# Patient Record
Sex: FEMALE | Race: WHITE | NOT HISPANIC OR LATINO | Employment: OTHER | URBAN - METROPOLITAN AREA
[De-identification: names, ages, dates, MRNs, and addresses within clinical notes are randomized per-mention and may not be internally consistent; named-entity substitution may affect disease eponyms.]

---

## 2017-01-13 DIAGNOSIS — R94.39 ABNORMAL STRESS ECHOCARDIOGRAM: ICD-10-CM

## 2017-01-13 NOTE — PROGRESS NOTES
Patient has abnormal wall motion on DSE and has a family history of heart disease (premature). We will proceed with PET stress for further evaluation and then see her back in clinic.

## 2017-01-16 ENCOUNTER — TELEPHONE (OUTPATIENT)
Dept: CARDIOLOGY | Facility: CLINIC | Age: 62
End: 2017-01-16

## 2017-01-16 NOTE — TELEPHONE ENCOUNTER
----- Message from Citlaly Robison MD sent at 1/13/2017  4:50 PM CST -----  I have reviewed the results.I put in a noted for her stress test. Her baseline heart fucntion is 45-50% and she has family history of CAD. I would like to proceed with PET stress (Dobutamine) and then see her back in clinic for discussion of med adjustments. Please call her and let her know.     Please open telephone encounter, call patient, document conversation, and close encounter. Close encounter unless further questions for me.

## 2017-02-02 ENCOUNTER — TELEPHONE (OUTPATIENT)
Dept: CARDIOLOGY | Facility: CLINIC | Age: 62
End: 2017-02-02

## 2017-02-02 NOTE — TELEPHONE ENCOUNTER
Called to confirm appointment for Nuclear Cardiology PET stress test.   Message left with pre-testing instructions & call back information.

## 2017-02-03 ENCOUNTER — CLINICAL SUPPORT (OUTPATIENT)
Dept: CARDIOLOGY | Facility: CLINIC | Age: 62
End: 2017-02-03
Payer: COMMERCIAL

## 2017-02-03 DIAGNOSIS — R94.39 ABNORMAL STRESS ECHOCARDIOGRAM: ICD-10-CM

## 2017-02-03 PROCEDURE — 93015 CV STRESS TEST SUPVJ I&R: CPT | Mod: S$GLB,,, | Performed by: INTERNAL MEDICINE

## 2017-02-03 PROCEDURE — 78492 MYOCRD IMG PET MLT RST&STRS: CPT | Mod: S$GLB,,, | Performed by: INTERNAL MEDICINE

## 2017-02-03 PROCEDURE — A9555 RB82 RUBIDIUM: HCPCS | Mod: S$GLB,,, | Performed by: INTERNAL MEDICINE

## 2017-02-06 ENCOUNTER — TELEPHONE (OUTPATIENT)
Dept: CARDIOLOGY | Facility: CLINIC | Age: 62
End: 2017-02-06

## 2017-02-06 NOTE — TELEPHONE ENCOUNTER
----- Message from Citlaly Robison MD sent at 2/5/2017  9:18 AM CST -----  I have reviewed the results. Please call her and let her know her PET stress is normal. I will be seeing her next week also.    Please open telephone encounter, call patient, document conversation, and close encounter. Close encounter unless further questions for me.

## 2017-03-30 ENCOUNTER — HOSPITAL ENCOUNTER (OUTPATIENT)
Dept: PULMONOLOGY | Facility: CLINIC | Age: 62
Discharge: HOME | End: 2017-03-30
Payer: COMMERCIAL

## 2017-03-30 ENCOUNTER — TELEPHONE (OUTPATIENT)
Dept: TRANSPLANT | Facility: CLINIC | Age: 62
End: 2017-03-30

## 2017-03-30 ENCOUNTER — OFFICE VISIT (OUTPATIENT)
Dept: CARDIOLOGY | Facility: CLINIC | Age: 62
End: 2017-03-30
Payer: COMMERCIAL

## 2017-03-30 ENCOUNTER — OFFICE VISIT (OUTPATIENT)
Dept: PULMONOLOGY | Facility: CLINIC | Age: 62
End: 2017-03-30
Payer: COMMERCIAL

## 2017-03-30 VITALS
HEIGHT: 71 IN | HEART RATE: 88 BPM | DIASTOLIC BLOOD PRESSURE: 68 MMHG | WEIGHT: 194.25 LBS | BODY MASS INDEX: 27.19 KG/M2 | SYSTOLIC BLOOD PRESSURE: 118 MMHG

## 2017-03-30 VITALS
HEART RATE: 88 BPM | WEIGHT: 193.56 LBS | DIASTOLIC BLOOD PRESSURE: 72 MMHG | BODY MASS INDEX: 27.1 KG/M2 | OXYGEN SATURATION: 97 % | SYSTOLIC BLOOD PRESSURE: 140 MMHG | HEIGHT: 71 IN

## 2017-03-30 VITALS — BODY MASS INDEX: 28.58 KG/M2 | WEIGHT: 193 LBS | HEIGHT: 69 IN

## 2017-03-30 DIAGNOSIS — I27.20 PULMONARY HYPERTENSION: Primary | ICD-10-CM

## 2017-03-30 DIAGNOSIS — I27.20 PULMONARY HYPERTENSION: ICD-10-CM

## 2017-03-30 DIAGNOSIS — E89.0 POSTOPERATIVE HYPOTHYROIDISM: ICD-10-CM

## 2017-03-30 DIAGNOSIS — M35.00 SJOGREN'S SYNDROME: ICD-10-CM

## 2017-03-30 DIAGNOSIS — I73.00 RAYNAUD'S DISEASE WITHOUT GANGRENE: ICD-10-CM

## 2017-03-30 DIAGNOSIS — I50.32 CHRONIC DIASTOLIC HEART FAILURE: ICD-10-CM

## 2017-03-30 DIAGNOSIS — M34.1 CREST (CALCINOSIS, RAYNAUD'S PHENOMENON, ESOPHAGEAL DYSFUNCTION, SCLERODACTYLY, TELANGIECTASIA): ICD-10-CM

## 2017-03-30 DIAGNOSIS — E78.2 MIXED HYPERLIPIDEMIA: ICD-10-CM

## 2017-03-30 DIAGNOSIS — R94.39 ABNORMAL STRESS ECHOCARDIOGRAM: Primary | ICD-10-CM

## 2017-03-30 DIAGNOSIS — J84.10 PULMONARY FIBROSIS: ICD-10-CM

## 2017-03-30 LAB
PRE FEV1 FVC: 85
PRE FEV1: 2.31
PRE FVC: 2.71
PREDICTED FEV1 FVC: 77
PREDICTED FEV1: 2.77
PREDICTED FVC: 3.53

## 2017-03-30 PROCEDURE — 99999 PR PBB SHADOW E&M-EST. PATIENT-LVL III: CPT | Mod: PBBFAC,,, | Performed by: INTERNAL MEDICINE

## 2017-03-30 PROCEDURE — 94010 BREATHING CAPACITY TEST: CPT | Mod: 59,S$GLB,, | Performed by: INTERNAL MEDICINE

## 2017-03-30 PROCEDURE — 99204 OFFICE O/P NEW MOD 45 MIN: CPT | Mod: 25,S$GLB,, | Performed by: INTERNAL MEDICINE

## 2017-03-30 PROCEDURE — 1160F RVW MEDS BY RX/DR IN RCRD: CPT | Mod: S$GLB,,, | Performed by: INTERNAL MEDICINE

## 2017-03-30 PROCEDURE — 99214 OFFICE O/P EST MOD 30 MIN: CPT | Mod: S$GLB,,, | Performed by: INTERNAL MEDICINE

## 2017-03-30 PROCEDURE — 94727 GAS DIL/WSHOT DETER LNG VOL: CPT | Mod: S$GLB,,, | Performed by: INTERNAL MEDICINE

## 2017-03-30 PROCEDURE — 94620 PR PULMONARY STRESS TESTING,SIMPLE: CPT | Mod: S$GLB,,, | Performed by: INTERNAL MEDICINE

## 2017-03-30 PROCEDURE — 94729 DIFFUSING CAPACITY: CPT | Mod: S$GLB,,, | Performed by: INTERNAL MEDICINE

## 2017-03-30 RX ORDER — RISEDRONATE SODIUM 150 MG/1
150 TABLET, FILM COATED ORAL WEEKLY
Refills: 6 | COMMUNITY
Start: 2017-03-23 | End: 2021-03-19

## 2017-03-30 RX ORDER — CARVEDILOL 12.5 MG/1
12.5 TABLET ORAL 2 TIMES DAILY WITH MEALS
Qty: 60 TABLET | Refills: 6 | Status: SHIPPED | OUTPATIENT
Start: 2017-03-30 | End: 2017-05-30 | Stop reason: SDUPTHER

## 2017-03-30 NOTE — MR AVS SNAPSHOT
[facility] - Pulmonary Services  [address]  [facility] [address]  Phone: [phone]                  Fatimah Jonesboscq   3/30/2017 9:30 AM   Office Visit    Description:  Female : 1955   Provider:  Wilma Briones MD   Department:  [facility]           Diagnoses this Visit        Comments    Pulmonary hypertension    -  Primary     Pulmonary fibrosis         CREST (calcinosis, Raynaud's phenomenon, esophageal dysfunction, sclerodactyly, telangiectasia)         Sjogren's syndrome         Raynaud's disease without gangrene                To Do List           Goals (5 Years of Data)     None      [facility]     [facility] -  Assistance  Unless otherwise directed by your provider, please contact [facility], our nurse care line that is available for  assistance.     Registered nurses in the [facility] provide: appointment scheduling, clinical advisement, health education, and other advisory services.  Call: [phone] (toll free)               Medications           Message regarding Medications     Verify the changes and/or additions to your medication regime listed below are the same as discussed with your clinician today.  If any of these changes or additions are incorrect, please notify your healthcare provider.             Verify that the below list of medications is an accurate representation of the medications you are currently taking.  If none reported, the list may be blank. If incorrect, please contact your healthcare provider. Carry this list with you in case of emergency.           Current Medications     albuterol-ipratropium 2.5mg-0.5mg/3mL (DUO-NEB) 0.5 mg-3 mg(2.5 mg base)/3 mL nebulizer solution ONE VIAL in Nebulizer EVERY SIX HOURS AS NEEDED FOR SHORTNESS OF BREATH    ambrisentan (LETAIRIS) 10 MG Tab Take 10 mg by mouth once daily.    atorvastatin (LIPITOR) 10 MG tablet Take 10 mg by mouth once daily.      "azathioprine (IMURAN) 50 mg Tab Take 125 mg by mouth once daily.     BIOTIN ORAL Take 500 mcg by mouth once daily.    budesonide (PULMICORT) 0.5 mg/2 mL nebulizer solution ONE VIAL IN Nebulizer EVERY DAY    CALCIUM CARBONATE/VITAMIN D3 (CALCIUM 500 + D, D3, ORAL) Take by mouth once daily.    DYMISTA 137-50 mcg/spray Spry nassal spray 1 spray by Each Nare route 2 (two) times daily.    folic acid (FOLVITE) 1 MG tablet TWO TABLETS BY MOUTH EVERY DAY    furosemide (LASIX) 40 MG tablet every Mon, Wed, Fri.     gabapentin (NEURONTIN) 600 MG tablet Take by mouth. 2 tablets am and 1/2 tablet pm    KLOR-CON 8 mEq TbSR Take 8 mEq by mouth every Mon, Wed, Fri.     levocetirizine (XYZAL) 5 MG tablet Take 5 mg by mouth every evening.     lorazepam (ATIVAN) 1 MG tablet 1 mg 2 (two) times daily.     multivitamin capsule Take 1 capsule by mouth once daily.    nifedipine (PROCARDIA) 10 MG Cap 10 mg once daily.     NON FORMULARY MEDICATION once daily. AnG    pantoprazole (PROTONIX) 40 MG tablet Take 40 mg by mouth once daily.     predniSONE (DELTASONE) 1 MG tablet Take 2 mg by mouth once daily.    predniSONE (DELTASONE) 5 MG tablet Take 5 mg by mouth once daily.     TIROSINT 137 mcg Cap once daily.     tizanidine (ZANAFLEX) 4 MG tablet every 8 (eight) hours as needed.     VITAMIN D2 50,000 unit capsule 50,000 Units every 30 days.     XOPENEX HFA 45 mcg/actuation inhaler Inhale 1 puff into the lungs every 4 (four) hours as needed.     carvedilol (COREG) 12.5 MG tablet Take 1 tablet (12.5 mg total) by mouth 2 (two) times daily with meals.    risedronate (ACTONEL) 150 MG Tab Take 150 mg by mouth every 30 days.           Clinical Reference Information           Your Vitals Were     BP Pulse Height Weight SpO2 BMI    140/72 88 5' 11" (1.803 m) 87.8 kg (193 lb 9 oz) 97% 27 kg/m2      Blood Pressure          Most Recent Value    BP  (!)  140/72      Allergies as of 3/30/2017     No Known Allergies      Immunizations Administered on Date of " Encounter - 3/30/2017     None      Orders Placed During Today's Visit     Future Labs/Procedures Expected by Expires    CT Chest Without Contrast  3/30/2017 3/30/2018    DLCO-Carbon Monoxide Diffusing Capacity  3/30/2017 3/30/2018    LUNG VOLUMES  3/30/2017 3/30/2018    Spirometry without Bronchodilator  3/30/2017 3/30/2018    Stress test, pulmonary  As directed 3/30/2018      [facility] Sign-Up     Activating your [facility] account is as easy as 1-2-3!     1) Visit [email or web address], select Sign Up Now, enter this activation code and your date of birth, then select Next.  [email or web address]  Expires: 5/14/2017  8:01 AM      2) Create a username and password to use when you visit [facility] in the future and select a security question in case you lose your password and select Next.    3) Enter your e-mail address and click Sign Up!    Additional Information  If you have questions, please e-mail [email or web address] or call [phone] to talk to our [facility] staff. Remember, [facility] is NOT to be used for urgent needs. For medical emergencies, dial 911.         Instructions    Open lung biopsy report/ and slides    Disk with the last 2-3 CT scans chest    Last few pulmonary function test/6mwd results               Language Assistance Services     ATTENTION: Language assistance services are available, free of charge. Please call [phone].      ATENCIÓN: Si habla español, tiene a ascencio disposición servicios gratuitos de asistencia lingüística. Llame al [phone].     CHÚ Ý: N?u b?n nói Ti?ng Vi?t, có các d?ch v? h? tr? ngôn ng? mi?n phí dành cho b?n. G?i s? [phone].         [facility] complies with applicable Federal civil rights laws and does not discriminate on the basis of race, color, national origin, age, disability, or sex.

## 2017-03-30 NOTE — PROGRESS NOTES
Subjective:       Patient ID: Fatimah Cha is a 61 y.o. female.    Chief Complaint: No chief complaint on file.    HPI   Fatimah Cha 61 y.o. female    has a past medical history of Diabetes mellitus; Hypercholesteremia; Hypertension; Hypothyroidism; Interstitial lung disease; Osteopenia; Raynaud disease; and Sjoegren syndrome.    has a past surgical history that includes foot surgery x2; Tubal ligation; Breast biopsy; lumpectomy (benign); and Lung biopsy.   reports that she quit smoking about 38 years ago. Her smoking use included Cigarettes. She has a 5.00 pack-year smoking history. She has never used smokeless tobacco. She reports that she does not use illicit drugs.  Referred by: Dr. Citlaly Robison  Who had no chief complaint listed for this encounter.  The patient's last visit with me was on Visit date not found.    Patient referred by Dr Robison for second opinion for ILD and PH  Patient states she is doing well, has been stable for many years, continues to work.     Diagnosed with thyroid dz Hashimotos at age 29- strong family hx  2001- had raynauds, eventually dx with CREST  2004- dx with Sjogren's   Approximated 4 years ago, was evaluated for ild after collapsing at work. Workup showed ILD and subsequently PH by [facility]  Started on Letairis 4 years ago  2013 had OLB  Has noted no change in sx  Has also been on immuran 150 and 7mg prednisone  Gets pfts 2x per year, with 6mwd  Last ct chest last year  Last [facility] 2014 or 2015  Exercises on her gazelle for 30 min per day  Recently had FLU 2/2017    Review of Systems   Respiratory: Positive for dyspnea on extertion.    All other systems reviewed and are negative.      Objective:      Physical Exam   Constitutional: She is oriented to person, place, and time. She appears well-developed and well-nourished. She appears not cachectic. No distress. She is not obese.   HENT:   Head: Normocephalic.   Nose: Nose normal. No mucosal edema.   Mouth/Throat: Normal  dentition. No oropharyngeal exudate.   Neck: Normal range of motion. Neck supple. No tracheal deviation present.   Cardiovascular: Normal rate, regular rhythm, normal heart sounds and intact distal pulses.  Exam reveals no gallop and no friction rub.    No murmur heard.  Accentuated p2, pasp ~40-50   Pulmonary/Chest: Normal expansion, symmetric chest wall expansion, effort normal and breath sounds normal. No stridor.   Abdominal: Soft. Bowel sounds are normal.   Musculoskeletal: Normal range of motion. She exhibits no edema, tenderness or deformity.   Lymphadenopathy: No supraclavicular adenopathy is present.     She has no cervical adenopathy.   Neurological: She is alert and oriented to person, place, and time. No cranial nerve deficit. Gait normal.   Skin: Skin is warm and dry. No rash noted. She is not diaphoretic. No cyanosis or erythema. No pallor. Nails show no clubbing.   telagiectasias   Psychiatric: She has a normal mood and affect. Her behavior is normal. Judgment and thought content normal.     Personal Diagnostic Review    Pulmonary Function Tests 3/30/2017   Ordering Provider MD Anselmo.   Performing nurse/tech/RT RADHA Diana.   Diagnosis Pulmonary Hypertension   Height 69   Weight 3088   BMI (Calculated) 28.6   Patient Race    6MWT Status not completed   Patient Reported Dyspnea   Was O2 used? No   6MW Distance walked (feet) 400   Distance walked (meters) 121.92   Did patient stop? Yes   How many times? 1   Stop Time 1 139   Did patient restart? No   Type of assistive device(s) used? no assistive devices   Oxygen Saturation 93   Supplemental Oxygen Room Air   Heart Rate 93   Blood Pressure 133/78   Kushal Dyspnea Rating  moderate   Oxygen Saturation 78   Supplemental Oxygen Room Air   Heart Rate 111   Blood Pressure 139/65   Kushal Dyspnea Rating  somewhat heavy   Recovery Time (seconds) 151   Oxygen Saturation 92   Supplemental Oxygen Room Air   Heart Rate 95   Is procedure ready for interpretation?  Yes         Assessment:       1. Pulmonary hypertension    2. Pulmonary fibrosis    3. CREST (calcinosis, Raynaud's phenomenon, esophageal dysfunction, sclerodactyly, telangiectasia)    4. Sjogren's syndrome    5. Raynaud's disease without gangrene        Outpatient Encounter Prescriptions as of 3/30/2017   Medication Sig Dispense Refill    albuterol-ipratropium 2.5mg-0.5mg/3mL (DUO-NEB) 0.5 mg-3 mg(2.5 mg base)/3 mL nebulizer solution ONE VIAL in Nebulizer EVERY SIX HOURS AS NEEDED FOR SHORTNESS OF BREATH  5    ambrisentan (LETAIRIS) 10 MG Tab Take 10 mg by mouth once daily.      atorvastatin (LIPITOR) 10 MG tablet Take 10 mg by mouth once daily.       azathioprine (IMURAN) 50 mg Tab Take 125 mg by mouth once daily.       BIOTIN ORAL Take 500 mcg by mouth once daily.      budesonide (PULMICORT) 0.5 mg/2 mL nebulizer solution ONE VIAL IN Nebulizer EVERY DAY  5    CALCIUM CARBONATE/VITAMIN D3 (CALCIUM 500 + D, D3, ORAL) Take by mouth once daily.      carvedilol (COREG) 12.5 MG tablet Take 1 tablet (12.5 mg total) by mouth 2 (two) times daily with meals. 60 tablet 6    DYMISTA 137-50 mcg/spray Spry nassal spray 1 spray by Each Nare route 2 (two) times daily.  6    folic acid (FOLVITE) 1 MG tablet TWO TABLETS BY MOUTH EVERY DAY  1    furosemide (LASIX) 40 MG tablet every Mon, Wed, Fri.       KLOR-CON 8 mEq TbSR Take 8 mEq by mouth every Mon, Wed, Fri.       levocetirizine (XYZAL) 5 MG tablet Take 5 mg by mouth every evening.       lorazepam (ATIVAN) 1 MG tablet 1 mg 2 (two) times daily.       multivitamin capsule Take 1 capsule by mouth once daily.      nifedipine (PROCARDIA) 10 MG Cap 10 mg once daily.       NON FORMULARY MEDICATION once daily. AnG      pantoprazole (PROTONIX) 40 MG tablet Take 40 mg by mouth once daily.       predniSONE (DELTASONE) 1 MG tablet Take 2 mg by mouth once daily.  1    predniSONE (DELTASONE) 5 MG tablet Take 5 mg by mouth once daily.       TIROSINT 137 mcg Cap once  daily.       tizanidine (ZANAFLEX) 4 MG tablet every 8 (eight) hours as needed.       VITAMIN D2 50,000 unit capsule 50,000 Units every 30 days.       XOPENEX HFA 45 mcg/actuation inhaler Inhale 1 puff into the lungs every 4 (four) hours as needed.       gabapentin (NEURONTIN) 600 MG tablet Take by mouth. 2 tablets am and 1/2 tablet pm  5    risedronate (ACTONEL) 150 MG Tab Take 150 mg by mouth every 30 days.  6     No facility-administered encounter medications on file as of 3/30/2017.      Orders Placed This Encounter   Procedures    CT Chest Without Contrast     Standing Status:   Future     Standing Expiration Date:   3/30/2018    LUNG VOLUMES     Standing Status:   Future     Number of Occurrences:   1     Standing Expiration Date:   3/30/2018    Spirometry without Bronchodilator     Standing Status:   Future     Number of Occurrences:   1     Standing Expiration Date:   3/30/2018    DLCO-Carbon Monoxide Diffusing Capacity     Standing Status:   Future     Number of Occurrences:   1     Standing Expiration Date:   3/30/2018    Stress test, pulmonary     Standing Status:   Future     Number of Occurrences:   1     Standing Expiration Date:   3/30/2018     Plan:           Diagnoses and all orders for this visit:    Pulmonary hypertension  -     CT Chest Without Contrast; Future  -     LUNG VOLUMES; Future  -     Spirometry without Bronchodilator; Future  -     DLCO-Carbon Monoxide Diffusing Capacity; Future  -     Stress test, pulmonary; Future    Pulmonary fibrosis    CREST (calcinosis, Raynaud's phenomenon, esophageal dysfunction, sclerodactyly, telangiectasia)    Sjogren's syndrome    Raynaud's disease without gangrene       I personally reviewed the      1. Echo report stress echo with pasp 20's  2. PFT pending  3. 6MWD pending  4. CXR pending  5. CXR report pending  6. CT chest pending   7. CT chest report pending    Assessment:  ILD  PH  Sjogrens  CREST    Plan:  Reevaluate workup with ct, pfts,  6mwd  Patient to bring us OLB slides and ct scans, pfts  Continue current therapy for now  May need to consider additional therapy for PAH  Patient also seeing Rheumatology here now    Return in about 4 weeks (around 4/27/2017).    Patient Instructions   Open lung biopsy report/ and slides    Disk with the last 2-3 CT scans chest    Last few pulmonary function test/6mwd results                There is no immunization history on file for this patient.

## 2017-03-30 NOTE — PROGRESS NOTES
Subjective:    Patient ID:  Fatimah Cha is a 61 y.o. female who presents for follow-up of Palpitations      HPI     Patient was hospitalized feb 6 for the flu for 2 days.  She has dyspnea on exertion with moderate exertion.  LE edema more pronounce at the end of the day.    When she moves to fast she has a fast higher HR. She does not have palpitations with rest.    Review of Systems   Constitution: Negative.   HENT: Negative.    Eyes: Negative.    Cardiovascular: Positive for dyspnea on exertion, irregular heartbeat, leg swelling and palpitations.   Respiratory: Positive for cough.    Endocrine: Negative.    Skin: Negative.    Musculoskeletal: Negative.         Objective:    Physical Exam   Constitutional: She appears well-developed and well-nourished. She is cooperative.        HENT:   Mouth/Throat: Oropharynx is clear and moist and mucous membranes are normal. Mucous membranes are not cyanotic.   Eyes: Conjunctivae and lids are normal.   Neck: No hepatojugular reflux and no JVD present.   Cardiovascular: Normal rate and regular rhythm.  PMI is not displaced.    Pulses:       Radial pulses are 2+ on the right side, and 2+ on the left side.        Posterior tibial pulses are 2+ on the right side, and 2+ on the left side.   Pulmonary/Chest: Effort normal and breath sounds normal.   No kyphosis or scoliosis.   Abdominal: Normal appearance and bowel sounds are normal. There is no tenderness.   Musculoskeletal:        Right upper leg: She exhibits no edema.        Left upper leg: She exhibits no edema.        Right lower leg: She exhibits no edema.        Left lower leg: She exhibits no edema.   Yes limitations in exercise ability.  Varicose veins.   Neurological: She is alert. She has normal strength.   Skin: Skin is warm and dry. No cyanosis. Nails show no clubbing.   Psychiatric: She has a normal mood and affect.   Test(s) Reviewed  I have reviewed the following in detail:  [x] Stress test   [] Angiography    [x] Echocardiogram   [x] Labs   [] Other:               Assessment:       Abnormal stress echocardiogram  No obstructive coronary disease as per PET stress. It did show endothelial dysfunction.    We will start coreg 12.5 mg twice a day. She will start half a pill twice a day first and then increase gradually.    Pt information handout given and reviewed    Mixed hyperlipidemia  Last lipid profile from 7/19/2016  Total chol 140  HDL 53    LDl 63    She is on Lipitor 10 mg once a day    Chronic diastolic heart failure  She is on Lasix 40 mg MWF. Some day when she takes her medications her BP is low. We will have to monitor with the starting of coreg.    EF of 45-50%    holter showed pvc no arrhythmia    Pulmonary fibrosis  She is on Letaris, prednisone (7 mg maintained) and imuran 75 mg twice a day.    She need to see our pulmonary hypertension clinic  On our echo here our pulmonary pressures were estimated at normal , with normal diastolic function and normal Rv systolic function.    Postoperative hypothyroidism  She is followed by Dr. Madrid on the [facility] . Her TSH is 0.65    Sjogren's syndrome  Consult Rheumatology    CREST (calcinosis, Raynaud's phenomenon, esophageal dysfunction, sclerodactyly, telangiectasia)  Consult rheumatology.         Plan:         Medications Ordered This Encounter      carvedilol (COREG) 12.5 MG tablet   Orders Placed This Encounter   Procedures    Ambulatory consult to Rheumatology    Ambulatory consult to Pulmonology    2D Echo w/ Color Flow Doppler           Follow up in 6 months

## 2017-03-30 NOTE — PATIENT INSTRUCTIONS
Open lung biopsy report/ and slides    Disk with the last 2-3 CT scans chest    Last few pulmonary function test/6mwd results

## 2017-03-30 NOTE — MR AVS SNAPSHOT
[facility]  [address]  [facility] [address]  Phone: [phone]                  Fatimah Jonesboscq   3/30/2017 8:00 AM   Office Visit    Description:  Female : 1955   Provider:  Citlaly Robison MD   Department:  [facility]           Reason for Visit     Palpitations           Diagnoses this Visit        Comments    Abnormal stress echocardiogram    -  Primary     Mixed hyperlipidemia         Chronic diastolic heart failure         Pulmonary fibrosis         Postoperative hypothyroidism         Sjogren's syndrome         CREST (calcinosis, Raynaud's phenomenon, esophageal dysfunction, sclerodactyly, telangiectasia)                To Do List           Goals (5 Years of Data)     None      Follow-Up and Disposition     Return in about 6 months (around 2017).    Follow-up and Disposition History       These Medications        Disp Refills Start End    carvedilol (COREG) 12.5 MG tablet 60 tablet 6 3/30/2017     Take 1 tablet (12.5 mg total) by mouth 2 (two) times daily with meals. - Oral    Pharmacy: [facility] - [place] [address] Ph #: [phone]         [facility]     [facility] Nurse Care Line -  Assistance  Unless otherwise directed by your provider, please contact [facility], our nurse care line that is available for  assistance.     Registered nurses in the [facility] provide: appointment scheduling, clinical advisement, health education, and other advisory services.  Call: [phone] (toll free)               Medications           Message regarding Medications     Verify the changes and/or additions to your medication regime listed below are the same as discussed with your clinician today.  If any of these changes or additions are incorrect, please notify your healthcare provider.        START taking these NEW medications        Refills    carvedilol (COREG) 12.5 MG tablet 6     Sig: Take 1 tablet (12.5 mg total) by mouth 2 (two) times daily with meals.    Class: Normal    Route: Oral           Verify that the below list of medications is an accurate representation of the medications you are currently taking.  If none reported, the list may be blank. If incorrect, please contact your healthcare provider. Carry this list with you in case of emergency.           Current Medications     albuterol-ipratropium 2.5mg-0.5mg/3mL (DUO-NEB) 0.5 mg-3 mg(2.5 mg base)/3 mL nebulizer solution ONE VIAL in Nebulizer EVERY SIX HOURS AS NEEDED FOR SHORTNESS OF BREATH    ambrisentan (LETAIRIS) 10 MG Tab Take 10 mg by mouth once daily.    atorvastatin (LIPITOR) 10 MG tablet Take 10 mg by mouth once daily.     azathioprine (IMURAN) 50 mg Tab Take 125 mg by mouth once daily.     BIOTIN ORAL Take 500 mcg by mouth once daily.    budesonide (PULMICORT) 0.5 mg/2 mL nebulizer solution ONE VIAL IN Nebulizer EVERY DAY    CALCIUM CARBONATE/VITAMIN D3 (CALCIUM 500 + D, D3, ORAL) Take by mouth once daily.    DYMISTA 137-50 mcg/spray Spry nassal spray 1 spray by Each Nare route 2 (two) times daily.    folic acid (FOLVITE) 1 MG tablet TWO TABLETS BY MOUTH EVERY DAY    furosemide (LASIX) 40 MG tablet every Mon, Wed, Fri.     gabapentin (NEURONTIN) 600 MG tablet Take by mouth. 2 tablets am and 1/2 tablet pm    KLOR-CON 8 mEq TbSR Take 8 mEq by mouth every Mon, Wed, Fri.     levocetirizine (XYZAL) 5 MG tablet Take 5 mg by mouth every evening.     lorazepam (ATIVAN) 1 MG tablet 1 mg 2 (two) times daily.     multivitamin capsule Take 1 capsule by mouth once daily.    nifedipine (PROCARDIA) 10 MG Cap 10 mg once daily.     NON FORMULARY MEDICATION once daily. AnG    pantoprazole (PROTONIX) 40 MG tablet Take 40 mg by mouth once daily.     predniSONE (DELTASONE) 1 MG tablet Take 2 mg by mouth once daily.    predniSONE (DELTASONE) 5 MG tablet Take 5 mg by mouth once daily.     TIROSINT 137 mcg Cap once daily.     tizanidine  "(ZANAFLEX) 4 MG tablet every 8 (eight) hours as needed.     VITAMIN D2 50,000 unit capsule 50,000 Units every 30 days.     XOPENEX HFA 45 mcg/actuation inhaler Inhale 1 puff into the lungs every 4 (four) hours as needed.     carvedilol (COREG) 12.5 MG tablet Take 1 tablet (12.5 mg total) by mouth 2 (two) times daily with meals.           Clinical Reference Information           Your Vitals Were     BP Pulse Height Weight BMI    118/68 88 5' 11" (1.803 m) 88.1 kg (194 lb 3.6 oz) 27.09 kg/m2      Blood Pressure          Most Recent Value    Right Arm BP - Sitting  120/80    Left Arm BP - Sitting  118/68    BP  118/68      Allergies as of 3/30/2017     No Known Allergies      Immunizations Administered on Date of Encounter - 3/30/2017     None      Orders Placed During Today's Visit      Normal Orders This Visit    Ambulatory consult to Pulmonology     Ambulatory consult to Rheumatology     Future Labs/Procedures Expected by Expires    2D Echo w/ Color Flow Doppler  9/30/2017 3/30/2018      [facility] Sign-Up     Activating your [facility] account is as easy as 1-2-3!     1) Visit [email or web address], select Sign Up Now, enter this activation code and your date of birth, then select Next.  [email or web address]  Expires: 5/14/2017  8:01 AM      2) Create a username and password to use when you visit [facility] in the future and select a security question in case you lose your password and select Next.    3) Enter your e-mail address and click Sign Up!    Additional Information  If you have questions, please e-mail [email or web address] or call [phone] to talk to our [facility] staff. Remember, [facility] is NOT to be used for urgent needs. For medical emergencies, dial 911.         Language Assistance Services     ATTENTION: Language assistance services are available, free of charge. Please call [phone].      ATENCIÓN: Si habla español, tiene a ascencio disposición servicios gratuitos de asistencia lingüística. Llame al " [phone].     VILLA Ý: N?u b?n nói Ti?ng Vi?t, có các d?ch v? h? tr? ngôn ng? mi?n phí dành cho b?n. G?i s? [phone].         [facility] complies with applicable Federal civil rights laws and does not discriminate on the basis of race, color, national origin, age, disability, or sex.

## 2017-03-30 NOTE — ASSESSMENT & PLAN NOTE
Last lipid profile from 7/19/2016  Total chol 140  HDL 53    LDl 63    She is on Lipitor 10 mg once a day

## 2017-03-30 NOTE — ASSESSMENT & PLAN NOTE
She is on Lasix 40 mg MWF. Some day when she takes her medications her BP is low. We will have to monitor with the starting of coreg.    EF of 45-50%    holter showed pvc no arrhythmia

## 2017-03-30 NOTE — ASSESSMENT & PLAN NOTE
No obstructive coronary disease as per PET stress. It did show endothelial dysfunction.    We will start coreg 12.5 mg twice a day. She will start half a pill twice a day first and then increase gradually.    Pt information handout given and reviewed

## 2017-03-30 NOTE — ASSESSMENT & PLAN NOTE
She is on Letaris, prednisone (7 mg maintained) and imuran 75 mg twice a day.    She need to see our pulmonary hypertension clinic  On our echo here our pulmonary pressures were estimated at normal , with normal diastolic function and normal Rv systolic function.

## 2017-03-30 NOTE — LETTER
March 30, 2017      Citlaly Robison MD  [address] [facility] [address]           [facility]  [address] [facility]  [facility] [address]  Phone: [phone]          Patient: Fatimah Cha   MR Number: [id number]   YOB: 1955   Date of Visit: 3/30/2017       Dear Dr. Citlaly Robison:    Thank you for referring Fatimah Cha to me for evaluation. Attached you will find relevant portions of my assessment and plan of care.    If you have questions, please do not hesitate to call me. I look forward to following Fatimah Cha along with you.    Sincerely,    Wilma Briones MD    Enclosure  CC:  No Recipients    If you would like to receive this communication electronically, please contact [email or web address] or [phone] to request more information on [facility] access.    For providers and/or their staff who would like to refer a patient to [facility], please contact us through our one-stop-shop provider referral line, [facility], at [phone].    If you feel you have received this communication in error or would no longer like to receive these types of communications, please e-mail [email or web address]

## 2017-03-30 NOTE — TELEPHONE ENCOUNTER
----- Message from Elizabeth Bland sent at 3/30/2017 11:31 AM CDT -----      ----- Message -----     From: Aye Perdomo MA     Sent: 3/30/2017   8:26 AM       To: [facility] Pre-Heart Transplant Clinical    Dr Robison would like this patient to be seen in Pulmonary Hypertension.  She says if anyone has a problem to call her Dr Robison.  Thanks

## 2017-03-31 ENCOUNTER — HOSPITAL ENCOUNTER (OUTPATIENT)
Dept: RADIOLOGY | Facility: HOSPITAL | Age: 62
Discharge: HOME | End: 2017-03-31
Attending: INTERNAL MEDICINE
Payer: COMMERCIAL

## 2017-03-31 DIAGNOSIS — I27.20 PULMONARY HYPERTENSION: ICD-10-CM

## 2017-03-31 PROCEDURE — 71250 CT THORAX DX C-: CPT | Mod: TC

## 2017-03-31 PROCEDURE — 71250 CT THORAX DX C-: CPT | Mod: 26,,, | Performed by: RADIOLOGY

## 2017-03-31 NOTE — PROCEDURES
Fatimah Cha is a 61 y.o.  female patient, who presents for a 6 minute walk test ordered by Marlena Briones MD.  The diagnosis is Pulmonary Hypertension.  The patient's BMI is 28.6 kg/m2.  Predicted distance (lower limit of normal) is 338.08 meters.      Test Results:    The test was not completed due to shortness of breath.  The patient stopped 1 time for a total of 221 seconds.  The total time walked was 139 seconds.  During walking, the patient reported:  Dyspnea. The patient used no assistive devices  during testing.     03/30/2017---------Distance: 121.92 meters (400 feet)     O2 Sat % Supplemental Oxygen Heart Rate Blood Pressure Kushal Scale   Pre-exercise  (Resting) 93 % Room Air 93 bpm 133/78 mmHg 3   During Exercise 78 % Room Air 111 bpm 139/65 mmHg 4   Post-exercise  (Recovery) 92 % Room Air  95 bpm       Recovery Time: 151 seconds    Performing nurse/tech: RADHA Diana.      PREVIOUS STUDY:   The patient has not had a previous study.      CLINICAL INTERPRETATION:  Six minute walk distance is 121.92 meters (400 feet) with somewhat heavy dyspnea.  During exercise, there was significant desaturation while breathing room air.  Both blood pressure and heart rate remained stable with walking.  The patient did not report non-pulmonary symptoms during exercise.  Severe exercise impairment is likely due to desaturation and subjective symptoms.  The patient did not complete the study, walking 139 seconds of the 360 second test.  The patient may benefit from using supplemental oxygen during exertion and lung transplant evaluation.  No previous study performed.  Based upon age and body mass index, exercise capacity is less than predicted.

## 2017-04-26 ENCOUNTER — TELEPHONE (OUTPATIENT)
Dept: PULMONOLOGY | Facility: CLINIC | Age: 62
End: 2017-04-26

## 2017-04-26 ENCOUNTER — TELEPHONE (OUTPATIENT)
Dept: TRANSPLANT | Facility: CLINIC | Age: 62
End: 2017-04-26

## 2017-04-26 DIAGNOSIS — I27.9 CHRONIC PULMONARY HEART DISEASE: ICD-10-CM

## 2017-04-26 DIAGNOSIS — Z79.01 LONG TERM (CURRENT) USE OF ANTICOAGULANTS: ICD-10-CM

## 2017-04-26 DIAGNOSIS — Z79.899 POLYPHARMACY: Primary | ICD-10-CM

## 2017-04-26 DIAGNOSIS — R06.82 TACHYPNEA: ICD-10-CM

## 2017-04-26 NOTE — TELEPHONE ENCOUNTER
----- Message from Pernell Aleman sent at 4/26/2017 10:15 AM CDT -----  Contact: Patient  Patient is returning a call from Dr. Briones    Please call [phone].

## 2017-04-26 NOTE — TELEPHONE ENCOUNTER
----- Message from Wilma Briones MD sent at 4/26/2017 11:22 AM CDT -----  Please set patient up for RHC. I have spoken to her. Thanks, SJ

## 2017-05-11 ENCOUNTER — TELEPHONE (OUTPATIENT)
Dept: TRANSPLANT | Facility: CLINIC | Age: 62
End: 2017-05-11

## 2017-05-11 NOTE — TELEPHONE ENCOUNTER
"Returned call. Patient had questions about RHC and appointment. Confirmed the date of the cath for May 17th. Patient has had several caths and wants to make sure the staff knows that she will be wearing socks and gloves, because she suffers from Raynaud's. Also, she is very claustrophobic which has gotten worse since she started wearing oxygen. Additionally, she reports that she "turns blue" when she lies flat. Reassured patient and let her know that she would have time to talk with the doctor and the nurses pre-procedure so that they can address any concerns she might have.  Will discuss follow-up appointment after RHC.  "

## 2017-05-12 ENCOUNTER — TELEPHONE (OUTPATIENT)
Dept: TRANSPLANT | Facility: CLINIC | Age: 62
End: 2017-05-12

## 2017-05-12 DIAGNOSIS — Z76.82 LUNG TRANSPLANT CANDIDATE: ICD-10-CM

## 2017-05-12 DIAGNOSIS — J84.10 PULMONARY FIBROSIS: ICD-10-CM

## 2017-05-12 DIAGNOSIS — I27.20 PULMONARY HYPERTENSION: ICD-10-CM

## 2017-05-12 DIAGNOSIS — M35.02 SJOGREN'S SYNDROME WITH LUNG INVOLVEMENT: Primary | ICD-10-CM

## 2017-05-17 ENCOUNTER — HOSPITAL ENCOUNTER (OUTPATIENT)
Facility: HOSPITAL | Age: 62
Discharge: HOME | End: 2017-05-17
Attending: INTERNAL MEDICINE | Admitting: INTERNAL MEDICINE
Payer: COMMERCIAL

## 2017-05-17 DIAGNOSIS — I27.29 OTHER SECONDARY PULMONARY HYPERTENSION: ICD-10-CM

## 2017-05-17 DIAGNOSIS — M34.1 CREST (CALCINOSIS, RAYNAUD'S PHENOMENON, ESOPHAGEAL DYSFUNCTION, SCLERODACTYLY, TELANGIECTASIA): Primary | ICD-10-CM

## 2017-05-17 DIAGNOSIS — J84.10 PULMONARY FIBROSIS: ICD-10-CM

## 2017-05-17 PROCEDURE — 25000003 PHARM REV CODE 250: Mod: TXP

## 2017-05-17 PROCEDURE — C1894 INTRO/SHEATH, NON-LASER: HCPCS | Mod: TXP

## 2017-05-17 PROCEDURE — 93451 RIGHT HEART CATH: CPT | Mod: 26,NTX,, | Performed by: INTERNAL MEDICINE

## 2017-05-17 RX ORDER — ASPIRIN 81 MG/1
81 TABLET ORAL NIGHTLY
COMMUNITY

## 2017-05-17 RX ORDER — PANTOPRAZOLE SODIUM 40 MG/1
40 TABLET, DELAYED RELEASE ORAL DAILY
COMMUNITY
End: 2017-09-19

## 2017-05-17 NOTE — DISCHARGE SUMMARY
Admit 05/17/2017  Discharge  05/17/2017  Discharge / Principle diagnosis  pulmonary hypertension.    Discharge attending Cherise Bower MD  Hospital course.  Came in for RHC . Tolerated procedure well (see full results in cardiac procedure section).  Results discussed with patient / caregiver.   Discharge condition / disposition Good / home   discharge activity activity as tolerated    Discharge diet diet as tolerated / unchanged from admission  Discharge medication   No current facility-administered medications on file prior to encounter.      Current Outpatient Prescriptions on File Prior to Encounter   Medication Sig    ambrisentan (LETAIRIS) 10 MG Tab Take 10 mg by mouth once daily.    atorvastatin (LIPITOR) 10 MG tablet Take 10 mg by mouth once daily.     carvedilol (COREG) 12.5 MG tablet Take 1 tablet (12.5 mg total) by mouth 2 (two) times daily with meals.    DYMISTA 137-50 mcg/spray Spry nassal spray 1 spray by Each Nare route 2 (two) times daily.    folic acid (FOLVITE) 1 MG tablet TWO TABLETS BY MOUTH EVERY DAY    furosemide (LASIX) 40 MG tablet every Mon, Wed, Fri.     KLOR-CON 8 mEq TbSR Take 8 mEq by mouth every Mon, Wed, Fri.     lorazepam (ATIVAN) 1 MG tablet 1 mg 2 (two) times daily.     multivitamin capsule Take 1 capsule by mouth once daily.    predniSONE (DELTASONE) 1 MG tablet Take 2 mg by mouth once daily.    predniSONE (DELTASONE) 5 MG tablet Take 5 mg by mouth once daily.     risedronate (ACTONEL) 150 MG Tab Take 150 mg by mouth every 30 days.    TIROSINT 137 mcg Cap once daily.     VITAMIN D2 50,000 unit capsule 50,000 Units every 30 days.     albuterol-ipratropium 2.5mg-0.5mg/3mL (DUO-NEB) 0.5 mg-3 mg(2.5 mg base)/3 mL nebulizer solution ONE VIAL in Nebulizer EVERY SIX HOURS AS NEEDED FOR SHORTNESS OF BREATH    azathioprine (IMURAN) 50 mg Tab Take 50 mg by mouth 3 (three) times daily.     BIOTIN ORAL Take 500 mcg by mouth once daily.    budesonide (PULMICORT) 0.5 mg/2 mL  nebulizer solution ONE VIAL IN Nebulizer EVERY DAY    CALCIUM CARBONATE/VITAMIN D3 (CALCIUM 500 + D, D3, ORAL) Take by mouth once daily.    levocetirizine (XYZAL) 5 MG tablet Take 5 mg by mouth every evening.     nifedipine (PROCARDIA) 10 MG Cap 10 mg 3 (three) times daily.     NON FORMULARY MEDICATION once daily. AnG    pantoprazole (PROTONIX) 40 MG tablet Take 40 mg by mouth once daily.     tizanidine (ZANAFLEX) 4 MG tablet every 8 (eight) hours as needed.     XOPENEX HFA 45 mcg/actuation inhaler Inhale 1 puff into the lungs every 4 (four) hours as needed.     [DISCONTINUED] gabapentin (NEURONTIN) 600 MG tablet Take by mouth. 2 tablets am and 1/2 tablet pm     Followup in clinic as scheduled

## 2017-05-17 NOTE — H&P
Subjective:      Fatimah Cha is a 61 y.o. female with a who needs RHC  for evaluation of pulmonary hypertension.  Currently able to lay flat.      Past Medical History:   Diagnosis Date    Diabetes mellitus     Hypercholesteremia     Hypertension     Hypothyroidism     Interstitial lung disease     Osteopenia     Raynaud disease     Sjoegren syndrome      Past Surgical History:   Procedure Laterality Date    BREAST BIOPSY      foot surgery x2      lumpectomy (benign)      LUNG BIOPSY      TUBAL LIGATION       Social History     Social History    Marital status:      Spouse name: N/A    Number of children: N/A    Years of education: N/A     Occupational History    Not on file.     Social History Main Topics    Smoking status: Former Smoker     Packs/day: 1.00     Years: 5.00     Types: Cigarettes     Quit date: 3/30/1979    Smokeless tobacco: Never Used    Alcohol use Not on file    Drug use: No    Sexual activity: Not on file     Other Topics Concern    Not on file     Social History Narrative     Family History   Problem Relation Age of Onset    Breast cancer Neg Hx     Colon cancer Neg Hx     Ovarian cancer Neg Hx            Other significant clinical information:  Review of patient's allergies indicates:  No Known Allergies  No current facility-administered medications on file prior to encounter.      Current Outpatient Prescriptions on File Prior to Encounter   Medication Sig    TIROSINT 137 mcg Cap once daily.     albuterol-ipratropium 2.5mg-0.5mg/3mL (DUO-NEB) 0.5 mg-3 mg(2.5 mg base)/3 mL nebulizer solution ONE VIAL in Nebulizer EVERY SIX HOURS AS NEEDED FOR SHORTNESS OF BREATH    ambrisentan (LETAIRIS) 10 MG Tab Take 10 mg by mouth once daily.    atorvastatin (LIPITOR) 10 MG tablet Take 10 mg by mouth once daily.     azathioprine (IMURAN) 50 mg Tab Take 125 mg by mouth once daily.     BIOTIN ORAL Take 500 mcg by mouth once daily.    budesonide (PULMICORT) 0.5  mg/2 mL nebulizer solution ONE VIAL IN Nebulizer EVERY DAY    CALCIUM CARBONATE/VITAMIN D3 (CALCIUM 500 + D, D3, ORAL) Take by mouth once daily.    carvedilol (COREG) 12.5 MG tablet Take 1 tablet (12.5 mg total) by mouth 2 (two) times daily with meals.    DYMISTA 137-50 mcg/spray Spry nassal spray 1 spray by Each Nare route 2 (two) times daily.    folic acid (FOLVITE) 1 MG tablet TWO TABLETS BY MOUTH EVERY DAY    furosemide (LASIX) 40 MG tablet every Mon, Wed, Fri.     gabapentin (NEURONTIN) 600 MG tablet Take by mouth. 2 tablets am and 1/2 tablet pm    KLOR-CON 8 mEq TbSR Take 8 mEq by mouth every Mon, Wed, Fri.     levocetirizine (XYZAL) 5 MG tablet Take 5 mg by mouth every evening.     lorazepam (ATIVAN) 1 MG tablet 1 mg 2 (two) times daily.     multivitamin capsule Take 1 capsule by mouth once daily.    nifedipine (PROCARDIA) 10 MG Cap 10 mg once daily.     NON FORMULARY MEDICATION once daily. AnG    pantoprazole (PROTONIX) 40 MG tablet Take 40 mg by mouth once daily.     predniSONE (DELTASONE) 1 MG tablet Take 2 mg by mouth once daily.    predniSONE (DELTASONE) 5 MG tablet Take 5 mg by mouth once daily.     risedronate (ACTONEL) 150 MG Tab Take 150 mg by mouth every 30 days.    tizanidine (ZANAFLEX) 4 MG tablet every 8 (eight) hours as needed.     VITAMIN D2 50,000 unit capsule 50,000 Units every 30 days.     XOPENEX HFA 45 mcg/actuation inhaler Inhale 1 puff into the lungs every 4 (four) hours as needed.             Objective:      Physical Exam     / 66   HR 63   PULSE ox 96% on three liters    General Appearance:    Alert, cooperative, no distress, appears stated age   Head:    Normocephalic, without obvious abnormality, atraumatic   Eyes:    PERRL, conjunctiva/corneas clear, EOM's intact, fundi     benign, both eyes   Ears:    Normal TM's and external ear canals, both ears   Nose:   Nares normal, septum midline, mucosa normal, no drainage    or sinus tenderness   Throat:   Lips,  mucosa, and tongue normal; teeth and gums normal   Neck:   Supple, symmetrical, trachea midline, no adenopathy;     thyroid:  no enlargement/tenderness/nodules; no carotid    bruit or JVD   Back:     Symmetric, no curvature, ROM normal, no CVA tenderness   Lungs:     Clear to auscultation bilaterally, respirations unlabored   Chest Wall:    No tenderness or deformity    Heart:    Regular rate and rhythm, S1 and S2 normal, no murmur, rub   or gallop   Breast Exam:    No tenderness, masses, or nipple abnormality   Abdomen:     Soft, non-tender, bowel sounds active all four quadrants,     no masses, no organomegaly   Genitalia:    Normal female without lesion, discharge or tenderness   Rectal:    Normal tone, no masses or tenderness;    guaiac negative stool   Extremities:   Extremities normal, atraumatic, no cyanosis or edema   Pulses:   2+ and symmetric all extremities   Skin:   Skin color, texture, turgor normal, no rashes or lesions   Lymph nodes:   Cervical, supraclavicular, and axillary nodes normal   Neurologic:   CNII-XII intact, normal strength, sensation and reflexes     throughout         Lab Review   Lab Results   Component Value Date    WBC 5.15 05/17/2017    HGB 12.7 05/17/2017    HCT 37.9 05/17/2017    MCV 92 05/17/2017     05/17/2017     Lab Results   Component Value Date    INR 0.9 05/17/2017           Assessment:       Plan:     I have explained the risks, benefits, and alternatives of the procedure in detail.  The patient expresses understanding and all questions have been answered.  The patient agrees to the proceed as planned.  RHC via RIJ   Micropuncture access needle will be used to minimize bleeding risk.

## 2017-05-17 NOTE — DISCHARGE INSTRUCTIONS

## 2017-05-17 NOTE — IP AVS SNAPSHOT
[facility]  [address]  [facility] [address]  Phone: [phone]           Patient Discharge Instructions   Our goal is to set you up for success. This packet includes information on your condition, medications, and your home care.  It will help you care for yourself to prevent having to return to the hospital.     Please ask your nurse if you have any questions.      There are many details to remember when preparing to leave the hospital. Here is what you will need to do:    1. Take your medicine. If you are prescribed medications, review your Medication List on the following pages. You may have new medications to  at the pharmacy and others that you'll need to stop taking. Review the instructions for how and when to take your medications. Talk with your doctor or nurses if you are unsure of what to do.     2. Go to your follow-up appointments. Specific follow-up information is listed in the following pages. Your may be contacted by a nurse or clinical provider about future appointments. Be sure we have all of the phone numbers to reach you. Please contact your provider's office if you are unable to make an appointment.     3. Watch for warning signs. Your doctor or nurse will give you detailed warning signs to watch for and when to call for assistance. These instructions may also include educational information about your condition. If you experience any of warning signs to your health, call your doctor.           [facility]  Unless otherwise directed by your provider, please   contact [facility], our nurse care line   that is available for 24/7 assistance.     [phone] (toll-free)     Registered nurses in the [facility]   provide: appointment scheduling, clinical advisement, health education, and other advisory services.                  ** Verify the list of medication(s) below is accurate and up to date. Carry this with you in case of  emergency. If your medications have changed, please notify your healthcare provider.             Medication List      ASK your doctor about these medications        Additional Info                      albuterol-ipratropium 2.5mg-0.5mg/3mL 0.5 mg-3 mg(2.5 mg base)/3 mL nebulizer solution   Commonly known as:  DUO-NEB   Refills:  5    Instructions:  ONE VIAL in Nebulizer EVERY SIX HOURS AS NEEDED FOR SHORTNESS OF BREATH     Begin Date    AM    Noon    PM    Bedtime       aspirin 81 MG EC tablet   Commonly known as:  ECOTRIN   Refills:  0   Dose:  81 mg    Instructions:  Take 81 mg by mouth every evening.     Begin Date    AM    Noon    PM    Bedtime       atorvastatin 10 MG tablet   Commonly known as:  LIPITOR   Refills:  0   Dose:  10 mg    Instructions:  Take 10 mg by mouth once daily.     Begin Date    AM    Noon    PM    Bedtime       BIOTIN ORAL   Refills:  0   Dose:  500 mcg    Instructions:  Take 500 mcg by mouth once daily.     Begin Date    AM    Noon    PM    Bedtime       budesonide 0.5 mg/2 mL nebulizer solution   Commonly known as:  PULMICORT   Refills:  5    Instructions:  ONE VIAL IN Nebulizer EVERY DAY     Begin Date    AM    Noon    PM    Bedtime       CALCIUM 500 + D (D3) ORAL   Refills:  0    Instructions:  Take by mouth once daily.     Begin Date    AM    Noon    PM    Bedtime       carvedilol 12.5 MG tablet   Commonly known as:  COREG   Quantity:  60 tablet   Refills:  6   Dose:  12.5 mg    Instructions:  Take 1 tablet (12.5 mg total) by mouth 2 (two) times daily with meals.     Begin Date    AM    Noon    PM    Bedtime       DYMISTA 137-50 mcg/spray [place] nassal spray   Refills:  6   Dose:  1 spray   Generic drug:  azelastine-fluticasone    Instructions:  1 spray by Each Nare route 2 (two) times daily.     Begin Date    AM    Noon    PM    Bedtime       folic acid 1 MG tablet   Commonly known as:  FOLVITE   Refills:  1    Instructions:  TWO TABLETS BY MOUTH EVERY DAY     Begin Date    AM    Noon     PM    Bedtime       furosemide 40 MG tablet   Commonly known as:  LASIX   Refills:  0    Instructions:  every Mon, Wed, Fri.     Begin Date    AM    Noon    PM    Bedtime       IMURAN 50 mg Tab   Refills:  0   Dose:  50 mg   Generic drug:  azathioprine    Instructions:  Take 50 mg by mouth 3 (three) times daily.     Begin Date    AM    Noon    PM    Bedtime       KLOR-CON 8 8 MEQ Tbsr   Refills:  0   Dose:  8 mEq   Generic drug:  potassium chloride    Instructions:  Take 8 mEq by mouth every Mon, Wed, Fri.     Begin Date    AM    Noon    PM    Bedtime       LETAIRIS 10 MG Tab   Refills:  0   Dose:  10 mg   Generic drug:  ambrisentan    Instructions:  Take 10 mg by mouth once daily.     Begin Date    AM    Noon    PM    Bedtime       levocetirizine 5 MG tablet   Commonly known as:  XYZAL   Refills:  0   Dose:  5 mg    Instructions:  Take 5 mg by mouth every evening.     Begin Date    AM    Noon    PM    Bedtime       lorazepam 1 MG tablet   Commonly known as:  ATIVAN   Refills:  0   Dose:  1 mg    Instructions:  1 mg 2 (two) times daily.     Begin Date    AM    Noon    PM    Bedtime       multivitamin capsule   Refills:  0   Dose:  1 capsule    Instructions:  Take 1 capsule by mouth once daily.     Begin Date    AM    Noon    PM    Bedtime       nifedipine 10 MG Cap   Commonly known as:  PROCARDIA   Refills:  0   Dose:  10 mg    Instructions:  10 mg 3 (three) times daily.     Begin Date    AM    Noon    PM    Bedtime       NON FORMULARY MEDICATION   Refills:  0    Instructions:  once daily. AnG     Begin Date    AM    Noon    PM    Bedtime       * pantoprazole 40 MG tablet   Commonly known as:  PROTONIX   Refills:  0   Dose:  40 mg    Instructions:  Take 40 mg by mouth once daily.     Begin Date    AM    Noon    PM    Bedtime       * pantoprazole 40 MG tablet   Commonly known as:  PROTONIX   Refills:  0   Dose:  40 mg    Instructions:  Take 40 mg by mouth once daily.     Begin Date    AM    Noon    PM    Bedtime        * predniSONE 5 MG tablet   Commonly known as:  DELTASONE   Refills:  0   Dose:  5 mg    Instructions:  Take 5 mg by mouth once daily.     Begin Date    AM    Noon    PM    Bedtime       * predniSONE 1 MG tablet   Commonly known as:  DELTASONE   Refills:  1   Dose:  2 mg    Instructions:  Take 2 mg by mouth once daily.     Begin Date    AM    Noon    PM    Bedtime       risedronate 150 MG Tab   Commonly known as:  ACTONEL   Refills:  6   Dose:  150 mg    Instructions:  Take 150 mg by mouth every 30 days.     Begin Date    AM    Noon    PM    Bedtime       TIROSINT 137 mcg Cap   Refills:  0   Generic drug:  levothyroxine    Instructions:  once daily.     Begin Date    AM    Noon    PM    Bedtime       tizanidine 4 MG tablet   Commonly known as:  ZANAFLEX   Refills:  0    Instructions:  every 8 (eight) hours as needed.     Begin Date    AM    Noon    PM    Bedtime       VITAMIN D2 50,000 unit Cap   Refills:  0   Dose:  74746 Units   Generic drug:  ergocalciferol    Instructions:  50,000 Units every 30 days.     Begin Date    AM    Noon    PM    Bedtime       XOPENEX HFA 45 mcg/actuation inhaler   Refills:  0   Dose:  1 puff   Generic drug:  levalbuterol    Instructions:  Inhale 1 puff into the lungs every 4 (four) hours as needed.     Begin Date    AM    Noon    PM    Bedtime       * Notice:  This list has 4 medication(s) that are the same as other medications prescribed for you. Read the directions carefully, and ask your doctor or other care provider to review them with you.               Please bring to all follow up appointments:    1. A copy of your discharge instructions.  2. All medicines you are currently taking in their original bottles.  3. Identification and insurance card.    Please arrive 15 minutes ahead of scheduled appointment time.    Please call 24 hours in advance if you must reschedule your appointment and/or time.        Your Scheduled Appointments     Jun 23, 2017  8:00 AM CDT   Consult with  MD Demetrio Coreas - Rheumatology ([facility] )    [address]  [place] [address] [phone]            Aug 30, 2017  9:00 AM CDT   Established Patient Visit with MD Demetrio Marquis - Pulmonary Services ([facility] )    [address]  [place] [address] [phone]                  Discharge Instructions       AFTER THE PROCEDURE:  -You may remove the bandage in 24 hours and wash with soap and water.  -You may shower, but do not soak in a tub for three days.   PRECAUTIONS FOR THE NEXT 24 HOURS:  -If you need to cough, sneeze, have a bowel movement, or bear down, hold pressure over your bandage.  -Do not  anything heavier than a gallon of milk(about 5 pounds)  -Avoid excessive bending over.  SYMPTOMS TO WATCH FOR AND REPORT TO YOUR DOCTOR:  -BLEEDING: hold pressure over the site until bleeding stops. Proceed to Emergency Room by ambulance (do not drive yourself) if unable to stop bleeding. Notify your doctor.  -HEMATOMA(hard bruise under the skin): Blake around the bruise if one develops. Call your doctor if it increases in size or if you have difficulty talking, swallowing, breathing or anything unusual.  SIGNS OF INFECTION:Fever (temperature over 100.5 F), pus or redness  -RASH  -CHEST PAIN OR SHORTNESS OF BREATH    You may call you coordinator in the Heart Failure/Heart Transplant/Pulmonary Hypertension Clinic at [phone] during normal business hours(Monday through Friday from 8 A.M. to 5 P.M.) After hours, call the Heart Transplant Service doctor on call at [phone].        Admission Information     Date & Time Provider Department CSN    5/17/2017  7:37 AM Renetta Russell MD [facility] [id number]      Care Providers     Provider Role Specialty Primary office phone    Renetta Russell MD Attending Provider Cardiology [phone]      Recent Lab Values     No lab values to display.       Allergies as of 5/17/2017     No Known Allergies      Advance Directives     An advance directive is a document which, in the event you are no longer able to make decisions for yourself, tells your healthcare team what kind of treatment you do or do not want to receive, or who you would like to make those decisions for you.  If you do not currently have an advance directive, [facility] encourages you to create one.  For more information call:  [phone]), [phone] ([phone]),  or log on to [email or web address].        Smoking Cessation     If you would like to quit smoking:   You may be eligible for free services if you are a [place] resident and started smoking cigarettes before September 1, 1988.  Call the Smoking Cessation Trust (SCT) toll free at ([phone] [phone] or ([phone] [phone].   Call 9-442-QUIT-NOW if you do not meet the above criteria.   Contact us via email: [email or web address]   View our website for more information: [email or web address]        Language Assistance Services     ATTENTION: Language assistance services are available, free of charge. Please call [phone].      ATENCIÓN: Si habla español, tiene a ascencio disposición servicios gratuitos de asistencia lingüística. Llame al [phone].     CHÚ Ý: N?u b?n nói Ti?ng Vi?t, có các d?ch v? h? tr? ngôn ng? mi?n phí dành cho b?n. G?i s? [phone].        Heart Failure Education       Heart Failure: Being Active  You have a condition called heart failure. Being active doesnt mean that you have to wear yourself out. Even a little movement each day helps to strengthen your heart. If you cant get out to exercise, you can do simple stretching and strengthening exercises at home. These are good ways to keep you well-conditioned and prevent you and your heart from becoming excessively weak.    Ideas to get you started  · Add a little movement to things you do now. Walk to mail letters. Park your car at  the far end of the parking lot and walk to the store. Walk up a flight of stairs instead of taking the elevator.  · Choose activities you enjoy. You might walk, swim, or ride an exercise bike. Things like gardening and washing the car count, too. Other possibilities include: washing dishes, walking the dog, walking around the mall, and doing aerobic activities with friends.  · Join a group exercise program at a [facility], a senior center, or a community center. Or look into a hospital cardiac rehabilitation program. Ask your doctor if you qualify.  Tips to keep you going  · Get up and get dressed each day. Go to a coffee shop and read a newspaper or go somewhere that you'll be in the presence of other active people. Youll feel more like being active.  · Make a plan. Choose one or more activities that you enjoy and that you can easily do. Then plan to do at least one each day. You might write your plan on a calendar.  · Go with a friend or a group if you like company. This can help you feel supported and stay motivated, too.  · Plan social events that you enjoy. This will keep you mentally engaged as well as physically motivated to do things you find pleasure in.  For your safety  · Talk with your healthcare provider before starting an exercise program.  · Exercise indoors when its too hot or too cold outside, or when the air quality is poor. Try walking at a shopping mall.  · Wear socks and sturdy shoes to maintain your balance and prevent falls.  · Start slowly. Do a few minutes several times a day at first. Increase your time and speed little by little.  · Stop and rest whenever you feel tired or get short of breath.  · Dont push yourself on days when you dont feel well.  Date Last Reviewed: 3/20/2016  © 3733-3567 [employer]. [address] [place] [address], [place] [address]. All rights reserved. This information is not intended as a substitute for professional medical care. Always follow your  healthcare professional's instructions.              Heart Failure: Evaluating Your Heart  You have a condition called heart failure. To evaluate your condition, your doctor will examine you, ask questions, and do some tests. Along with looking for signs of heart failure, the doctor looks for any other health problems that may have led to heart failure. The results of your evaluation will help your doctor form a treatment plan.  Health history and physical exam  Your visit will start with a health history. Tell the doctor about any symptoms youve noticed and about all medicines you take. Then youll have a physical exam. This includes listening to your heartbeat and breathing. Youll also be checked for swelling (edema) in your legs and neck. When you have fluid buildup or fluid in the lungs, it may be called congestive heart failure.  Diagnosing heart failure     During an echocardiogram, sound waves bounce off the heart. These are converted into a picture on the screen.   The following may be done to help your doctor form a diagnosis:  · X-rays show the size and shape of your heart. These pictures can also show fluid in your lungs.  · An electrocardiogram (ECG or EKG) shows the pattern of your heartbeat. Small pads (electrodes) are placed on your chest, arms, and legs. Wires connect the pads to the ECG machine, which records your hearts electrical signals. This can give the doctor information about heart function.  · An echocardiogram uses ultrasound waves to show the structure and movement of your heart muscle. This shows how well the heart pumps. It also shows the thickness of the heart walls, and if the heart is enlarged. It is one of the most useful, non-invasive tests as it provides information about the heart's general function. This helps your doctor make treatment decisions.  · Lab tests evaluate small amounts of blood or urine for signs of problems. A BNP lab test can help diagnose and evaluate heart  failure. BNP stands for B-type natriuretic peptide. The ventricles secrete more BNP when heart failure worsens. Lab tests can also provide information about metabolic dysfunction or heart dysfunction.  Your treatment plan  Based on the results of your evaluation and tests, your doctor will develop a treatment plan. This plan is designed to relieve some of your heart failure symptoms and help make you more comfortable. Your treatment plan may include:  · Medicine to help your heart work better and improve your quality of life  · Changes in what you eat and drink to help prevent fluid from backing up in your body  · Daily monitoring of your weight and heart failure symptoms to see how well your treatment plan is working  · Exercise to help you stay healthy  · Help with quitting smoking  · Emotional and psychological support to help adjust to the changes  · Referrals to other specialists to make sure you are being treated comprehensively  Date Last Reviewed: 3/21/2016  © 2146-2177 [employer]. [address] [place] [address]. All rights reserved. This information is not intended as a substitute for professional medical care. Always follow your healthcare professional's instructions.              Heart Failure: Making Changes to Your Diet  You have a condition called heart failure. When you have heart failure, excess fluid is more likely to build up in your body because your heart isn't working well. This makes the heart work harder to pump blood. Fluid buildup causes symptoms such as shortness of breath and swelling (edema). This is often referred to as congestive heart failure or CHF. Controlling the amount of salt (sodium) you eat may help stop fluid from building up. Your doctor may also tell you to reduce the amount of fluid you drink.  Reading food labels    Your healthcare provider will tell you how much sodium you can eat each day. Read food labels to keep track. Keep in mind that certain  foods are high in salt. These include canned, frozen, and processed foods. Check the amount of sodium in each serving. Watch out for high-sodium ingredients. These include MSG (monosodium glutamate), baking soda, and sodium phosphate.   Eating less salt  Give yourself time to get used to eating less salt. It may take a little while. Here are some tips to help:  · Take the saltshaker off the table. Replace it with salt-free herb mixes and spices.  · Eat fresh or plain frozen vegetables. These have much less salt than canned vegetables.  · Choose low-sodium snacks like sodium-free pretzels, crackers, or air-popped popcorn.  · Dont add salt to your food when youre cooking. Instead, season your foods with pepper, lemon, garlic, or onion.  · When you eat out, ask that your food be cooked without added salt.  · Avoid eating fried foods as these often have a great deal of salt.  If youre told to limit fluids  You may need to limit how much fluid you have to help prevent swelling. This includes anything that is liquid at room temperature, such as ice cream and soup. If your doctor tells you to limit fluid, try these tips:  · Measure drinks in a measuring cup before you drink them. This will help you meet daily goals.  · Chill drinks to make them more refreshing.  · Suck on frozen lemon wedges to quench thirst.  · Only drink when youre thirsty.  · Chew sugarless gum or suck on hard candy to keep your mouth moist.  · Weigh yourself daily to know if your body's fluid content is rising.  My sodium goal  Your healthcare provider may give you a sodium goal to meet each day. This includes sodium found in food as well as salt that you add. My goal is to eat no more than ___________ mg of sodium per day.     When to call your doctor  Call your doctor right away if you have any symptoms of worsening heart failure. These can include:  · Sudden weight gain  · Increased swelling of your legs or ankles  · Trouble breathing when  youre resting or at night  · Increase in the number of pillows you have to sleep on  · Chest pain, pressure, discomfort, or pain in the jaw, neck, or back   Date Last Reviewed: 3/21/2016  © 5496-2330 [employer]. [address] [place] [address]. All rights reserved. This information is not intended as a substitute for professional medical care. Always follow your healthcare professional's instructions.              Heart Failure: Medicines to Help Your Heart    You have a condition called heart failure (also known as congestive heart failure, or CHF). Your doctor will likely prescribe medicines for heart failure and any underlying health problems you have. Most heart failure patients take one or more types of medicinen. Your healthcare provider will work to find the combination of medicines that works best for you.  Heart failure medicines  Here are the most common heart failure medicines:  · ACE inhibitors lower blood pressure and decrease strain on the heart. This makes it easier for the heart to pump. Angiotensin receptor blockers have similar effects. These are prescribed for some patients instead of ACE inhibitors.  · Beta-blockers relieve stress on the heart. They also improve symptoms. They may also improve the heart's pumping action over time.  · Diuretics (also called water pills) help rid your body of excess water. This can help rid your body of swelling (edema). Having less fluid to pump means your heart doesnt have to work as hard. Some diuretics make your body lose a mineral called potassium. Your doctor will tell you if you need to take supplements or eat more foods high in potassium.  · Digoxin helps your heart pump with more strength. This helps your heart pump more blood with each beat. So, more oxygen-rich blood travels to the rest of the body.  · Aldosterone antagonists help alter hormones and decrease strain on the heart.  · Hydralazine and nitrates are two separate  medicines used together to treat heart failure. They may come in one combination pill. They lower blood pressure and decrease how hard the heart has to pump.  Medicines for related conditions  Controlling other heart problems helps keep heart failure under control, too. Depending on other heart problems you have, medicines may be prescribed to:  · Lower blood pressure (antihypertensives).  · Lower cholesterol levels (statins).  · Prevent blood clots (anticoagulants or aspirin).  · Keep the heartbeat steady (antiarrhythmics).  Date Last Reviewed: 3/5/2016  © 2959-5842 [employer]. [address] [place] [address]. All rights reserved. This information is not intended as a substitute for professional medical care. Always follow your healthcare professional's instructions.              Heart Failure: Procedures That May Help    The heart is a muscle that pumps oxygen-rich blood to all parts of the body. When you have heart failure, the heart is not able to pump as well as it should. Blood and fluid may back up into the lungs (congestive heart failure), and some parts of the body dont get enough oxygen-rich blood to work normally. These problems lead to the symptoms of heart failure.     Certain procedures may help the heart pump better in some cases of heart failure. Some procedures are done to treat health problems that may have caused the heart failure such as coronary artery disease or heart rhythm problems. For more serious heart failure, other options are available.  Treating artery and valve problems  If you have coronary artery disease or valve disease, procedures may be done to improve blood flow. This helps the heart pump better, which can improve heart failure symptoms. First, your doctor may do a cardiac catheterization to help detect clogged blood vessels or valve damage. During this procedure, a  thin tube (catheter) in inserted into a blood vessel and guided to the heart. There a  dye is injected and a special type of X-ray (angiogram) is taken of the blood vessels. Procedures to open a blocked artery or fix damaged valves can also be done using catheterization.  · Angioplasty uses a balloon-tipped instrument at the end of the catheter. The balloon is inflated to widen the narrowed artery. In many cases, a stent is expanded to further support the narrowed artery. A stent is a metal mesh tube.  · Valve surgery repairs or replacement of faulty valves can also be done during catheterization so blood can flow properly through the chambers of the heart.  Bypass surgery is another option to help treat blocked arteries. It uses a healthy blood vessel from elsewhere in the body. The healthy blood vessel is attached above and below the blocked area so that blood can flow around the blocked artery.  Treating heart rhythm problems  A device may be placed in the chest to help a weak heart maintain a healthy, heartbeat so the heart can pump more effectively:  · Pacemaker. A pacemaker is an implanted device that regulates your heartbeat electronically. It monitors your heart's rhythm and generates a painless electric impulse that helps the heart beat in a regular rhythm. A pacemaker is programmed to meet your specific heart rhythm needs.  · Biventricular pacing/cardiac resynchronization therapy. A type of pacemaker that paces both pumping chambers of the heart at the same time to coordinate contractions and to improve the heart's function. Some people with heart failure are candidates for this therapy.  · Implantable cardioverter defibrillator. A device similar to a pacemaker that senses when the heart is beating too fast and delivers an electrical shock to convert the fast rhythm to a normal rhythm. This can be a life saving device.  In severe cases  In more serious cases of heart failure when other treatments no longer work, other options may include:  · Ventricular assist devices (VADs). These are  mechanical devices used to take over the pumping function for one or both of the heart's ventricles, or pumping chambers. A VAD may be necessary when heart failure progresses to the point that medicines and other treatments no longer help. In some cases, a VAD may be used as a bridge to transplant.  · Heart transplant. This is replacing the diseased heart with a healthy one from a donor. This is an option for a few people who are very sick. A heart transplant is very serious and not an option for all patients. Your doctor can tell you more.  Date Last Reviewed: 3/20/2016  © 7873-6483 [employer]. [address] [place] [address], [place] [address]. All rights reserved. This information is not intended as a substitute for professional medical care. Always follow your healthcare professional's instructions.              Heart Failure: Tracking Your Weight  You have a condition called heart failure. When you have heart failure, a sudden weight gain or a steady rise in weight is a warning sign that your body is retaining too much water and salt. This could mean your heart failure is getting worse. If left untreated, it can cause problems for your lungs and result in shortness of breath. Weighing yourself each day is the best way to know if youre retaining water. If your weight goes up quickly, call your doctor. You will be given instructions on how to get rid of the excess water. You will likely need medicines and to avoid salt. This will help your heart work better.  Call your doctor if you gain more than 2 pounds in 1 day, more than 5 pounds in 1 week, or whatever weight gain you were told to report by your doctor. This is often a sign of worsening heart failure and needs to be evaluated and treated. Your doctor will tell you what to do next.   Tips for weighing yourself    · Weigh yourself at the same time each morning, wearing the same clothes. Weigh yourself after urinating and before eating.  · Use the same  scale each day. Make sure the numbers are easy to read. Put the scale on a flat, hard surface -- not on a rug or carpet.  · Do not stop weighing yourself. If you forget one day, weigh again the next morning.  How to use your weight chart  · Keep your weight chart near the scale. Write your weight on the chart as soon as you get off the scale.  · Fill in the month and the start date on the chart. Then write down your weight each day. Your chart will look like this:    · If you miss a day, leave the space blank. Weigh yourself the next day and write your weight in the next space.  · Take your weight chart with you when you go to see your doctor.  Date Last Reviewed: 3/20/2016  © 7399-6976 [employer]. [address] [place] [address], [place] [address]. All rights reserved. This information is not intended as a substitute for professional medical care. Always follow your healthcare professional's instructions.              Heart Failure: Warning Signs of a Flare-Up  You have a condition called heart failure. Once you have heart failure, flare-ups can happen. Below are signs that can mean your heart failure is getting worse. If you notice any of these warning signs, call your healthcare provider.  Swelling    · Your feet, ankles, or lower legs get puffier.  · You notice skin changes on your lower legs.  · Your shoes feel too tight.  · Your clothes are tighter in the waist.  · You have trouble getting rings on or off your fingers.  Shortness of breath  · You have to breathe harder even when youre doing your normal activities or when youre resting.  · You are short of breath walking up stairs or even short distances.  · You wake up at night short of breath or coughing.  · You need to use more pillows or sit up to sleep.  · You wake up tired or restless.  Other warning signs  · You feel weaker, dizzy, or more tired.  · You have chest pain or changes in your heartbeat.  · You have a cough that wont go away.  · You  cant remember things or dont feel like eating.  Tracking your weight  Gaining weight is often the first warning sign that heart failure is getting worse. Gaining even a few pounds can be a sign that your body is retaining excess water and salt. Weighing yourself each day in the morning after you urinate and before you eat, is the best way to know if you're retaining water. Get a scale that is easy to read and make sure you wear the same clothes and use the same scale every time you weigh. Your healthcare provider will show you how to track your weight. Call your doctor if you gain more than 2 pounds in 1 day, 5 pounds in 1 week, or whatever weight gain you were told to report by your doctor. This is often a sign of worsening heart failure and needs to be evaluated and treated before it compromises your breathing. Your doctor will tell you what to do next.    Date Last Reviewed: 3/15/2016  © 4835-3495 [employer]. [address] [place] [address], [place] [address]. All rights reserved. This information is not intended as a substitute for professional medical care. Always follow your healthcare professional's instructions.              [facility] Sign-Up     Activating your [facility] account is as easy as 1-2-3!     1) Visit [email or web address], select Sign Up Now, enter this activation code and your date of birth, then select Next.  [email or web address]  Expires: 7/1/2017  9:57 AM      2) Create a username and password to use when you visit [facility] in the future and select a security question in case you lose your password and select Next.    3) Enter your e-mail address and click Sign Up!    Additional Information  If you have questions, please e-mail [email or web address] or call [phone] to talk to our [facility] staff. Remember, [facility] is NOT to be used for urgent needs. For medical emergencies, dial 911.          [facility] complies with applicable Federal civil rights laws and does not  discriminate on the basis of race, color, national origin, age, disability, or sex.

## 2017-05-30 NOTE — TELEPHONE ENCOUNTER
----- Message from Xochitl Little sent at 5/30/2017 12:57 PM CDT -----  Contact: Pt called  Pt called, requesting a 90 day supply for coreg. [facility].Ph for pt is [phone]. Pt of Dr. Robison and LOV 3/30/17. Thank you

## 2017-05-31 RX ORDER — CARVEDILOL 12.5 MG/1
12.5 TABLET ORAL 2 TIMES DAILY WITH MEALS
Qty: 180 TABLET | Refills: 3 | Status: SHIPPED | OUTPATIENT
Start: 2017-05-31 | End: 2018-11-20 | Stop reason: SDUPTHER

## 2017-06-08 ENCOUNTER — TELEPHONE (OUTPATIENT)
Dept: TRANSPLANT | Facility: CLINIC | Age: 62
End: 2017-06-08

## 2017-06-08 NOTE — TELEPHONE ENCOUNTER
----- Message from Amaris Farrar sent at 6/7/2017  2:54 PM CDT -----  Contact: pt   Patient calling to speak with someone about an appt that was suppose to be scheduled on June 26th. Please call  [phone]

## 2017-07-03 ENCOUNTER — LAB VISIT (OUTPATIENT)
Dept: LAB | Facility: HOSPITAL | Age: 62
End: 2017-07-03
Attending: INTERNAL MEDICINE
Payer: COMMERCIAL

## 2017-07-03 ENCOUNTER — HOSPITAL ENCOUNTER (OUTPATIENT)
Dept: PULMONOLOGY | Facility: CLINIC | Age: 62
Discharge: HOME | End: 2017-07-03
Payer: COMMERCIAL

## 2017-07-03 ENCOUNTER — INITIAL CONSULT (OUTPATIENT)
Dept: TRANSPLANT | Facility: CLINIC | Age: 62
End: 2017-07-03
Payer: COMMERCIAL

## 2017-07-03 ENCOUNTER — HOSPITAL ENCOUNTER (OUTPATIENT)
Dept: CARDIOLOGY | Facility: CLINIC | Age: 62
Discharge: HOME | End: 2017-07-03
Payer: COMMERCIAL

## 2017-07-03 VITALS
HEART RATE: 94 BPM | TEMPERATURE: 97 F | RESPIRATION RATE: 20 BRPM | HEIGHT: 69 IN | BODY MASS INDEX: 29.47 KG/M2 | WEIGHT: 199 LBS | OXYGEN SATURATION: 92 % | SYSTOLIC BLOOD PRESSURE: 118 MMHG | DIASTOLIC BLOOD PRESSURE: 56 MMHG

## 2017-07-03 VITALS — WEIGHT: 199.31 LBS | HEIGHT: 69 IN | BODY MASS INDEX: 29.52 KG/M2

## 2017-07-03 DIAGNOSIS — Z76.82 LUNG TRANSPLANT CANDIDATE: ICD-10-CM

## 2017-07-03 DIAGNOSIS — I27.20 PULMONARY HYPERTENSION: ICD-10-CM

## 2017-07-03 DIAGNOSIS — M35.02 SJOGREN'S SYNDROME WITH LUNG INVOLVEMENT: ICD-10-CM

## 2017-07-03 DIAGNOSIS — J84.10 PULMONARY FIBROSIS: ICD-10-CM

## 2017-07-03 DIAGNOSIS — Z76.82 LUNG TRANSPLANT CANDIDATE: Primary | ICD-10-CM

## 2017-07-03 DIAGNOSIS — I34.0 NONRHEUMATIC MITRAL VALVE INSUFFICIENCY: ICD-10-CM

## 2017-07-03 DIAGNOSIS — J84.89 INTERSTITIAL LUNG DISEASE DUE TO CONNECTIVE TISSUE DISEASE: ICD-10-CM

## 2017-07-03 DIAGNOSIS — M35.9 INTERSTITIAL LUNG DISEASE DUE TO CONNECTIVE TISSUE DISEASE: ICD-10-CM

## 2017-07-03 LAB
AMPHET+METHAMPHET UR QL: NEGATIVE
BARBITURATES UR QL SCN>200 NG/ML: NEGATIVE
BENZODIAZ UR QL SCN>200 NG/ML: NEGATIVE
BZE UR QL SCN: NEGATIVE
CANNABINOIDS UR QL SCN: NEGATIVE
CREAT UR-MCNC: 168 MG/DL
DIASTOLIC DYSFUNCTION: NO
ESTIMATED PA SYSTOLIC PRESSURE: 17.64
ETHANOL UR-MCNC: <10 MG/DL
METHADONE UR QL SCN>300 NG/ML: NEGATIVE
MITRAL VALVE REGURGITATION: NORMAL
OPIATES UR QL SCN: NEGATIVE
PCP UR QL SCN>25 NG/ML: NEGATIVE
PRE FEV1 FVC: 85
PRE FEV1: 2.2
PRE FVC: 2.6
PREDICTED FEV1 FVC: 77
PREDICTED FEV1: 2.77
PREDICTED FVC: 3.53
RETIRED EF AND QEF - SEE NOTES: 55 (ref 55–65)
TOXICOLOGY INFORMATION: NORMAL
TRICUSPID VALVE REGURGITATION: NORMAL

## 2017-07-03 PROCEDURE — 80307 DRUG TEST PRSMV CHEM ANLYZR: CPT | Mod: TXP

## 2017-07-03 PROCEDURE — 94727 GAS DIL/WSHOT DETER LNG VOL: CPT | Mod: S$GLB,TXP,, | Performed by: INTERNAL MEDICINE

## 2017-07-03 PROCEDURE — 93306 TTE W/DOPPLER COMPLETE: CPT | Mod: S$GLB,TXP,, | Performed by: INTERNAL MEDICINE

## 2017-07-03 PROCEDURE — 94620 PR PULMONARY STRESS TESTING,SIMPLE: CPT | Mod: S$GLB,TXP,, | Performed by: INTERNAL MEDICINE

## 2017-07-03 PROCEDURE — 94729 DIFFUSING CAPACITY: CPT | Mod: S$GLB,TXP,, | Performed by: INTERNAL MEDICINE

## 2017-07-03 PROCEDURE — 99999 PR PBB SHADOW E&M-EST. PATIENT-LVL III: CPT | Mod: PBBFAC,TXP,, | Performed by: INTERNAL MEDICINE

## 2017-07-03 PROCEDURE — 99203 OFFICE O/P NEW LOW 30 MIN: CPT | Mod: 25,S$GLB,TXP, | Performed by: INTERNAL MEDICINE

## 2017-07-03 PROCEDURE — 94010 BREATHING CAPACITY TEST: CPT | Mod: 59,S$GLB,TXP, | Performed by: INTERNAL MEDICINE

## 2017-07-03 PROCEDURE — 80323 ALKALOIDS NOS: CPT | Mod: TXP

## 2017-07-03 NOTE — PROCEDURES
Fatimah Cha is a 61 y.o.  female patient, who presents for a 6 minute walk test ordered by Frederick Garcia MD.  The diagnosis is Pre Lung Transplant Evaluation; Pulmonary Hypertension; Interstitial Lung Disease.  The patient's BMI is 29.5 kg/m2.  Predicted distance (lower limit of normal) is 332.46 meters.      Test Results:    The test was completed without stopping.  The total time walked was 360 seconds.  During walking, the patient reported:  Dyspnea. The patient used supplemental oxygen during testing.     07/03/2017---------Distance:  282 meters (925 feet)     O2 Sat % Supplemental Oxygen Heart Rate Blood Pressure Kushal Scale   Pre-exercise  (Resting) 96 % 3 L/M 79 bpm 106/55 mmHg 1   During Exercise 80 % 3 L/M 93 bpm 114/64 mmHg 4   Post-exercise  (Recovery) 94 % 3 L/M  76 bpm       Recovery Time: 142 seconds    Performing nurse/tech: ROSEMARY Rivas      PREVIOUS STUDY:   03/30/2017---------Distance: 121.92 meters (400 feet)       O2 Sat % Supplemental Oxygen Heart Rate Blood Pressure Kushal Scale   Pre-exercise  (Resting) 93 % Room Air 93 bpm 133/78 mmHg 3   During Exercise 78 % Room Air 111 bpm 139/65 mmHg 4   Post-exercise  (Recovery) 92 % Room Air  95 bpm           CLINICAL INTERPRETATION:  Six minute walk distance is 282 meters (925 feet) with somewhat heavy dyspnea.  During exercise, there was significant desaturation while breathing supplemental oxygen.  Both blood pressure and heart rate remained stable with walking.  The patient did not report non-pulmonary symptoms during exercise.  Significant exercise impairment is likely due to desaturation and subjective symptoms.  Since the previous study in March 2017, exercise capacity is significantly improved.  Based upon age and body mass index, exercise capacity is less than predicted.

## 2017-07-03 NOTE — PROGRESS NOTES
LUNG TRANSPLANT INITIAL EVALUATION                                                                                                                                             Reason for Visit:  Evaluation for lung transplant    Referring Physician: Luis Ken MD     History of Present Illness: Fatimah Cha is a 61 y.o. female who is on 0L of oxygen (but uses 3L NC on exertion and nocturnally).  She is on no assisted ventilation.  Her New York Heart Association Class is III and a Karnofsky score of 70% - Cares for self: Unable to carry on normal activity or active work. She is not diabetic.     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 She underwent a VATS mediated lung biopsy (2013) at an OSH. (the results of this lung biopsy are unclear). She was then referred to Dr. Ken at [facility] for management of her ILD. There she has been followed with relative stability on azathioprine and prednisone.   In 02/2017- she developed worsening of her sx secondary to the flu and at that time notes that her pulmonary function had declined significantly. Lately, she has started feeling a little better but still feels she is not back to her pre-flu baseline.   Previously she was on 3L Nc at home nocturnally and with severe exertion, now she feels she has to use her oxygen more. (still not 24/7).     She also has pulmonary HTN that she is on ambrisentan for. Her last RHC was 05/2017 and noted a PA mean pressure of 30.     Past Medical History:   Diagnosis Date    Colitis     CREST syndrome     Diabetes mellitus     Hypercholesteremia     Hypertension     Hypothyroidism     Interstitial lung disease     Osteopenia     Raynaud disease     Scleroderma     Sjoegren syndrome      Colonoscopy Results: No procedure found.    Past Surgical History:   Procedure Laterality Date    BREAST BIOPSY      CARDIAC  CATHETERIZATION      COLONOSCOPY      foot surgery x2      lumpectomy (benign)      LUNG BIOPSY  03/2013    THYROIDECTOMY      TONSILLECTOMY      TUBAL LIGATION      UPPER GASTROINTESTINAL ENDOSCOPY         Allergies: Review of patient's allergies indicates no known allergies.    Current Outpatient Prescriptions   Medication Sig    albuterol-ipratropium 2.5mg-0.5mg/3mL (DUO-NEB) 0.5 mg-3 mg(2.5 mg base)/3 mL nebulizer solution ONE VIAL in Nebulizer EVERY SIX HOURS AS NEEDED FOR SHORTNESS OF BREATH    ambrisentan (LETAIRIS) 10 MG Tab Take 10 mg by mouth once daily.    aspirin (ECOTRIN) 81 MG EC tablet Take 81 mg by mouth every evening.    atorvastatin (LIPITOR) 10 MG tablet Take 10 mg by mouth once daily.     BIOTIN ORAL Take 500 mcg by mouth once daily.    budesonide (PULMICORT) 0.5 mg/2 mL nebulizer solution ONE VIAL IN Nebulizer EVERY DAY PRN    carvedilol (COREG) 12.5 MG tablet Take 1 tablet (12.5 mg total) by mouth 2 (two) times daily with meals.    DYMISTA 137-50 mcg/spray Spry nassal spray 1 spray by Each Nare route 2 (two) times daily.    folic acid (FOLVITE) 1 MG tablet TWO TABLETS BY MOUTH EVERY DAY    furosemide (LASIX) 40 MG tablet every Mon, Wed, Fri.     gabapentin (NEURONTIN) 300 MG capsule 300 mg 2 (two) times daily.     KLOR-CON 8 mEq TbSR Take 8 mEq by mouth every Mon, Wed, Fri.     levocetirizine (XYZAL) 5 MG tablet Take 5 mg by mouth every evening.     lorazepam (ATIVAN) 1 MG tablet 1 mg 2 (two) times daily.     multivitamin capsule Take 1 capsule by mouth once daily.    mycophenolate (CELLCEPT) 500 mg Tab 1500 mg daily  1000 mg nightly    nifedipine (PROCARDIA) 10 MG Cap 10 mg 3 (three) times daily.     pantoprazole (PROTONIX) 40 MG tablet Take 40 mg by mouth once daily.    predniSONE (DELTASONE) 1 MG tablet Take 2 mg by mouth once daily.    predniSONE (DELTASONE) 5 MG tablet Take 5 mg by mouth once daily.     risedronate (ACTONEL) 150 MG Tab Take 150 mg by mouth  every 30 days.    TIROSINT 137 mcg Cap once daily.     tizanidine (ZANAFLEX) 4 MG tablet every 8 (eight) hours as needed.     VITAMIN D2 50,000 unit capsule 50,000 Units every 30 days.     XOPENEX HFA 45 mcg/actuation inhaler Inhale 1 puff into the lungs every 4 (four) hours as needed.      No current facility-administered medications for this visit.          There is no immunization history on file for this patient.  Family History:    Family History   Problem Relation Age of Onset    Thyroid cancer Mother     Heart failure Father     Emphysema Sister     Thyroid disease Sister     Deep vein thrombosis Brother     Lung cancer Brother     Breast cancer Neg Hx     Colon cancer Neg Hx     Ovarian cancer Neg Hx      History   Alcohol Use    Yes     Comment: occasional      History   Drug Use No      Social History     Social History    Marital status:      Spouse name: N/A    Number of children: N/A    Years of education: N/A     Occupational History    Not on file.     Social History Main Topics    Smoking status: Former Smoker     Packs/day: 1.00     Years: 5.00     Types: Cigarettes     Quit date: 3/30/1979    Smokeless tobacco: Never Used    Alcohol use Yes      Comment: occasional    Drug use: No    Sexual activity: Not on file     Other Topics Concern    Not on file     Social History Narrative    No narrative on file     ROS       Positive findings are in BOLD. Otherwise negative ROS as below.     CONSTITUTIONAL: weight loss, fever, chills, weakness or fatigue.   HEENT: visual loss, blurred vision, double vision or yellow sclerae. Ears, Nose, Throat: No hearing loss, sneezing, congestion, runny nose or sore throat.   CARDIOVASCULAR: chest pain, chest pressure or chest discomfort. No palpitations or edema.   RESPIRATORY: shortness of breath, cough or sputum.   GASTROINTESTINAL:anorexia, nausea, vomiting or diarrhea. No abdominal pain or blood.   NEUROLOGICAL: headache, dizziness,  "syncope, paralysis, ataxia, numbness or tingling in the extremities. No change in bowel or bladder control.   MUSCULOSKELETAL: muscle, back pain, joint pain or stiffness.   HEMATOLOGIC: anemia, bleeding or bruising.   LYMPHATICS: enlarged nodes. No history of splenectomy.   PSYCHIATRIC: history of depression or anxiety.   ENDOCRINOLOGIC: No reports of sweating, cold or heat intolerance. No polyuria or polydipsia.     Vitals  BP (!) 118/56 (BP Location: Left arm, Patient Position: Sitting, BP Method: Automatic)   Pulse 94   Temp 96.6 °F (35.9 °C) (Oral)   Resp 20   Ht 5' 9" (1.753 m)   Wt 90.3 kg (199 lb)   SpO2 (!) 92% Comment: 2L NC  BMI 29.39 kg/m²   Physical Exam    Physical Exam   Constitutional: He is oriented to person, place, and time and well-developed, well-nourished, and in no distress.   HENT:   Head: Normocephalic.   Mouth/Throat: Oropharynx is clear and moist.   Eyes: Conjunctivae are normal. Pupils are equal, round, and reactive to light.   Neck: No tracheal deviation present. No thyromegaly present.   Cardiovascular: Normal rate, regular rhythm and normal heart sounds.    Pulmonary/Chest: Effort normal and breath sounds normal. No stridor. No respiratory distress. He has no wheezes.   Abdominal: Soft. Bowel sounds are normal.   Musculoskeletal: Normal range of motion. He exhibits no edema or deformity.   Neurological: He is alert and oriented to person, place, and time.       Labs:  Hospital Outpatient Visit on 07/03/2017   Component Date Value    EF 07/03/2017 55     Mitral Valve Regurgitati* 07/03/2017 MILD     Diastolic Dysfunction 07/03/2017 No     Est. PA Systolic Pressure 07/03/2017 17.64     Tricuspid Valve Regurgit* 07/03/2017 MILD    Lab Visit on 07/03/2017   Component Date Value    Alcohol, Urine 07/03/2017 <10     Benzodiazepines 07/03/2017 Negative     Methadone metabolites 07/03/2017 Negative     Cocaine (Metab.) 07/03/2017 Negative     Opiate Scrn, Ur 07/03/2017 Negative "     Barbiturate Screen, Ur 07/03/2017 Negative     Amphetamine Screen, Ur 07/03/2017 Negative     THC 07/03/2017 Negative     Phencyclidine 07/03/2017 Negative     Creatinine, Random Ur 07/03/2017 168.0     Toxicology Information 07/03/2017 SEE COMMENT        Pulmonary Function Tests 7/3/2017 6/13/2017   FVC 2.6 2.6   FEV1 2.2 2.23   TLC (liters) 3.3 3.3   DLCO (ml/mmHg sec) 6.4 6.2   FVC% 74 65   FEV1% 80 72   FEF 25-75 2.99 2.94   FEF 25-75% 113 112   TLC% 56 55   RV 0.78 0.68   RV% 34 29   DLCO% 32 25     Imaging:  No results found for this or any previous visit.  Results for orders placed in visit on 11/03/15   DXA Bone Density Spine And Hip    Narrative AP SPINE -0.92  LEFT HIP -2.29     6MWT: walked 925 feet. Did not stop during exercise.   Her HR increased from 79- to 93.   O2 sat decreased with exercise from 96 to 80. She did use 3L NC during the activity.   BP remained stable.     Assessment:  1. Lung transplant candidate    2. 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 Pulmonary hypertension    4. Sjogren's syndrome with lung involvement      Plan:     1. Current pulmonary problems are ILD secondary to CTD + pulmonary HTN. Her VATS biopsy apparently in 2013 revealed sera combing and fibrosis per notes from Dr. Ken. However these are not available for us to review. Her clinical sx appear to be improving according to patient since her flu episode in 02/2017. She is currently being ramped up on mycophenolate + 7mg of prednisone. Review of her PFT's reveals relative stability over the last few years with some decline now post flu. Her FEV1 in clinic today - 2.2 L, FVC - 2.6 L, FEV1- 2.2. In comparison FEV1 - 2.45 L and FVC- 3L in 02/2016.     Her pulmonary Hypertension is relatively mild and controlled on ambrisentan. Most recent RHC reveals mean PA pressure of 30. Her TTE reveals mildly depressed RV function.   Given her overall stable PFT's and some clinical improvement post  flu. We will defer considering for transplant at the moment and re-assess in 6 months.     2. She is to continue treatment for her ILD per treating pulmonologists. (mycophenolate and prednisone taper).     3. On ambrisentan.     Tobi Anderson  Pulmonary Critical Care fellow.   [facility].   Cell:[phone]    Attending Note:    I have seen and evaluated the patient with the fellow. Their note reflects the content of our discussion and my plan of care. I would like to evaluate the natural history of her disease after starting therapy with mycophenolate. Will re-evaluate in six months.      Rodrick Garcia MD  Pulmonary/Critical Care Medicine

## 2017-07-03 NOTE — LETTER
July 3, 2017        Luis Ken  [address]  [facility] [address]  Phone: [phone]  Fax: [phone]             [facility]  [address]  [facility] [address]  Phone: [phone]   Patient: Fatimah Cha   MR Number: [id number]   YOB: 1955   Date of Visit: 7/3/2017       Dear Dr. Luis Ken    Thank you for referring Fatimah Cha to me for evaluation. Attached you will find relevant portions of my assessment and plan of care.    If you have questions, please do not hesitate to call me. I look forward to following Fatimah Cha along with you.    Sincerely,    Rodrick Garcia MD    Enclosure    If you would like to receive this communication electronically, please contact [email or web address] or [phone] to request [employer] access.    [employer] is a tool which provides read-only access to select patient information with whom you have a relationship. Its easy to use and provides real time access to review your patients record including encounter summaries, notes, results, and demographic information.    If you feel you have received this communication in error or would no longer like to receive these types of communications, please e-mail [email or web address]

## 2017-07-06 ENCOUNTER — TELEPHONE (OUTPATIENT)
Dept: TRANSPLANT | Facility: CLINIC | Age: 62
End: 2017-07-06

## 2017-07-06 LAB
ANABASINE UR-MCNC: <2 NG/ML
COTININE UR-MCNC: <5 NG/ML
NICOTINE UR-MCNC: <2 NG/ML
NORNICOTINE UR-MCNC: <2 NG/ML

## 2017-09-01 ENCOUNTER — HOSPITAL ENCOUNTER (OUTPATIENT)
Facility: HOSPITAL | Age: 62
Discharge: HOME | End: 2017-09-03
Attending: EMERGENCY MEDICINE | Admitting: EMERGENCY MEDICINE
Payer: COMMERCIAL

## 2017-09-01 DIAGNOSIS — R07.89 CHEST TIGHTNESS: ICD-10-CM

## 2017-09-01 DIAGNOSIS — R06.09 DYSPNEA ON EXERTION: Primary | ICD-10-CM

## 2017-09-01 LAB
ALBUMIN SERPL BCP-MCNC: 3.9 G/DL
ALP SERPL-CCNC: 50 U/L
ALT SERPL W/O P-5'-P-CCNC: 16 U/L
ANION GAP SERPL CALC-SCNC: 9 MMOL/L
AST SERPL-CCNC: 24 U/L
BASOPHILS # BLD AUTO: 0.05 K/UL
BASOPHILS NFR BLD: 0.8 %
BILIRUB SERPL-MCNC: 0.7 MG/DL
BNP SERPL-MCNC: 26 PG/ML
BUN SERPL-MCNC: 16 MG/DL
CALCIUM SERPL-MCNC: 9.5 MG/DL
CHLORIDE SERPL-SCNC: 107 MMOL/L
CO2 SERPL-SCNC: 25 MMOL/L
CREAT SERPL-MCNC: 0.8 MG/DL
DIFFERENTIAL METHOD: ABNORMAL
EOSINOPHIL # BLD AUTO: 0.2 K/UL
EOSINOPHIL NFR BLD: 3.9 %
ERYTHROCYTE [DISTWIDTH] IN BLOOD BY AUTOMATED COUNT: 13.7 %
EST. GFR  (AFRICAN AMERICAN): >60 ML/MIN/1.73 M^2
EST. GFR  (NON AFRICAN AMERICAN): >60 ML/MIN/1.73 M^2
GLUCOSE SERPL-MCNC: 100 MG/DL
HCT VFR BLD AUTO: 40.8 %
HGB BLD-MCNC: 13.5 G/DL
LYMPHOCYTES # BLD AUTO: 0.8 K/UL
LYMPHOCYTES NFR BLD: 12.5 %
MCH RBC QN AUTO: 29.9 PG
MCHC RBC AUTO-ENTMCNC: 33.1 G/DL
MCV RBC AUTO: 91 FL
MONOCYTES # BLD AUTO: 0.7 K/UL
MONOCYTES NFR BLD: 10.6 %
NEUTROPHILS # BLD AUTO: 4.4 K/UL
NEUTROPHILS NFR BLD: 71.7 %
PLATELET # BLD AUTO: 217 K/UL
PMV BLD AUTO: 9 FL
POTASSIUM SERPL-SCNC: 4.2 MMOL/L
PROT SERPL-MCNC: 7.5 G/DL
RBC # BLD AUTO: 4.51 M/UL
SODIUM SERPL-SCNC: 141 MMOL/L
TROPONIN I SERPL DL<=0.01 NG/ML-MCNC: <0.006 NG/ML
WBC # BLD AUTO: 6.16 K/UL

## 2017-09-01 PROCEDURE — 85025 COMPLETE CBC W/AUTO DIFF WBC: CPT | Mod: NTX

## 2017-09-01 PROCEDURE — 99285 EMERGENCY DEPT VISIT HI MDM: CPT | Mod: 25,NTX

## 2017-09-01 PROCEDURE — G0378 HOSPITAL OBSERVATION PER HR: HCPCS | Mod: NTX

## 2017-09-01 PROCEDURE — 94761 N-INVAS EAR/PLS OXIMETRY MLT: CPT | Mod: NTX

## 2017-09-01 PROCEDURE — 94640 AIRWAY INHALATION TREATMENT: CPT | Mod: NTX

## 2017-09-01 PROCEDURE — 83880 ASSAY OF NATRIURETIC PEPTIDE: CPT | Mod: NTX

## 2017-09-01 PROCEDURE — 84484 ASSAY OF TROPONIN QUANT: CPT | Mod: NTX

## 2017-09-01 PROCEDURE — 96360 HYDRATION IV INFUSION INIT: CPT | Mod: NTX

## 2017-09-01 PROCEDURE — 25000242 PHARM REV CODE 250 ALT 637 W/ HCPCS: Mod: NTX | Performed by: EMERGENCY MEDICINE

## 2017-09-01 PROCEDURE — 99285 EMERGENCY DEPT VISIT HI MDM: CPT | Mod: NTX,,, | Performed by: EMERGENCY MEDICINE

## 2017-09-01 PROCEDURE — 80053 COMPREHEN METABOLIC PANEL: CPT | Mod: NTX

## 2017-09-01 PROCEDURE — 96361 HYDRATE IV INFUSION ADD-ON: CPT | Mod: NTX

## 2017-09-01 PROCEDURE — 25000003 PHARM REV CODE 250: Mod: NTX | Performed by: EMERGENCY MEDICINE

## 2017-09-01 PROCEDURE — 93005 ELECTROCARDIOGRAM TRACING: CPT | Mod: NTX

## 2017-09-01 PROCEDURE — 93010 ELECTROCARDIOGRAM REPORT: CPT | Mod: NTX,,, | Performed by: INTERNAL MEDICINE

## 2017-09-01 RX ORDER — IPRATROPIUM BROMIDE AND ALBUTEROL SULFATE 2.5; .5 MG/3ML; MG/3ML
3 SOLUTION RESPIRATORY (INHALATION)
Status: COMPLETED | OUTPATIENT
Start: 2017-09-01 | End: 2017-09-01

## 2017-09-01 RX ADMIN — IPRATROPIUM BROMIDE AND ALBUTEROL SULFATE 3 ML: .5; 3 SOLUTION RESPIRATORY (INHALATION) at 09:09

## 2017-09-01 RX ADMIN — SODIUM CHLORIDE, PRESERVATIVE FREE 500 ML: 5 INJECTION INTRAVENOUS at 09:09

## 2017-09-01 NOTE — PROVIDER PROGRESS NOTES - EMERGENCY DEPT.
Encounter Date: 9/1/2017    ED Physician Progress Notes       SCRIBE NOTE: I, Rebecca Clarke, am scribing for, and in the presence of,  Dr. Shankar.  Physician Statement: I, Dr. Shankar, personally performed the services described in this documentation as scribed by Rebecca Clarke in my presence, and it is both accurate and complete.      EKG - STEMI Decision  Initial Reading: No STEMI present.

## 2017-09-02 PROBLEM — E03.8 HYPOTHYROIDISM DUE TO HASHIMOTO'S THYROIDITIS: Status: ACTIVE | Noted: 2017-09-02

## 2017-09-02 PROBLEM — J96.11 CHRONIC RESPIRATORY FAILURE WITH HYPOXIA: Status: ACTIVE | Noted: 2017-09-02

## 2017-09-02 PROBLEM — E06.3 HYPOTHYROIDISM DUE TO HASHIMOTO'S THYROIDITIS: Status: ACTIVE | Noted: 2017-09-02

## 2017-09-02 PROBLEM — I95.1 ORTHOSTATIC HYPOTENSION: Status: ACTIVE | Noted: 2017-09-02

## 2017-09-02 PROBLEM — E78.5 HLD (HYPERLIPIDEMIA): Status: ACTIVE | Noted: 2017-09-02

## 2017-09-02 LAB
ANION GAP SERPL CALC-SCNC: 7 MMOL/L
BASOPHILS # BLD AUTO: 0.03 K/UL
BASOPHILS NFR BLD: 0.5 %
BUN SERPL-MCNC: 14 MG/DL
CALCIUM SERPL-MCNC: 8.9 MG/DL
CHLORIDE SERPL-SCNC: 108 MMOL/L
CO2 SERPL-SCNC: 27 MMOL/L
CREAT SERPL-MCNC: 0.8 MG/DL
DIFFERENTIAL METHOD: ABNORMAL
EOSINOPHIL # BLD AUTO: 0.3 K/UL
EOSINOPHIL NFR BLD: 5.6 %
ERYTHROCYTE [DISTWIDTH] IN BLOOD BY AUTOMATED COUNT: 14 %
EST. GFR  (AFRICAN AMERICAN): >60 ML/MIN/1.73 M^2
EST. GFR  (NON AFRICAN AMERICAN): >60 ML/MIN/1.73 M^2
GLUCOSE SERPL-MCNC: 83 MG/DL
HCT VFR BLD AUTO: 37.3 %
HGB BLD-MCNC: 12.3 G/DL
LYMPHOCYTES # BLD AUTO: 0.9 K/UL
LYMPHOCYTES NFR BLD: 14.9 %
MCH RBC QN AUTO: 29.4 PG
MCHC RBC AUTO-ENTMCNC: 33 G/DL
MCV RBC AUTO: 89 FL
MONOCYTES # BLD AUTO: 0.8 K/UL
MONOCYTES NFR BLD: 13.1 %
NEUTROPHILS # BLD AUTO: 4 K/UL
NEUTROPHILS NFR BLD: 65.4 %
PLATELET # BLD AUTO: 172 K/UL
PMV BLD AUTO: 9.2 FL
POTASSIUM SERPL-SCNC: 3.9 MMOL/L
RBC # BLD AUTO: 4.18 M/UL
SODIUM SERPL-SCNC: 142 MMOL/L
WBC # BLD AUTO: 6.11 K/UL

## 2017-09-02 PROCEDURE — 99220 PR INITIAL OBSERVATION CARE,LEVL III: CPT | Mod: NTX,,, | Performed by: HOSPITALIST

## 2017-09-02 PROCEDURE — 25000003 PHARM REV CODE 250: Mod: NTX | Performed by: HOSPITALIST

## 2017-09-02 PROCEDURE — 25000003 PHARM REV CODE 250: Mod: NTX | Performed by: STUDENT IN AN ORGANIZED HEALTH CARE EDUCATION/TRAINING PROGRAM

## 2017-09-02 PROCEDURE — G0378 HOSPITAL OBSERVATION PER HR: HCPCS | Mod: NTX

## 2017-09-02 PROCEDURE — 87070 CULTURE OTHR SPECIMN AEROBIC: CPT | Mod: NTX

## 2017-09-02 PROCEDURE — 25500020 PHARM REV CODE 255: Mod: NTX | Performed by: EMERGENCY MEDICINE

## 2017-09-02 PROCEDURE — 85025 COMPLETE CBC W/AUTO DIFF WBC: CPT | Mod: NTX

## 2017-09-02 PROCEDURE — 25000242 PHARM REV CODE 250 ALT 637 W/ HCPCS: Mod: NTX | Performed by: STUDENT IN AN ORGANIZED HEALTH CARE EDUCATION/TRAINING PROGRAM

## 2017-09-02 PROCEDURE — 36415 COLL VENOUS BLD VENIPUNCTURE: CPT | Mod: NTX

## 2017-09-02 PROCEDURE — 94761 N-INVAS EAR/PLS OXIMETRY MLT: CPT | Mod: NTX

## 2017-09-02 PROCEDURE — 99254 IP/OBS CNSLTJ NEW/EST MOD 60: CPT | Mod: NTX,,, | Performed by: INTERNAL MEDICINE

## 2017-09-02 PROCEDURE — 63600175 PHARM REV CODE 636 W HCPCS: Mod: NTX | Performed by: HOSPITALIST

## 2017-09-02 PROCEDURE — 87205 SMEAR GRAM STAIN: CPT | Mod: NTX

## 2017-09-02 PROCEDURE — 80048 BASIC METABOLIC PNL TOTAL CA: CPT | Mod: NTX

## 2017-09-02 PROCEDURE — 94640 AIRWAY INHALATION TREATMENT: CPT | Mod: NTX

## 2017-09-02 PROCEDURE — 63600175 PHARM REV CODE 636 W HCPCS: Mod: NTX | Performed by: STUDENT IN AN ORGANIZED HEALTH CARE EDUCATION/TRAINING PROGRAM

## 2017-09-02 PROCEDURE — 27000221 HC OXYGEN, UP TO 24 HOURS: Mod: NTX

## 2017-09-02 RX ORDER — AMBRISENTAN 10 MG/1
10 TABLET, FILM COATED ORAL DAILY
Status: DISCONTINUED | OUTPATIENT
Start: 2017-09-02 | End: 2017-09-03 | Stop reason: HOSPADM

## 2017-09-02 RX ORDER — ATORVASTATIN CALCIUM 10 MG/1
10 TABLET, FILM COATED ORAL DAILY
Status: DISCONTINUED | OUTPATIENT
Start: 2017-09-02 | End: 2017-09-03 | Stop reason: HOSPADM

## 2017-09-02 RX ORDER — ONDANSETRON 8 MG/1
8 TABLET, ORALLY DISINTEGRATING ORAL EVERY 8 HOURS PRN
Status: DISCONTINUED | OUTPATIENT
Start: 2017-09-02 | End: 2017-09-03 | Stop reason: HOSPADM

## 2017-09-02 RX ORDER — AZELASTINE HYDROCHLORIDE, FLUTICASONE PROPIONATE 137; 50 UG/1; UG/1
1 SPRAY, METERED NASAL 2 TIMES DAILY
Status: DISCONTINUED | OUTPATIENT
Start: 2017-09-02 | End: 2017-09-02

## 2017-09-02 RX ORDER — LEVOCETIRIZINE DIHYDROCHLORIDE 5 MG/1
5 TABLET, FILM COATED ORAL DAILY
Status: DISCONTINUED | OUTPATIENT
Start: 2017-09-03 | End: 2017-09-02

## 2017-09-02 RX ORDER — MYCOPHENOLATE MOFETIL 250 MG/1
1500 CAPSULE ORAL 2 TIMES DAILY
Status: DISCONTINUED | OUTPATIENT
Start: 2017-09-02 | End: 2017-09-03 | Stop reason: HOSPADM

## 2017-09-02 RX ORDER — ASPIRIN 81 MG/1
81 TABLET ORAL NIGHTLY
Status: DISCONTINUED | OUTPATIENT
Start: 2017-09-02 | End: 2017-09-03 | Stop reason: HOSPADM

## 2017-09-02 RX ORDER — ENOXAPARIN SODIUM 100 MG/ML
40 INJECTION SUBCUTANEOUS EVERY 24 HOURS
Status: DISCONTINUED | OUTPATIENT
Start: 2017-09-02 | End: 2017-09-03 | Stop reason: HOSPADM

## 2017-09-02 RX ORDER — BUDESONIDE 0.5 MG/2ML
0.5 INHALANT ORAL DAILY PRN
Status: DISCONTINUED | OUTPATIENT
Start: 2017-09-02 | End: 2017-09-03 | Stop reason: HOSPADM

## 2017-09-02 RX ORDER — GABAPENTIN 300 MG/1
300 CAPSULE ORAL 3 TIMES DAILY
COMMUNITY
End: 2017-09-19

## 2017-09-02 RX ORDER — LEVOCETIRIZINE DIHYDROCHLORIDE 5 MG/1
5 TABLET, FILM COATED ORAL DAILY
Status: DISCONTINUED | OUTPATIENT
Start: 2017-09-02 | End: 2017-09-03 | Stop reason: HOSPADM

## 2017-09-02 RX ORDER — FUROSEMIDE 40 MG/1
40 TABLET ORAL
Status: DISCONTINUED | OUTPATIENT
Start: 2017-09-04 | End: 2017-09-03 | Stop reason: HOSPADM

## 2017-09-02 RX ORDER — ACETAMINOPHEN 325 MG/1
650 TABLET ORAL EVERY 4 HOURS PRN
Status: DISCONTINUED | OUTPATIENT
Start: 2017-09-02 | End: 2017-09-03 | Stop reason: HOSPADM

## 2017-09-02 RX ORDER — FUROSEMIDE 10 MG/ML
20 INJECTION INTRAMUSCULAR; INTRAVENOUS ONCE
Status: COMPLETED | OUTPATIENT
Start: 2017-09-02 | End: 2017-09-02

## 2017-09-02 RX ORDER — CARVEDILOL 12.5 MG/1
12.5 TABLET ORAL 2 TIMES DAILY WITH MEALS
Status: DISCONTINUED | OUTPATIENT
Start: 2017-09-02 | End: 2017-09-03 | Stop reason: HOSPADM

## 2017-09-02 RX ORDER — NIFEDIPINE 10 MG/1
10 CAPSULE ORAL 3 TIMES DAILY
Status: DISCONTINUED | OUTPATIENT
Start: 2017-09-02 | End: 2017-09-02

## 2017-09-02 RX ORDER — NIFEDIPINE 10 MG/1
10 CAPSULE ORAL 2 TIMES DAILY
Status: DISCONTINUED | OUTPATIENT
Start: 2017-09-02 | End: 2017-09-03 | Stop reason: HOSPADM

## 2017-09-02 RX ORDER — LEVOTHYROXINE SODIUM 137 UG/1
137 TABLET ORAL DAILY
Status: DISCONTINUED | OUTPATIENT
Start: 2017-09-02 | End: 2017-09-02

## 2017-09-02 RX ORDER — PREDNISONE 5 MG/1
5 TABLET ORAL DAILY
Status: DISCONTINUED | OUTPATIENT
Start: 2017-09-02 | End: 2017-09-03 | Stop reason: HOSPADM

## 2017-09-02 RX ORDER — CETIRIZINE HYDROCHLORIDE 5 MG/1
5 TABLET ORAL DAILY
Status: DISCONTINUED | OUTPATIENT
Start: 2017-09-02 | End: 2017-09-02

## 2017-09-02 RX ORDER — IPRATROPIUM BROMIDE AND ALBUTEROL SULFATE 2.5; .5 MG/3ML; MG/3ML
3 SOLUTION RESPIRATORY (INHALATION) EVERY 4 HOURS
Status: DISCONTINUED | OUTPATIENT
Start: 2017-09-02 | End: 2017-09-03 | Stop reason: HOSPADM

## 2017-09-02 RX ORDER — AZELASTINE 1 MG/ML
1 SPRAY, METERED NASAL 2 TIMES DAILY
Status: DISCONTINUED | OUTPATIENT
Start: 2017-09-02 | End: 2017-09-03 | Stop reason: HOSPADM

## 2017-09-02 RX ORDER — ATORVASTATIN CALCIUM 10 MG/1
10 TABLET, FILM COATED ORAL DAILY
Status: DISCONTINUED | OUTPATIENT
Start: 2017-09-02 | End: 2017-09-02

## 2017-09-02 RX ORDER — PANTOPRAZOLE SODIUM 40 MG/1
40 TABLET, DELAYED RELEASE ORAL DAILY
Status: DISCONTINUED | OUTPATIENT
Start: 2017-09-02 | End: 2017-09-03 | Stop reason: HOSPADM

## 2017-09-02 RX ORDER — LEVOTHYROXINE SODIUM 137 UG/1
137 CAPSULE ORAL DAILY
Status: DISCONTINUED | OUTPATIENT
Start: 2017-09-02 | End: 2017-09-03 | Stop reason: HOSPADM

## 2017-09-02 RX ORDER — AZELASTINE HYDROCHLORIDE, FLUTICASONE PROPIONATE 137; 50 UG/1; UG/1
1 SPRAY, METERED NASAL 2 TIMES DAILY
Status: DISCONTINUED | OUTPATIENT
Start: 2017-09-02 | End: 2017-09-03 | Stop reason: HOSPADM

## 2017-09-02 RX ORDER — LORAZEPAM 1 MG/1
1 TABLET ORAL 2 TIMES DAILY
Status: DISCONTINUED | OUTPATIENT
Start: 2017-09-02 | End: 2017-09-03 | Stop reason: HOSPADM

## 2017-09-02 RX ORDER — FLUTICASONE PROPIONATE 50 MCG
1 SPRAY, SUSPENSION (ML) NASAL 2 TIMES DAILY
Status: DISCONTINUED | OUTPATIENT
Start: 2017-09-02 | End: 2017-09-03 | Stop reason: HOSPADM

## 2017-09-02 RX ADMIN — IPRATROPIUM BROMIDE AND ALBUTEROL SULFATE 3 ML: .5; 3 SOLUTION RESPIRATORY (INHALATION) at 01:09

## 2017-09-02 RX ADMIN — FUROSEMIDE 20 MG: 10 INJECTION, SOLUTION INTRAMUSCULAR; INTRAVENOUS at 01:09

## 2017-09-02 RX ADMIN — NIFEDIPINE 10 MG: 10 CAPSULE, LIQUID FILLED ORAL at 09:09

## 2017-09-02 RX ADMIN — LORAZEPAM 1 MG: 1 TABLET ORAL at 09:09

## 2017-09-02 RX ADMIN — ENOXAPARIN SODIUM 40 MG: 100 INJECTION SUBCUTANEOUS at 06:09

## 2017-09-02 RX ADMIN — ATORVASTATIN CALCIUM 10 MG: 10 TABLET, FILM COATED ORAL at 09:09

## 2017-09-02 RX ADMIN — IPRATROPIUM BROMIDE AND ALBUTEROL SULFATE 3 ML: .5; 3 SOLUTION RESPIRATORY (INHALATION) at 11:09

## 2017-09-02 RX ADMIN — CARVEDILOL 12.5 MG: 12.5 TABLET, FILM COATED ORAL at 06:09

## 2017-09-02 RX ADMIN — PANTOPRAZOLE SODIUM 40 MG: 40 TABLET, DELAYED RELEASE ORAL at 09:09

## 2017-09-02 RX ADMIN — IPRATROPIUM BROMIDE AND ALBUTEROL SULFATE 3 ML: .5; 3 SOLUTION RESPIRATORY (INHALATION) at 07:09

## 2017-09-02 RX ADMIN — ASPIRIN 81 MG: 81 TABLET, COATED ORAL at 09:09

## 2017-09-02 RX ADMIN — MYCOPHENOLATE MOFETIL 1500 MG: 250 CAPSULE ORAL at 09:09

## 2017-09-02 RX ADMIN — IOHEXOL 125 ML: 350 INJECTION, SOLUTION INTRAVENOUS at 12:09

## 2017-09-02 RX ADMIN — PREDNISONE 5 MG: 5 TABLET ORAL at 09:09

## 2017-09-02 RX ADMIN — CARVEDILOL 12.5 MG: 12.5 TABLET, FILM COATED ORAL at 10:09

## 2017-09-02 RX ADMIN — IPRATROPIUM BROMIDE AND ALBUTEROL SULFATE 3 ML: .5; 3 SOLUTION RESPIRATORY (INHALATION) at 03:09

## 2017-09-02 NOTE — ED TRIAGE NOTES
LOC: The patient is awake, alert, aware of environment with an appropriate affect. Oriented x4, speaking appropriately  APPEARANCE: Pt resting comfortably, in no acute distress, pt is clean and well groomed, clothing properly fastened  SKIN:The skin is warm and dry, color consistent with ethnicity, patient has normal skin turgor and moist mucus membranes  RESPIRATORY:Airway is open and patent, respirations are spontaneous, patient has a normal effort and rate, no accessory muscle use noted. On oxygen at 3lpm/nc  CARDIAC: Normal rate and rhythm, no peripheral edema noted, capillary refill < 3 seconds, bilateral radial pulses 2+.  ABDOMEN: Soft, non tender, non distended. Bowel sounds present x 4 quadrants.   NEUROLOGIC: PERRLA, facial expression is symmetrical, patient moving all extremities spontaneously, normal sensation in all extremities when touched with a finger.  Follows all commands appropriately  MUSCULOSKELETAL: Patient moving all extremities spontaneously, no obvious swelling or deformities noted.

## 2017-09-02 NOTE — ASSESSMENT & PLAN NOTE
-last RHC was 05/2017 and noted a PA mean pressure of 30.   -continue home letaris, lasix (ALBERT), and coreg

## 2017-09-02 NOTE — H&P
[facility] Medicine  History & Physical    Patient Name: Fatimah Cha  MRN: [id number]  Admission Date: 9/1/2017  Attending Physician: Serafin Dolan MD   Primary Care Provider: Simon Orozco MD    [facility] Medicine Team: [facility] HOSP MED 2 Jozef Bonner MD     Patient information was obtained from patient, past medical records and ER records.     Subjective:     Principal Problem:Dyspnea on exertion    Chief Complaint:   Chief Complaint   Patient presents with    Chest Pain     tightness in her chest, saw md today, told CHF. hx of pulmonary fibrosis. on home o2 at 3-4 liters. pt just discharged from [facility]        HPI: 62 yo female with PMHx of CREST syndrome, Scleroderma, Sjogren's, Raynaud's, pulmonary HTN, HLD, hypothyroidism, interstitial lung disease (on home oxygen) who presents to the ED with chief complaint of dyspnea on exertion. Patient was seen at [facility] 8/29-8/30 for MORAES with associated leg swelling, palpitations, and hypotension, and was aggressively diuresed with IV and PO lasix, as they thought her symptoms were from a heart failure exacerbation. Her symptoms improved with diuresis and she was discharged home. However, pt states that her oxygen requirement has been increasing and she has a home pulse ox where she has noted to be down to 82 even on oxygen with simple ambulation around her house. She does not think that diuresis has helped her. She states that she can barely walk from room to room without desatting even on 5L of NC at home. She sates that she is also having headaches and some lightheadedness and dizziness. She states that when she is sitting and not moving, she can wean herself off the oxygen completely. She denies any recent illnesses/pneumonia, cough/congestion, fevers/chills, rashes, new medications, recent travel, new pets. Patient reports history of O2 use for the last 5 years, has rescue inhaler at home and home breathing treatments  as needed. She states she has been complaint with her Lasix qMWF.     Per Dr. 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 She underwent a VATS mediated lung biopsy (2013) at an OSH. (the results of this lung biopsy are unclear). She was then referred to Dr. Ken at [facility] for management of her ILD. There she has been followed with relative stability on azathioprine and prednisone.   In 02/2017- she developed worsening of her sx secondary to the flu and at that time notes that her pulmonary function had declined significantly. Lately, she has started feeling a little better but still feels she is not back to her pre-flu baseline.   Previously she was on 3L [place] at home nocturnally and with severe exertion, now she feels she has to use her oxygen more. (still not 24/7).      She also has pulmonary HTN that she is on ambrisentan for. Her last RHC was 05/2017 and noted a PA mean pressure of 30.     Past Medical History:   Diagnosis Date    Colitis     CREST syndrome     Hypercholesteremia     Hypertension     Hypothyroidism     Interstitial lung disease     Osteopenia     Raynaud disease     Scleroderma     Sjoegren syndrome        Past Surgical History:   Procedure Laterality Date    BREAST BIOPSY      CARDIAC CATHETERIZATION      COLONOSCOPY      foot surgery x2      lumpectomy (benign)      LUNG BIOPSY  03/2013    THYROIDECTOMY      TONSILLECTOMY      TUBAL LIGATION      UPPER GASTROINTESTINAL ENDOSCOPY         Review of patient's allergies indicates:  No Known Allergies    No current facility-administered medications on file prior to encounter.      Current Outpatient Prescriptions on File Prior to Encounter   Medication Sig    aspirin (ECOTRIN) 81 MG EC tablet Take 81 mg by mouth every evening.    atorvastatin (LIPITOR) 10 MG tablet Take 10 mg by mouth once  daily.     BIOTIN ORAL Take 500 mcg by mouth once daily.    folic acid (FOLVITE) 1 MG tablet TWO TABLETS BY MOUTH EVERY DAY    furosemide (LASIX) 40 MG tablet every Mon, Wed, Fri.     KLOR-CON 8 mEq TbSR Take 8 mEq by mouth every Mon, Wed, Fri.     levocetirizine (XYZAL) 5 MG tablet Take 5 mg by mouth every evening.     lorazepam (ATIVAN) 1 MG tablet 1 mg 2 (two) times daily.     nifedipine (PROCARDIA) 10 MG Cap 10 mg 3 (three) times daily.     pantoprazole (PROTONIX) 40 MG tablet Take 40 mg by mouth once daily.    predniSONE (DELTASONE) 5 MG tablet Take 5 mg by mouth once daily.     albuterol-ipratropium 2.5mg-0.5mg/3mL (DUO-NEB) 0.5 mg-3 mg(2.5 mg base)/3 mL nebulizer solution ONE VIAL in Nebulizer EVERY SIX HOURS AS NEEDED FOR SHORTNESS OF BREATH    ambrisentan (LETAIRIS) 10 MG Tab Take 10 mg by mouth once daily.    budesonide (PULMICORT) 0.5 mg/2 mL nebulizer solution ONE VIAL IN Nebulizer EVERY DAY PRN    carvedilol (COREG) 12.5 MG tablet Take 1 tablet (12.5 mg total) by mouth 2 (two) times daily with meals.    DYMISTA 137-50 mcg/spray Spry nassal spray 1 spray by Each Nare route 2 (two) times daily.    multivitamin capsule Take 1 capsule by mouth once daily.    mycophenolate (CELLCEPT) 500 mg Tab 1500 mg daily  1000 mg nightly    risedronate (ACTONEL) 150 MG Tab Take 150 mg by mouth every 30 days.    TIROSINT 137 mcg Cap once daily.     tizanidine (ZANAFLEX) 4 MG tablet every 8 (eight) hours as needed.     VITAMIN D2 50,000 unit capsule 50,000 Units every 30 days.     XOPENEX HFA 45 mcg/actuation inhaler Inhale 1 puff into the lungs every 4 (four) hours as needed.     [DISCONTINUED] gabapentin (NEURONTIN) 300 MG capsule 300 mg 2 (two) times daily.     [DISCONTINUED] predniSONE (DELTASONE) 1 MG tablet Take 2 mg by mouth once daily.     Family History     Problem Relation (Age of Onset)    Deep vein thrombosis Brother    Emphysema Sister    Heart failure Father    Lung cancer Brother     Thyroid cancer Mother    Thyroid disease Sister        Social History Main Topics    Smoking status: Former Smoker     Packs/day: 1.00     Years: 5.00     Types: Cigarettes     Quit date: 3/30/1979    Smokeless tobacco: Never Used    Alcohol use No    Drug use: No    Sexual activity: Not on file     Review of Systems   Constitutional: Positive for activity change. Negative for chills and fever.   HENT: Negative for congestion, sinus pressure, sneezing, sore throat and trouble swallowing.    Respiratory: Positive for cough, chest tightness and shortness of breath.    Cardiovascular: Negative for palpitations and leg swelling.   Gastrointestinal: Negative for abdominal distention, abdominal pain, nausea and vomiting.   Genitourinary: Negative for dysuria and hematuria.   Musculoskeletal: Negative for arthralgias and myalgias.   Neurological: Positive for dizziness, light-headedness and headaches.     Objective:     Vital Signs (Most Recent):  Temp: 97.7 °F (36.5 °C) (09/02/17 0139)  Pulse: 98 (09/02/17 0139)  Resp: 20 (09/02/17 0139)  BP: (!) 102/50 (09/02/17 0139)  SpO2: 97 % (09/02/17 0139) Vital Signs (24h Range):  Temp:  [97.7 °F (36.5 °C)-98.8 °F (37.1 °C)] 97.7 °F (36.5 °C)  Pulse:  [86-98] 98  Resp:  [18-20] 20  SpO2:  [96 %-99 %] 97 %  BP: (102-143)/(50-84) 102/50     Weight: 86.8 kg (191 lb 5.8 oz)  Body mass index is 27.46 kg/m².    Physical Exam   Constitutional: She is oriented to person, place, and time. She appears well-developed and well-nourished. No distress.   HENT:   Head: Normocephalic and atraumatic.   Eyes: Pupils are equal, round, and reactive to light. No scleral icterus.   Neck: Normal range of motion. No JVD present.   Cardiovascular: Normal rate and regular rhythm.    No murmur heard.  Pulmonary/Chest: Effort normal. No respiratory distress.   Coarse breath sounds throughout all lung fields consistent with ILD   Abdominal: Soft. Bowel sounds are normal. She exhibits no distension.    Musculoskeletal: Normal range of motion. She exhibits no edema.   Neurological: She is alert and oriented to person, place, and time.   Skin: Skin is warm and dry.        Significant Labs:   BMP:   Recent Labs  Lab 09/01/17 2016         K 4.2      CO2 25   BUN 16   CREATININE 0.8   CALCIUM 9.5     CBC:   Recent Labs  Lab 09/01/17 2016   WBC 6.16   HGB 13.5   HCT 40.8        CMP:   Recent Labs  Lab 09/01/17 2016      K 4.2      CO2 25      BUN 16   CREATININE 0.8   CALCIUM 9.5   PROT 7.5   ALBUMIN 3.9   BILITOT 0.7   ALKPHOS 50*   AST 24   ALT 16   ANIONGAP 9   EGFRNONAA >60.0     Troponin:   Recent Labs  Lab 09/01/17 2016   TROPONINI <0.006        Pulmonary Function Tests 7/3/2017 6/13/2017   FVC 2.6 2.6   FEV1 2.2 2.23   TLC (liters) 3.3 3.3   DLCO (ml/mmHg sec) 6.4 6.2   FVC% 74 65   FEV1% 80 72   FEF 25-75 2.99 2.94   FEF 25-75% 113 112   TLC% 56 55   RV 0.78 0.68   RV% 34 29   DLCO% 32          Significant Imaging: I have reviewed all pertinent imaging results/findings within the past 24 hours.    Assessment/Plan:     Hypothyroidism due to Hashimoto's thyroiditis    -pt on tirosint at home, will continue levothyroxine while here          HLD (hyperlipidemia)    -continue home statin          Pulmonary hypertension    -last RHC was 05/2017 and noted a PA mean pressure of 30.   -continue home letaris, lasix (MWF), and coreg          CREST (calcinosis, Raynaud's phenomenon, esophageal dysfunction, sclerodactyly, telangiectasia)    -continue cellcept 1500 BID and prednisone 5mg daily  -continue nifedipine 10 TID (for Raynaud's)  -continue protonix 40 (for GERD)          * Dyspnea on exertion    -pt with worsening MORAES and increasing O2 requirements, concerning for worsening of ILD  -last PFT's have remained pretty stable over the last few years with some decline. Most recent  FEV1 in clinic July 2017 - 2.2 L, FVC - 2.6 L, FEV1- 2.2. In comparison FEV1 - 2.45 L and  FVC- 3L in 02/2016  -recently diuresed at OSH and pt dry on exam with no evidence of CHF (BNP WNL and troponin negative), so this is less likely. CTA ruled out PE. CT also with no signs of infection/pneumonia  -will continue home neb treatments as well as nasal spray and additional PRN neb treatment  -pulm consulted, please f/u recs            VTE Risk Mitigation         Ordered     enoxaparin injection 40 mg  Daily     Route:  Subcutaneous        09/02/17 0002     Medium Risk of VTE  Once      09/02/17 0002             Jozef Bonner MD  Department of Hospital Medicine   [facility]

## 2017-09-02 NOTE — SUBJECTIVE & OBJECTIVE
Past Medical History:   Diagnosis Date    Colitis     CREST syndrome     Hypercholesteremia     Hypertension     Hypothyroidism     Interstitial lung disease     Osteopenia     Raynaud disease     Scleroderma     Sjoegren syndrome        Past Surgical History:   Procedure Laterality Date    BREAST BIOPSY      CARDIAC CATHETERIZATION      COLONOSCOPY      foot surgery x2      lumpectomy (benign)      LUNG BIOPSY  03/2013    THYROIDECTOMY      TONSILLECTOMY      TUBAL LIGATION      UPPER GASTROINTESTINAL ENDOSCOPY         Review of patient's allergies indicates:  No Known Allergies    Family History     Problem Relation (Age of Onset)    Deep vein thrombosis Brother    Emphysema Sister    Heart failure Father    Lung cancer Brother    Thyroid cancer Mother    Thyroid disease Sister        Social History Main Topics    Smoking status: Former Smoker     Packs/day: 1.00     Years: 5.00     Types: Cigarettes     Quit date: 3/30/1979    Smokeless tobacco: Never Used    Alcohol use No    Drug use: No    Sexual activity: Not on file         Review of Systems   Constitutional: Positive for unexpected weight change (had been gaining weight, now at baseline). Negative for fever.   HENT: Positive for trouble swallowing.    Eyes: Negative for visual disturbance.   Respiratory: Positive for shortness of breath. Negative for chest tightness, wheezing and stridor.    Cardiovascular: Positive for leg swelling. Negative for chest pain.   Gastrointestinal: Negative for vomiting.   Endocrine: Negative for cold intolerance and heat intolerance.   Genitourinary: Negative.    Musculoskeletal: Negative for gait problem.   Skin: Negative for rash.   Allergic/Immunologic: Positive for immunocompromised state.   Neurological: Negative for headaches.   Hematological: Negative for adenopathy.   Psychiatric/Behavioral: Negative for agitation.     Objective:     Vital Signs (Most Recent):  Temp: 98.4 °F (36.9 °C) (09/02/17  0710)  Pulse: 60 (09/02/17 0746)  Resp: 14 (09/02/17 0746)  BP: (!) 116/56 (09/02/17 0710)  SpO2: 96 % (09/02/17 0746) Vital Signs (24h Range):  Temp:  [97.7 °F (36.5 °C)-98.8 °F (37.1 °C)] 98.4 °F (36.9 °C)  Pulse:  [60-98] 60  Resp:  [14-20] 14  SpO2:  [96 %-99 %] 96 %  BP: (102-143)/(50-84) 116/56     Weight: 86.8 kg (191 lb 5.8 oz)  Body mass index is 27.46 kg/m².      Intake/Output Summary (Last 24 hours) at 09/02/17 1003  Last data filed at 09/02/17 0610   Gross per 24 hour   Intake              200 ml   Output              180 ml   Net               20 ml       Physical Exam   Constitutional: She is oriented to person, place, and time. She appears well-developed and well-nourished.   HENT:   Head: Normocephalic and atraumatic.   Eyes: Conjunctivae and EOM are normal. Pupils are equal, round, and reactive to light.   Neck: Normal range of motion. Neck supple.   Cardiovascular: Normal rate, regular rhythm, normal heart sounds and intact distal pulses.    Pulmonary/Chest: Effort normal and breath sounds normal. No respiratory distress. She has no wheezes. She exhibits no tenderness.   Abdominal: Soft. Bowel sounds are normal. She exhibits no distension. No hernia.   Musculoskeletal: Normal range of motion. She exhibits no edema or tenderness.   Neurological: She is alert and oriented to person, place, and time. No cranial nerve deficit.   Skin: Skin is warm and dry.   Psychiatric: She has a normal mood and affect.       Vents:       Lines/Drains/Airways     Peripheral Intravenous Line                 Peripheral IV - Single Lumen 09/01/17 2010 Left Antecubital less than 1 day                Significant Labs:    CBC/Anemia Profile:    Recent Labs  Lab 09/01/17 2016 09/02/17  0538   WBC 6.16 6.11   HGB 13.5 12.3   HCT 40.8 37.3    172   MCV 91 89   RDW 13.7 14.0        Chemistries:    Recent Labs  Lab 09/01/17 2016 09/02/17  0538    142   K 4.2 3.9    108   CO2 25 27   BUN 16 14   CREATININE  0.8 0.8   CALCIUM 9.5 8.9   ALBUMIN 3.9  --    PROT 7.5  --    BILITOT 0.7  --    ALKPHOS 50*  --    ALT 16  --    AST 24  --      07/03/2017---------Distance:  282 meters (925 feet)       O2 Sat % Supplemental Oxygen Heart Rate Blood Pressure Kushal Scale   Pre-exercise  (Resting) 96 % 3 L/M 79 bpm 106/55 mmHg 1   During Exercise 80 % 3 L/M 93 bpm 114/64 mmHg 4   Post-exercise  (Recovery) 94 % 3 L/M  76 bpm          Recovery Time: 142 seconds     Performing nurse/tech: ROSEMARY Rivas        PREVIOUS STUDY:   03/30/2017---------Distance: 121.92 meters (400 feet)       O2 Sat % Supplemental Oxygen Heart Rate Blood Pressure Kushal Scale   Pre-exercise  (Resting) 93 % Room Air 93 bpm 133/78 mmHg 3   During Exercise 78 % Room Air 111 bpm 139/65 mmHg 4   Post-exercise  (Recovery) 92 % Room Air  95 bpm          .    Significant Imaging:   CT: I have reviewed all pertinent results/findings within the past 24 hours and my personal findings are:  Unchanged from baseline

## 2017-09-02 NOTE — ASSESSMENT & PLAN NOTE
-continue cellcept 1500 BID and prednisone 5mg daily  -continue nifedipine 10 TID (for Raynaud's)  -continue protonix 40 (for GERD)

## 2017-09-02 NOTE — ASSESSMENT & PLAN NOTE
-pt with worsening MORAES and increasing O2 requirements, concerning for worsening of ILD  -last PFT's have remained pretty stable over the last few years with some decline. Most recent  FEV1 in clinic July 2017 - 2.2 L, FVC - 2.6 L, FEV1- 2.2. In comparison FEV1 - 2.45 L and FVC- 3L in 02/2016  -recently diuresed at OSH and pt dry on exam with no evidence of CHF (BNP WNL and troponin negative), so this is less likely. CTA ruled out PE. CT also with no signs of infection/pneumonia  -will continue home neb treatments as well as nasal spray and additional PRN neb treatment  -pulm consulted, please f/u recs

## 2017-09-02 NOTE — ED PROVIDER NOTES
Encounter Date: 9/1/2017    SCRIBE #1 NOTE: I, Evelio Singer, am scribing for, and in the presence of, Dr. Alexander.       History     Chief Complaint   Patient presents with    Chest Pain     tightness in her chest, saw md today, told CHF. hx of pulmonary fibrosis. on home o2 at 3-4 liters. pt just discharged from [facility]     Time seen by provider: 9:12 PM    This is a 62 y.o. female with history of HTN, hypercholesteremia, pulmonary fibrosis, presents with MORAES x 4 days. Patient states she was seen at [facility] 8/29-8/30 for MORAES with associated leg swelling, palpitations, and hypotension, had improved after treatment with IV and PO lasix and was discharged, but then started feeling worse later that afternoon. Patient reports history of O2 use for the last 5 years, has rescue inhaler at home and home breathing treatments as needed. She states she has been complaint with her Lasix qMWF.       The history is provided by the patient.     Review of patient's allergies indicates:  No Known Allergies  Past Medical History:   Diagnosis Date    Colitis     CREST syndrome     Hypercholesteremia     Hypertension     Hypothyroidism     Interstitial lung disease     Osteopenia     Raynaud disease     Scleroderma     Sjoegren syndrome      Past Surgical History:   Procedure Laterality Date    BREAST BIOPSY      CARDIAC CATHETERIZATION      COLONOSCOPY      foot surgery x2      lumpectomy (benign)      LUNG BIOPSY  03/2013    THYROIDECTOMY      TONSILLECTOMY      TUBAL LIGATION      UPPER GASTROINTESTINAL ENDOSCOPY       Family History   Problem Relation Age of Onset    Thyroid cancer Mother     Heart failure Father     Emphysema Sister     Thyroid disease Sister     Deep vein thrombosis Brother     Lung cancer Brother     Breast cancer Neg Hx     Colon cancer Neg Hx     Ovarian cancer Neg Hx      Social History   Substance Use Topics    Smoking status: Former Smoker     Packs/day: 1.00     Years: 5.00      Types: Cigarettes     Quit date: 3/30/1979    Smokeless tobacco: Never Used    Alcohol use No     Review of Systems   Constitutional: Negative for fever.   HENT: Negative for sore throat.    Respiratory: Negative for shortness of breath.    Cardiovascular: Positive for palpitations and leg swelling.        Positive for MORAES.   Gastrointestinal: Negative for nausea.   Genitourinary: Negative for dysuria.   Musculoskeletal: Negative for back pain.   Skin: Negative for rash.   Neurological: Negative for weakness.   Hematological: Does not bruise/bleed easily.       Physical Exam     Initial Vitals [09/01/17 1652]   BP Pulse Resp Temp SpO2   128/84 92 20 98.8 °F (37.1 °C) 96 %      MAP       98.67         Physical Exam    Nursing note and vitals reviewed.  Constitutional: She appears well-developed and well-nourished. No distress.   HENT:   Head: Normocephalic.   Mouth/Throat: Oropharynx is clear and moist.   Eyes: Conjunctivae are normal.   Neck: Neck supple.   Cardiovascular: Normal rate, regular rhythm and intact distal pulses.   Pulmonary/Chest: Breath sounds normal. No respiratory distress. She has no wheezes. She has no rhonchi. She has no rales.   Coarse breath sounds.   Abdominal: Soft. There is no tenderness.   Musculoskeletal: Normal range of motion.   Neurological: She is alert and oriented to person, place, and time. She has normal strength.   Skin: Skin is warm and dry.   Psychiatric: Thought content normal.         ED Course   Procedures  Labs Reviewed   CBC W/ AUTO DIFFERENTIAL - Abnormal; Notable for the following:        Result Value    MPV 9.0 (*)     Lymph # 0.8 (*)     Lymph% 12.5 (*)     All other components within normal limits   COMPREHENSIVE METABOLIC PANEL - Abnormal; Notable for the following:     Alkaline Phosphatase 50 (*)     All other components within normal limits   TROPONIN I   B-TYPE NATRIURETIC PEPTIDE             Medical Decision Making:   History:   Old Medical Records: I  decided to obtain old medical records.  Initial Assessment:   62 y.o. female with worsening MORAES and dramatic tachycardia complicated by history of extensive pulmonary pathology sent in by PCP for pulmonary consult. Plan to do x-rays, labs, admit to Medicine.    Clinical Tests:   Lab Tests: Ordered and Reviewed  Radiological Study: Ordered and Reviewed  Medical Tests: Reviewed and Ordered  Other:   I have discussed this case with another health care provider.       <> Summary of the Discussion: Pulmonology.            Scribe Attestation:   Scribe #1: I performed the above scribed service and the documentation accurately describes the services I performed. I attest to the accuracy of the note.    Attending Attestation:           Physician Attestation for Scribe:  Physician Attestation Statement for Scribe #1: I, Dr. Alexnader, reviewed documentation, as scribed by Evelio Singer in my presence, and it is both accurate and complete.         Attending ED Notes:   10:44 PM. Workup is benign. Will place patient in observation for Pulmonology evaluation. Medicine recommended non-contrast CT of the chest.           ED Course      Clinical Impression:   The primary encounter diagnosis was Dyspnea on exertion. A diagnosis of Chest tightness was also pertinent to this visit.    Disposition:   Disposition: Placed in Observation                        Jeffery Alexander MD  09/05/17 0933

## 2017-09-02 NOTE — HPI
"62 year old with scleroderma/crest with associated pulmonary fibrosis and pulmonary hypertension.  Admits to being non-adherent to supplemental oxygen until recently.  Patient reports a one to month history of weight gain, lower extremity edema and worsening MORAES and orthopnea. She was admitted to [facility] this week and diuresed.  Her edema resolved but she felt "weak and Dizzy" and decided to come to [facility] for evaluation.  In the ED fluid was administered for hypotension and symptoms have resolved.  Recently immunosuppressant changed from Imuran to Cellcept in order to further decrease corticosteroids.  No cough or additional symptoms.      Last seen by Dr. Weber with Lung transplant who felt that ILD is stable.    Patient does endorse dysphagia, etc.    "

## 2017-09-02 NOTE — ASSESSMENT & PLAN NOTE
Do not believe this is related to medication change.  Continue CellCept and follow up with Dr. Demetrius Ken.

## 2017-09-02 NOTE — NURSING
RN to bedside to administered scheduled nifedipine at 0600. Pt states that she has to take Tirosint first on an empty stomach and wait an hour before taking any other meds. Pt took home Tirosint at 0600 and nifedipine placed on hold. Day RN notified.

## 2017-09-02 NOTE — ED TRIAGE NOTES
Pt was discharged from [place] yesterday. Pt complaining of central chestpain  4/10, SOB for couple of days. + dizziness, frontal headache, body weakness (tingling sensation). On oxygen at 3lpm/nc

## 2017-09-02 NOTE — ASSESSMENT & PLAN NOTE
Unchanged.  She remains at baseline.  Please see previous walk tests above.  Encouraged to wear supplemental oxygen constantly but especially with any activity.  She is likely developing worsening RV strain leading to volume overload and pulmonary edema.

## 2017-09-02 NOTE — HPI
62 yo female with PMHx of CREST syndrome, Scleroderma, Sjogren's, Raynaud's, pulmonary HTN, HLD, hypothyroidism, interstitial lung disease (on home oxygen) who presents to the ED with chief complaint of dyspnea on exertion. Patient was seen at [facility] 8/29-8/30 for MORAES with associated leg swelling, palpitations, and hypotension, and was aggressively diuresed with IV and PO lasix, as they thought her symptoms were from a heart failure exacerbation. Her symptoms improved with diuresis and she was discharged home. However, pt states that her oxygen requirement has been increasing and she has a home pulse ox where she has noted to be down to 82 even on oxygen with simple ambulation around her house. She does not think that diuresis has helped her. She states that she can barely walk from room to room without desatting even on 5L of NC at home. She sates that she is also having headaches and some lightheadedness and dizziness. She states that when she is sitting and not moving, she can wean herself off the oxygen completely. She denies any recent illnesses/pneumonia, cough/congestion, fevers/chills, rashes, new medications, recent travel, new pets. Patient reports history of O2 use for the last 5 years, has rescue inhaler at home and home breathing treatments as needed. She states she has been complaint with her Lasix qMWF.     Per Dr. 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 She underwent a VATS mediated lung biopsy (2013) at an OSH. (the results of this lung biopsy are unclear). She was then referred to Dr. Ken at [facility] for management of her ILD. There she has been followed with relative stability on azathioprine and prednisone.   In 02/2017- she developed worsening of her sx secondary to the flu and at that time notes that her pulmonary function had declined significantly.  Lately, she has started feeling a little better but still feels she is not back to her pre-flu baseline.   Previously she was on 3L Nc at home nocturnally and with severe exertion, now she feels she has to use her oxygen more. (still not 24/7).      She also has pulmonary HTN that she is on ambrisentan for. Her last RHC was 05/2017 and noted a PA mean pressure of 30.

## 2017-09-02 NOTE — PLAN OF CARE
Problem: Patient Care Overview  Goal: Plan of Care Review  Pt arrived to unit from ED accompanied by daughter. Oriented to call light and room. Pt sating 98% on 2 L NC. Extension added to tubing so pt can ambulate to BR with 02. Pt denies any pain when asked. Pt very particular about medication and will only take specific meds she takes at home (see sticky note to physicians). Pt noted to be taking home medications before telling RN. Pt educated on importance of allowing RN to administer medication to her and to tell RN before taking. No other issues noted. Will continue to monitor.

## 2017-09-02 NOTE — SUBJECTIVE & OBJECTIVE
Past Medical History:   Diagnosis Date    Colitis     CREST syndrome     Hypercholesteremia     Hypertension     Hypothyroidism     Interstitial lung disease     Osteopenia     Raynaud disease     Scleroderma     Sjoegren syndrome        Past Surgical History:   Procedure Laterality Date    BREAST BIOPSY      CARDIAC CATHETERIZATION      COLONOSCOPY      foot surgery x2      lumpectomy (benign)      LUNG BIOPSY  03/2013    THYROIDECTOMY      TONSILLECTOMY      TUBAL LIGATION      UPPER GASTROINTESTINAL ENDOSCOPY         Review of patient's allergies indicates:  No Known Allergies    No current facility-administered medications on file prior to encounter.      Current Outpatient Prescriptions on File Prior to Encounter   Medication Sig    aspirin (ECOTRIN) 81 MG EC tablet Take 81 mg by mouth every evening.    atorvastatin (LIPITOR) 10 MG tablet Take 10 mg by mouth once daily.     BIOTIN ORAL Take 500 mcg by mouth once daily.    folic acid (FOLVITE) 1 MG tablet TWO TABLETS BY MOUTH EVERY DAY    furosemide (LASIX) 40 MG tablet every Mon, Wed, Fri.     KLOR-CON 8 mEq TbSR Take 8 mEq by mouth every Mon, Wed, Fri.     levocetirizine (XYZAL) 5 MG tablet Take 5 mg by mouth every evening.     lorazepam (ATIVAN) 1 MG tablet 1 mg 2 (two) times daily.     nifedipine (PROCARDIA) 10 MG Cap 10 mg 3 (three) times daily.     pantoprazole (PROTONIX) 40 MG tablet Take 40 mg by mouth once daily.    predniSONE (DELTASONE) 5 MG tablet Take 5 mg by mouth once daily.     albuterol-ipratropium 2.5mg-0.5mg/3mL (DUO-NEB) 0.5 mg-3 mg(2.5 mg base)/3 mL nebulizer solution ONE VIAL in Nebulizer EVERY SIX HOURS AS NEEDED FOR SHORTNESS OF BREATH    ambrisentan (LETAIRIS) 10 MG Tab Take 10 mg by mouth once daily.    budesonide (PULMICORT) 0.5 mg/2 mL nebulizer solution ONE VIAL IN Nebulizer EVERY DAY PRN    carvedilol (COREG) 12.5 MG tablet Take 1 tablet (12.5 mg total) by mouth 2 (two) times daily with meals.     DYMISTA 137-50 mcg/spray Spry nassal spray 1 spray by Each Nare route 2 (two) times daily.    multivitamin capsule Take 1 capsule by mouth once daily.    mycophenolate (CELLCEPT) 500 mg Tab 1500 mg daily  1000 mg nightly    risedronate (ACTONEL) 150 MG Tab Take 150 mg by mouth every 30 days.    TIROSINT 137 mcg Cap once daily.     tizanidine (ZANAFLEX) 4 MG tablet every 8 (eight) hours as needed.     VITAMIN D2 50,000 unit capsule 50,000 Units every 30 days.     XOPENEX HFA 45 mcg/actuation inhaler Inhale 1 puff into the lungs every 4 (four) hours as needed.     [DISCONTINUED] gabapentin (NEURONTIN) 300 MG capsule 300 mg 2 (two) times daily.     [DISCONTINUED] predniSONE (DELTASONE) 1 MG tablet Take 2 mg by mouth once daily.     Family History     Problem Relation (Age of Onset)    Deep vein thrombosis Brother    Emphysema Sister    Heart failure Father    Lung cancer Brother    Thyroid cancer Mother    Thyroid disease Sister        Social History Main Topics    Smoking status: Former Smoker     Packs/day: 1.00     Years: 5.00     Types: Cigarettes     Quit date: 3/30/1979    Smokeless tobacco: Never Used    Alcohol use No    Drug use: No    Sexual activity: Not on file     Review of Systems   Constitutional: Positive for activity change. Negative for chills and fever.   HENT: Negative for congestion, sinus pressure, sneezing, sore throat and trouble swallowing.    Respiratory: Positive for cough, chest tightness and shortness of breath.    Cardiovascular: Negative for palpitations and leg swelling.   Gastrointestinal: Negative for abdominal distention, abdominal pain, nausea and vomiting.   Genitourinary: Negative for dysuria and hematuria.   Musculoskeletal: Negative for arthralgias and myalgias.   Neurological: Positive for dizziness, light-headedness and headaches.     Objective:     Vital Signs (Most Recent):  Temp: 97.7 °F (36.5 °C) (09/02/17 0139)  Pulse: 98 (09/02/17 0139)  Resp: 20 (09/02/17  0139)  BP: (!) 102/50 (09/02/17 0139)  SpO2: 97 % (09/02/17 0139) Vital Signs (24h Range):  Temp:  [97.7 °F (36.5 °C)-98.8 °F (37.1 °C)] 97.7 °F (36.5 °C)  Pulse:  [86-98] 98  Resp:  [18-20] 20  SpO2:  [96 %-99 %] 97 %  BP: (102-143)/(50-84) 102/50     Weight: 86.8 kg (191 lb 5.8 oz)  Body mass index is 27.46 kg/m².    Physical Exam   Constitutional: She is oriented to person, place, and time. She appears well-developed and well-nourished. No distress.   HENT:   Head: Normocephalic and atraumatic.   Eyes: Pupils are equal, round, and reactive to light. No scleral icterus.   Neck: Normal range of motion. No JVD present.   Cardiovascular: Normal rate and regular rhythm.    No murmur heard.  Pulmonary/Chest: Effort normal. No respiratory distress.   Coarse breath sounds throughout all lung fields consistent with ILD   Abdominal: Soft. Bowel sounds are normal. She exhibits no distension.   Musculoskeletal: Normal range of motion. She exhibits no edema.   Neurological: She is alert and oriented to person, place, and time.   Skin: Skin is warm and dry.        Significant Labs:   BMP:   Recent Labs  Lab 09/01/17 2016         K 4.2      CO2 25   BUN 16   CREATININE 0.8   CALCIUM 9.5     CBC:   Recent Labs  Lab 09/01/17 2016   WBC 6.16   HGB 13.5   HCT 40.8        CMP:   Recent Labs  Lab 09/01/17 2016      K 4.2      CO2 25      BUN 16   CREATININE 0.8   CALCIUM 9.5   PROT 7.5   ALBUMIN 3.9   BILITOT 0.7   ALKPHOS 50*   AST 24   ALT 16   ANIONGAP 9   EGFRNONAA >60.0     Troponin:   Recent Labs  Lab 09/01/17 2016   TROPONINI <0.006        Pulmonary Function Tests 7/3/2017 6/13/2017   FVC 2.6 2.6   FEV1 2.2 2.23   TLC (liters) 3.3 3.3   DLCO (ml/mmHg sec) 6.4 6.2   FVC% 74 65   FEV1% 80 72   FEF 25-75 2.99 2.94   FEF 25-75% 113 112   TLC% 56 55   RV 0.78 0.68   RV% 34 29   DLCO% 32          Significant Imaging: I have reviewed all pertinent imaging results/findings within the  past 24 hours.

## 2017-09-02 NOTE — PLAN OF CARE
Problem: Fall Risk (Adult)  Goal: Absence of Falls  Patient will demonstrate the desired outcomes by discharge/transition of care.   Outcome: Ongoing (interventions implemented as appropriate)  Pt AOx4; VSS; pt on 2LNC at 95%; pt up ad constanza; no falls or injuries noted; family at bedside; will continue to monitor

## 2017-09-02 NOTE — CONSULTS
"[facility]  Pulmonology  Consult Note    Patient Name: Fatimah Cha  MRN: [id number]  Admission Date: 9/1/2017  Hospital Length of Stay: 0 days  Code Status: No Order  Attending Physician: Serafin Dolan MD  Primary Care Provider: Simon Orozco MD   Principal Problem: Orthostatic hypotension    Consults  Subjective:     HPI:  62 year old with scleroderma/crest with associated pulmonary fibrosis and pulmonary hypertension.  Admits to being non-adherent to supplemental oxygen until recently.  Patient reports a one to month history of weight gain, lower extremity edema and worsening MORAES and orthopnea. She was admitted to [facility] this week and diuresed.  Her edema resolved but she felt "weak and Dizzy" and decided to come to [facility] for evaluation.  In the ED fluid was administered for hypotension and symptoms have resolved.  Recently immunosuppressant changed from Imuran to Cellcept in order to further decrease corticosteroids.  No cough or additional symptoms.      Last seen by Dr. Weber with Lung transplant who felt that ILD is stable.    Patient does endorse dysphagia, etc.      Past Medical History:   Diagnosis Date    Colitis     CREST syndrome     Hypercholesteremia     Hypertension     Hypothyroidism     Interstitial lung disease     Osteopenia     Raynaud disease     Scleroderma     Sjoegren syndrome        Past Surgical History:   Procedure Laterality Date    BREAST BIOPSY      CARDIAC CATHETERIZATION      COLONOSCOPY      foot surgery x2      lumpectomy (benign)      LUNG BIOPSY  03/2013    THYROIDECTOMY      TONSILLECTOMY      TUBAL LIGATION      UPPER GASTROINTESTINAL ENDOSCOPY         Review of patient's allergies indicates:  No Known Allergies    Family History     Problem Relation (Age of Onset)    Deep vein thrombosis Brother    Emphysema Sister    Heart failure Father    Lung cancer Brother    Thyroid cancer Mother    Thyroid disease Sister    "     Social History Main Topics    Smoking status: Former Smoker     Packs/day: 1.00     Years: 5.00     Types: Cigarettes     Quit date: 3/30/1979    Smokeless tobacco: Never Used    Alcohol use No    Drug use: No    Sexual activity: Not on file         Review of Systems   Constitutional: Positive for unexpected weight change (had been gaining weight, now at baseline). Negative for fever.   HENT: Positive for trouble swallowing.    Eyes: Negative for visual disturbance.   Respiratory: Positive for shortness of breath. Negative for chest tightness, wheezing and stridor.    Cardiovascular: Positive for leg swelling. Negative for chest pain.   Gastrointestinal: Negative for vomiting.   Endocrine: Negative for cold intolerance and heat intolerance.   Genitourinary: Negative.    Musculoskeletal: Negative for gait problem.   Skin: Negative for rash.   Allergic/Immunologic: Positive for immunocompromised state.   Neurological: Negative for headaches.   Hematological: Negative for adenopathy.   Psychiatric/Behavioral: Negative for agitation.     Objective:     Vital Signs (Most Recent):  Temp: 98.4 °F (36.9 °C) (09/02/17 0710)  Pulse: 60 (09/02/17 0746)  Resp: 14 (09/02/17 0746)  BP: (!) 116/56 (09/02/17 0710)  SpO2: 96 % (09/02/17 0746) Vital Signs (24h Range):  Temp:  [97.7 °F (36.5 °C)-98.8 °F (37.1 °C)] 98.4 °F (36.9 °C)  Pulse:  [60-98] 60  Resp:  [14-20] 14  SpO2:  [96 %-99 %] 96 %  BP: (102-143)/(50-84) 116/56     Weight: 86.8 kg (191 lb 5.8 oz)  Body mass index is 27.46 kg/m².      Intake/Output Summary (Last 24 hours) at 09/02/17 1003  Last data filed at 09/02/17 0610   Gross per 24 hour   Intake              200 ml   Output              180 ml   Net               20 ml       Physical Exam   Constitutional: She is oriented to person, place, and time. She appears well-developed and well-nourished.   HENT:   Head: Normocephalic and atraumatic.   Eyes: Conjunctivae and EOM are normal. Pupils are equal, round, and  reactive to light.   Neck: Normal range of motion. Neck supple.   Cardiovascular: Normal rate, regular rhythm, normal heart sounds and intact distal pulses.    Pulmonary/Chest: Effort normal and breath sounds normal. No respiratory distress. She has no wheezes. She exhibits no tenderness.   Abdominal: Soft. Bowel sounds are normal. She exhibits no distension. No hernia.   Musculoskeletal: Normal range of motion. She exhibits no edema or tenderness.   Neurological: She is alert and oriented to person, place, and time. No cranial nerve deficit.   Skin: Skin is warm and dry.   Psychiatric: She has a normal mood and affect.       Vents:       Lines/Drains/Airways     Peripheral Intravenous Line                 Peripheral IV - Single Lumen 09/01/17 2010 Left Antecubital less than 1 day                Significant Labs:    CBC/Anemia Profile:    Recent Labs  Lab 09/01/17 2016 09/02/17  0538   WBC 6.16 6.11   HGB 13.5 12.3   HCT 40.8 37.3    172   MCV 91 89   RDW 13.7 14.0        Chemistries:    Recent Labs  Lab 09/01/17 2016 09/02/17  0538    142   K 4.2 3.9    108   CO2 25 27   BUN 16 14   CREATININE 0.8 0.8   CALCIUM 9.5 8.9   ALBUMIN 3.9  --    PROT 7.5  --    BILITOT 0.7  --    ALKPHOS 50*  --    ALT 16  --    AST 24  --      07/03/2017---------Distance:  282 meters (925 feet)       O2 Sat % Supplemental Oxygen Heart Rate Blood Pressure Kushal Scale   Pre-exercise  (Resting) 96 % 3 L/M 79 bpm 106/55 mmHg 1   During Exercise 80 % 3 L/M 93 bpm 114/64 mmHg 4   Post-exercise  (Recovery) 94 % 3 L/M  76 bpm          Recovery Time: 142 seconds     Performing nurse/tech: ROSEMARY Rivas        PREVIOUS STUDY:   03/30/2017---------Distance: 121.92 meters (400 feet)       O2 Sat % Supplemental Oxygen Heart Rate Blood Pressure Kushal Scale   Pre-exercise  (Resting) 93 % Room Air 93 bpm 133/78 mmHg 3   During Exercise 78 % Room Air 111 bpm 139/65 mmHg 4   Post-exercise  (Recovery) 92 % Room Air  95 bpm           .    Significant Imaging:   CT: I have reviewed all pertinent results/findings within the past 24 hours and my personal findings are:  Unchanged from baseline    Assessment/Plan:     Chronic respiratory failure with hypoxia    Unchanged.  She remains at baseline.  Please see previous walk tests above.  Encouraged to wear supplemental oxygen constantly but especially with any activity.  She is likely developing worsening RV strain leading to volume overload and pulmonary edema.        Pulmonary hypertension    Continue home medications and supplemental oxygen.  Encouraged to monitor weight and take daily Lasix.        Pulmonary fibrosis    Do not believe this is related to medication change.  Continue CellCept and follow up with Dr. Demetrius Ken.        * Orthostatic hypotension    Resolved with fluids in ED.  Feels back to baseline.  Educated on the importance of monitoring weight and remaining euvolemic              Thank you for your consult. I will sign off. Please contact us if you have any additional questions.   Patient seems to be at baseline and may follow up with Severiano Ken and Tia as previously scheduled.  Luh Nelson MD  Pulmonology  [facility]

## 2017-09-02 NOTE — ASSESSMENT & PLAN NOTE
Resolved with fluids in ED.  Feels back to baseline.  Educated on the importance of monitoring weight and remaining euvolemic

## 2017-09-02 NOTE — ASSESSMENT & PLAN NOTE
Continue home medications and supplemental oxygen.  Encouraged to monitor weight and take daily Lasix.

## 2017-09-03 VITALS
SYSTOLIC BLOOD PRESSURE: 117 MMHG | HEIGHT: 70 IN | RESPIRATION RATE: 14 BRPM | OXYGEN SATURATION: 97 % | BODY MASS INDEX: 27.17 KG/M2 | HEART RATE: 64 BPM | DIASTOLIC BLOOD PRESSURE: 62 MMHG | TEMPERATURE: 98 F | WEIGHT: 189.81 LBS

## 2017-09-03 PROBLEM — E87.70 FLUID OVERLOAD: Status: ACTIVE | Noted: 2017-09-01

## 2017-09-03 LAB
ANION GAP SERPL CALC-SCNC: 8 MMOL/L
BUN SERPL-MCNC: 19 MG/DL
CALCIUM SERPL-MCNC: 8.8 MG/DL
CHLORIDE SERPL-SCNC: 101 MMOL/L
CO2 SERPL-SCNC: 28 MMOL/L
CREAT SERPL-MCNC: 0.8 MG/DL
EST. GFR  (AFRICAN AMERICAN): >60 ML/MIN/1.73 M^2
EST. GFR  (NON AFRICAN AMERICAN): >60 ML/MIN/1.73 M^2
GLUCOSE SERPL-MCNC: 86 MG/DL
MAGNESIUM SERPL-MCNC: 2.1 MG/DL
PHOSPHATE SERPL-MCNC: 4 MG/DL
POTASSIUM SERPL-SCNC: 3.4 MMOL/L
SODIUM SERPL-SCNC: 137 MMOL/L

## 2017-09-03 PROCEDURE — 97165 OT EVAL LOW COMPLEX 30 MIN: CPT | Mod: NTX

## 2017-09-03 PROCEDURE — G0378 HOSPITAL OBSERVATION PER HR: HCPCS | Mod: NTX

## 2017-09-03 PROCEDURE — 63600175 PHARM REV CODE 636 W HCPCS: Mod: NTX | Performed by: STUDENT IN AN ORGANIZED HEALTH CARE EDUCATION/TRAINING PROGRAM

## 2017-09-03 PROCEDURE — 99217 PR OBSERVATION CARE DISCHARGE: CPT | Mod: NTX,,, | Performed by: HOSPITALIST

## 2017-09-03 PROCEDURE — 27000221 HC OXYGEN, UP TO 24 HOURS: Mod: NTX

## 2017-09-03 PROCEDURE — 94640 AIRWAY INHALATION TREATMENT: CPT | Mod: NTX

## 2017-09-03 PROCEDURE — 83735 ASSAY OF MAGNESIUM: CPT | Mod: NTX

## 2017-09-03 PROCEDURE — 25000242 PHARM REV CODE 250 ALT 637 W/ HCPCS: Mod: NTX | Performed by: STUDENT IN AN ORGANIZED HEALTH CARE EDUCATION/TRAINING PROGRAM

## 2017-09-03 PROCEDURE — G8989 SELF CARE D/C STATUS: HCPCS | Mod: CH,NTX

## 2017-09-03 PROCEDURE — G8988 SELF CARE GOAL STATUS: HCPCS | Mod: CH,NTX

## 2017-09-03 PROCEDURE — 36415 COLL VENOUS BLD VENIPUNCTURE: CPT | Mod: NTX

## 2017-09-03 PROCEDURE — 94761 N-INVAS EAR/PLS OXIMETRY MLT: CPT | Mod: NTX

## 2017-09-03 PROCEDURE — 80048 BASIC METABOLIC PNL TOTAL CA: CPT | Mod: NTX

## 2017-09-03 PROCEDURE — 84100 ASSAY OF PHOSPHORUS: CPT | Mod: NTX

## 2017-09-03 PROCEDURE — 25000003 PHARM REV CODE 250: Mod: NTX | Performed by: HOSPITALIST

## 2017-09-03 PROCEDURE — 25000003 PHARM REV CODE 250: Mod: NTX | Performed by: STUDENT IN AN ORGANIZED HEALTH CARE EDUCATION/TRAINING PROGRAM

## 2017-09-03 PROCEDURE — G8987 SELF CARE CURRENT STATUS: HCPCS | Mod: CH,NTX

## 2017-09-03 RX ORDER — FUROSEMIDE 10 MG/ML
20 INJECTION INTRAMUSCULAR; INTRAVENOUS ONCE
Status: COMPLETED | OUTPATIENT
Start: 2017-09-03 | End: 2017-09-03

## 2017-09-03 RX ORDER — NIFEDIPINE 10 MG/1
10 CAPSULE ORAL 2 TIMES DAILY
Qty: 30 CAPSULE | Refills: 0 | Status: ON HOLD | OUTPATIENT
Start: 2017-09-03 | End: 2017-11-26 | Stop reason: ALTCHOICE

## 2017-09-03 RX ORDER — FUROSEMIDE 40 MG/1
40 TABLET ORAL DAILY
Qty: 60 TABLET | Refills: 0 | Status: SHIPPED | OUTPATIENT
Start: 2017-09-03 | End: 2017-09-03

## 2017-09-03 RX ORDER — POTASSIUM CHLORIDE 20 MEQ/1
20 TABLET, EXTENDED RELEASE ORAL DAILY
Status: DISCONTINUED | OUTPATIENT
Start: 2017-09-03 | End: 2017-09-03 | Stop reason: HOSPADM

## 2017-09-03 RX ORDER — POTASSIUM CHLORIDE 1.5 G/1.58G
20 POWDER, FOR SOLUTION ORAL DAILY
Qty: 30 PACKET | Refills: 1 | Status: SHIPPED | OUTPATIENT
Start: 2017-09-03 | End: 2019-09-10

## 2017-09-03 RX ORDER — FUROSEMIDE 40 MG/1
40 TABLET ORAL DAILY
Qty: 30 TABLET | Refills: 0 | Status: SHIPPED | OUTPATIENT
Start: 2017-09-03 | End: 2019-09-10 | Stop reason: SDUPTHER

## 2017-09-03 RX ADMIN — PREDNISONE 5 MG: 5 TABLET ORAL at 09:09

## 2017-09-03 RX ADMIN — IPRATROPIUM BROMIDE AND ALBUTEROL SULFATE 3 ML: .5; 3 SOLUTION RESPIRATORY (INHALATION) at 05:09

## 2017-09-03 RX ADMIN — POTASSIUM CHLORIDE 20 MEQ: 1500 TABLET, EXTENDED RELEASE ORAL at 09:09

## 2017-09-03 RX ADMIN — NIFEDIPINE 10 MG: 10 CAPSULE, LIQUID FILLED ORAL at 09:09

## 2017-09-03 RX ADMIN — FUROSEMIDE 20 MG: 10 INJECTION, SOLUTION INTRAMUSCULAR; INTRAVENOUS at 12:09

## 2017-09-03 RX ADMIN — IPRATROPIUM BROMIDE AND ALBUTEROL SULFATE 3 ML: .5; 3 SOLUTION RESPIRATORY (INHALATION) at 07:09

## 2017-09-03 RX ADMIN — CARVEDILOL 12.5 MG: 12.5 TABLET, FILM COATED ORAL at 09:09

## 2017-09-03 RX ADMIN — IPRATROPIUM BROMIDE AND ALBUTEROL SULFATE 3 ML: .5; 3 SOLUTION RESPIRATORY (INHALATION) at 12:09

## 2017-09-03 RX ADMIN — LORAZEPAM 1 MG: 1 TABLET ORAL at 09:09

## 2017-09-03 RX ADMIN — MYCOPHENOLATE MOFETIL 1500 MG: 250 CAPSULE ORAL at 09:09

## 2017-09-03 RX ADMIN — PANTOPRAZOLE SODIUM 40 MG: 40 TABLET, DELAYED RELEASE ORAL at 09:09

## 2017-09-03 RX ADMIN — AMBRISENTAN 10 MG: 10 TABLET, FILM COATED ORAL at 09:09

## 2017-09-03 NOTE — HOSPITAL COURSE
Patient was admitted with dyspnia on exertion. Although clinically she appeared dry on examination and CT was unremarkable for fluid overload, she was found to be several pounds above her d/c weight from last admission. Pulmonology was consulted but felt her symptoms were more likely due to her being fluid overloaded than worsening of her ILD. She was diuresed w/ lasix with improvement overnight. She was then discharged w/ adjustments to her lasix from WMF to QD, Nifedipine from TID > BID, and potassium supplements. She was also instructed to follow a low salt diet and fluid restrict. She will have f/u w/ [facility], her PCP and her pulmonologist at [facility].

## 2017-09-03 NOTE — PLAN OF CARE
Problem: Patient Care Overview  Goal: Plan of Care Review  Outcome: Ongoing (interventions implemented as appropriate)  POC reviewed with pt and spouse;both acknowledged understanding. Pt remains free from falls/injuries, VSS. Tele monitored. Respiratory culture sent. Independently ambulating in room. 2L NC ( home O2) worn. WCTM.

## 2017-09-03 NOTE — PT/OT/SLP EVAL
Occupational Therapy  Evaluation & D/C    Fatimah Cha   MRN: [id number]   Admitting Diagnosis: Orthostatic hypotension    OT Date of Treatment: 09/03/17   OT Start Time: 0819  OT Stop Time: 0830  OT Total Time (min): 11 min    Billable Minutes:  Evaluation 11    Diagnosis: Orthostatic hypotension     Past Medical History:   Diagnosis Date    Colitis     CREST syndrome     Hypercholesteremia     Hypertension     Hypothyroidism     Interstitial lung disease     Osteopenia     Raynaud disease     Scleroderma     Sjoegren syndrome       Past Surgical History:   Procedure Laterality Date    BREAST BIOPSY      CARDIAC CATHETERIZATION      COLONOSCOPY      foot surgery x2      lumpectomy (benign)      LUNG BIOPSY  03/2013    THYROIDECTOMY      TONSILLECTOMY      TUBAL LIGATION      UPPER GASTROINTESTINAL ENDOSCOPY         Referring physician: Lolly  Date referred to OT: 9/2/17    General Precautions: Standard,    Orthopedic Precautions:    Braces:            Patient History:  Living Environment  Lives With: spouse  Living Arrangements: house  Living Environment Comment: Pt lives in a H with no steps and was totally (I) with ADLs/walking and was working full-time. No DME.  Equipment Currently Used at Home: none    Prior level of function:   Bed Mobility/Transfers: independent  Grooming: independent  Bathing: independent  Upper Body Dressing: independent  Lower Body Dressing: independent  Toileting: independent  Home Management Skills: independent        Dominant hand: right    Subjective:  Communicated with RN prior to session.  Chief Complaint: SOB  Patient/Family stated goals: Return to PLOF.    Pain/Comfort  Pain Rating 1: 0/10    Objective:  Patient found with: oxygen    Cognitive Exam:  Oriented to: Person, Place, Time and Situation  Follows Commands/attention: Follows multistep  commands  Communication: clear/fluent  Memory:  No Deficits noted  Safety awareness/insight to disability:  intact  Coping skills/emotional control: Appropriate to situation    Visual/perceptual:  Intact    Physical Exam:  Postural examination/scapula alignment: No postural abnormalities identified  Skin integrity: Visible skin intact  Edema: None noted     Sensation:   Intact    Upper Extremity Range of Motion:  Right Upper Extremity: WNL  Left Upper Extremity: WNL    Upper Extremity Strength:  Right Upper Extremity: WNL  Left Upper Extremity: WNL   Strength: wnl    Fine motor coordination:   Intact    Gross motor coordination: WFL    Functional Mobility:  Bed Mobility:       Transfers:  Sit <> Stand Assistance: Independent  Sit <> Stand Assistive Device: No Assistive Device  Toilet Transfer Assistance: Independent    Functional Ambulation: Independent    Activities of Daily Living:     UE Dressing Level of Assistance: Independent  LE Dressing Level of Assistance: Independent     Grooming Level of Assistance: Independent          Balance:   Static Sit: NORMAL: No deviations seen in posture held statically  Dynamic Sit: GOOD+: Maintains balance through MAXIMAL excursions of active trunk motion  Static Stand: GOOD+: Takes MAXIMAL challenges from all directions  Dynamic stand: GOOD+: Independent gait (with or without assistive device)    Therapeutic Activities and Exercises:  UE ROM/MMT  Bed mobility training / assessment  Functional mobility assessment  Sit/standing balance assessment  Discussed OT D/C    AM-PAC 6 CLICK ADL  How much help from another person does this patient currently need?  1 = Unable, Total/Dependent Assistance  2 = A lot, Maximum/Moderate Assistance  3 = A little, Minimum/Contact Guard/Supervision  4 = None, Modified [place]/Independent    Putting on and taking off regular lower body clothing? : 4  Bathing (including washing, rinsing, drying)?: 4  Toileting, which includes using toilet, bedpan, or urinal? : 4  Putting on and taking off regular upper body clothing?: 4  Taking care of personal  "grooming such as brushing teeth?: 4  Eating meals?: 4  Total Score: 24    AM-PAC Raw Score CMS "G-Code Modifier Level of Impairment Assistance   6 % Total / Unable   7 - 9 CM 80 - 100% Maximal Assist   10-14 CL 60 - 80% Moderate Assist   15 - 19 CK 40 - 60% Moderate Assist   20 - 22 CJ 20 - 40% Minimal Assist   23 CI 1-20% SBA / CGA   24 CH 0% Independent/ Mod I       Patient left supine with all lines intact and call button in reach    Assessment:  Fatimah Cha is a 62 y.o. female with a medical diagnosis of Orthostatic hypotension and is currently performing ADLs, functional mobility & t/fs without assistance and displays age-appropriate strength, endurance & balance. OT services are not recommended at this time and patient is safe to D/C home.    Pt evaluation falls under low complexity for evaluation coding due to performance deficits noted in 1-3 areas as stated above and 0 co-morbities affecting current functional status. Data obtained from problem focused assessments. No modifications or assistance was required for completion of evaluation. Only brief occupational profile and history review completed.     Rehab identified problem list/impairments: Rehab identified problem list/impairments: impaired endurance, impaired cardiopulmonary response to activity      Activity tolerance: Good    Discharge recommendations: Discharge Facility/Level Of Care Needs: home     Barriers to discharge: Barriers to Discharge: None    Equipment recommendations: none     GOALS:    Occupational Therapy Goals     Not on file          Multidisciplinary Problems (Resolved)        Problem: Occupational Therapy Goal    Goal Priority Disciplines Outcome Interventions   Occupational Therapy Goal   (Resolved)     OT, PT/OT Outcome(s) achieved                    PLAN:  Patient to be seen   to address the above listed problems via    Plan of Care expires:    Plan of Care reviewed with: patient         Dinesh Calvert, " LOTR  09/03/2017

## 2017-09-03 NOTE — NURSING
Pt discharged at 1430; understood discharge instructions and home medications; IV removed; left via wheelchair with son; no acute distress

## 2017-09-03 NOTE — DISCHARGE SUMMARY
[facility] Medicine  Discharge Summary      Patient Name: Fatimah Cha  MRN: [id number]  Admission Date: 9/1/2017  Hospital Length of Stay: 0 days  Discharge Date and Time:  09/03/2017 1:54 PM  Attending Physician: Serafin Dolan MD   Discharging Provider: Que Ambrocio MD  Primary Care Provider: Simon Orozco MD    [facility] Medicine Team: [facility]OSP MED 2     HPI:  62 yo female with PMHx of CREST syndrome, Scleroderma, Sjogren's, Raynaud's, pulmonary HTN, HLD, hypothyroidism, interstitial lung disease (on home oxygen) who presents to the ED with chief complaint of dyspnea on exertion. Patient was seen at [facility] 8/29-8/30 for MORAES with associated leg swelling, palpitations, and hypotension, and was aggressively diuresed with IV and PO lasix, as they thought her symptoms were from a heart failure exacerbation. Her symptoms improved with diuresis and she was discharged home. However, pt states that her oxygen requirement has been increasing and she has a home pulse ox where she has noted to be down to 82 even on oxygen with simple ambulation around her house. She does not think that diuresis has helped her. She states that she can barely walk from room to room without desatting even on 5L of NC at home. She sates that she is also having headaches and some lightheadedness and dizziness. She states that when she is sitting and not moving, she can wean herself off the oxygen completely. She denies any recent illnesses/pneumonia, cough/congestion, fevers/chills, rashes, new medications, recent travel, new pets. Patient reports history of O2 use for the last 5 years, has rescue inhaler at home and home breathing treatments as needed. She states she has been complaint with her Lasix qMWF.      Per Dr. 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 She  underwent a VATS mediated lung biopsy (2013) at an OSH. (the results of this lung biopsy are unclear). She was then referred to Dr. Ken at [facility] for management of her ILD. There she has been followed with relative stability on azathioprine and prednisone.   In 02/2017- she developed worsening of her sx secondary to the flu and at that time notes that her pulmonary function had declined significantly. Lately, she has started feeling a little better but still feels she is not back to her pre-flu baseline.   Previously she was on 3L [place] at home nocturnally and with severe exertion, now she feels she has to use her oxygen more. (still not 24/7).      She also has pulmonary HTN that she is on ambrisentan for. Her last RHC was 05/2017 and noted a PA mean pressure of 30.     * No surgery found *      Indwelling Lines/Drains at time of discharge:   Lines/Drains/Airways          No matching active lines, drains, or airways        Hospital Course:   Patient was admitted with dyspnia on exertion. CTA r/o PE and troponins/EKG were unremarkable. Although clinically she appeared dry on examination and CT was did not comment on fluid overload, she was found to be several pounds above her d/c weight from last admission at [facility]. Pulmonology was consulted but felt her symptoms were more likely due to her being fluid overloaded than worsening of her ILD. She was diuresed w/ lasix with improvement overnight in her symptoms. She was then discharged w/ adjustments to her lasix from WMF to QD, Nifedipine from TID > BID, and potassium supplements. She was also instructed to follow a low salt diet and fluid restrict. She will have f/u w/ priority clinic, her PCP and her pulmonologist at [facility].     Consults:   Consults         Status Ordering Provider     Inpatient consult to Pulmonology  Once     Provider:  (Not yet assigned)    AMELIA Rincon          Significant Diagnostic Studies:    CT:  1. Stable  appearance and distribution of patchy groundglass attenuation, reticulation and traction bronchiectasis with a basilar predominance, unchanged since 3/2017.  Findings are compatible with chronic interstitial lung disease such as NSIP.    2.  No evidence of pulmonary thromboembolism. Stable prominence of the main pulmonary artery suggestive of pulmonary artery hypertension.    3.  Small pericardial effusion, stable.    Pending Diagnostic Studies:     None        Final Active Diagnoses:    Diagnosis Date Noted POA    PRINCIPAL PROBLEM:  Dyspnea on Exertion 2/2 Fluid overload [E87.70] 09/01/2017 Yes    HLD (hyperlipidemia) [E78.5] 09/02/2017 Yes    Hypothyroidism due to Hashimoto's thyroiditis [E03.8, E06.3] 09/02/2017 Yes    Chronic respiratory failure with hypoxia [J96.11] 09/02/2017 Yes    Orthostatic hypotension [I95.1] 09/02/2017 Yes    Pulmonary hypertension [I27.2]  Yes    CREST (calcinosis, Raynaud's phenomenon, esophageal dysfunction, sclerodactyly, telangiectasia) [M34.1] 12/29/2016 Yes    Pulmonary fibrosis [J84.10] 12/29/2016 Yes    Chronic diastolic heart failure [I50.32] 12/29/2016 Yes      Problems Resolved During this Admission:    Diagnosis Date Noted Date Resolved POA      Discharged Condition: good    Disposition: Home or Self Care    Follow Up:  Follow-up Information     Schedule an appointment as soon as possible for a visit with Simon Orozco MD.    Specialty:  General Practice  Why:  To update on your dyspnea  Contact information:  [address] [address]  [phone]             [facility]    Specialty:  [facility]  Why:  They will reach out to you but call if you haven't heard from them in a 2 weeks.  Contact information:  [address]  [facility] [address] [phone]  Additional information:  [facility]           Dr. Demetrius Ken.    Why:  To reevaluate your baseline ILD  function.                Patient Instructions:     Ambulatory Referral to [facility]   Referral Priority: Routine Referral Type: Consultation   Referral Reason: Specialty Services Required    Number of Visits Requested: 1      Diet Low Sodium, 2gm     Call MD for:  difficulty breathing or increased cough     Call MD for:  severe persistent headache     Call MD for:  persistent dizziness, light-headedness, or visual disturbances     Call MD for:  increased confusion or weakness       Medications:  Reconciled Home Medications:   Current Discharge Medication List      START taking these medications    Details   potassium chloride (KLOR-CON) 20 mEq Pack Take 20 mEq by mouth once daily.  Qty: 30 packet, Refills: 1         CONTINUE these medications which have CHANGED    Details   furosemide (LASIX) 40 MG tablet Take 1 tablet (40 mg total) by mouth once daily.  Qty: 30 tablet, Refills: 0      nifedipine (PROCARDIA) 10 MG Cap Take 1 capsule (10 mg total) by mouth 2 (two) times daily.  Qty: 30 capsule, Refills: 0         CONTINUE these medications which have NOT CHANGED    Details   albuterol-ipratropium 2.5mg-0.5mg/3mL (DUO-NEB) 0.5 mg-3 mg(2.5 mg base)/3 mL nebulizer solution USE ONE VIAL in Nebulizer EVERY SIX HOURS AS NEEDED FOR SHORTNESS OF BREATH  Refills: 5      ambrisentan (LETAIRIS) 10 MG Tab Take 10 mg by mouth once daily.      aspirin (ECOTRIN) 81 MG EC tablet Take 81 mg by mouth every evening.      atorvastatin (LIPITOR) 10 MG tablet Take 10 mg by mouth once daily.       BIOTIN ORAL Take 500 mcg by mouth once daily.      budesonide (PULMICORT) 0.5 mg/2 mL nebulizer solution USE ONE VIAL IN Nebulizer EVERY DAY AS NEEDED FOR SHORTNESS OF BREATH  Refills: 5      carvedilol (COREG) 12.5 MG tablet Take 1 tablet (12.5 mg total) by mouth 2 (two) times daily with meals.  Qty: 180 tablet, Refills: 3      DYMISTA 137-50 mcg/spray Spry nassal spray 1 spray by Each Nare route 2 (two) times daily.  Refills: 6       folic acid (FOLVITE) 1 MG tablet TAKE TWO TABLETS BY MOUTH EVERY DAY  Refills: 1      gabapentin (NEURONTIN) 300 MG capsule Take 300 mg by mouth 3 (three) times daily.      KLOR-CON 8 mEq TbSR Take 8 mEq by mouth every Mon, Wed, Fri.       levocetirizine (XYZAL) 5 MG tablet Take 5 mg by mouth every evening.       lorazepam (ATIVAN) 1 MG tablet Take 1 mg by mouth 2 (two) times daily.       multivitamin capsule Take 1 capsule by mouth once daily.      mycophenolate (CELLCEPT) 500 mg Tab Take 1,500 mg by mouth 2 (two) times daily.       pantoprazole (PROTONIX) 40 MG tablet Take 40 mg by mouth once daily.      predniSONE (DELTASONE) 5 MG tablet Take 5 mg by mouth once daily.       risedronate (ACTONEL) 150 MG Tab Take 150 mg by mouth every 30 days.  Refills: 6      TIROSINT 137 mcg Cap Take 1 capsule by mouth once daily.       tizanidine (ZANAFLEX) 4 MG tablet Take 4 mg by mouth every 8 (eight) hours as needed (for muscle spasms).       VITAMIN D2 50,000 unit capsule Take 50,000 Units by mouth every 30 days.       XOPENEX HFA 45 mcg/actuation inhaler Inhale 1 puff into the lungs every 4 (four) hours as needed.            Time spent on the discharge of patient: 30 minutes    Que Ambrocio MD  Department of Hospital Medicine  [facility]

## 2017-09-03 NOTE — PLAN OF CARE
Problem: Occupational Therapy Goal  Goal: Occupational Therapy Goal  Outcome: Outcome(s) achieved Date Met: 09/03/17  Eval & D/C - no OT services needed.    LOUISA Mclaughlin

## 2017-09-05 LAB
BACTERIA SPEC AEROBE CULT: NORMAL
GRAM STN SPEC: NORMAL

## 2017-09-08 ENCOUNTER — TELEPHONE (OUTPATIENT)
Dept: GASTROENTEROLOGY | Facility: CLINIC | Age: 62
End: 2017-09-08

## 2017-09-08 NOTE — TELEPHONE ENCOUNTER
Patient sees Dr. Vitale patient went to hospital at [facility] over the weekend. Patient was full of fluid and is on oxygen.    Patient was diagnosed by bryson orlando with colitis, patient had bloody stool and it showed colitis in the CT scan.     Patient is currently taking Protonix for acid reflux, Zofran for nausea as needed.    Right now: no fever, no abdominal pain, no nausea, no vomiting also patient is able to keep food down.    Patient is okay with scheduling in November.

## 2017-09-08 NOTE — TELEPHONE ENCOUNTER
----- Message from Delmi Miller sent at 9/8/2017  3:48 PM CDT -----  Contact: pt cell [phone] or home [phone]  Jack    Pt states she is returning Patti's call about scheduling.

## 2017-09-08 NOTE — TELEPHONE ENCOUNTER
Attempted to contact patient without success.    Left message for patient to return call to the clinic. Regarding new patient visit.

## 2017-09-08 NOTE — TELEPHONE ENCOUNTER
----- Message from Radha Santiago MD sent at 9/8/2017 12:15 PM CDT -----  Contact: Self- [phone]  Pls inquire who is referring her and request a refferal. I do not think I have seen this pt in the hospital.  I know her pulmonologist (Dr. Ken) at [facility], he works with Dr. Vitale.    Thanks,  ML      ----- Message -----  From: Patti Soto MA  Sent: 9/8/2017  10:34 AM  To: Radha Santiago MD    I didn't see any notes on this patient following up with you? Do you need to see her? 30 min or 60 min visit if yes?     ----- Message -----  From: Savanna Myers  Sent: 9/7/2017   4:26 PM  To: Jack MEZA Staff    Jack- pt called to schedule a np appt with Dr. Santiago- was in the hospital last week and was told to f/u with her for colitis- please call pt back at [phone]

## 2017-09-14 ENCOUNTER — TELEPHONE (OUTPATIENT)
Dept: GASTROENTEROLOGY | Facility: CLINIC | Age: 62
End: 2017-09-14

## 2017-09-14 NOTE — TELEPHONE ENCOUNTER
----- Message from Savanna Myers sent at 9/14/2017  3:32 PM CDT -----  Contact: Self- [phone]  Jack- pt is returning a missed call about scheduling her np appt- please call pt back at [phone]

## 2017-09-14 NOTE — TELEPHONE ENCOUNTER
Spoke with patient and scheduled new patient visit for Tuesday sept. 19 th.    Patient confirms appt.

## 2017-09-14 NOTE — TELEPHONE ENCOUNTER
Attempted to contact patient without success.    Left message for patient to return call to the clinic regarding scheduling new patient visit.

## 2017-09-19 ENCOUNTER — OFFICE VISIT (OUTPATIENT)
Dept: GASTROENTEROLOGY | Facility: CLINIC | Age: 62
End: 2017-09-19
Payer: COMMERCIAL

## 2017-09-19 VITALS
DIASTOLIC BLOOD PRESSURE: 70 MMHG | HEIGHT: 70 IN | SYSTOLIC BLOOD PRESSURE: 108 MMHG | BODY MASS INDEX: 27.24 KG/M2 | HEART RATE: 88 BPM | WEIGHT: 190.25 LBS

## 2017-09-19 DIAGNOSIS — R13.10 DYSPHAGIA, UNSPECIFIED TYPE: Primary | ICD-10-CM

## 2017-09-19 DIAGNOSIS — K21.9 GASTROESOPHAGEAL REFLUX DISEASE, ESOPHAGITIS PRESENCE NOT SPECIFIED: ICD-10-CM

## 2017-09-19 DIAGNOSIS — R14.0 BLOATING: ICD-10-CM

## 2017-09-19 DIAGNOSIS — R10.9 ABDOMINAL PAIN, UNSPECIFIED LOCATION: ICD-10-CM

## 2017-09-19 DIAGNOSIS — R11.2 NAUSEA AND VOMITING, INTRACTABILITY OF VOMITING NOT SPECIFIED, UNSPECIFIED VOMITING TYPE: ICD-10-CM

## 2017-09-19 PROCEDURE — 99245 OFF/OP CONSLTJ NEW/EST HI 55: CPT | Mod: NTX,S$GLB,, | Performed by: INTERNAL MEDICINE

## 2017-09-19 PROCEDURE — 99999 PR PBB SHADOW E&M-EST. PATIENT-LVL V: CPT | Mod: PBBFAC,TXP,, | Performed by: INTERNAL MEDICINE

## 2017-09-19 RX ORDER — PANTOPRAZOLE SODIUM 40 MG/1
40 TABLET, DELAYED RELEASE ORAL 2 TIMES DAILY
Qty: 180 TABLET | Refills: 3 | Status: SHIPPED | OUTPATIENT
Start: 2017-09-19 | End: 2018-04-19

## 2017-09-19 NOTE — PROGRESS NOTES
[facility] Consultation Note    Reason for Consult:    Chief Complaint   Patient presents with    Heartburn    Dysphagia    Nausea    Gas    Bloated    Abdominal Pain         PCP:   Simon Orozco   [address] / [place] [address]    Referring MD:  Esmer Vitale Md  [address] [place] [address]      HPI:  Fatimah Cha is a 62 y.o. female with history CREST, Sjogran, Scleroderma, ILD, pulmonary HTN, HTN, HLD, hashimotos thyroiditis, hypothyroidsm here for evaluation of the following GI problems:    GERD.  Patient reports bothersome heartburn in mid chest.  Regurgitation of acidic liquids.   Symtoms started many years ago.  Since childhood.    Occur: multiple times per day   Currently take protonix 40mg daily.  Previously tried prevacid, prilosec   Has bothersome nocturnal symptoms.   Sleeps with head of the bed elevated.   Avoids eating prior to bedtime.       Atypical Symptoms:  Hoarseness: yes  Cough: yes  Throat clearing: yes     Dysphagia.  Patient reports difficulty swallowing solids, no problems with liquids. Frequent choking on pills, small particles of food.  Sensitive gag reflex. Feels that food gets lodged in cervical esophagus.  Has dry mouth.  Biotin does not help. Symptoms started after thyroid surgery 12 years ago.  Got progressively worse over years.  Seen by ENT without an explanation.   Occur: few times per week.    Denies food impactions requiring ED visit.          Nausea.  Patient reports nausea.  Symptoms occur few times per month.  Started years ago.  Worse with sweets. Improve phenergan. Unable to tolerate reglan due to difficulty sleeping.      Emesis.  Vomiting few times per month.     Gas and bloating. Patient reports bothersome gas and bloating with abdominal distension.  Symptoms get worse at night. Consumes milk products, no artificial sugars.   Also with bothersome belching.  Worse after meals.  Previously w positive  SIBO testing, treated w Flagyl.      Hiccups.  Frequent hiccups for years.  Last minutes to hours.      Abdominal pain.  Reports dull abd discomfort.  Located in mid abdomen.  Recurrent episodes of sharp abd pain lower abdomen.   Started few years ago. Occurs few times per year. Last hours.  Better with bentyl.  Followed by BM.   Nocturnal pain:  no  Using narcotic pain medication: no     Colitis.  Was admitted to the hospital w bloody diarrhea 10/2016.  CT showed colitis. Had similar episode 5 years prior. Negative stools studies.      No diarrhea or constipation for the past few months.  Previously w constipation.        Denies BRBPR, melena, weight loss.       Rheumatology: CREST, Sjogran, Scleroderma, ILD, pulmonary HTN.  On cellcept and prednisone.   Hashimotos thyroiditis. Hypothyroid      78 pages of records reviewed.     Previous Studies:   Ct chst 8/30/17: progression of fibrosis.   EGD 6/16/14: HH. Short segment of salmon fingers(IM)_. Esophagitis. Fund gland polyps (FGP). Nl stomach (-). Duodenitis (-).   Colon 6/6/2014: GPTC. 3 tiny polyps in asc colon (TA). Int hemorrhoids.   CBC nl   Smart pill: slow motility of gi tract    ROS:  ROS   Constitutional: No fevers, no chills, no night sweats, no weight loss  ENT: No congestion, no rhinorrhea, + chronic sinus problems  CV: No chest pain, no palpitations  Pulm: No cough, + shortness of breath  Ophtho: No blurry vision, no eye redness  GI: see HPI  Derm: No rash  Heme: No lymphadenopathy, + bruising  MSK: No arthritis, no joint swelling, + Raynauds  : No dysuria, no frequent urination, no blood in urine  Endo: + hot or cold intolerance  Neuro: No dizziness, no syncope, no seizure  Psych: + anxiety, no depression      Medical History:   Past Medical History:   Diagnosis Date    Colitis     CREST syndrome     Hypercholesteremia     Hypertension     Hypothyroidism     Interstitial lung disease     Osteopenia     Raynaud disease     Scleroderma      Sjoegren syndrome         Surgical History:   Past Surgical History:   Procedure Laterality Date    BREAST BIOPSY      CARDIAC CATHETERIZATION      COLONOSCOPY      foot surgery x2      lumpectomy (benign)      LUNG BIOPSY  03/2013    THYROIDECTOMY      TONSILLECTOMY      TUBAL LIGATION      UPPER GASTROINTESTINAL ENDOSCOPY          Family History:   Family History   Problem Relation Age of Onset    Thyroid cancer Mother     Heart failure Father     Emphysema Sister     Thyroid disease Sister     Deep vein thrombosis Brother     Lung cancer Brother     Colon cancer Maternal Uncle      60s    Breast cancer Neg Hx     Ovarian cancer Neg Hx     Celiac disease Neg Hx     Cirrhosis Neg Hx     Colon polyps Neg Hx     Crohn's disease Neg Hx     Cystic fibrosis Neg Hx     Esophageal cancer Neg Hx     Hemochromatosis Neg Hx     Inflammatory bowel disease Neg Hx     Irritable bowel syndrome Neg Hx     Liver cancer Neg Hx     Liver disease Neg Hx     Rectal cancer Neg Hx     Stomach cancer Neg Hx     Ulcerative colitis Neg Hx     Phoenix's disease Neg Hx     Lymphoma Neg Hx     Tuberculosis Neg Hx     Rheum arthritis Neg Hx     Scleroderma Neg Hx     Melanoma Neg Hx     Lupus Neg Hx     Multiple sclerosis Neg Hx     Psoriasis Neg Hx     Skin cancer Neg Hx         Social History:   Social History     Social History    Marital status:      Spouse name: N/A    Number of children: N/A    Years of education: N/A     Social History Main Topics    Smoking status: Former Smoker     Packs/day: 1.00     Years: 5.00     Types: Cigarettes     Quit date: 3/30/1979    Smokeless tobacco: Never Used    Alcohol use No    Drug use: No    Sexual activity: Not Asked     Other Topics Concern    None     Social History Narrative    None        Review of patient's allergies indicates:  No Known Allergies    Current Outpatient Prescriptions   Medication Sig Dispense Refill     "albuterol-ipratropium 2.5mg-0.5mg/3mL (DUO-NEB) 0.5 mg-3 mg(2.5 mg base)/3 mL nebulizer solution USE ONE VIAL in Nebulizer EVERY SIX HOURS AS NEEDED FOR SHORTNESS OF BREATH  5    ambrisentan (LETAIRIS) 10 MG Tab Take 10 mg by mouth once daily.      aspirin (ECOTRIN) 81 MG EC tablet Take 81 mg by mouth every evening.      atorvastatin (LIPITOR) 10 MG tablet Take 10 mg by mouth once daily.       carvedilol (COREG) 12.5 MG tablet Take 1 tablet (12.5 mg total) by mouth 2 (two) times daily with meals. 180 tablet 3    DYMISTA 137-50 mcg/spray Spry nassal spray 1 spray by Each Nare route 2 (two) times daily.  6    furosemide (LASIX) 40 MG tablet Take 1 tablet (40 mg total) by mouth once daily. 30 tablet 0    levocetirizine (XYZAL) 5 MG tablet Take 5 mg by mouth every evening.       lorazepam (ATIVAN) 1 MG tablet Take 1 mg by mouth 2 (two) times daily.       multivitamin capsule Take 1 capsule by mouth once daily.      mycophenolate (CELLCEPT) 500 mg Tab Take 1,500 mg by mouth 2 (two) times daily.       nifedipine (PROCARDIA) 10 MG Cap Take 1 capsule (10 mg total) by mouth 2 (two) times daily. 30 capsule 0    potassium chloride (KLOR-CON) 20 mEq Pack Take 20 mEq by mouth once daily. 30 packet 1    predniSONE (DELTASONE) 5 MG tablet Take 5 mg by mouth once daily.       risedronate (ACTONEL) 150 MG Tab Take 150 mg by mouth every 30 days.  6    TIROSINT 137 mcg Cap Take 1 capsule by mouth once daily.       VITAMIN D2 50,000 unit capsule Take 50,000 Units by mouth every 30 days.       pantoprazole (PROTONIX) 40 MG tablet Take 1 tablet (40 mg total) by mouth 2 (two) times daily. 180 tablet 3     No current facility-administered medications for this visit.         Objective Findings:  Vital Signs:  /70   Pulse 88   Ht 5' 10" (1.778 m)   Wt 86.3 kg (190 lb 4.1 oz)   BMI 27.30 kg/m²   Body mass index is 27.3 kg/m².    Physical Exam:  General appearance: alert, cooperative, no distress  HENT: " Normocephalic, atraumatic, neck symmetrical, no nasal discharge  Eyes: conjunctivae/corneas clear, PERRL, EOM's intact  Lungs: clear to auscultation bilaterally, no dullness to percussion bilaterally  Heart: regular rate and rhythm without rub; no displacement of the PMI  Abdomen: soft, non-tender; bowel sounds normoactive; no organomegaly  Extremities: extremities symmetric; no clubbing, cyanosis, or edema  Integument: Skin color, texture, turgor normal; no rashes; hair distrubution normal  Neurologic: Alert and oriented X 3, normal strength, normal coordination and gait  Psychiatric: no pressured speech; normal affect; no evidence of impaired cognition    Labs:  Lab Results   Component Value Date    WBC 6.11 09/02/2017    HGB 12.3 09/02/2017    HCT 37.3 09/02/2017    MCV 89 09/02/2017     09/02/2017     No results found for: FERRITIN  Lab Results   Component Value Date     09/03/2017    K 3.4 (L) 09/03/2017     09/03/2017    CO2 28 09/03/2017    GLU 86 09/03/2017    BUN 19 09/03/2017    CREATININE 0.8 09/03/2017    CALCIUM 8.8 09/03/2017    PROT 7.5 09/01/2017    ALBUMIN 3.9 09/01/2017    BILITOT 0.7 09/01/2017    ALKPHOS 50 (L) 09/01/2017    AST 24 09/01/2017    ALT 16 09/01/2017     No results found for: TSH  No results found for: SEDRATE  No results found for: CRP  No results found for: LABA1C, HGBA1C        Assessment and Plan:  Fatimah Cha is a 62 y.o. female with with history CREST, Sjogran, Scleroderma, ILD on 02, pulmonary HTN, HTN, HLD, hashimotos thyroiditis, hypothyroidsm here for evaluation of the following GI problems:    GERD. Regurgitation  -Increase protonix 40mg BID     Little's esophagus   -EGD w GEJ bx    Dysphagia. Choking. Seen by ENT in the past   -EGD w E/GEJ/G/D bx (High risk procedure, 2nd floor, cardio-pulm eval in progress)  -Increase PPI to BID   -MBS         Nausea.  Vomiting  -Phenergan prn    -Unable to tolerate reglan due to difficulty sleeping.       -Obtain report of smart pill by Dr. Carrillo at [facility]   -Check GES pending above    Gas and bloating. Belching.  History of SIBO testing, treated w Flagyl.    -Trial of lactose elimination.  Avoids artificial sugars       Hiccups.       Abdominal pain related to BMs.  -Bentyl prn.  Followed by BM.     Colitis.  Admitted to the hospital w self limited bloody diarrhea 10/2016.  CT showed colitis. Had similar episode 5 years prior. Negative stools studies.   No diarrhea or constipation for the past few months.   -colonoscopy w r/l bc    CREST, Sjogran, Scleroderma, ILD, pulmonary HTN.  On cellcept and prednisone.     Hashimotos thyroiditis. Hypothyroid    -On synthroid    3 tiny TA in 2013  -colonoscopy     Lung transplant workup in progress by Dr. Garcia    Return in about 3 months (around 12/19/2017).    1. Dysphagia, unspecified type    2. Gastroesophageal reflux disease, esophagitis presence not specified    3. Bloating    4. Nausea and vomiting, intractability of vomiting not specified, unspecified vomiting type    5. Abdominal pain, unspecified location          Order summary:  Orders Placed This Encounter    Fl Modified Barium Swallow Speech    SLP video swallow    pantoprazole (PROTONIX) 40 MG tablet    Case request GI: ESOPHAGOGASTRODUODENOSCOPY (EGD), COLONOSCOPY         Thank you so much for allowing me to participate in the care of Fatimah Santiago MD

## 2017-09-19 NOTE — LETTER
September 19, 2017      Esmer Vitale MD  [address]  [facility] [address]           [facility] - Gastroenterology  [address]  [facility] [address]  Phone: [phone]  Fax: [phone]          Patient: Fatimah Cha   MR Number: [id number]   YOB: 1955   Date of Visit: 9/19/2017       Dear Dr. Esmer Vitale:    Thank you for referring Fatimah Cha to me for evaluation. Attached you will find relevant portions of my assessment and plan of care.    If you have questions, please do not hesitate to call me. I look forward to following Fatimah Cha along with you.    Sincerely,    Radha Santiago MD    Enclosure  CC:  No Recipients    If you would like to receive this communication electronically, please contact [email or web address] or [phone] to request more information on [employer] Link access.    For providers and/or their staff who would like to refer a patient to [facility], please contact us through our one-stop-shop provider referral line, [facility], at [phone].    If you feel you have received this communication in error or would no longer like to receive these types of communications, please e-mail [email or web address]

## 2017-09-20 ENCOUNTER — PATIENT MESSAGE (OUTPATIENT)
Dept: TRANSPLANT | Facility: CLINIC | Age: 62
End: 2017-09-20

## 2017-09-20 DIAGNOSIS — R06.82 TACHYPNEA: Primary | ICD-10-CM

## 2017-09-20 DIAGNOSIS — Z79.899 POLYPHARMACY: ICD-10-CM

## 2017-09-20 DIAGNOSIS — I27.9 CHRONIC PULMONARY HEART DISEASE: ICD-10-CM

## 2017-10-03 ENCOUNTER — OFFICE VISIT (OUTPATIENT)
Dept: TRANSPLANT | Facility: CLINIC | Age: 62
End: 2017-10-03
Payer: COMMERCIAL

## 2017-10-03 ENCOUNTER — HOSPITAL ENCOUNTER (OUTPATIENT)
Dept: CARDIOLOGY | Facility: CLINIC | Age: 62
Discharge: HOME | End: 2017-10-03
Payer: COMMERCIAL

## 2017-10-03 ENCOUNTER — LAB VISIT (OUTPATIENT)
Dept: LAB | Facility: HOSPITAL | Age: 62
End: 2017-10-03
Payer: COMMERCIAL

## 2017-10-03 ENCOUNTER — HOSPITAL ENCOUNTER (OUTPATIENT)
Dept: PULMONOLOGY | Facility: CLINIC | Age: 62
Discharge: HOME | End: 2017-10-03
Payer: COMMERCIAL

## 2017-10-03 VITALS
HEART RATE: 77 BPM | WEIGHT: 190.69 LBS | BODY MASS INDEX: 28.24 KG/M2 | SYSTOLIC BLOOD PRESSURE: 122 MMHG | BODY MASS INDEX: 26.57 KG/M2 | HEIGHT: 71 IN | DIASTOLIC BLOOD PRESSURE: 66 MMHG | HEIGHT: 69 IN | OXYGEN SATURATION: 94 % | WEIGHT: 189.81 LBS

## 2017-10-03 DIAGNOSIS — Z79.899 POLYPHARMACY: ICD-10-CM

## 2017-10-03 DIAGNOSIS — M34.1 CREST (CALCINOSIS, RAYNAUD'S PHENOMENON, ESOPHAGEAL DYSFUNCTION, SCLERODACTYLY, TELANGIECTASIA): ICD-10-CM

## 2017-10-03 DIAGNOSIS — I27.9 CHRONIC PULMONARY HEART DISEASE: ICD-10-CM

## 2017-10-03 DIAGNOSIS — I73.00 RAYNAUD'S DISEASE WITHOUT GANGRENE: ICD-10-CM

## 2017-10-03 DIAGNOSIS — M35.02 SJOGREN'S SYNDROME WITH LUNG INVOLVEMENT: ICD-10-CM

## 2017-10-03 DIAGNOSIS — I50.32 CHRONIC DIASTOLIC HEART FAILURE: ICD-10-CM

## 2017-10-03 DIAGNOSIS — R06.82 TACHYPNEA: ICD-10-CM

## 2017-10-03 DIAGNOSIS — Z76.82 LUNG TRANSPLANT CANDIDATE: ICD-10-CM

## 2017-10-03 DIAGNOSIS — I95.1 ORTHOSTATIC HYPOTENSION: ICD-10-CM

## 2017-10-03 DIAGNOSIS — E87.70 HYPERVOLEMIA, UNSPECIFIED HYPERVOLEMIA TYPE: ICD-10-CM

## 2017-10-03 DIAGNOSIS — R94.39 ABNORMAL STRESS ECHOCARDIOGRAM: ICD-10-CM

## 2017-10-03 DIAGNOSIS — J84.10 PULMONARY FIBROSIS: ICD-10-CM

## 2017-10-03 DIAGNOSIS — J96.11 CHRONIC RESPIRATORY FAILURE WITH HYPOXIA: ICD-10-CM

## 2017-10-03 DIAGNOSIS — I27.20 PULMONARY HYPERTENSION: Primary | ICD-10-CM

## 2017-10-03 DIAGNOSIS — I27.20 PULMONARY HYPERTENSION: ICD-10-CM

## 2017-10-03 LAB
ALBUMIN SERPL BCP-MCNC: 3.9 G/DL
ALP SERPL-CCNC: 53 U/L
ALT SERPL W/O P-5'-P-CCNC: 23 U/L
ANION GAP SERPL CALC-SCNC: 12 MMOL/L
AST SERPL-CCNC: 31 U/L
BASOPHILS # BLD AUTO: 0.07 K/UL
BASOPHILS NFR BLD: 1.6 %
BILIRUB SERPL-MCNC: 0.8 MG/DL
BNP SERPL-MCNC: 61 PG/ML
BUN SERPL-MCNC: 11 MG/DL
CALCIUM SERPL-MCNC: 9.4 MG/DL
CHLORIDE SERPL-SCNC: 104 MMOL/L
CO2 SERPL-SCNC: 26 MMOL/L
CREAT SERPL-MCNC: 0.8 MG/DL
DIASTOLIC DYSFUNCTION: NO
DIFFERENTIAL METHOD: ABNORMAL
EOSINOPHIL # BLD AUTO: 0.2 K/UL
EOSINOPHIL NFR BLD: 5.4 %
ERYTHROCYTE [DISTWIDTH] IN BLOOD BY AUTOMATED COUNT: 13.6 %
EST. GFR  (AFRICAN AMERICAN): >60 ML/MIN/1.73 M^2
EST. GFR  (NON AFRICAN AMERICAN): >60 ML/MIN/1.73 M^2
ESTIMATED PA SYSTOLIC PRESSURE: 17.59
GLUCOSE SERPL-MCNC: 106 MG/DL
HCT VFR BLD AUTO: 37.1 %
HGB BLD-MCNC: 11.9 G/DL
LYMPHOCYTES # BLD AUTO: 0.7 K/UL
LYMPHOCYTES NFR BLD: 14.6 %
MAGNESIUM SERPL-MCNC: 1.9 MG/DL
MCH RBC QN AUTO: 28.6 PG
MCHC RBC AUTO-ENTMCNC: 32.1 G/DL
MCV RBC AUTO: 89 FL
MITRAL VALVE REGURGITATION: NORMAL
MONOCYTES # BLD AUTO: 0.4 K/UL
MONOCYTES NFR BLD: 8.3 %
NEUTROPHILS # BLD AUTO: 3.1 K/UL
NEUTROPHILS NFR BLD: 69.7 %
PLATELET # BLD AUTO: 200 K/UL
PMV BLD AUTO: 9 FL
POTASSIUM SERPL-SCNC: 3.5 MMOL/L
PROT SERPL-MCNC: 7.3 G/DL
RBC # BLD AUTO: 4.16 M/UL
RETIRED EF AND QEF - SEE NOTES: 55 (ref 55–65)
SODIUM SERPL-SCNC: 142 MMOL/L
WBC # BLD AUTO: 4.46 K/UL

## 2017-10-03 PROCEDURE — 80053 COMPREHEN METABOLIC PANEL: CPT | Mod: TXP

## 2017-10-03 PROCEDURE — 83735 ASSAY OF MAGNESIUM: CPT | Mod: TXP

## 2017-10-03 PROCEDURE — 36415 COLL VENOUS BLD VENIPUNCTURE: CPT | Mod: TXP

## 2017-10-03 PROCEDURE — 83880 ASSAY OF NATRIURETIC PEPTIDE: CPT | Mod: TXP

## 2017-10-03 PROCEDURE — 93306 TTE W/DOPPLER COMPLETE: CPT | Mod: NTX,S$GLB,, | Performed by: INTERNAL MEDICINE

## 2017-10-03 PROCEDURE — 99214 OFFICE O/P EST MOD 30 MIN: CPT | Mod: NTX,S$GLB,, | Performed by: INTERNAL MEDICINE

## 2017-10-03 PROCEDURE — 99999 PR PBB SHADOW E&M-EST. PATIENT-LVL III: CPT | Mod: PBBFAC,TXP,, | Performed by: INTERNAL MEDICINE

## 2017-10-03 PROCEDURE — 94620 PR PULMONARY STRESS TESTING,SIMPLE: CPT | Mod: NTX,S$GLB,, | Performed by: INTERNAL MEDICINE

## 2017-10-03 PROCEDURE — 85025 COMPLETE CBC W/AUTO DIFF WBC: CPT | Mod: TXP

## 2017-10-03 NOTE — LETTER
October 3, 2017        Luis Ken  [address]  [facility] [address]  Phone: [phone]  Fax: [phone]             [facility]  [address]  [facility] [address]  Phone: [phone]   Patient: Fatimah Cha   MR Number: [id number]   YOB: 1955   Date of Visit: 10/3/2017       Dear Dr. Luis Ken    Thank you for referring Fatimah Cha to me for evaluation. Attached you will find relevant portions of my assessment and plan of care.    If you have questions, please do not hesitate to call me. I look forward to following Fatimah Cha along with you.    Sincerely,    Renetta Russell MD    Enclosure    If you would like to receive this communication electronically, please contact [email or web address] or [phone] to request [employer] access.    [employer] is a tool which provides read-only access to select patient information with whom you have a relationship. Its easy to use and provides real time access to review your patients record including encounter summaries, notes, results, and demographic information.    If you feel you have received this communication in error or would no longer like to receive these types of communications, please e-mail [email or web address]

## 2017-10-03 NOTE — PATIENT INSTRUCTIONS
Lets repeat the echocardiogram first    If the pressures are still up in your lungs I'll repeat your right heart cath    Keep salt intake to under 2000 mg sodium, fluids to under 2 L (64 oz)    Check your weights every morning after getting out of bed and urinating. If your weight goes up 3# overnight or 5# in one week take an extra lasix in the afternoon and call us if the fluid doesn't come off.

## 2017-10-03 NOTE — PROGRESS NOTES
Subjective:    Patient ID:  Fatimah Cha is a 62 y.o. female who presents for follow-up of Pulmonary Hypertension (Pt last seen in Pulm Dept by Dr. 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 She underwent a VATS mediated lung biopsy (2013) at an OSH. (the results of this lung biopsy are unclear). She was then referred to Dr. Ken at [facility] for management of her ILD. There she has been followed with relative stability on azathioprine and prednisone.   In 02/2017- she developed worsening of her sx secondary to the flu and at that time notes that her pulmonary function had declined significantly. Lately, she has started feeling a little better but still feels she is not back to her pre-flu baseline.   Previously she was on 3L Nc at home nocturnally and with severe exertion, now she feels she has to use her oxygen more. (still not 24/7).   She also has pulmonary HTN that she is on ambrisentan for. Her last RHC was 05/2017 and noted a PA mean pressure of 30.  She was last seen by Dr Briones in march 2017 and is new to me today    Was hosp 8/29-30 at [facility] and discharge, then was admitted here the next day- was full of fluid and diuresed about 10#-  Was recently started on lasix 40mg once a day along with K- still has some swelling in her feet at night, jalil if she doesn't have on her compression stockings- wt 184->187 overnight but had eaten a lot of cake at work yest    Dr Vitale put her on MMF since June, was unable to wean off steroids due to worsening SOB- on 4mg daily now- goes to 3mg this weekend    6mw today 266m  m ( 282  m in  July  )                                      "         O2 sat 97  -> 83 %                                                           HR   86->  102                                                                BP   120/ 64  ->128 /66                                                         Kushal    2 -> 3    PFTs   9 / 19 /17    FVC  2.55   L    64 % pred  FEV1  2.08 L  68   % pred  FEV1/FVC   83  %  TLC 3.35  L  56 %pred  DLCO        19    % pred     Ct chest with contrast 8/30/17  Fibrosis in periphery of lungs, progressed since prior     Echo 8/30/17  Normal RV size and RV systolic fxn, PASp 72  TAPSE 2.35  Normal RA size, LA size  Normal LV EF 60-65%  Grade 1 DD    Lung bx 2013 with interstitial fibrosis, endstaged honeycomb    RHC 5/17  AOPRES: 106/70 (82)  AOSAT: 100  FICKCI: 2.96  FICKCO: 6.19  PAPRES: 52/16 (30)  PASAT: 74  PWPRES: 13/12 (10)  RAPRES: 8/7 (6)  RVPRES: 49/2, 6    Condition 2:  PWSAT_POST: 93    Review of Systems   Constitution: Negative for chills, fever, malaise/fatigue and weight gain.   HENT: Negative.    Eyes: Negative.    Cardiovascular: Positive for dyspnea on exertion and leg swelling. Negative for chest pain, near-syncope, orthopnea, palpitations, paroxysmal nocturnal dyspnea and syncope.   Respiratory: Negative for cough and shortness of breath.    Endocrine: Negative.    Skin: Negative.    Musculoskeletal: Negative.    Gastrointestinal: Negative for bloating, abdominal pain and change in bowel habit.   Neurological: Negative for dizziness and light-headedness.   Psychiatric/Behavioral: Negative for depression.        Objective: /66   Pulse 77   Ht 5' 11" (1.803 m)   Wt 86.1 kg (189 lb 13.1 oz)   SpO2 (!) 94%   BMI 26.47 kg/m²       Physical Exam   Constitutional: She is oriented to person, place, and time. She appears well-developed and well-nourished.   HENT:   Head: Normocephalic and atraumatic.   Eyes: Right eye exhibits no discharge. Left eye exhibits no discharge.   Neck: Neck supple. No JVD present. No " thyromegaly present.   Cardiovascular: Normal rate and regular rhythm.  Exam reveals no gallop and no friction rub.    No murmur heard.       Pulmonary/Chest: Effort normal and breath sounds normal. No respiratory distress. She has no wheezes. She has no rales.   Abdominal: Soft. Bowel sounds are normal. She exhibits no distension. There is no tenderness.   Musculoskeletal: Normal range of motion. She exhibits edema. She exhibits no tenderness.   Mild nonpitting edema to above ankles today, compression stockings on   Neurological: She is alert and oriented to person, place, and time. No cranial nerve deficit. Coordination normal.   Skin: Skin is warm and dry. No rash noted.   Psychiatric: She has a normal mood and affect. Judgment and thought content normal.     Lab Results   Component Value Date    BNP 61 10/03/2017     09/03/2017    K 3.4 (L) 09/03/2017    MG 2.1 09/03/2017     09/03/2017    CO2 28 09/03/2017    BUN 19 09/03/2017    CREATININE 0.8 09/03/2017    GLU 86 09/03/2017    AST 24 09/01/2017    ALT 16 09/01/2017    ALBUMIN 3.9 09/01/2017    PROT 7.5 09/01/2017    BILITOT 0.7 09/01/2017       Magnesium   Date Value Ref Range Status   09/03/2017 2.1 1.6 - 2.6 mg/dL Final       Lab Results   Component Value Date    WBC 4.46 10/03/2017    HGB 11.9 (L) 10/03/2017    HCT 37.1 10/03/2017    MCV 89 10/03/2017     10/03/2017       Lab Results   Component Value Date    INR 0.9 05/17/2017       BNP   Date Value Ref Range Status   10/03/2017 61 0 - 99 pg/mL Final     Comment:     Values of less than 100 pg/ml are consistent with non-CHF populations.   09/01/2017 26 0 - 99 pg/mL Final     Comment:     Values of less than 100 pg/ml are consistent with non-CHF populations.   05/17/2017 48 0 - 99 pg/mL Final     Comment:     Values of less than 100 pg/ml are consistent with non-CHF populations.       No results found for: LDH            Assessment:       1. Pulmonary hypertension- mild on RHC in May  2017, with normal RV size and fxn on our last echo- did have higher PASP one cho during her hosp setting this summer, was vol overloaded at the time. Appears dry today with normal BNP   2. CREST (calcinosis, Raynaud's phenomenon, esophageal dysfunction, sclerodactyly, telangiectasia)    3. Chronic respiratory failure with hypoxia    4. Orthostatic hypotension    5. Pulmonary fibrosis    6. Raynaud's disease without gangrene    7. Sjogren's syndrome with lung involvement    8. Chronic diastolic heart failure    9. Hypervolemia, unspecified hypervolemia type    10. Lung transplant candidate    11. Abnormal stress echocardiogram         Plan:     will repeat echo- if PASP still elevated will repeat RHC but I suspect her PAP this summer were elevated due to her volume overload      if PASP lower and RV looks ok will plan to see pt back with annual screening echo in 1 year- my fear is that her other physicians are all correct, her SOB is mostly due to her pulm fibrosis and in this setting adding pulmonary VD tx will likely only make her more hypoxic.

## 2017-10-04 NOTE — PROCEDURES
Fatimah Cha is a 62 y.o.  female patient, who presents for a 6 minute walk test ordered by MD. Ernesto.  The diagnosis is Pulmonary Hypertension.  The patient's BMI is 28.2 kg/m2.  Predicted distance (lower limit of normal) is 334.74 meters.      Test Results:    The test was completed without stopping.  The total time walked was 360 seconds.  During walking, the patient reported:  Dyspnea. The patient used no assistive devices and supplemental oxygen during testing.     10/03/2017---------Distance: 266.7 meters (875 feet)     O2 Sat % Supplemental Oxygen Heart Rate Blood Pressure Kushal Scale   Pre-exercise  (Resting) 97 % 3 L/M 86 bpm 120/64 mmHg 2   During Exercise 83 % 3 L/M 102 bpm 128/66 mmHg 3   Post-exercise  (Recovery) 94 % 3 L/M  91 bpm   mmHg       Recovery Time: 116 seconds    Performing nurse/tech: ROSEMARY Rivas.      PREVIOUS STUDY:   The patient had a previous study.  07/03/2017---------Distance:  282 meters (925 feet)       O2 Sat % Supplemental Oxygen Heart Rate Blood Pressure Kushal Scale   Pre-exercise  (Resting) 96 % 3 L/M 79 bpm 106/55 mmHg 1   During Exercise 80 % 3 L/M 93 bpm 114/64 mmHg 4   Post-exercise  (Recovery) 94 % 3 L/M  76 bpm             CLINICAL INTERPRETATION:  Six minute walk distance is 266.7 meters (875 feet) with moderate dyspnea.  During exercise, there was significant desaturation while breathing supplemental oxygen.  Both blood pressure and heart rate remained stable with walking.  The patient did not report non-pulmonary symptoms during exercise.  Significant exercise impairment is likely due to desaturation.  Since the previous study in July 2017, exercise capacity is unchanged.  Based upon age and body mass index, exercise capacity is less than predicted.

## 2017-10-07 ENCOUNTER — TELEPHONE (OUTPATIENT)
Dept: ENDOSCOPY | Facility: HOSPITAL | Age: 62
End: 2017-10-07

## 2017-10-07 NOTE — TELEPHONE ENCOUNTER
----- Message from Serene Espinoza RN sent at 10/6/2017  9:20 AM CDT -----  Regarding: Please schedule for the 2nd Floor  Good morning,     I reviewed Ms. Cha's history with Dr. Leopold (Anesthesia) and based on her medical history of:  Pulmonary Hypertension, Scleroderma, Interstitial lung disease, Lupus, Raynaud's...., it is recommended that her EGD be scheduled on the 2nd Floor.                              Thanks, Serene  [facility]

## 2017-10-18 ENCOUNTER — TELEPHONE (OUTPATIENT)
Dept: RADIOLOGY | Facility: HOSPITAL | Age: 62
End: 2017-10-18

## 2017-10-19 ENCOUNTER — CLINICAL SUPPORT (OUTPATIENT)
Dept: SPEECH THERAPY | Facility: HOSPITAL | Age: 62
End: 2017-10-19
Attending: INTERNAL MEDICINE
Payer: COMMERCIAL

## 2017-10-19 ENCOUNTER — HOSPITAL ENCOUNTER (OUTPATIENT)
Dept: RADIOLOGY | Facility: HOSPITAL | Age: 62
Discharge: HOME | End: 2017-10-19
Attending: INTERNAL MEDICINE
Payer: COMMERCIAL

## 2017-10-19 DIAGNOSIS — R13.10 DYSPHAGIA, UNSPECIFIED TYPE: ICD-10-CM

## 2017-10-19 PROCEDURE — G8996 SWALLOW CURRENT STATUS: HCPCS | Mod: GN,CI,NTX | Performed by: SPEECH-LANGUAGE PATHOLOGIST

## 2017-10-19 PROCEDURE — 74230 X-RAY XM SWLNG FUNCJ C+: CPT | Mod: TC,TXP

## 2017-10-19 PROCEDURE — G8997 SWALLOW GOAL STATUS: HCPCS | Mod: GN,CI,NTX | Performed by: SPEECH-LANGUAGE PATHOLOGIST

## 2017-10-19 PROCEDURE — 92611 MOTION FLUOROSCOPY/SWALLOW: CPT | Mod: GN,TXP | Performed by: SPEECH-LANGUAGE PATHOLOGIST

## 2017-10-19 PROCEDURE — G8998 SWALLOW D/C STATUS: HCPCS | Mod: GN,CI,TXP | Performed by: SPEECH-LANGUAGE PATHOLOGIST

## 2017-10-19 PROCEDURE — 74230 X-RAY XM SWLNG FUNCJ C+: CPT | Mod: 26,NTX,, | Performed by: RADIOLOGY

## 2017-10-19 NOTE — PROGRESS NOTES
"Referring provider: Dr. Radha Santiago  Reason for visit:  Modified barium swallow study (CPT 29489)    Report Summary   --Date: 10/19/2017  --Purpose: to evaluate anatomy and physiology of the oropharyngeal swallow, to determine effectiveness of rehabilitation strategies, and to determine diet consistency and intervention recommendations.   --Diet recommendations: regular as tolerated    Subjective / History    Fatimah Cha is a 62 y.o. female referred by Dr. Santiago for Modified Barium Swallow Study (37675).  She is currently on a regular diet.  She has an esophageal history significant for Little's esophagus and reflux.  She reports frequent gagging on mucus and with eating.  She reports she throws up a lot but never feels nauseous.  Her swallowing complaints are relatively vague.  She describes intermittent difficulty with swallowing dry solids such as toast, c/b feeling like it "sticks in her throat" and needing to cough it back up.  She does not report any difficulty with swallowing liquids.  She reports heartburn and intermittent regurgitation with red gravy. She also reports coughing and gagging when taking pills, and feeling like she needs to bring them back up.  She has not modified her diet due to any concerns for aspiration.     Past Medical History:   Diagnosis Date    Colitis     CREST syndrome     Hypercholesteremia     Hypertension     Hypothyroidism     Interstitial lung disease     Osteopenia     Raynaud disease     Scleroderma     Sjoegren syndrome      Current Outpatient Prescriptions on File Prior to Visit   Medication Sig Dispense Refill    ALBUTEROL SULFATE (VENTOLIN INHL) Inhale 2 puffs into the lungs every 4 to 6 hours as needed.      albuterol-ipratropium 2.5mg-0.5mg/3mL (DUO-NEB) 0.5 mg-3 mg(2.5 mg base)/3 mL nebulizer solution USE ONE VIAL in Nebulizer EVERY SIX HOURS AS NEEDED FOR SHORTNESS OF BREATH  5    ambrisentan (LETAIRIS) 10 MG Tab Take 10 mg by mouth once daily. "      aspirin (ECOTRIN) 81 MG EC tablet Take 81 mg by mouth every evening.      atorvastatin (LIPITOR) 10 MG tablet Take 10 mg by mouth once daily.       carvedilol (COREG) 12.5 MG tablet Take 1 tablet (12.5 mg total) by mouth 2 (two) times daily with meals. 180 tablet 3    DYMISTA 137-50 mcg/spray Spry nassal spray 1 spray by Each Nare route 2 (two) times daily.  6    furosemide (LASIX) 40 MG tablet Take 1 tablet (40 mg total) by mouth once daily. 30 tablet 0    levocetirizine (XYZAL) 5 MG tablet Take 5 mg by mouth every evening.       lorazepam (ATIVAN) 1 MG tablet Take 1 mg by mouth 2 (two) times daily.       multivitamin capsule Take 1 capsule by mouth once daily.      mycophenolate (CELLCEPT) 500 mg Tab Take 1,500 mg by mouth 2 (two) times daily.       nifedipine (PROCARDIA) 10 MG Cap Take 1 capsule (10 mg total) by mouth 2 (two) times daily. 30 capsule 0    pantoprazole (PROTONIX) 40 MG tablet Take 1 tablet (40 mg total) by mouth 2 (two) times daily. 180 tablet 3    potassium chloride (KLOR-CON) 20 mEq Pack Take 20 mEq by mouth once daily. 30 packet 1    predniSONE (DELTASONE) 5 MG tablet Take 5 mg by mouth once daily.       risedronate (ACTONEL) 150 MG Tab Take 150 mg by mouth every 30 days.  6    TIROSINT 137 mcg Cap Take 1 capsule by mouth once daily.       VITAMIN D2 50,000 unit capsule Take 50,000 Units by mouth every 30 days.        No current facility-administered medications on file prior to visit.        Objective   Lateral videofluorographic views were obtained. The radiologist and speech pathologist were present for the entire procedure.     Trials administered / method of administration: thin liquid/cup, thin liquid/sequential cup sips, pudding/tsp, cracker and barium tablet    Oral phase  - Grossly WNL with adequate/timely lip closure, tongue control during bolus hold, bolus preparation, and bolus transport/lingual motion.  Little to no oral residue.     Pharyngeal phase  - Delayed  initiation: to valleculae  - Adequate soft palate elevation  - Adequate laryngeal elevation and anterior hyoid excursion  - Complete epiglottic movement  - Adequate laryngeal vestibular closure: 1 episode flash pen with thin liquids, expelled immediately   - Adequate pharyngeal stripping wave  - Complete PE segment opening  - Complete tongue base retraction  - Mild pharyngeal residue: residue of cracker in valleculae; cleared w repeat swallow, no clinician cue    Therapeutic interventions to reduce risk for pen/asp/residue: multiple swallows per bolus.  Strategies were effective in decreasing/eliminating risk for residue.     Rosenbek Penetration-Aspiration Scale (Rosenbek et al 1996)  Best Trial: 1. Contrast does not enter airway.   Worst Trial: 2. Contrast enters airway but remains above TVF, no residue. (flash penetration with thin liquids, cleared immediately)    Assessment / Impressions   The results of this MBSS indicate that patient demonstrates oropharyngeal swallow function WNL w/o concern for aspiration of food/liquid and w/ no obvious obstruction.     Recommendations / POC   -Diet: recommend regular as tolerated   -Therapeutic technique: recommend small bites/sips, slow rate of eating, alternate solid with liquid wash and multiple swallows per bolus   -Contact clinician or referring provider for repeat MBSS if swallowing status changes or worsens    G-Codes for Swallowing  Current status:  - CI  Projected status:   - CI  Discharge status:   -  CI

## 2017-10-20 ENCOUNTER — TELEPHONE (OUTPATIENT)
Dept: ENDOSCOPY | Facility: HOSPITAL | Age: 62
End: 2017-10-20

## 2017-10-24 ENCOUNTER — TELEPHONE (OUTPATIENT)
Dept: ENDOSCOPY | Facility: HOSPITAL | Age: 62
End: 2017-10-24

## 2017-10-24 DIAGNOSIS — Z12.11 SPECIAL SCREENING FOR MALIGNANT NEOPLASMS, COLON: Primary | ICD-10-CM

## 2017-10-24 RX ORDER — SODIUM, POTASSIUM,MAG SULFATES 17.5-3.13G
1 SOLUTION, RECONSTITUTED, ORAL ORAL ONCE
Qty: 1 BOTTLE | Refills: 0 | Status: SHIPPED | OUTPATIENT
Start: 2017-10-24 | End: 2017-10-24

## 2017-10-24 NOTE — PT/OT/SLP DISCHARGE
Occupational Therapy Discharge Summary    Fatimah Cha  MRN: [id number]   Fluid overload   Patient Discharged from acute Occupational Therapy on 9/3/17.  Please refer to prior OT note dated on 9/3/17 for functional status.     Assessment:   Patient has met all goals and is not appropriate for therapy.  GOALS:    Occupational Therapy Goals     Not on file          Multidisciplinary Problems (Resolved)        Problem: Occupational Therapy Goal    Goal Priority Disciplines Outcome Interventions   Occupational Therapy Goal   (Resolved)     OT, PT/OT Outcome(s) achieved                  Reasons for Discontinuation of Therapy Services  Satisfactory goal achievement.      Plan:  Patient Discharged to: Home no PT services needed.

## 2017-11-14 ENCOUNTER — ANESTHESIA EVENT (OUTPATIENT)
Dept: ENDOSCOPY | Facility: HOSPITAL | Age: 62
End: 2017-11-14
Payer: COMMERCIAL

## 2017-11-15 ENCOUNTER — HOSPITAL ENCOUNTER (EMERGENCY)
Facility: HOSPITAL | Age: 62
Discharge: HOME | End: 2017-11-15
Attending: EMERGENCY MEDICINE
Payer: COMMERCIAL

## 2017-11-15 ENCOUNTER — ANESTHESIA (OUTPATIENT)
Dept: ENDOSCOPY | Facility: HOSPITAL | Age: 62
End: 2017-11-15
Payer: COMMERCIAL

## 2017-11-15 ENCOUNTER — HOSPITAL ENCOUNTER (OUTPATIENT)
Facility: HOSPITAL | Age: 62
Discharge: HOME | End: 2017-11-15
Attending: INTERNAL MEDICINE | Admitting: INTERNAL MEDICINE
Payer: COMMERCIAL

## 2017-11-15 ENCOUNTER — SURGERY (OUTPATIENT)
Age: 62
End: 2017-11-15

## 2017-11-15 VITALS
HEIGHT: 70 IN | TEMPERATURE: 99 F | DIASTOLIC BLOOD PRESSURE: 53 MMHG | RESPIRATION RATE: 18 BRPM | BODY MASS INDEX: 26.05 KG/M2 | OXYGEN SATURATION: 100 % | SYSTOLIC BLOOD PRESSURE: 92 MMHG | HEART RATE: 76 BPM | WEIGHT: 182 LBS

## 2017-11-15 VITALS
SYSTOLIC BLOOD PRESSURE: 111 MMHG | HEART RATE: 76 BPM | HEIGHT: 70 IN | OXYGEN SATURATION: 100 % | BODY MASS INDEX: 26.05 KG/M2 | DIASTOLIC BLOOD PRESSURE: 55 MMHG | WEIGHT: 182 LBS | RESPIRATION RATE: 20 BRPM | TEMPERATURE: 98 F

## 2017-11-15 DIAGNOSIS — R06.02 SHORTNESS OF BREATH: ICD-10-CM

## 2017-11-15 DIAGNOSIS — R10.9 ABDOMINAL PAIN, UNSPECIFIED ABDOMINAL LOCATION: ICD-10-CM

## 2017-11-15 DIAGNOSIS — B34.9 VIRAL SYNDROME: Primary | ICD-10-CM

## 2017-11-15 DIAGNOSIS — Z76.82 LUNG TRANSPLANT CANDIDATE: Primary | ICD-10-CM

## 2017-11-15 LAB
ALBUMIN SERPL BCP-MCNC: 3.3 G/DL
ALLENS TEST: ABNORMAL
ALP SERPL-CCNC: 58 U/L
ALT SERPL W/O P-5'-P-CCNC: 14 U/L
ANION GAP SERPL CALC-SCNC: 10 MMOL/L
AST SERPL-CCNC: 27 U/L
BASOPHILS # BLD AUTO: 0.05 K/UL
BASOPHILS NFR BLD: 1.1 %
BILIRUB SERPL-MCNC: 0.6 MG/DL
BUN SERPL-MCNC: 9 MG/DL
CALCIUM SERPL-MCNC: 8.4 MG/DL
CHLORIDE SERPL-SCNC: 111 MMOL/L
CO2 SERPL-SCNC: 21 MMOL/L
CREAT SERPL-MCNC: 0.8 MG/DL
DELSYS: ABNORMAL
DIFFERENTIAL METHOD: ABNORMAL
EOSINOPHIL # BLD AUTO: 0.3 K/UL
EOSINOPHIL NFR BLD: 5.7 %
ERYTHROCYTE [DISTWIDTH] IN BLOOD BY AUTOMATED COUNT: 13.3 %
EST. GFR  (AFRICAN AMERICAN): >60 ML/MIN/1.73 M^2
EST. GFR  (NON AFRICAN AMERICAN): >60 ML/MIN/1.73 M^2
FLOW: 2
FLUAV AG SPEC QL IA: NEGATIVE
FLUBV AG SPEC QL IA: NEGATIVE
GLUCOSE SERPL-MCNC: 102 MG/DL
HCO3 UR-SCNC: 21.1 MMOL/L (ref 24–28)
HCT VFR BLD AUTO: 33.1 %
HGB BLD-MCNC: 10.8 G/DL
IMM GRANULOCYTES # BLD AUTO: 0.02 K/UL
IMM GRANULOCYTES NFR BLD AUTO: 0.4 %
LACTATE SERPL-SCNC: 0.6 MMOL/L
LYMPHOCYTES # BLD AUTO: 0.4 K/UL
LYMPHOCYTES NFR BLD: 7.9 %
MCH RBC QN AUTO: 29 PG
MCHC RBC AUTO-ENTMCNC: 32.6 G/DL
MCV RBC AUTO: 89 FL
MODE: ABNORMAL
MONOCYTES # BLD AUTO: 0.6 K/UL
MONOCYTES NFR BLD: 12.7 %
NEUTROPHILS # BLD AUTO: 3.3 K/UL
NEUTROPHILS NFR BLD: 72.2 %
NRBC BLD-RTO: 0 /100 WBC
PCO2 BLDA: 34.8 MMHG (ref 35–45)
PH SMN: 7.39 [PH] (ref 7.35–7.45)
PLATELET # BLD AUTO: 182 K/UL
PMV BLD AUTO: 9 FL
PO2 BLDA: 86 MMHG (ref 80–100)
POC BE: -4 MMOL/L
POC SATURATED O2: 97 % (ref 95–100)
POC TCO2: 22 MMOL/L (ref 23–27)
POTASSIUM SERPL-SCNC: 3.2 MMOL/L
PROT SERPL-MCNC: 6.4 G/DL
RBC # BLD AUTO: 3.72 M/UL
SAMPLE: ABNORMAL
SITE: ABNORMAL
SODIUM SERPL-SCNC: 142 MMOL/L
SP02: 97
SPECIMEN SOURCE: NORMAL
WBC # BLD AUTO: 4.58 K/UL

## 2017-11-15 PROCEDURE — 87040 BLOOD CULTURE FOR BACTERIA: CPT

## 2017-11-15 PROCEDURE — 27000221 HC OXYGEN, UP TO 24 HOURS: Mod: TXP

## 2017-11-15 PROCEDURE — 96365 THER/PROPH/DIAG IV INF INIT: CPT

## 2017-11-15 PROCEDURE — 99900035 HC TECH TIME PER 15 MIN (STAT)

## 2017-11-15 PROCEDURE — D9220A PRA ANESTHESIA: Mod: CRNA,,, | Performed by: NURSE ANESTHETIST, CERTIFIED REGISTERED

## 2017-11-15 PROCEDURE — 83605 ASSAY OF LACTIC ACID: CPT

## 2017-11-15 PROCEDURE — 85025 COMPLETE CBC W/AUTO DIFF WBC: CPT

## 2017-11-15 PROCEDURE — 43239 EGD BIOPSY SINGLE/MULTIPLE: CPT | Mod: TXP | Performed by: INTERNAL MEDICINE

## 2017-11-15 PROCEDURE — 36600 WITHDRAWAL OF ARTERIAL BLOOD: CPT

## 2017-11-15 PROCEDURE — 43239 EGD BIOPSY SINGLE/MULTIPLE: CPT | Mod: 51,NTX,, | Performed by: INTERNAL MEDICINE

## 2017-11-15 PROCEDURE — 63600175 PHARM REV CODE 636 W HCPCS: Mod: NTX | Performed by: NURSE ANESTHETIST, CERTIFIED REGISTERED

## 2017-11-15 PROCEDURE — 37000009 HC ANESTHESIA EA ADD 15 MINS: Mod: NTX | Performed by: INTERNAL MEDICINE

## 2017-11-15 PROCEDURE — 81003 URINALYSIS AUTO W/O SCOPE: CPT

## 2017-11-15 PROCEDURE — 88305 TISSUE EXAM BY PATHOLOGIST: CPT | Mod: 26,,, | Performed by: PATHOLOGY

## 2017-11-15 PROCEDURE — 37000008 HC ANESTHESIA 1ST 15 MINUTES: Mod: TXP | Performed by: INTERNAL MEDICINE

## 2017-11-15 PROCEDURE — 63600175 PHARM REV CODE 636 W HCPCS: Performed by: EMERGENCY MEDICINE

## 2017-11-15 PROCEDURE — 82803 BLOOD GASES ANY COMBINATION: CPT

## 2017-11-15 PROCEDURE — 25000003 PHARM REV CODE 250: Mod: NTX | Performed by: NURSE ANESTHETIST, CERTIFIED REGISTERED

## 2017-11-15 PROCEDURE — 25000003 PHARM REV CODE 250: Performed by: NURSE ANESTHETIST, CERTIFIED REGISTERED

## 2017-11-15 PROCEDURE — 27201012 HC FORCEPS, HOT/COLD, DISP: Mod: NTX | Performed by: INTERNAL MEDICINE

## 2017-11-15 PROCEDURE — 45380 COLONOSCOPY AND BIOPSY: CPT | Mod: NTX,,, | Performed by: INTERNAL MEDICINE

## 2017-11-15 PROCEDURE — 96375 TX/PRO/DX INJ NEW DRUG ADDON: CPT

## 2017-11-15 PROCEDURE — D9220A PRA ANESTHESIA: Mod: ANES,,, | Performed by: ANESTHESIOLOGY

## 2017-11-15 PROCEDURE — 88305 TISSUE EXAM BY PATHOLOGIST: CPT | Mod: TXP | Performed by: PATHOLOGY

## 2017-11-15 PROCEDURE — 25000003 PHARM REV CODE 250: Performed by: PHYSICIAN ASSISTANT

## 2017-11-15 PROCEDURE — 99284 EMERGENCY DEPT VISIT MOD MDM: CPT | Mod: ,,, | Performed by: PHYSICIAN ASSISTANT

## 2017-11-15 PROCEDURE — 99284 EMERGENCY DEPT VISIT MOD MDM: CPT | Mod: 25

## 2017-11-15 PROCEDURE — 45380 COLONOSCOPY AND BIOPSY: CPT | Mod: NTX | Performed by: INTERNAL MEDICINE

## 2017-11-15 PROCEDURE — 87400 INFLUENZA A/B EACH AG IA: CPT

## 2017-11-15 PROCEDURE — 63600175 PHARM REV CODE 636 W HCPCS: Performed by: PHYSICIAN ASSISTANT

## 2017-11-15 PROCEDURE — 80053 COMPREHEN METABOLIC PANEL: CPT

## 2017-11-15 RX ORDER — SODIUM CHLORIDE 9 MG/ML
INJECTION, SOLUTION INTRAVENOUS CONTINUOUS PRN
Status: DISCONTINUED | OUTPATIENT
Start: 2017-11-15 | End: 2017-11-15

## 2017-11-15 RX ORDER — ETOMIDATE 2 MG/ML
INJECTION INTRAVENOUS
Status: DISCONTINUED | OUTPATIENT
Start: 2017-11-15 | End: 2017-11-15

## 2017-11-15 RX ORDER — PROPOFOL 10 MG/ML
VIAL (ML) INTRAVENOUS CONTINUOUS PRN
Status: DISCONTINUED | OUTPATIENT
Start: 2017-11-15 | End: 2017-11-15

## 2017-11-15 RX ORDER — KETAMINE HCL IN 0.9 % NACL 50 MG/5 ML
SYRINGE (ML) INTRAVENOUS
Status: DISCONTINUED | OUTPATIENT
Start: 2017-11-15 | End: 2017-11-15

## 2017-11-15 RX ORDER — SODIUM CHLORIDE 9 MG/ML
INJECTION, SOLUTION INTRAVENOUS CONTINUOUS
Status: CANCELLED | OUTPATIENT
Start: 2017-11-15

## 2017-11-15 RX ORDER — GUAIFENESIN 600 MG/1
1200 TABLET, EXTENDED RELEASE ORAL
COMMUNITY

## 2017-11-15 RX ORDER — SODIUM CHLORIDE 0.9 % (FLUSH) 0.9 %
3 SYRINGE (ML) INJECTION
Status: DISCONTINUED | OUTPATIENT
Start: 2017-11-15 | End: 2017-11-15 | Stop reason: HOSPADM

## 2017-11-15 RX ORDER — MIDAZOLAM HYDROCHLORIDE 1 MG/ML
INJECTION, SOLUTION INTRAMUSCULAR; INTRAVENOUS
Status: DISCONTINUED | OUTPATIENT
Start: 2017-11-15 | End: 2017-11-15

## 2017-11-15 RX ORDER — CEFEPIME HYDROCHLORIDE 1 G/50ML
1 INJECTION, SOLUTION INTRAVENOUS
Status: COMPLETED | OUTPATIENT
Start: 2017-11-15 | End: 2017-11-15

## 2017-11-15 RX ORDER — PROPOFOL 10 MG/ML
VIAL (ML) INTRAVENOUS
Status: DISCONTINUED | OUTPATIENT
Start: 2017-11-15 | End: 2017-11-15

## 2017-11-15 RX ORDER — SILDENAFIL 25 MG/1
20 TABLET, FILM COATED ORAL 3 TIMES DAILY
COMMUNITY
End: 2018-02-02 | Stop reason: ALTCHOICE

## 2017-11-15 RX ORDER — VASOPRESSIN 20 [USP'U]/ML
INJECTION, SOLUTION INTRAMUSCULAR; SUBCUTANEOUS
Status: DISCONTINUED | OUTPATIENT
Start: 2017-11-15 | End: 2017-11-15

## 2017-11-15 RX ORDER — LIDOCAINE HCL/PF 100 MG/5ML
SYRINGE (ML) INTRAVENOUS
Status: DISCONTINUED | OUTPATIENT
Start: 2017-11-15 | End: 2017-11-15

## 2017-11-15 RX ORDER — PHENYLEPHRINE HYDROCHLORIDE 10 MG/ML
INJECTION INTRAVENOUS
Status: DISCONTINUED | OUTPATIENT
Start: 2017-11-15 | End: 2017-11-15

## 2017-11-15 RX ADMIN — PROPOFOL 10 MG: 10 INJECTION, EMULSION INTRAVENOUS at 11:11

## 2017-11-15 RX ADMIN — PHENYLEPHRINE HYDROCHLORIDE 200 MCG: 10 INJECTION INTRAVENOUS at 11:11

## 2017-11-15 RX ADMIN — HYDROCORTISONE SODIUM SUCCINATE 150 MG: 100 INJECTION, POWDER, FOR SOLUTION INTRAMUSCULAR; INTRAVENOUS at 10:11

## 2017-11-15 RX ADMIN — MIDAZOLAM HYDROCHLORIDE 1 MG: 1 INJECTION, SOLUTION INTRAMUSCULAR; INTRAVENOUS at 11:11

## 2017-11-15 RX ADMIN — ETOMIDATE 4 MG: 2 INJECTION, SOLUTION INTRAVENOUS at 11:11

## 2017-11-15 RX ADMIN — MIDAZOLAM HYDROCHLORIDE 1 MG: 1 INJECTION, SOLUTION INTRAMUSCULAR; INTRAVENOUS at 10:11

## 2017-11-15 RX ADMIN — PHENYLEPHRINE HYDROCHLORIDE 300 MCG: 10 INJECTION INTRAVENOUS at 11:11

## 2017-11-15 RX ADMIN — Medication 10 MG: at 10:11

## 2017-11-15 RX ADMIN — ETOMIDATE 8 MG: 2 INJECTION, SOLUTION INTRAVENOUS at 10:11

## 2017-11-15 RX ADMIN — PROPOFOL 40 MG: 10 INJECTION, EMULSION INTRAVENOUS at 10:11

## 2017-11-15 RX ADMIN — PROPOFOL 125 MCG/KG/MIN: 10 INJECTION, EMULSION INTRAVENOUS at 10:11

## 2017-11-15 RX ADMIN — Medication 30 MG: at 10:11

## 2017-11-15 RX ADMIN — VASOPRESSIN 1 UNITS: 20 INJECTION INTRAVENOUS at 11:11

## 2017-11-15 RX ADMIN — TOPICAL ANESTHETIC 1 EACH: 200 SPRAY DENTAL; PERIODONTAL at 10:11

## 2017-11-15 RX ADMIN — LIDOCAINE HYDROCHLORIDE 40 MG: 20 INJECTION, SOLUTION INTRAVENOUS at 10:11

## 2017-11-15 RX ADMIN — CEFEPIME HYDROCHLORIDE 1 G: 1 INJECTION, SOLUTION INTRAVENOUS at 10:11

## 2017-11-15 RX ADMIN — SODIUM CHLORIDE 500 ML: 0.9 INJECTION, SOLUTION INTRAVENOUS at 10:11

## 2017-11-15 RX ADMIN — SODIUM CHLORIDE: 0.9 INJECTION, SOLUTION INTRAVENOUS at 10:11

## 2017-11-15 NOTE — PATIENT INSTRUCTIONS
Discharge Summary/Instructions after an Endoscopic Procedure  Patient Name: Fatimah Cha  Patient MRN: [id number]  Patient YOB: 1955  Wednesday, November 15, 2017  Charlotte Santiago MD  RESTRICTIONS:  During your procedure today, you received medications for sedation.  These   medications may affect your judgment, balance and coordination.  Therefore,   for 24 hours, you have the following restrictions:   - DO NOT drive a car, operate machinery, make legal/financial decisions,   sign important papers or drink alcohol.    ACTIVITY:  The following day: return to full activity including work, except no heavy   lifting, straining or running for 3 days if polyps were removed.  DIET:  Eat and drink normally unless instructed otherwise.     TREATMENT FOR COMMON SIDE EFFECTS:  - Mild abdominal pain, belching, bloating or excessive gas: rest, eat   lightly and use a heating pad.  - Sore Throat: treat with throat lozenges and/or gargle with warm salt   water.  SYMPTOMS TO WATCH FOR AND REPORT TO YOUR PHYSICIAN:  1. Abdominal pain or bloating, other than gas cramps.  2. Chest pain.  3. Back pain.  4. Chills or fever occurring within 24 hours after the procedure.  5. Rectal bleeding, which would show as bright red, maroon, or black stools.   (A tablespoon of blood from the rectum is not serious, especially if   hemorrhoids are present.)  6. Vomiting.  7. Weakness or dizziness.  8. Because air was used during the procedure, expelling large amounts of air   from your rectum or belching is normal.  9. If a bowel prep was taken, you may not have a bowel movement for 1-3   days.  This is normal.  GO DIRECTLY TO THE NEAREST EMERGENCY ROOM IF YOU HAVE ANY OF THE FOLLOWING:      Difficulty breathing  Chills and/or fever over 101 F   Persistent vomiting and/or vomiting blood   Severe abdominal pain   Severe chest pain   Black, tarry stools   Bleeding- more than one tablespoon   Any other symptom or condition that you may  feel needs urgent attention  Your doctor recommends these additional instructions:  If any biopsies were taken, your doctor s clinic will contact you in 1 to 2   weeks with any results.  We are waiting for your pathology results.   You are being discharged to home.   Resume your previous diet.   Continue your present medications.   Return to my office as previously scheduled.   The findings and recommendations have been discussed with you.  For questions, problems or results please call your physician - Charlotte Santiago MD at Work:  [phone].  [facility] [place], EMERGENCY ROOM PHONE NUMBER: [phone]  IF A COMPLICATION OR EMERGENCY SITUATION ARISES AND YOU ARE UNABLE TO REACH   YOUR PHYSICIAN - GO DIRECTLY TO THE EMERGENCY ROOM.  Charlotte Santiago MD  11/15/2017 11:10:12 AM  This report has been verified and signed electronically.

## 2017-11-15 NOTE — PLAN OF CARE
Discharge instructions reviewed with patient and sister. Vital signs stable, taking fluids. Meets criteria for discharge at this time.

## 2017-11-15 NOTE — H&P
Short Stay Endoscopy History and Physical    PCP - Simon Orozco MD     Procedure - EGD/colon  ASA - per anesthesia  Mallampati - per anesthesia  History of Anesthesia problems - no  Family history Anesthesia problems -  no   Plan of anesthesia - General    HPI:  This is a 62 y.o. female here for evaluation of dysphagia, nausea, emesis, abd pain, colitis:      ROS:  Constitutional: No fevers, chills, No weight loss  CV: No chest pain  Pulm: No cough, No shortness of breath  Ophtho: No vision changes  GI: see HPI  Derm: No rash    Medical History:  has a past medical history of CHF (congestive heart failure); Colitis; CREST syndrome; Hypercholesteremia; Hypertension; Hypothyroidism; Interstitial lung disease; Osteopenia; Pulmonary hypertension; Raynaud disease; Scleroderma; and Sjoegren syndrome.    Surgical History:  has a past surgical history that includes foot surgery x2; Tubal ligation; Breast biopsy; lumpectomy (benign); Lung biopsy (03/2013); Colonoscopy; Upper gastrointestinal endoscopy; Thyroidectomy; Cardiac catheterization (2013); and Tonsillectomy.    Family History: family history includes Colon cancer in her maternal uncle; Deep vein thrombosis in her brother; Emphysema in her sister; Heart failure in her father; Lung cancer in her brother; Thyroid cancer in her mother; Thyroid disease in her sister.. Otherwise no colon cancer, inflammatory bowel disease, or GI malignancies.    Social History:  reports that she quit smoking about 38 years ago. Her smoking use included Cigarettes. She has a 5.00 pack-year smoking history. She has never used smokeless tobacco. She reports that she does not drink alcohol or use drugs.    Review of patient's allergies indicates:   Allergen Reactions    Adhesive tape-silicones        Medications:   No prescriptions prior to admission.       Physical Exam:    Vital Signs: There were no vitals filed for this visit.    General Appearance: Well appearing in no acute  distress  Eyes:    No scleral icterus  Lungs: CTA anteriorly  Heart:  Regular rate, S1, S2 normal, no murmurs heard.  Abdomen: Soft, non tender, non distended with normal bowel sounds. No hepatosplenomegaly, ascites, or mass.  Extremities: No edema  Skin: No rash    Labs:  Lab Results   Component Value Date    WBC 4.46 10/03/2017    HGB 11.9 (L) 10/03/2017    HCT 37.1 10/03/2017     10/03/2017    ALT 23 10/03/2017    AST 31 10/03/2017     10/03/2017    K 3.5 10/03/2017     10/03/2017    CREATININE 0.8 10/03/2017    BUN 11 10/03/2017    CO2 26 10/03/2017    INR 0.9 05/17/2017       I have explained the risks and benefits of endoscopy procedures to the patient including but not limited to bleeding, perforation, infection, and death.      Radha Santiago MD

## 2017-11-15 NOTE — ANESTHESIA PREPROCEDURE EVALUATION
11/15/2017  Fatimah Cha is a 62 y.o., female with scleroderma/CREST with PAH and ILD, chronic resp failure on 2-3L NC 24/7  Anesthesia Evaluation    I have reviewed the Patient Summary Reports.    I have reviewed the Nursing Notes.   I have reviewed the Medications.     Review of Systems  Renal/:   renal calculi    Hepatic/GI:   Denies Liver Disease.    Neurological:   Denies CVA. Denies Seizures.    Endocrine:   Hypothyroidism        Physical Exam  General:  Well nourished    Airway/Jaw/Neck:  Airway Findings: Mouth Opening: Normal Tongue: Normal  General Airway Assessment: Adult  Mallampati: III      Dental:  Dental Findings: In tact   Chest/Lungs:  Chest/Lungs Findings: Normal Respiratory Rate     Heart/Vascular:  Heart Findings: Rate: Normal  Rhythm: Regular Rhythm  Sounds: Normal        Mental Status:  Mental Status Findings:  Cooperative, Alert and Oriented         Anesthesia Plan  Type of Anesthesia, risks & benefits discussed:  Anesthesia Type:  general  Patient's Preference: GA  Intra-op Monitoring Plan: standard ASA monitors  Intra-op Monitoring Plan Comments:   Post Op Pain Control Plan:   Post Op Pain Control Plan Comments:   Induction:   IV  Beta Blocker:  Patient is on a Beta-Blocker and has received one dose within the past 24 hours (No further documentation required).       Informed Consent: Patient understands risks and agrees with Anesthesia plan.  Questions answered. Anesthesia consent signed with patient.  ASA Score: 4     Day of Surgery Review of History & Physical:    H&P update referred to the provider.         Ready For Surgery From Anesthesia Perspective.     2D echo 10/3/17:  CONCLUSIONS     1 - Normal left ventricular systolic function (EF 55-60%).     2 - No wall motion abnormalities.     3 - Normal left ventricular diastolic function.     4 - Right ventricle is upper limit  of normal in size with normal systolic function.     5 - The estimated PA systolic pressure is 18 mmHg.     6 - Mild mitral regurgitation.

## 2017-11-15 NOTE — PATIENT INSTRUCTIONS
Discharge Summary/Instructions after an Endoscopic Procedure  Patient Name: Fatimah Cha  Patient MRN: [id number]  Patient YOB: 1955  Wednesday, November 15, 2017  Charlotte Santiago MD  RESTRICTIONS:  During your procedure today, you received medications for sedation.  These   medications may affect your judgment, balance and coordination.  Therefore,   for 24 hours, you have the following restrictions:   - DO NOT drive a car, operate machinery, make legal/financial decisions,   sign important papers or drink alcohol.    ACTIVITY:  The following day: return to full activity including work, except no heavy   lifting, straining or running for 3 days if polyps were removed.  DIET:  Eat and drink normally unless instructed otherwise.     TREATMENT FOR COMMON SIDE EFFECTS:  - Mild abdominal pain, belching, bloating or excessive gas: rest, eat   lightly and use a heating pad.  - Sore Throat: treat with throat lozenges and/or gargle with warm salt   water.  SYMPTOMS TO WATCH FOR AND REPORT TO YOUR PHYSICIAN:  1. Abdominal pain or bloating, other than gas cramps.  2. Chest pain.  3. Back pain.  4. Chills or fever occurring within 24 hours after the procedure.  5. Rectal bleeding, which would show as bright red, maroon, or black stools.   (A tablespoon of blood from the rectum is not serious, especially if   hemorrhoids are present.)  6. Vomiting.  7. Weakness or dizziness.  8. Because air was used during the procedure, expelling large amounts of air   from your rectum or belching is normal.  9. If a bowel prep was taken, you may not have a bowel movement for 1-3   days.  This is normal.  GO DIRECTLY TO THE NEAREST EMERGENCY ROOM IF YOU HAVE ANY OF THE FOLLOWING:      Difficulty breathing  Chills and/or fever over 101 F   Persistent vomiting and/or vomiting blood   Severe abdominal pain   Severe chest pain   Black, tarry stools   Bleeding- more than one tablespoon   Any other symptom or condition that you may  feel needs urgent attention  Your doctor recommends these additional instructions:  If any biopsies were taken, your doctor s clinic will contact you in 1 to 2   weeks with any results.  You are being discharged to home.   Resume your previous diet.   Continue your present medications.   We are waiting for your pathology results.   Your physician has recommended a repeat colonoscopy in five years for   surveillance.   Return to my office as previously scheduled.   The findings and recommendations have been discussed with you.  For questions, problems or results please call your physician - Charlotte Santiago MD at Work:  [phone].  [facility], EMERGENCY ROOM PHONE NUMBER: [phone]  IF A COMPLICATION OR EMERGENCY SITUATION ARISES AND YOU ARE UNABLE TO REACH   YOUR PHYSICIAN - GO DIRECTLY TO THE EMERGENCY ROOM.  Charlotte Santiago MD  11/15/2017 11:51:43 AM  This report has been verified and signed electronically.

## 2017-11-15 NOTE — TRANSFER OF CARE
"Anesthesia Transfer of Care Note    Patient: Fatimah Cha    Procedure(s) Performed: Procedure(s) (LRB):  ESOPHAGOGASTRODUODENOSCOPY (EGD) (N/A)  COLONOSCOPY (N/A)    Patient location: PACU    Anesthesia Type: general    Transport from OR: Transported from OR on 6-10 L/min O2 by face mask with adequate spontaneous ventilation    Post pain: adequate analgesia    Post assessment: no apparent anesthetic complications    Post vital signs: stable    Level of consciousness: awake and sedated    Nausea/Vomiting: no nausea/vomiting    Complications: none    Transfer of care protocol was followed      Last vitals:   Visit Vitals  /78 (BP Location: Left arm, Patient Position: Lying)   Pulse 78   Temp 36 °C (96.8 °F) (Axillary)   Resp 17   Ht 5' 10" (1.778 m)   Wt 82.6 kg (182 lb)   SpO2 100%   Breastfeeding? No   BMI 26.11 kg/m²     "

## 2017-11-16 LAB
BILIRUB UR QL STRIP: NEGATIVE
CLARITY UR REFRACT.AUTO: ABNORMAL
COLOR UR AUTO: YELLOW
GLUCOSE UR QL STRIP: NEGATIVE
HGB UR QL STRIP: NEGATIVE
KETONES UR QL STRIP: ABNORMAL
LEUKOCYTE ESTERASE UR QL STRIP: NEGATIVE
NITRITE UR QL STRIP: NEGATIVE
PH UR STRIP: 5 [PH] (ref 5–8)
PROT UR QL STRIP: NEGATIVE
SP GR UR STRIP: 1.02 (ref 1–1.03)
URN SPEC COLLECT METH UR: ABNORMAL
UROBILINOGEN UR STRIP-ACNC: NEGATIVE EU/DL

## 2017-11-16 NOTE — ED TRIAGE NOTES
Pt reports cystoscopy and endoscopy this morning and has been groggy with SOB every since. Pt reports fever off 100.6 after taking tylenol.

## 2017-11-16 NOTE — ED NOTES
Report received from pauline paniagua rn. Pt care assumed. Pt resting comfortably and independently repositioned in stretcher with bed locked in lowest position for safety. NAD noted at this time. Respirations even and unlabored and visible chest rise noted.  Patient offered bathroom assistance and denies need at this time. Pt instructed to call if assistance is needed. Pt on continuous cardiac, BP, and O2 monitoring. Call light within reach. Family at bedside. No needs at this time. Will continue to monitor.

## 2017-11-16 NOTE — ED PROVIDER NOTES
Encounter Date: 11/15/2017       History     Chief Complaint   Patient presents with    Post-op Evaluation     had endoscopy and  biopsies today. Now running fever and feels sob. T at home was 100.6 Took two tylenol one hr PTA     61-year-old female presents to the ER for evaluation of shortness of breath fatigue and low-grade fever.  Patient has numerous autoimmune as documented in her chart.  She is on CellCept a chronic steroids.  She had a EGD and colonoscopy.  Since that time she has felt the symptoms.  She chronically uses 2 L nasal cannula at home but has been using 4 L throughout today due to oxygen saturation being slightly lower than normal.  Patient's baseline oxygen saturation is 95 she has been hovering slightly lower than that on 4 L throughout the day.           Review of patient's allergies indicates:   Allergen Reactions    Adhesive tape-silicones      Past Medical History:   Diagnosis Date    CHF (congestive heart failure)     Colitis     CREST syndrome     Hypercholesteremia     Hypertension     Hypothyroidism     Interstitial lung disease     Osteopenia     Pulmonary hypertension     Raynaud disease     Respiratory distress     Scleroderma     Sjoegren syndrome      Past Surgical History:   Procedure Laterality Date    BREAST BIOPSY      CARDIAC CATHETERIZATION  2013    COLONOSCOPY      foot surgery x2      lumpectomy (benign)      LUNG BIOPSY  03/2013    THYROIDECTOMY      TONSILLECTOMY      TUBAL LIGATION      UPPER GASTROINTESTINAL ENDOSCOPY       Family History   Problem Relation Age of Onset    Thyroid cancer Mother     Heart failure Father     Emphysema Sister     Thyroid disease Sister     Deep vein thrombosis Brother     Lung cancer Brother     Colon cancer Maternal Uncle      60s    Cancer Maternal Aunt     Breast cancer Neg Hx     Ovarian cancer Neg Hx     Celiac disease Neg Hx     Cirrhosis Neg Hx     Colon polyps Neg Hx     Crohn's disease Neg Hx      Cystic fibrosis Neg Hx     Esophageal cancer Neg Hx     Hemochromatosis Neg Hx     Inflammatory bowel disease Neg Hx     Irritable bowel syndrome Neg Hx     Liver cancer Neg Hx     Liver disease Neg Hx     Rectal cancer Neg Hx     Stomach cancer Neg Hx     Ulcerative colitis Neg Hx     Phoenix's disease Neg Hx     Lymphoma Neg Hx     Tuberculosis Neg Hx     Rheum arthritis Neg Hx     Scleroderma Neg Hx     Melanoma Neg Hx     Lupus Neg Hx     Multiple sclerosis Neg Hx     Psoriasis Neg Hx     Skin cancer Neg Hx      Social History   Substance Use Topics    Smoking status: Former Smoker     Packs/day: 1.00     Years: 5.00     Types: Cigarettes     Quit date: 3/30/1979    Smokeless tobacco: Never Used    Alcohol use No     Review of Systems   Constitutional: Positive for chills, fatigue and fever.   HENT: Negative for sore throat.    Respiratory: Positive for shortness of breath.    Cardiovascular: Negative for chest pain.   Gastrointestinal: Negative for nausea.   Genitourinary: Negative for dysuria.   Musculoskeletal: Negative for back pain.   Skin: Negative for rash.   Neurological: Negative for weakness.   Hematological: Does not bruise/bleed easily.       Physical Exam     Initial Vitals [11/15/17 1857]   BP Pulse Resp Temp SpO2   (!) 101/58 98 18 99.3 °F (37.4 °C) (!) 94 %      MAP       72.33         Physical Exam    Constitutional: Vital signs are normal. She appears well-developed and well-nourished. She is not diaphoretic. No distress.   HENT:   Head: Normocephalic and atraumatic.   Left Ear: External ear normal.   Eyes: Conjunctivae are normal.   Cardiovascular: Normal rate, regular rhythm and normal heart sounds. Exam reveals no gallop and no friction rub.    No murmur heard.  Pulmonary/Chest: No respiratory distress. She has no wheezes. She has no rhonchi. She has rales. She exhibits no tenderness.   Scant Rales to the anterior left Holman   Abdominal: Soft. Normal appearance and  bowel sounds are normal.   Musculoskeletal: Normal range of motion.   Neurological: She is alert and oriented to person, place, and time.   Skin: Skin is warm and intact.   Psychiatric: She has a normal mood and affect. Her speech is normal and behavior is normal. Cognition and memory are normal.         ED Course   Procedures  Labs Reviewed   CBC W/ AUTO DIFFERENTIAL - Abnormal; Notable for the following:        Result Value    RBC 3.72 (*)     Hemoglobin 10.8 (*)     Hematocrit 33.1 (*)     MPV 9.0 (*)     Lymph # 0.4 (*)     Lymph% 7.9 (*)     All other components within normal limits   COMPREHENSIVE METABOLIC PANEL - Abnormal; Notable for the following:     Potassium 3.2 (*)     Chloride 111 (*)     CO2 21 (*)     Calcium 8.4 (*)     Albumin 3.3 (*)     All other components within normal limits   ISTAT PROCEDURE - Abnormal; Notable for the following:     POC PCO2 34.8 (*)     POC HCO3 21.1 (*)     POC TCO2 22 (*)     All other components within normal limits   CULTURE, BLOOD   CULTURE, BLOOD   LACTIC ACID, PLASMA   INFLUENZA A AND B ANTIGEN   URINALYSIS, REFLEX TO URINE CULTURE             Medical Decision Making:   ED Management:  62-year-old female with numerous medical comorbidities as listed in her chart.  Patient's vital signs have remained stable.  Her O2 Nasal Cannula was dropped to 2 L and her pulse ox has remained above 95% which is baseline for her.   She is feeling much better.   Laboratory analysis shows no acute findings.   Chest x-ray shows no acute findings.   I discussed keeping the patient for observation but she feels well enough and would like to go home.   Given that her vital signs have remained stable, she is feeling better, and she has a good support system I think this is appropriate.  She will be discharged home with her  to follow-up in clinic.  Return instructions given.                     ED Course      Clinical Impression:   The primary encounter diagnosis was Viral syndrome.  A diagnosis of Shortness of breath was also pertinent to this visit.    Disposition:   Disposition: Discharged  Condition: Stable                        Blaine Pappas PA-C  11/15/17 1112

## 2017-11-16 NOTE — ED NOTES
Pt placed on continuous pulse ox monitoring with non-invasive blood pressure to cycle every 30 minutes.  VS stable.  Pt denies needs or complaints at this time.  Bed locked in lowest position; side rails up and locked x 2; call light and personal belongings within reach; will continue to monitor pt.   at bedside.

## 2017-11-16 NOTE — ANESTHESIA POSTPROCEDURE EVALUATION
"Anesthesia Post Evaluation    Patient: Fatimah Cha    Procedure(s) Performed: Procedure(s) (LRB):  ESOPHAGOGASTRODUODENOSCOPY (EGD) (N/A)  COLONOSCOPY (N/A)    Final Anesthesia Type: general  Patient location during evaluation: [facility]  Patient participation: Yes- Able to Participate  Level of consciousness: awake and alert  Post-procedure vital signs: reviewed and stable  Pain management: adequate  Airway patency: patent  PONV status at discharge: No PONV  Anesthetic complications: no      Cardiovascular status: blood pressure returned to baseline and stable  Respiratory status: spontaneous ventilation and nasal cannula  Hydration status: euvolemic  Follow-up not needed.        Visit Vitals  BP (!) 111/55   Pulse 76   Temp 36.6 °C (97.9 °F)   Resp 20   Ht 5' 10" (1.778 m)   Wt 82.6 kg (182 lb)   SpO2 100%   Breastfeeding? No   BMI 26.11 kg/m²       Pain/Juan C Score: Pain Assessment Performed: Yes (11/15/2017  1:30 PM)  Presence of Pain: denies (11/15/2017  1:30 PM)  Juan C Score: 10 (11/15/2017  1:00 PM)      "

## 2017-11-17 ENCOUNTER — TELEPHONE (OUTPATIENT)
Dept: GASTROENTEROLOGY | Facility: CLINIC | Age: 62
End: 2017-11-17

## 2017-11-17 NOTE — TELEPHONE ENCOUNTER
----- Message from Delmi Miller sent at 11/17/2017 11:39 AM CST -----  Contact: pt  [phone]  Jack    Pt would like Dr Santiago know that she had to go to the ER 3 hours after being released after from hospital following her procedure. Pt states her temp was 100.6 and she had chills. Pt states she at work on today and feeling a tired. Please call pt

## 2017-11-17 NOTE — TELEPHONE ENCOUNTER
Spoke with patient and patient wanted to update the clinic.    Patient had the EGD done 11/15/2017. 3 hours after the procedure throat was irritating, patient took tylenol and started running fever 100.6. Patient also had trouble with breathing and was drowsy.    Patient went to the ED at [facility] and they jaye labs that night, patient reports they wanted her to stay overnight but she went home.     Patient states her throat is still irritated but breathing is being monitored and fever is gone.

## 2017-11-18 NOTE — TELEPHONE ENCOUNTER
Pt reports that she developed a tem 100.6, had sore throat and some SOB after her procedure on wed.  Seen in ED.  CXR was unremarkable, K was low.  Was discharged within hours. Did not receive any treatment.  Seen by her GP today. Now feeling better.  No more fever. PT will inform me if her condition declines.         Pt is very high risk due to pulm disease. She was difficult to sedate and had cough even prior to insertion of the scope.  Required some pressure support during procedure. PLs see anesthesia notes for details.

## 2017-11-21 LAB
BACTERIA BLD CULT: NORMAL
BACTERIA BLD CULT: NORMAL

## 2017-11-25 ENCOUNTER — HOSPITAL ENCOUNTER (OUTPATIENT)
Facility: HOSPITAL | Age: 62
Discharge: HOME | End: 2017-11-27
Attending: EMERGENCY MEDICINE | Admitting: EMERGENCY MEDICINE
Payer: COMMERCIAL

## 2017-11-25 DIAGNOSIS — R06.02 SHORTNESS OF BREATH: Primary | ICD-10-CM

## 2017-11-25 DIAGNOSIS — J96.11 CHRONIC RESPIRATORY FAILURE WITH HYPOXIA, ON HOME O2 THERAPY: ICD-10-CM

## 2017-11-25 DIAGNOSIS — J84.9 INTERSTITIAL LUNG DISEASE: ICD-10-CM

## 2017-11-25 DIAGNOSIS — Z99.81 CHRONIC RESPIRATORY FAILURE WITH HYPOXIA, ON HOME O2 THERAPY: ICD-10-CM

## 2017-11-25 LAB
ALBUMIN SERPL BCP-MCNC: 3.7 G/DL
ALP SERPL-CCNC: 51 U/L
ALT SERPL W/O P-5'-P-CCNC: 13 U/L
ANION GAP SERPL CALC-SCNC: 12 MMOL/L
AST SERPL-CCNC: 27 U/L
BASOPHILS # BLD AUTO: 0.03 K/UL
BASOPHILS NFR BLD: 0.7 %
BILIRUB SERPL-MCNC: 0.5 MG/DL
BILIRUB UR QL STRIP: NEGATIVE
BNP SERPL-MCNC: 172 PG/ML
BUN SERPL-MCNC: 13 MG/DL
CALCIUM SERPL-MCNC: 9.1 MG/DL
CHLORIDE SERPL-SCNC: 107 MMOL/L
CLARITY UR REFRACT.AUTO: ABNORMAL
CO2 SERPL-SCNC: 25 MMOL/L
COLOR UR AUTO: YELLOW
CREAT SERPL-MCNC: 0.8 MG/DL
DIFFERENTIAL METHOD: ABNORMAL
EOSINOPHIL # BLD AUTO: 0 K/UL
EOSINOPHIL NFR BLD: 0.2 %
ERYTHROCYTE [DISTWIDTH] IN BLOOD BY AUTOMATED COUNT: 13.6 %
EST. GFR  (AFRICAN AMERICAN): >60 ML/MIN/1.73 M^2
EST. GFR  (NON AFRICAN AMERICAN): >60 ML/MIN/1.73 M^2
FLUAV AG SPEC QL IA: NEGATIVE
FLUBV AG SPEC QL IA: NEGATIVE
GLUCOSE SERPL-MCNC: 109 MG/DL
GLUCOSE UR QL STRIP: NEGATIVE
HCT VFR BLD AUTO: 36.1 %
HGB BLD-MCNC: 11.4 G/DL
HGB UR QL STRIP: NEGATIVE
IMM GRANULOCYTES # BLD AUTO: 0.04 K/UL
IMM GRANULOCYTES NFR BLD AUTO: 0.9 %
KETONES UR QL STRIP: ABNORMAL
LEUKOCYTE ESTERASE UR QL STRIP: NEGATIVE
LYMPHOCYTES # BLD AUTO: 0.4 K/UL
LYMPHOCYTES NFR BLD: 8.2 %
MCH RBC QN AUTO: 28.1 PG
MCHC RBC AUTO-ENTMCNC: 31.6 G/DL
MCV RBC AUTO: 89 FL
MONOCYTES # BLD AUTO: 0.4 K/UL
MONOCYTES NFR BLD: 9.1 %
NEUTROPHILS # BLD AUTO: 3.6 K/UL
NEUTROPHILS NFR BLD: 80.9 %
NITRITE UR QL STRIP: NEGATIVE
NRBC BLD-RTO: 0 /100 WBC
PH UR STRIP: 5 [PH] (ref 5–8)
PLATELET # BLD AUTO: 222 K/UL
PMV BLD AUTO: 8.6 FL
POTASSIUM SERPL-SCNC: 3.6 MMOL/L
PROT SERPL-MCNC: 7.1 G/DL
PROT UR QL STRIP: NEGATIVE
RBC # BLD AUTO: 4.06 M/UL
SODIUM SERPL-SCNC: 144 MMOL/L
SP GR UR STRIP: 1.02 (ref 1–1.03)
SPECIMEN SOURCE: NORMAL
TROPONIN I SERPL DL<=0.01 NG/ML-MCNC: <0.006 NG/ML
URN SPEC COLLECT METH UR: ABNORMAL
UROBILINOGEN UR STRIP-ACNC: NEGATIVE EU/DL
WBC # BLD AUTO: 4.41 K/UL

## 2017-11-25 PROCEDURE — 63600175 PHARM REV CODE 636 W HCPCS: Performed by: PHYSICIAN ASSISTANT

## 2017-11-25 PROCEDURE — 99284 EMERGENCY DEPT VISIT MOD MDM: CPT | Mod: ,,, | Performed by: PHYSICIAN ASSISTANT

## 2017-11-25 PROCEDURE — G0378 HOSPITAL OBSERVATION PER HR: HCPCS

## 2017-11-25 PROCEDURE — 84484 ASSAY OF TROPONIN QUANT: CPT

## 2017-11-25 PROCEDURE — 94761 N-INVAS EAR/PLS OXIMETRY MLT: CPT

## 2017-11-25 PROCEDURE — 85025 COMPLETE CBC W/AUTO DIFF WBC: CPT

## 2017-11-25 PROCEDURE — 93010 ELECTROCARDIOGRAM REPORT: CPT | Mod: ,,, | Performed by: INTERNAL MEDICINE

## 2017-11-25 PROCEDURE — 99220 PR INITIAL OBSERVATION CARE,LEVL III: CPT | Mod: ,,, | Performed by: PHYSICIAN ASSISTANT

## 2017-11-25 PROCEDURE — 93005 ELECTROCARDIOGRAM TRACING: CPT

## 2017-11-25 PROCEDURE — 81003 URINALYSIS AUTO W/O SCOPE: CPT

## 2017-11-25 PROCEDURE — 99284 EMERGENCY DEPT VISIT MOD MDM: CPT | Mod: 25

## 2017-11-25 PROCEDURE — 80053 COMPREHEN METABOLIC PANEL: CPT

## 2017-11-25 PROCEDURE — 87400 INFLUENZA A/B EACH AG IA: CPT

## 2017-11-25 PROCEDURE — 83880 ASSAY OF NATRIURETIC PEPTIDE: CPT

## 2017-11-25 RX ORDER — PREDNISONE 20 MG/1
60 TABLET ORAL
Status: COMPLETED | OUTPATIENT
Start: 2017-11-25 | End: 2017-11-25

## 2017-11-25 RX ORDER — POTASSIUM CHLORIDE 750 MG/1
TABLET, EXTENDED RELEASE ORAL
Refills: 5 | Status: ON HOLD | COMMUNITY
Start: 2017-10-25 | End: 2017-11-26 | Stop reason: SDUPTHER

## 2017-11-25 RX ORDER — AZELASTINE 1 MG/ML
SPRAY, METERED NASAL
COMMUNITY
Start: 2017-10-16 | End: 2017-12-04

## 2017-11-25 RX ORDER — POTASSIUM CHLORIDE 20 MEQ/1
20 TABLET, EXTENDED RELEASE ORAL DAILY
Refills: 1 | Status: ON HOLD | COMMUNITY
Start: 2017-09-05 | End: 2017-11-26 | Stop reason: SDUPTHER

## 2017-11-25 RX ORDER — FLUTICASONE PROPIONATE 50 MCG
1 SPRAY, SUSPENSION (ML) NASAL
Refills: 3 | COMMUNITY
Start: 2017-11-09 | End: 2020-11-13

## 2017-11-25 RX ORDER — ALBUTEROL SULFATE 0.83 MG/ML
SOLUTION RESPIRATORY (INHALATION)
Refills: 6 | COMMUNITY
Start: 2017-11-07 | End: 2020-11-23 | Stop reason: SDUPTHER

## 2017-11-25 RX ORDER — AMOXICILLIN 250 MG/1
TABLET, CHEWABLE ORAL
Status: ON HOLD | COMMUNITY
Start: 2017-09-29 | End: 2017-11-27 | Stop reason: HOSPADM

## 2017-11-25 RX ORDER — PROMETHAZINE HYDROCHLORIDE 25 MG/1
TABLET ORAL
Refills: 0 | COMMUNITY
Start: 2017-10-20 | End: 2019-02-25

## 2017-11-25 RX ORDER — ALBUTEROL SULFATE 90 UG/1
2 AEROSOL, METERED RESPIRATORY (INHALATION) EVERY 4 HOURS PRN
COMMUNITY
Start: 2017-10-06

## 2017-11-25 RX ADMIN — PREDNISONE 60 MG: 20 TABLET ORAL at 10:11

## 2017-11-26 PROBLEM — J84.9 INTERSTITIAL LUNG DISEASE: Status: ACTIVE | Noted: 2017-11-26

## 2017-11-26 LAB
ANION GAP SERPL CALC-SCNC: 8 MMOL/L
BASOPHILS # BLD AUTO: 0.04 K/UL
BASOPHILS NFR BLD: 1 %
BUN SERPL-MCNC: 12 MG/DL
CALCIUM SERPL-MCNC: 8.7 MG/DL
CHLORIDE SERPL-SCNC: 107 MMOL/L
CO2 SERPL-SCNC: 25 MMOL/L
CREAT SERPL-MCNC: 0.7 MG/DL
DIFFERENTIAL METHOD: ABNORMAL
EOSINOPHIL # BLD AUTO: 0 K/UL
EOSINOPHIL NFR BLD: 0.2 %
ERYTHROCYTE [DISTWIDTH] IN BLOOD BY AUTOMATED COUNT: 13.6 %
EST. GFR  (AFRICAN AMERICAN): >60 ML/MIN/1.73 M^2
EST. GFR  (NON AFRICAN AMERICAN): >60 ML/MIN/1.73 M^2
GLUCOSE SERPL-MCNC: 114 MG/DL
HCT VFR BLD AUTO: 34.2 %
HGB BLD-MCNC: 10.9 G/DL
IMM GRANULOCYTES # BLD AUTO: 0.02 K/UL
IMM GRANULOCYTES NFR BLD AUTO: 0.5 %
LYMPHOCYTES # BLD AUTO: 0.3 K/UL
LYMPHOCYTES NFR BLD: 6.4 %
MAGNESIUM SERPL-MCNC: 2 MG/DL
MCH RBC QN AUTO: 28.4 PG
MCHC RBC AUTO-ENTMCNC: 31.9 G/DL
MCV RBC AUTO: 89 FL
MONOCYTES # BLD AUTO: 0.2 K/UL
MONOCYTES NFR BLD: 3.8 %
NEUTROPHILS # BLD AUTO: 3.7 K/UL
NEUTROPHILS NFR BLD: 88.1 %
NRBC BLD-RTO: 0 /100 WBC
PHOSPHATE SERPL-MCNC: 3.6 MG/DL
PLATELET # BLD AUTO: 224 K/UL
PMV BLD AUTO: 9.5 FL
POTASSIUM SERPL-SCNC: 3.4 MMOL/L
RBC # BLD AUTO: 3.84 M/UL
SODIUM SERPL-SCNC: 140 MMOL/L
WBC # BLD AUTO: 4.21 K/UL

## 2017-11-26 PROCEDURE — 94761 N-INVAS EAR/PLS OXIMETRY MLT: CPT

## 2017-11-26 PROCEDURE — 99226 PR SUBSEQUENT OBSERVATION CARE,LEVEL III: CPT | Mod: ,,, | Performed by: PHYSICIAN ASSISTANT

## 2017-11-26 PROCEDURE — G0378 HOSPITAL OBSERVATION PER HR: HCPCS

## 2017-11-26 PROCEDURE — 25000003 PHARM REV CODE 250: Performed by: PHYSICIAN ASSISTANT

## 2017-11-26 PROCEDURE — 83735 ASSAY OF MAGNESIUM: CPT

## 2017-11-26 PROCEDURE — 25000003 PHARM REV CODE 250: Performed by: INTERNAL MEDICINE

## 2017-11-26 PROCEDURE — 99214 OFFICE O/P EST MOD 30 MIN: CPT | Mod: ,,, | Performed by: INTERNAL MEDICINE

## 2017-11-26 PROCEDURE — 25000242 PHARM REV CODE 250 ALT 637 W/ HCPCS: Performed by: PHYSICIAN ASSISTANT

## 2017-11-26 PROCEDURE — 27000221 HC OXYGEN, UP TO 24 HOURS

## 2017-11-26 PROCEDURE — 63600175 PHARM REV CODE 636 W HCPCS: Performed by: INTERNAL MEDICINE

## 2017-11-26 PROCEDURE — 85025 COMPLETE CBC W/AUTO DIFF WBC: CPT

## 2017-11-26 PROCEDURE — 84100 ASSAY OF PHOSPHORUS: CPT

## 2017-11-26 PROCEDURE — 80048 BASIC METABOLIC PNL TOTAL CA: CPT

## 2017-11-26 PROCEDURE — 63600175 PHARM REV CODE 636 W HCPCS: Performed by: PHYSICIAN ASSISTANT

## 2017-11-26 PROCEDURE — 25500020 PHARM REV CODE 255: Performed by: INTERNAL MEDICINE

## 2017-11-26 PROCEDURE — 36415 COLL VENOUS BLD VENIPUNCTURE: CPT

## 2017-11-26 PROCEDURE — 94640 AIRWAY INHALATION TREATMENT: CPT

## 2017-11-26 RX ORDER — LEVOTHYROXINE SODIUM 137 UG/1
137 CAPSULE ORAL DAILY
Status: DISCONTINUED | OUTPATIENT
Start: 2017-11-26 | End: 2017-11-26

## 2017-11-26 RX ORDER — ATORVASTATIN CALCIUM 10 MG/1
10 TABLET, FILM COATED ORAL NIGHTLY
Status: DISCONTINUED | OUTPATIENT
Start: 2017-11-26 | End: 2017-11-27 | Stop reason: HOSPADM

## 2017-11-26 RX ORDER — GUAIFENESIN 600 MG/1
1200 TABLET, EXTENDED RELEASE ORAL
Status: DISCONTINUED | OUTPATIENT
Start: 2017-11-26 | End: 2017-11-27 | Stop reason: HOSPADM

## 2017-11-26 RX ORDER — SODIUM CHLORIDE 0.9 % (FLUSH) 0.9 %
3 SYRINGE (ML) INJECTION
Status: DISCONTINUED | OUTPATIENT
Start: 2017-11-26 | End: 2017-11-27 | Stop reason: HOSPADM

## 2017-11-26 RX ORDER — SILDENAFIL CITRATE 20 MG/1
20 TABLET ORAL 3 TIMES DAILY
Status: DISCONTINUED | OUTPATIENT
Start: 2017-11-26 | End: 2017-11-27 | Stop reason: HOSPADM

## 2017-11-26 RX ORDER — MYCOPHENOLATE MOFETIL 250 MG/1
1500 CAPSULE ORAL 2 TIMES DAILY
Status: DISCONTINUED | OUTPATIENT
Start: 2017-11-26 | End: 2017-11-27 | Stop reason: HOSPADM

## 2017-11-26 RX ORDER — PANTOPRAZOLE SODIUM 40 MG/1
40 TABLET, DELAYED RELEASE ORAL 2 TIMES DAILY
Status: DISCONTINUED | OUTPATIENT
Start: 2017-11-26 | End: 2017-11-27 | Stop reason: HOSPADM

## 2017-11-26 RX ORDER — CARVEDILOL 12.5 MG/1
12.5 TABLET ORAL 2 TIMES DAILY WITH MEALS
Status: DISCONTINUED | OUTPATIENT
Start: 2017-11-26 | End: 2017-11-27 | Stop reason: HOSPADM

## 2017-11-26 RX ORDER — ALBUTEROL SULFATE 90 UG/1
2 AEROSOL, METERED RESPIRATORY (INHALATION) EVERY 4 HOURS PRN
Status: DISCONTINUED | OUTPATIENT
Start: 2017-11-26 | End: 2017-11-27 | Stop reason: HOSPADM

## 2017-11-26 RX ORDER — LEVOTHYROXINE SODIUM 137 UG/1
137 CAPSULE ORAL DAILY
Status: DISCONTINUED | OUTPATIENT
Start: 2017-11-26 | End: 2017-11-27 | Stop reason: HOSPADM

## 2017-11-26 RX ORDER — LEVOTHYROXINE SODIUM 137 UG/1
137 TABLET ORAL DAILY
Status: DISCONTINUED | OUTPATIENT
Start: 2017-11-26 | End: 2017-11-26

## 2017-11-26 RX ORDER — ASPIRIN 81 MG/1
81 TABLET ORAL DAILY
Status: DISCONTINUED | OUTPATIENT
Start: 2017-11-26 | End: 2017-11-27 | Stop reason: HOSPADM

## 2017-11-26 RX ORDER — PREDNISONE 20 MG/1
60 TABLET ORAL ONCE
Status: COMPLETED | OUTPATIENT
Start: 2017-11-26 | End: 2017-11-26

## 2017-11-26 RX ORDER — POLYETHYLENE GLYCOL 3350 17 G/17G
17 POWDER, FOR SOLUTION ORAL 2 TIMES DAILY PRN
Status: DISCONTINUED | OUTPATIENT
Start: 2017-11-26 | End: 2017-11-27 | Stop reason: HOSPADM

## 2017-11-26 RX ORDER — AMBRISENTAN 10 MG/1
10 TABLET, FILM COATED ORAL DAILY
Status: DISCONTINUED | OUTPATIENT
Start: 2017-11-26 | End: 2017-11-27 | Stop reason: HOSPADM

## 2017-11-26 RX ORDER — ALBUTEROL SULFATE 0.83 MG/ML
2.5 SOLUTION RESPIRATORY (INHALATION) EVERY 4 HOURS
Status: DISCONTINUED | OUTPATIENT
Start: 2017-11-26 | End: 2017-11-27 | Stop reason: HOSPADM

## 2017-11-26 RX ORDER — AMOXICILLIN AND CLAVULANATE POTASSIUM 875; 125 MG/1; MG/1
1 TABLET, FILM COATED ORAL EVERY 12 HOURS
Status: DISCONTINUED | OUTPATIENT
Start: 2017-11-26 | End: 2017-11-27

## 2017-11-26 RX ORDER — AZITHROMYCIN 250 MG/1
500 TABLET, FILM COATED ORAL DAILY
Status: DISCONTINUED | OUTPATIENT
Start: 2017-11-26 | End: 2017-11-27 | Stop reason: HOSPADM

## 2017-11-26 RX ORDER — PROMETHAZINE HYDROCHLORIDE 25 MG/1
25 TABLET ORAL EVERY 6 HOURS PRN
Status: DISCONTINUED | OUTPATIENT
Start: 2017-11-26 | End: 2017-11-27 | Stop reason: HOSPADM

## 2017-11-26 RX ORDER — FUROSEMIDE 40 MG/1
40 TABLET ORAL DAILY
Status: DISCONTINUED | OUTPATIENT
Start: 2017-11-26 | End: 2017-11-27 | Stop reason: HOSPADM

## 2017-11-26 RX ORDER — ACETAMINOPHEN 325 MG/1
650 TABLET ORAL EVERY 4 HOURS PRN
Status: DISCONTINUED | OUTPATIENT
Start: 2017-11-26 | End: 2017-11-27 | Stop reason: HOSPADM

## 2017-11-26 RX ORDER — ONDANSETRON 8 MG/1
8 TABLET, ORALLY DISINTEGRATING ORAL EVERY 8 HOURS PRN
Status: DISCONTINUED | OUTPATIENT
Start: 2017-11-26 | End: 2017-11-27 | Stop reason: HOSPADM

## 2017-11-26 RX ORDER — LORAZEPAM 1 MG/1
1 TABLET ORAL 2 TIMES DAILY
Status: DISCONTINUED | OUTPATIENT
Start: 2017-11-26 | End: 2017-11-27 | Stop reason: HOSPADM

## 2017-11-26 RX ADMIN — ATORVASTATIN CALCIUM 10 MG: 10 TABLET, FILM COATED ORAL at 10:11

## 2017-11-26 RX ADMIN — CARVEDILOL 12.5 MG: 12.5 TABLET, FILM COATED ORAL at 05:11

## 2017-11-26 RX ADMIN — SILDENAFIL 20 MG: 20 TABLET ORAL at 05:11

## 2017-11-26 RX ADMIN — LORAZEPAM 1 MG: 1 TABLET ORAL at 10:11

## 2017-11-26 RX ADMIN — AZITHROMYCIN 500 MG: 250 TABLET, FILM COATED ORAL at 10:11

## 2017-11-26 RX ADMIN — AMBRISENTAN 10 MG: 10 TABLET, FILM COATED ORAL at 09:11

## 2017-11-26 RX ADMIN — ALBUTEROL SULFATE 2.5 MG: 2.5 SOLUTION RESPIRATORY (INHALATION) at 04:11

## 2017-11-26 RX ADMIN — PANTOPRAZOLE SODIUM 40 MG: 40 TABLET, DELAYED RELEASE ORAL at 09:11

## 2017-11-26 RX ADMIN — PANTOPRAZOLE SODIUM 40 MG: 40 TABLET, DELAYED RELEASE ORAL at 10:11

## 2017-11-26 RX ADMIN — IOHEXOL 75 ML: 350 INJECTION, SOLUTION INTRAVENOUS at 11:11

## 2017-11-26 RX ADMIN — SILDENAFIL 20 MG: 20 TABLET ORAL at 10:11

## 2017-11-26 RX ADMIN — ASPIRIN 81 MG: 81 TABLET, COATED ORAL at 09:11

## 2017-11-26 RX ADMIN — AMOXICILLIN AND CLAVULANATE POTASSIUM 1 TABLET: 875; 125 TABLET, FILM COATED ORAL at 09:11

## 2017-11-26 RX ADMIN — MYCOPHENOLATE MOFETIL 1500 MG: 250 CAPSULE ORAL at 09:11

## 2017-11-26 RX ADMIN — PREDNISONE 60 MG: 20 TABLET ORAL at 10:11

## 2017-11-26 RX ADMIN — MYCOPHENOLATE MOFETIL 1500 MG: 250 CAPSULE ORAL at 10:11

## 2017-11-26 RX ADMIN — CARVEDILOL 12.5 MG: 12.5 TABLET, FILM COATED ORAL at 09:11

## 2017-11-26 RX ADMIN — ALBUTEROL SULFATE 2.5 MG: 2.5 SOLUTION RESPIRATORY (INHALATION) at 07:11

## 2017-11-26 RX ADMIN — FUROSEMIDE 40 MG: 40 TABLET ORAL at 09:11

## 2017-11-26 RX ADMIN — AMOXICILLIN AND CLAVULANATE POTASSIUM 1 TABLET: 875; 125 TABLET, FILM COATED ORAL at 10:11

## 2017-11-26 RX ADMIN — ALBUTEROL SULFATE 2.5 MG: 2.5 SOLUTION RESPIRATORY (INHALATION) at 12:11

## 2017-11-26 RX ADMIN — LORAZEPAM 1 MG: 1 TABLET ORAL at 09:11

## 2017-11-26 RX ADMIN — SILDENAFIL 20 MG: 20 TABLET ORAL at 03:11

## 2017-11-26 RX ADMIN — ALBUTEROL SULFATE 2.5 MG: 2.5 SOLUTION RESPIRATORY (INHALATION) at 08:11

## 2017-11-26 NOTE — SUBJECTIVE & OBJECTIVE
Past Medical History:   Diagnosis Date    CHF (congestive heart failure)     Colitis     CREST syndrome     Hypercholesteremia     Hypertension     Hypothyroidism     Interstitial lung disease     Osteopenia     Pulmonary hypertension     Raynaud disease     Respiratory distress     Scleroderma     Sjoegren syndrome        Past Surgical History:   Procedure Laterality Date    BREAST BIOPSY      CARDIAC CATHETERIZATION  2013    COLONOSCOPY      COLONOSCOPY N/A 11/15/2017    Procedure: COLONOSCOPY;  Surgeon: Radha Santiago MD;  Location: [address]);  Service: Endoscopy;  Laterality: N/A;  2nd floor.  Pulm htn, ILD on o2.  Pls review w anesthesia.  Pt has cardio-pulm workup in progress at [facility] by Dr. Russell.                 reese monsalve RN- I reviewed Ms. Cha's history with Dr. Leopold (Anesthesia) and based on her medical history of:  Pulmonary Hyper    foot surgery x2      lumpectomy (benign)      LUNG BIOPSY  03/2013    THYROIDECTOMY      TONSILLECTOMY      TUBAL LIGATION      UPPER GASTROINTESTINAL ENDOSCOPY         Review of patient's allergies indicates:   Allergen Reactions    Adhesive tape-silicones        No current facility-administered medications on file prior to encounter.      Current Outpatient Prescriptions on File Prior to Encounter   Medication Sig    ALBUTEROL SULFATE (VENTOLIN INHL) Inhale 2 puffs into the lungs every 4 to 6 hours as needed.    albuterol-ipratropium 2.5mg-0.5mg/3mL (DUO-NEB) 0.5 mg-3 mg(2.5 mg base)/3 mL nebulizer solution USE ONE VIAL in Nebulizer EVERY SIX HOURS AS NEEDED FOR SHORTNESS OF BREATH    ambrisentan (LETAIRIS) 10 MG Tab Take 10 mg by mouth once daily.    aspirin (ECOTRIN) 81 MG EC tablet Take 81 mg by mouth every evening.    atorvastatin (LIPITOR) 10 MG tablet Take 10 mg by mouth once daily.     carvedilol (COREG) 12.5 MG tablet Take 1 tablet (12.5 mg total) by mouth 2 (two) times daily with meals.    DYMISTA 137-50  mcg/spray Spry nassal spray 1 spray by Each Nare route 2 (two) times daily.    furosemide (LASIX) 40 MG tablet Take 1 tablet (40 mg total) by mouth once daily.    guaiFENesin (MUCINEX) 600 mg 12 hr tablet Take 1,200 mg by mouth as needed for Congestion.    levocetirizine (XYZAL) 5 MG tablet Take 5 mg by mouth every evening.     lorazepam (ATIVAN) 1 MG tablet Take 1 mg by mouth 2 (two) times daily.     mycophenolate (CELLCEPT) 500 mg Tab Take 1,500 mg by mouth 2 (two) times daily.     pantoprazole (PROTONIX) 40 MG tablet Take 1 tablet (40 mg total) by mouth 2 (two) times daily.    potassium chloride (KLOR-CON) 20 mEq Pack Take 20 mEq by mouth once daily.    predniSONE (DELTASONE) 5 MG tablet Take 20 mg by mouth once daily.     sildenafil (VIAGRA) 25 MG tablet Take 20 mg by mouth 3 (three) times daily.    TIROSINT 137 mcg Cap Take 1 capsule by mouth once daily.     VITAMIN D2 50,000 unit capsule Take 50,000 Units by mouth every 30 days.     multivitamin capsule Take 1 capsule by mouth once daily.    risedronate (ACTONEL) 150 MG Tab Take 150 mg by mouth every 30 days.    [DISCONTINUED] nifedipine (PROCARDIA) 10 MG Cap Take 1 capsule (10 mg total) by mouth 2 (two) times daily.     Family History     Problem Relation (Age of Onset)    Cancer Maternal Aunt    Colon cancer Maternal Uncle    Deep vein thrombosis Brother    Emphysema Sister    Heart failure Father    Lung cancer Brother    Thyroid cancer Mother    Thyroid disease Sister        Social History Main Topics    Smoking status: Former Smoker     Packs/day: 1.00     Years: 5.00     Types: Cigarettes     Quit date: 3/30/1979    Smokeless tobacco: Never Used    Alcohol use No    Drug use: No    Sexual activity: Not on file     Review of Systems   Constitutional: Positive for fever. Negative for appetite change and chills.   HENT: Negative for trouble swallowing.    Eyes: Negative for photophobia and visual disturbance.   Respiratory: Positive for  cough, chest tightness, shortness of breath and wheezing.    Cardiovascular: Positive for leg swelling. Negative for chest pain and palpitations.   Gastrointestinal: Positive for nausea and vomiting. Negative for abdominal distention, abdominal pain, constipation and diarrhea.   Genitourinary: Negative for dysuria, flank pain, frequency and hematuria.   Musculoskeletal: Negative for gait problem, neck pain and neck stiffness.   Skin: Negative for rash and wound.   Neurological: Negative for dizziness, syncope, facial asymmetry, speech difficulty, weakness and headaches.   Psychiatric/Behavioral: Negative for agitation and confusion. The patient is nervous/anxious.      Objective:     Vital Signs (Most Recent):  Temp: 98.8 °F (37.1 °C) (11/25/17 2329)  Pulse: 69 (11/25/17 2329)  Resp: 20 (11/25/17 2329)  BP: 131/72 (11/25/17 2329)  SpO2: 98 % (11/25/17 2329) Vital Signs (24h Range):  Temp:  [98.7 °F (37.1 °C)-98.9 °F (37.2 °C)] 98.8 °F (37.1 °C)  Pulse:  [69-83] 69  Resp:  [20-24] 20  SpO2:  [95 %-100 %] 98 %  BP: (121-131)/(61-72) 131/72     Weight: 82.6 kg (182 lb)  Body mass index is 26.11 kg/m².    Physical Exam   Constitutional: She is oriented to person, place, and time. She appears well-developed and well-nourished. No distress.   HENT:   Head: Normocephalic and atraumatic.   Mouth/Throat: No oropharyngeal exudate.   Eyes: EOM are normal. Pupils are equal, round, and reactive to light. No scleral icterus.   Neck: Normal range of motion. Neck supple.   Cardiovascular: Normal rate, regular rhythm, normal heart sounds and intact distal pulses.    No murmur heard.  Pulmonary/Chest: Effort normal and breath sounds normal. No respiratory distress. She has no wheezes.   Coarse breath sounds   Abdominal: Soft. Bowel sounds are normal. She exhibits no distension. There is no tenderness.   Musculoskeletal: Normal range of motion. She exhibits no edema or tenderness.   Lymphadenopathy:     She has no cervical adenopathy.    Neurological: She is alert and oriented to person, place, and time. No cranial nerve deficit. Coordination normal.   Skin: Skin is warm and dry.   Psychiatric: She has a normal mood and affect. Her behavior is normal.        Significant Labs:   BMP:   Recent Labs  Lab 11/25/17 2014         K 3.6      CO2 25   BUN 13   CREATININE 0.8   CALCIUM 9.1     CBC:   Recent Labs  Lab 11/25/17 2014   WBC 4.41   HGB 11.4*   HCT 36.1*        CMP:   Recent Labs  Lab 11/25/17 2014      K 3.6      CO2 25      BUN 13   CREATININE 0.8   CALCIUM 9.1   PROT 7.1   ALBUMIN 3.7   BILITOT 0.5   ALKPHOS 51*   AST 27   ALT 13   ANIONGAP 12   EGFRNONAA >60.0     All pertinent labs within the past 24 hours have been reviewed.    Significant Imaging: I have reviewed all pertinent imaging results/findings within the past 24 hours.

## 2017-11-26 NOTE — H&P
[facility] Hospital Medicine  History & Physical    Patient Name: Fatimah Cha  MRN: [id number]  Admission Date: 11/25/2017  Attending Physician: SANCHEZ Sanchez MD   Primary Care Provider: Simon Orozco MD    [facility] Team: [facility] MED F Germán Hensley PA-C     Patient information was obtained from patient, past medical records and ER records.     Subjective:     Principal Problem:Shortness of breath    Chief Complaint:   Chief Complaint   Patient presents with    Shortness of Breath        HPI: Patient is a 61yo F with a PMHx of chronic respiratory failure 2/2 interstitial lung disease, lung transplant candidate, chronic diastolic heart failure, CREST syndrome, Sjogren's, Raynaud's, HTN, HLD being admitted to observation for evaluation of shortness of breath. Patient reports using 2-2.5L of O2 at her baseline while at work and home. She reports yesterday she became more short of breath requiring up to 4L. The shortness of breath increases with exertion. Patient has a chronic cough. She reports having a fever and sore throat 4 days ago (101) that she took Tylenol and Augmentin for and has since resolved. She is on Cellcept steroid therapy for her autoimmune diseases. She recently was started on Prednisone by her Endocrinologist to evaluate Cortisol levels. She reports using 3 breathing treatments and her other inhalers today which provided mild relief. She denies recent vomiting. She is experiencing chest tightness which started today. Patient denies headache, abdominal pain, dysuria, hematuria, diarrhea, constipation, and extremity weakness.    In the ED, vitals stable. Patient saturating on 3L. Intake labs unremarkable. CXR with honeycombing pattern consistent with ILD. Patient given 60mg prednisone and admitted to observation.    Past Medical History:   Diagnosis Date    CHF (congestive heart failure)     Colitis     CREST syndrome     Hypercholesteremia      Hypertension     Hypothyroidism     Interstitial lung disease     Osteopenia     Pulmonary hypertension     Raynaud disease     Respiratory distress     Scleroderma     Sjoegren syndrome        Past Surgical History:   Procedure Laterality Date    BREAST BIOPSY      CARDIAC CATHETERIZATION  2013    COLONOSCOPY      COLONOSCOPY N/A 11/15/2017    Procedure: COLONOSCOPY;  Surgeon: Radha Santiago MD;  Location: [facility] ([address] [place] [address]);  Service: Endoscopy;  Laterality: N/A;  2nd floor.  Pulm htn, ILD on o2.  Pls review w anesthesia.  Pt has cardio-pulm workup in progress at [facility] by Dr. Russell.                 reese monsalve RN- I reviewed Ms. Cha's history with Dr. Leopold (Anesthesia) and based on her medical history of:  Pulmonary Hyper    foot surgery x2      lumpectomy (benign)      LUNG BIOPSY  03/2013    THYROIDECTOMY      TONSILLECTOMY      TUBAL LIGATION      UPPER GASTROINTESTINAL ENDOSCOPY         Review of patient's allergies indicates:   Allergen Reactions    Adhesive tape-silicones        No current facility-administered medications on file prior to encounter.      Current Outpatient Prescriptions on File Prior to Encounter   Medication Sig    ALBUTEROL SULFATE (VENTOLIN INHL) Inhale 2 puffs into the lungs every 4 to 6 hours as needed.    albuterol-ipratropium 2.5mg-0.5mg/3mL (DUO-NEB) 0.5 mg-3 mg(2.5 mg base)/3 mL nebulizer solution USE ONE VIAL in Nebulizer EVERY SIX HOURS AS NEEDED FOR SHORTNESS OF BREATH    ambrisentan (LETAIRIS) 10 MG Tab Take 10 mg by mouth once daily.    aspirin (ECOTRIN) 81 MG EC tablet Take 81 mg by mouth every evening.    atorvastatin (LIPITOR) 10 MG tablet Take 10 mg by mouth once daily.     carvedilol (COREG) 12.5 MG tablet Take 1 tablet (12.5 mg total) by mouth 2 (two) times daily with meals.    DYMISTA 137-50 mcg/spray Spry nassal spray 1 spray by Each Nare route 2 (two) times daily.    furosemide (LASIX) 40 MG tablet Take 1 tablet (40 mg total)  by mouth once daily.    guaiFENesin (MUCINEX) 600 mg 12 hr tablet Take 1,200 mg by mouth as needed for Congestion.    levocetirizine (XYZAL) 5 MG tablet Take 5 mg by mouth every evening.     lorazepam (ATIVAN) 1 MG tablet Take 1 mg by mouth 2 (two) times daily.     mycophenolate (CELLCEPT) 500 mg Tab Take 1,500 mg by mouth 2 (two) times daily.     pantoprazole (PROTONIX) 40 MG tablet Take 1 tablet (40 mg total) by mouth 2 (two) times daily.    potassium chloride (KLOR-CON) 20 mEq Pack Take 20 mEq by mouth once daily.    predniSONE (DELTASONE) 5 MG tablet Take 20 mg by mouth once daily.     sildenafil (VIAGRA) 25 MG tablet Take 20 mg by mouth 3 (three) times daily.    TIROSINT 137 mcg Cap Take 1 capsule by mouth once daily.     VITAMIN D2 50,000 unit capsule Take 50,000 Units by mouth every 30 days.     multivitamin capsule Take 1 capsule by mouth once daily.    risedronate (ACTONEL) 150 MG Tab Take 150 mg by mouth every 30 days.    [DISCONTINUED] nifedipine (PROCARDIA) 10 MG Cap Take 1 capsule (10 mg total) by mouth 2 (two) times daily.     Family History     Problem Relation (Age of Onset)    Cancer Maternal Aunt    Colon cancer Maternal Uncle    Deep vein thrombosis Brother    Emphysema Sister    Heart failure Father    Lung cancer Brother    Thyroid cancer Mother    Thyroid disease Sister        Social History Main Topics    Smoking status: Former Smoker     Packs/day: 1.00     Years: 5.00     Types: Cigarettes     Quit date: 3/30/1979    Smokeless tobacco: Never Used    Alcohol use No    Drug use: No    Sexual activity: Not on file     Review of Systems   Constitutional: Positive for fever. Negative for appetite change and chills.   HENT: Negative for trouble swallowing.    Eyes: Negative for photophobia and visual disturbance.   Respiratory: Positive for cough, chest tightness, shortness of breath and wheezing.    Cardiovascular: Positive for leg swelling. Negative for chest pain and  palpitations.   Gastrointestinal: Positive for nausea and vomiting. Negative for abdominal distention, abdominal pain, constipation and diarrhea.   Genitourinary: Negative for dysuria, flank pain, frequency and hematuria.   Musculoskeletal: Negative for gait problem, neck pain and neck stiffness.   Skin: Negative for rash and wound.   Neurological: Negative for dizziness, syncope, facial asymmetry, speech difficulty, weakness and headaches.   Psychiatric/Behavioral: Negative for agitation and confusion. The patient is nervous/anxious.      Objective:     Vital Signs (Most Recent):  Temp: 98.8 °F (37.1 °C) (11/25/17 2329)  Pulse: 69 (11/25/17 2329)  Resp: 20 (11/25/17 2329)  BP: 131/72 (11/25/17 2329)  SpO2: 98 % (11/25/17 2329) Vital Signs (24h Range):  Temp:  [98.7 °F (37.1 °C)-98.9 °F (37.2 °C)] 98.8 °F (37.1 °C)  Pulse:  [69-83] 69  Resp:  [20-24] 20  SpO2:  [95 %-100 %] 98 %  BP: (121-131)/(61-72) 131/72     Weight: 82.6 kg (182 lb)  Body mass index is 26.11 kg/m².    Physical Exam   Constitutional: She is oriented to person, place, and time. She appears well-developed and well-nourished. No distress.   HENT:   Head: Normocephalic and atraumatic.   Mouth/Throat: No oropharyngeal exudate.   Eyes: EOM are normal. Pupils are equal, round, and reactive to light. No scleral icterus.   Neck: Normal range of motion. Neck supple.   Cardiovascular: Normal rate, regular rhythm, normal heart sounds and intact distal pulses.    No murmur heard.  Pulmonary/Chest: Effort normal and breath sounds normal. No respiratory distress. She has no wheezes.   Coarse breath sounds   Abdominal: Soft. Bowel sounds are normal. She exhibits no distension. There is no tenderness.   Musculoskeletal: Normal range of motion. She exhibits no edema or tenderness.   Lymphadenopathy:     She has no cervical adenopathy.   Neurological: She is alert and oriented to person, place, and time. No cranial nerve deficit. Coordination normal.   Skin: Skin  is warm and dry.   Psychiatric: She has a normal mood and affect. Her behavior is normal.        Significant Labs:   BMP:   Recent Labs  Lab 11/25/17 2014         K 3.6      CO2 25   BUN 13   CREATININE 0.8   CALCIUM 9.1     CBC:   Recent Labs  Lab 11/25/17 2014   WBC 4.41   HGB 11.4*   HCT 36.1*        CMP:   Recent Labs  Lab 11/25/17 2014      K 3.6      CO2 25      BUN 13   CREATININE 0.8   CALCIUM 9.1   PROT 7.1   ALBUMIN 3.7   BILITOT 0.5   ALKPHOS 51*   AST 27   ALT 13   ANIONGAP 12   EGFRNONAA >60.0     All pertinent labs within the past 24 hours have been reviewed.    Significant Imaging: I have reviewed all pertinent imaging results/findings within the past 24 hours.    Assessment/Plan:     * Shortness of breath    Interstitial lung disease  Pulmonary hypertension, fibrosis    Patient on 2.5L at baseline, follows closely with transplant. Recently started on Augmentin for possible infection with fever. Saturating on 3L.  - continue Augmentin  - continuous oxygen  - vitals / telemetry  - Prednisone 60mg daily  - Pulm consulted  - Ambrisentan 10mg daily  - Cellcept 1500mg BID  - Sildenafil 20mg TID  - albuterol PRN  - Lorazepam 1mg BID        Chronic diastolic heart failure    - 2D ECHO 10/17 showed normal/large right ventricle, normal EF  - strict Is/Os  - daily weights  - Lasix 40mg daily  - Carvedilol 12.5mg BID  - 1.5 L cardiac diet  - daily BMP / CBC        Mixed hyperlipidemia    - ASA 81  - Atorvastatin 10mg        Hypothyroidism due to Hashimoto's thyroiditis    - Levothyroxine 137 mcg, patient supplied          VTE Risk Mitigation     None             Germán Hensley PA-C  Department of Hospital Medicine   [facility]

## 2017-11-26 NOTE — ED NOTES
LOC: The patient is awake, alert and aware of environment with an appropriate affect, the patient is oriented x 3 and speaking appropriately.  APPEARANCE: Patient resting comfortably and in no acute distress, patient is clean and well groomed  SKIN: The skin is warm and dry, color consistent with ethnicity, patient has normal skin turgor and moist mucus membranes, skin intact, no breakdown or brusing noted.  MUSCULOSKELETAL: Patient moving all extremities well, no obvious swelling or deformities noted.   RESPIRATORY: Airway is open and patent, breath sounds clear throughout all lung fields; respirations are spontaneous, patient has a normal effort and rate, no accessory muscle use noted. Pt on 4L O2, reports taking breathing tx at home today and increasing O2.   CARDIAC: Patient has no peripheral edema noted, capillary refill < 3 seconds. No complaints of chest pain   ABDOMEN: Soft and non tender to palpation, no distention noted. Bowel sounds present x 4. Pt reports decrease in appetite at night r/t reflux.  NEUROLOGIC: PERRL, 3mm bilaterally, eyes open spontaneously, behavior appropriate to situation, follows commands, facial expression symmetrical, bilateral hand grasp equal and even, purposeful motor response noted, normal sensation in all extremities when touched with a finger.

## 2017-11-26 NOTE — ASSESSMENT & PLAN NOTE
Interstitial lung disease  Pulmonary hypertension, fibrosis    Patient on 2.5L at baseline, follows closely with transplant. Recently started on Augmentin for possible infection with fever. Saturating on 3L.  - continue Augmentin  - continuous oxygen  - vitals / telemetry  - Prednisone 60mg daily  - Pulm consulted  - Ambrisentan 10mg daily  - Cellcept 1500mg BID  - Sildenafil 20mg TID  - albuterol PRN  - Lorazepam 1mg BID

## 2017-11-26 NOTE — NURSING
Patient prefers to take enteric coated brand Tirosint thryoid medication from home.  Will contact service for approval.

## 2017-11-26 NOTE — NURSING
Pt sitting in bed aaox4. Denies SOB. Fall/safety precautions reviewed. Bed in lowest position,call bell in reach.Spouse at bedside. SHELBY Hensley at Bedside speaking with pt. Vital signs stable.

## 2017-11-26 NOTE — ASSESSMENT & PLAN NOTE
Interstitial lung disease  Pulmonary hypertension, fibrosis    Patient on 2.5L at baseline, follows closely with transplant. Recently started on Augmentin for possible infection with fever. Saturating on 3L.  - continue Augmentin  - continuous oxygen  - vitals / telemetry  - Prednisone 60mg daily  - Ambrisentan 10mg daily  - Cellcept 1500mg BID  - Sildenafil 20mg TID  - albuterol PRN  - Lorazepam 1mg BID  - Pulmonology consulted and recommended CTA which showed no evidence of pulmonary embolus, abnormal but grossly stable appearance of the lungs with reticular opacification suggesting an interstitial process (previously characterized as chronic interstitial lung disease/NSIP), prominence of the pulmonary artery suggesting pulmonary arterial hypertension, cardiomegaly, mild pericardial effusion.

## 2017-11-26 NOTE — ASSESSMENT & PLAN NOTE
- 2D ECHO 10/17 showed normal/large right ventricle, normal EF  - strict Is/Os  - daily weights  - Lasix 40mg daily  - Carvedilol 12.5mg BID  - 1.5 L cardiac diet  - daily BMP / CBC  - 11/26 pt c/o R leg swelling, US ordered to rule out DVT

## 2017-11-26 NOTE — HPI
Patient is a 63yo F with a PMHx of chronic respiratory failure 2/2 interstitial lung disease, lung transplant candidate, chronic diastolic heart failure, CREST syndrome, Sjogren's, Raynaud's, HTN, HLD being admitted to observation for evaluation of shortness of breath. Patient reports using 2-2.5L of O2 at her baseline while at work and home. She reports yesterday she became more short of breath requiring up to 4L. The shortness of breath increases with exertion. Patient has a chronic cough. She reports having a fever and sore throat 4 days ago (101) that she took Tylenol and Augmentin for and has since resolved. She is on Cellcept steroid therapy for her autoimmune diseases. She recently was started on Prednisone by her Endocrinologist to evaluate Cortisol levels. She reports using 3 breathing treatments and her other inhalers today which provided mild relief. She denies recent vomiting. She is experiencing chest tightness which started today. Patient denies headache, abdominal pain, dysuria, hematuria, diarrhea, constipation, and extremity weakness.    In the ED, vitals stable. Patient saturating on 3L. Intake labs unremarkable. CXR with honeycombing pattern consistent with ILD. Patient given 60mg prednisone and admitted to observation.

## 2017-11-26 NOTE — HOSPITAL COURSE
Pt with interstitial lung disease, pulmonary HTN, fibrosis admitted to observation for SOB.  Pt started on Augmentin prior to admission for possible infection with fever.  Pulmonology consulted and recommended CTA which showed no evidence of pulmonary embolus, abnormal but grossly stable appearance of the lungs with reticular opacification suggesting an interstitial process (previously characterized as chronic interstitial lung disease/NSIP), prominence of the pulmonary artery suggesting pulmonary arterial hypertension, cardiomegaly, mild pericardial effusion.  Pt improved with 60mg prednisone. On 2.5L of supplementary oxygen, normally on 2L at home.  Suspected SOB likely ILD exacerbation.  Prednisone 40mg daily (started 11/27) with a subsequent slow taper to be determined by [facility] Pulmonologist.  Azithromycin x 5 days (received 11/26 and 11/27).  Continue Lasix 40mg daily, Cellcept 1500mg BID, Sildenafil 20 mg TID, and ambrisentan 10mg daily.

## 2017-11-26 NOTE — SUBJECTIVE & OBJECTIVE
Past Medical History:   Diagnosis Date    CHF (congestive heart failure)     Colitis     CREST syndrome     Hypercholesteremia     Hypertension     Hypothyroidism     Interstitial lung disease     Osteopenia     Pulmonary hypertension     Raynaud disease     Respiratory distress     Scleroderma     Sjoegren syndrome        Past Surgical History:   Procedure Laterality Date    BREAST BIOPSY      CARDIAC CATHETERIZATION  2013    COLONOSCOPY      COLONOSCOPY N/A 11/15/2017    Procedure: COLONOSCOPY;  Surgeon: Radha Santiago MD;  Location: [address]);  Service: Endoscopy;  Laterality: N/A;  2nd floor.  Pulm htn, ILD on o2.  Pls review w anesthesia.  Pt has cardio-pulm workup in progress at [facility] by Dr. Russell.                 reese monsalve RN- I reviewed Ms. Cha's history with Dr. Leopold (Anesthesia) and based on her medical history of:  Pulmonary Hyper    foot surgery x2      lumpectomy (benign)      LUNG BIOPSY  03/2013    THYROIDECTOMY      TONSILLECTOMY      TUBAL LIGATION      UPPER GASTROINTESTINAL ENDOSCOPY         Review of patient's allergies indicates:   Allergen Reactions    Adhesive tape-silicones        Family History     Problem Relation (Age of Onset)    Cancer Maternal Aunt    Colon cancer Maternal Uncle    Deep vein thrombosis Brother    Emphysema Sister    Heart failure Father    Lung cancer Brother    Thyroid cancer Mother    Thyroid disease Sister        Social History Main Topics    Smoking status: Former Smoker     Packs/day: 1.00     Years: 5.00     Types: Cigarettes     Quit date: 3/30/1979    Smokeless tobacco: Never Used    Alcohol use No    Drug use: No    Sexual activity: Not on file         Review of Systems   Constitutional: Negative for activity change, chills, fatigue, fever and unexpected weight change.   HENT: Positive for congestion, sore throat and voice change. Negative for postnasal drip and rhinorrhea.    Respiratory: Positive for  cough and shortness of breath. Negative for chest tightness and wheezing.    Cardiovascular: Positive for leg swelling. Negative for chest pain.        RLE swelling past few days without weight gain   Gastrointestinal: Negative for constipation, diarrhea, nausea and vomiting.   Skin: Negative for rash.     Objective:     Vital Signs (Most Recent):  Temp: 98.3 °F (36.8 °C) (11/26/17 1134)  Pulse: 76 (11/26/17 1134)  Resp: 18 (11/26/17 1134)  BP: (!) 114/56 (11/26/17 1134)  SpO2: 95 % (11/26/17 1134) Vital Signs (24h Range):  Temp:  [98.1 °F (36.7 °C)-98.9 °F (37.2 °C)] 98.3 °F (36.8 °C)  Pulse:  [61-99] 76  Resp:  [16-24] 18  SpO2:  [67 %-100 %] 95 %  BP: (109-131)/(54-72) 114/56     Weight: 81.6 kg (180 lb)  Body mass index is 25.83 kg/m².    No intake or output data in the 24 hours ending 11/26/17 1138    Physical Exam   Constitutional: She appears well-developed and well-nourished. She appears distressed.   HENT:   Head: Normocephalic and atraumatic.   Mouth/Throat: Oropharynx is clear and moist. No oropharyngeal exudate.   Mallampati 1   Eyes: Conjunctivae are normal.   Neck: Neck supple.   Cardiovascular: Normal rate, regular rhythm and normal heart sounds.    Pulmonary/Chest: Effort normal and breath sounds normal. No stridor. No respiratory distress. She has no wheezes. She has no rales.   Abdominal: She exhibits no distension.   Musculoskeletal: She exhibits no edema or deformity.   Skin: Skin is warm and dry. She is not diaphoretic. No erythema. No pallor.   Psychiatric: She has a normal mood and affect. Her behavior is normal.   Vitals reviewed.      Vents:       Lines/Drains/Airways     Peripheral Intravenous Line                 Peripheral IV - Single Lumen 11/25/17 2014 Left Antecubital less than 1 day                Significant Labs:    CBC/Anemia Profile:    Recent Labs  Lab 11/25/17 2014 11/26/17  0336   WBC 4.41 4.21   HGB 11.4* 10.9*   HCT 36.1* 34.2*    224   MCV 89 89   RDW 13.6 13.6         Chemistries:    Recent Labs  Lab 11/25/17 2014 11/26/17  0336    140   K 3.6 3.4*    107   CO2 25 25   BUN 13 12   CREATININE 0.8 0.7   CALCIUM 9.1 8.7   ALBUMIN 3.7  --    PROT 7.1  --    BILITOT 0.5  --    ALKPHOS 51*  --    ALT 13  --    AST 27  --    MG  --  2.0   PHOS  --  3.6       All pertinent labs within the past 24 hours have been reviewed.    Significant Imaging:   CT: I have reviewed all pertinent results/findings within the past 24 hours and my personal findings are:  no PE; chronic LL ILD, no focal opacities  CXR: I have reviewed all pertinent results/findings within the past 24 hours and my personal findings are:  no significant change

## 2017-11-26 NOTE — NURSING
"Remains aaox4, ambulated to bathroom and denies dyspnea or SOB. Stated "I feel those steroids, I can feel them opening me up." No s/s of distress. Bed in lowest position,call bell in reach.  "

## 2017-11-26 NOTE — PROGRESS NOTES
[facility] Hospital Medicine  Progress Note    Patient Name: Fatimah Cha  MRN: [id number]  Patient Class: OP- Observation   Admission Date: 11/25/2017  Length of Stay: 0 days  Attending Physician: SANCHEZ Sanchez MD  Primary Care Provider: Simon Orozco MD    [facility] Medicine Team: [facility] F Susi Giordano PA-C    Subjective:     Principal Problem:Shortness of breath    HPI:  Patient is a 63yo F with a PMHx of chronic respiratory failure 2/2 interstitial lung disease, lung transplant candidate, chronic diastolic heart failure, CREST syndrome, Sjogren's, Raynaud's, HTN, HLD being admitted to observation for evaluation of shortness of breath. Patient reports using 2-2.5L of O2 at her baseline while at work and home. She reports yesterday she became more short of breath requiring up to 4L. The shortness of breath increases with exertion. Patient has a chronic cough. She reports having a fever and sore throat 4 days ago (101) that she took Tylenol and Augmentin for and has since resolved. She is on Cellcept steroid therapy for her autoimmune diseases. She recently was started on Prednisone by her Endocrinologist to evaluate Cortisol levels. She reports using 3 breathing treatments and her other inhalers today which provided mild relief. She denies recent vomiting. She is experiencing chest tightness which started today. Patient denies headache, abdominal pain, dysuria, hematuria, diarrhea, constipation, and extremity weakness.    In the ED, vitals stable. Patient saturating on 3L. Intake labs unremarkable. CXR with honeycombing pattern consistent with ILD. Patient given 60mg prednisone and admitted to observation.    Hospital Course:  Pt with interstitial lung disease, pulmonary HTN, fibrosis admitted to observation for SOB.  Pt started on Augmentin prior to admission for possible infection with fever.  Pulmonology consulted and recommended CTA which showed no evidence of pulmonary  embolus, abnormal but grossly stable appearance of the lungs with reticular opacification suggesting an interstitial process (previously characterized as chronic interstitial lung disease/NSIP), prominence of the pulmonary artery suggesting pulmonary arterial hypertension, cardiomegaly, mild pericardial effusion.      Interval History:  no acute events overnight, no new complaints    Review of Systems   Constitutional: Positive for fever. Negative for appetite change and chills.   HENT: Negative for trouble swallowing.    Eyes: Negative for photophobia and visual disturbance.   Respiratory: Positive for cough, chest tightness, shortness of breath and wheezing.    Cardiovascular: Positive for leg swelling (R). Negative for chest pain and palpitations.   Gastrointestinal: Positive for nausea and vomiting. Negative for abdominal distention, abdominal pain, constipation and diarrhea.   Genitourinary: Negative for dysuria, flank pain, frequency and hematuria.   Musculoskeletal: Negative for gait problem, neck pain and neck stiffness.   Skin: Negative for rash and wound.   Neurological: Negative for dizziness, syncope, facial asymmetry, speech difficulty, weakness and headaches.   Psychiatric/Behavioral: Negative for agitation and confusion. The patient is nervous/anxious.      Objective:     Vital Signs (Most Recent):  Temp: 98.3 °F (36.8 °C) (11/26/17 1134)  Pulse: 61 (11/26/17 1241)  Resp: 16 (11/26/17 1241)  BP: (!) 114/56 (11/26/17 1134)  SpO2: 99 % (11/26/17 1241) Vital Signs (24h Range):  Temp:  [98.1 °F (36.7 °C)-98.9 °F (37.2 °C)] 98.3 °F (36.8 °C)  Pulse:  [61-99] 61  Resp:  [16-24] 16  SpO2:  [67 %-100 %] 99 %  BP: (109-131)/(54-72) 114/56     Weight: 81.6 kg (180 lb)  Body mass index is 25.83 kg/m².  No intake or output data in the 24 hours ending 11/26/17 1317   Physical Exam   Constitutional: She is oriented to person, place, and time. She appears well-developed and well-nourished. No distress.   HENT:    Head: Normocephalic and atraumatic.   Eyes: EOM are normal. Pupils are equal, round, and reactive to light.   Neck: Normal range of motion. Neck supple.   Cardiovascular: Normal rate, regular rhythm, normal heart sounds and intact distal pulses.    Pulmonary/Chest: Effort normal and breath sounds normal. No respiratory distress. She has no wheezes.   Coarse breath sounds   Abdominal: Soft. Bowel sounds are normal.   Musculoskeletal: Normal range of motion. She exhibits no edema or tenderness.   Neurological: She is alert and oriented to person, place, and time.   Skin: Skin is warm and dry.   Psychiatric: She has a normal mood and affect. Her behavior is normal.       Significant Labs: All pertinent labs within the past 24 hours have been reviewed.    Significant Imaging: I have reviewed all pertinent imaging results/findings within the past 24 hours.    Assessment/Plan:      * Shortness of breath    Interstitial lung disease  Pulmonary hypertension, fibrosis    Patient on 2.5L at baseline, follows closely with transplant. Recently started on Augmentin for possible infection with fever. Saturating on 3L.  - continue Augmentin  - continuous oxygen  - vitals / telemetry  - Prednisone 60mg daily  - Ambrisentan 10mg daily  - Cellcept 1500mg BID  - Sildenafil 20mg TID  - albuterol PRN  - Lorazepam 1mg BID  - Pulmonology consulted and recommended CTA which showed no evidence of pulmonary embolus, abnormal but grossly stable appearance of the lungs with reticular opacification suggesting an interstitial process (previously characterized as chronic interstitial lung disease/NSIP), prominence of the pulmonary artery suggesting pulmonary arterial hypertension, cardiomegaly, mild pericardial effusion.            Chronic diastolic heart failure    - 2D ECHO 10/17 showed normal/large right ventricle, normal EF  - strict Is/Os  - daily weights  - Lasix 40mg daily  - Carvedilol 12.5mg BID  - 1.5 L cardiac diet  - daily BMP /  CBC  - 11/26 pt c/o R leg swelling, US ordered to rule out DVT        Mixed hyperlipidemia    - ASA 81  - Atorvastatin 10mg        Hypothyroidism due to Hashimoto's thyroiditis    - Levothyroxine 137 mcg, patient supplied 11/25-11/26          VTE Risk Mitigation         Ordered     Low Risk of VTE  Once      11/26/17 0035     Medium Risk of VTE  Once      11/26/17 0035     Place sequential compression device  Until discontinued      11/26/17 0035     Place AMIRA hose  Until discontinued      11/26/17 0035              Susi Giordano PA-C  Department of Hospital Medicine   [facility]

## 2017-11-26 NOTE — HPI
Ms. Cha is a 61 y/o woman admitted to evaluate worsened SOB that started suddenly in the morning 2 days ago when she was out shopping. She has chronic congestion that is unchanged from baseline and has not had recent rhinits or post-nasal drip. Following an EGD & c-scope on 11/15 she developed a fever and a sore throat. A few days later when she still had a sore throat she took a few days of Augmentin, which resolved her sore throat. No recent sick contacts. She has chronic hypoxic respiratory failure due to her Scleroderma-related ILD & PH for which she uses 2L O2. Yesterday she was using 4L O2 for comfort and noted that her SpO2 dropped from 96% to  83% with walking while on 4L, which is unusual for her. Sildenafil was recently started this month for Ph, which she has tolerated titrating up. She has had stable daily weights and has been compliant with her diuretics. She noted RLE edema over the past few days with associated redness. She had thrombophlebitis after her daughter was born >30 years ago and has chronically had more edema in that leg over the years, but she reports it has been more noticeable this past week. No h/o malignancy, she is UTD on cancer screenings, and has not recently had long car trips or flown anywhere. Her daughter and brother have had VTE and are on AC chronically. They have an identified prothrombotic source that she reports being tested negative for.

## 2017-11-26 NOTE — ED PROVIDER NOTES
Encounter Date: 11/25/2017       History     Chief Complaint   Patient presents with    Shortness of Breath     62 year old female with past medical history of chronic respiratory failure 2/2 interstitial lung disease, lung transplant candidate, chronic diastolic heart failure, CREST syndrome, Sjogren's, Raynaud's, HTN, HLD presenting to the ED with the chief complaint of shortness of breath. Patient reports using 2-2.5L of O2 at her baseline while at work and home. She reports yesterday she became more short of breath requiring up to 4L. The shortness of breath increases with exertion. Patient has a chronic cough. She reports having a fever and sore throat 4 days ago (101) that she took Tylenol and Augmentin for and has since resolved. She is on Cellcept steroid therapy for her autoimmune diseases. She recently was started on Prednisone by her Endocrinologist to evaluate Cortisol levels. She reports using 3 breathing treatments and her other inhalers today which provided mild relief. She denies recent vomiting. She is experiencing chest tightness which started today. Patient denies headache, abdominal pain, dysuria, hematuria, diarrhea, constipation, and extremity weakness.        The history is provided by the patient and the spouse.     Review of patient's allergies indicates:   Allergen Reactions    Adhesive tape-silicones      Past Medical History:   Diagnosis Date    CHF (congestive heart failure)     Colitis     CREST syndrome     Hypercholesteremia     Hypertension     Hypothyroidism     Interstitial lung disease     Osteopenia     Pulmonary hypertension     Raynaud disease     Respiratory distress     Scleroderma     Sjoegren syndrome      Past Surgical History:   Procedure Laterality Date    BREAST BIOPSY      CARDIAC CATHETERIZATION  2013    COLONOSCOPY      COLONOSCOPY N/A 11/15/2017    Procedure: COLONOSCOPY;  Surgeon: Radha Santiago MD;  Location: [facility] ([address] [place] [address]);  Service:  Endoscopy;  Laterality: N/A;  2nd floor.  Pulm htn, ILD on o2.  Pls review w anesthesia.  Pt has cardio-pulm workup in progress at [facility] by Dr. Russell.                 reese monsalve RN- I reviewed Ms. Cha's history with Dr. Leopold (Anesthesia) and based on her medical history of:  Pulmonary Hyper    foot surgery x2      lumpectomy (benign)      LUNG BIOPSY  03/2013    THYROIDECTOMY      TONSILLECTOMY      TUBAL LIGATION      UPPER GASTROINTESTINAL ENDOSCOPY       Family History   Problem Relation Age of Onset    Thyroid cancer Mother     Heart failure Father     Emphysema Sister     Thyroid disease Sister     Deep vein thrombosis Brother     Lung cancer Brother     Colon cancer Maternal Uncle      60s    Cancer Maternal Aunt     Breast cancer Neg Hx     Ovarian cancer Neg Hx     Celiac disease Neg Hx     Cirrhosis Neg Hx     Colon polyps Neg Hx     Crohn's disease Neg Hx     Cystic fibrosis Neg Hx     Esophageal cancer Neg Hx     Hemochromatosis Neg Hx     Inflammatory bowel disease Neg Hx     Irritable bowel syndrome Neg Hx     Liver cancer Neg Hx     Liver disease Neg Hx     Rectal cancer Neg Hx     Stomach cancer Neg Hx     Ulcerative colitis Neg Hx     Phoenix's disease Neg Hx     Lymphoma Neg Hx     Tuberculosis Neg Hx     Rheum arthritis Neg Hx     Scleroderma Neg Hx     Melanoma Neg Hx     Lupus Neg Hx     Multiple sclerosis Neg Hx     Psoriasis Neg Hx     Skin cancer Neg Hx      Social History   Substance Use Topics    Smoking status: Former Smoker     Packs/day: 1.00     Years: 5.00     Types: Cigarettes     Quit date: 3/30/1979    Smokeless tobacco: Never Used    Alcohol use No     Review of Systems   Constitutional: Positive for fever (101 at home on Wednesday). Negative for chills.   HENT: Negative for congestion, sneezing and trouble swallowing.    Eyes: Negative for pain and redness.   Respiratory: Positive for cough, chest tightness and shortness of  breath.    Cardiovascular: Negative for chest pain.   Gastrointestinal: Negative for abdominal pain, constipation, diarrhea, nausea and vomiting.   Genitourinary: Negative for dysuria and hematuria.   Musculoskeletal: Negative for neck pain and neck stiffness.   Skin: Negative for rash and wound.   Neurological: Negative for light-headedness, numbness and headaches.       Physical Exam     Initial Vitals [11/25/17 1842]   BP Pulse Resp Temp SpO2   125/67 83 (!) 22 98.9 °F (37.2 °C) 95 %      MAP       86.33         Physical Exam    Constitutional: She appears well-developed and well-nourished. No distress.   Patient resting comfortably on 2.5L   HENT:   Head: Normocephalic and atraumatic.   Eyes: EOM are normal. Pupils are equal, round, and reactive to light.   Neck: Normal range of motion. Neck supple.   Cardiovascular: Normal rate and regular rhythm.   Pulmonary/Chest: Breath sounds normal. No respiratory distress. She has no wheezes.   Abdominal: Soft. Bowel sounds are normal. She exhibits no distension.   Musculoskeletal: Normal range of motion. She exhibits no edema or tenderness.   Neurological: She is alert and oriented to person, place, and time. She has normal reflexes.   Skin: Skin is warm and dry. No erythema.       Imaging Results          X-Ray Chest PA And Lateral (Final result)  Result time 11/25/17 20:29:08    Final result by Chuck Stevens MD (11/25/17 20:29:08)                 Impression:         No acute findings in the chest.      Stable chronic interstitial lung disease.  No significant change from prior study.        Electronically signed by: CHUCK STEVENS MD  Date:     11/25/17  Time:    20:29              Narrative:    Chest PA and Lateral    Indication:.    Comparison:November 15, 2017.    Findings:     Stable chronic interstitial lung disease.    Heart and lungs unchanged when allowing for differences in technique and positioning.                              ED Course    Procedures  Labs Reviewed   CBC W/ AUTO DIFFERENTIAL - Abnormal; Notable for the following:        Result Value    Hemoglobin 11.4 (*)     Hematocrit 36.1 (*)     MCHC 31.6 (*)     MPV 8.6 (*)     Immature Granulocytes 0.9 (*)     Lymph # 0.4 (*)     Gran% 80.9 (*)     Lymph% 8.2 (*)     All other components within normal limits   COMPREHENSIVE METABOLIC PANEL - Abnormal; Notable for the following:     Alkaline Phosphatase 51 (*)     All other components within normal limits   B-TYPE NATRIURETIC PEPTIDE - Abnormal; Notable for the following:      (*)     All other components within normal limits   URINALYSIS, REFLEX TO URINE CULTURE - Abnormal; Notable for the following:     Appearance, UA Hazy (*)     Ketones, UA 1+ (*)     All other components within normal limits    Narrative:     Preferred Collection Type->Urine, Clean Catch   TROPONIN I   INFLUENZA A AND B ANTIGEN                   APC / Resident Notes:   62 year old female with past medical history of chronic respiratory failure 2/2 interstitial lung disease, lung transplant candidate, chronic diastolic heart failure, CREST syndrome, Sjogren's, Raynaud's, HTN, HLD presenting to the ED c/o shortness of breath for 2 days. Initial vitals significant for oxygen of 95 (on 2.5L), elevated respirations 22. Physical exam reveals patient AAOx3 in no respiratory distress. Heart and lung sounds are unremarkable. There is no abdominal tenderness. DDx includes but not limited to ILD exacerbation, heart failure exacerbation, influenza, pneumonia, pneumothorax. Patient placed on 2.5L O2. Will give 60mg of Prednisone. Will hold off on additional breathing treatments now since she has done 3 at home.     Negative Influenza screen  CBC 4.41 with left shift 80.9%, H/H 11.4/36.1  Electrolytes stable, Troponin <0.006  BNP mildly elevated 172  UA negative    ECG - NSR at 80 bpm with inverted T waves in anterior leads   CXR - Stable chronic interstitial lung disease with no  acute findings    Patient's shortness of breath most likely secondary to exacerbation of her interstitial lung disease. Labs and imaging reassuring. I do not suspect other emergent processes at this time. Patient stable on reassessment without signs of respiratory distress. She maintains O2 levels of 98% on 2.5L. Given worsening shortness of breath and hypoxia with regular home oxygen regimen, will place patient in observation. All of the patient's questions were answered. I have discussed the care of this patient with my supervising physician.                   ED Course      Clinical Impression:   The primary encounter diagnosis was Shortness of breath. Diagnoses of Interstitial lung disease and Chronic respiratory failure with hypoxia, on home O2 therapy were also pertinent to this visit.    Disposition:   Disposition: Placed in Observation  Condition: Stable                        Jared Terrell PA-C  11/26/17 0301

## 2017-11-26 NOTE — SUBJECTIVE & OBJECTIVE
Interval History:  no acute events overnight, no new complaints    Review of Systems   Constitutional: Positive for fever. Negative for appetite change and chills.   HENT: Negative for trouble swallowing.    Eyes: Negative for photophobia and visual disturbance.   Respiratory: Positive for cough, chest tightness, shortness of breath and wheezing.    Cardiovascular: Positive for leg swelling (R). Negative for chest pain and palpitations.   Gastrointestinal: Positive for nausea and vomiting. Negative for abdominal distention, abdominal pain, constipation and diarrhea.   Genitourinary: Negative for dysuria, flank pain, frequency and hematuria.   Musculoskeletal: Negative for gait problem, neck pain and neck stiffness.   Skin: Negative for rash and wound.   Neurological: Negative for dizziness, syncope, facial asymmetry, speech difficulty, weakness and headaches.   Psychiatric/Behavioral: Negative for agitation and confusion. The patient is nervous/anxious.      Objective:     Vital Signs (Most Recent):  Temp: 98.3 °F (36.8 °C) (11/26/17 1134)  Pulse: 61 (11/26/17 1241)  Resp: 16 (11/26/17 1241)  BP: (!) 114/56 (11/26/17 1134)  SpO2: 99 % (11/26/17 1241) Vital Signs (24h Range):  Temp:  [98.1 °F (36.7 °C)-98.9 °F (37.2 °C)] 98.3 °F (36.8 °C)  Pulse:  [61-99] 61  Resp:  [16-24] 16  SpO2:  [67 %-100 %] 99 %  BP: (109-131)/(54-72) 114/56     Weight: 81.6 kg (180 lb)  Body mass index is 25.83 kg/m².  No intake or output data in the 24 hours ending 11/26/17 1317   Physical Exam   Constitutional: She is oriented to person, place, and time. She appears well-developed and well-nourished. No distress.   HENT:   Head: Normocephalic and atraumatic.   Eyes: EOM are normal. Pupils are equal, round, and reactive to light.   Neck: Normal range of motion. Neck supple.   Cardiovascular: Normal rate, regular rhythm, normal heart sounds and intact distal pulses.    Pulmonary/Chest: Effort normal and breath sounds normal. No respiratory  distress. She has no wheezes.   Coarse breath sounds   Abdominal: Soft. Bowel sounds are normal.   Musculoskeletal: Normal range of motion. She exhibits no edema or tenderness.   Neurological: She is alert and oriented to person, place, and time.   Skin: Skin is warm and dry.   Psychiatric: She has a normal mood and affect. Her behavior is normal.       Significant Labs: All pertinent labs within the past 24 hours have been reviewed.    Significant Imaging: I have reviewed all pertinent imaging results/findings within the past 24 hours.

## 2017-11-26 NOTE — ED NOTES
Pt presents with increased SOB and chest heaviness since yesterday. Pt is on 2L O2 NC at home, pt states she had to use 4L today just to get to the bathroom. Pt states she also did breathing tx and was given augmentin by PCP for URI and sore throat. Pt was also placed on steroids and will have a procedure to check her adrenal glands. Pt states if she is sitting she sats at 98% but when she ambulates she drops to 85%.

## 2017-11-26 NOTE — ASSESSMENT & PLAN NOTE
- 2D ECHO 10/17 showed normal/large right ventricle, normal EF  - strict Is/Os  - daily weights  - Lasix 40mg daily  - Carvedilol 12.5mg BID  - 1.5 L cardiac diet  - daily BMP / CBC

## 2017-11-27 VITALS
OXYGEN SATURATION: 96 % | RESPIRATION RATE: 18 BRPM | BODY MASS INDEX: 25.77 KG/M2 | TEMPERATURE: 98 F | HEART RATE: 72 BPM | HEIGHT: 70 IN | WEIGHT: 180 LBS | SYSTOLIC BLOOD PRESSURE: 111 MMHG | DIASTOLIC BLOOD PRESSURE: 62 MMHG

## 2017-11-27 PROBLEM — J96.21 ACUTE ON CHRONIC RESPIRATORY FAILURE WITH HYPOXIA: Status: ACTIVE | Noted: 2017-11-27

## 2017-11-27 PROBLEM — R06.02 SHORTNESS OF BREATH: Status: RESOLVED | Noted: 2017-11-25 | Resolved: 2017-11-27

## 2017-11-27 LAB
ANION GAP SERPL CALC-SCNC: 8 MMOL/L
BASOPHILS # BLD AUTO: 0.03 K/UL
BASOPHILS NFR BLD: 0.6 %
BUN SERPL-MCNC: 14 MG/DL
CALCIUM SERPL-MCNC: 8.8 MG/DL
CHLORIDE SERPL-SCNC: 106 MMOL/L
CO2 SERPL-SCNC: 27 MMOL/L
CREAT SERPL-MCNC: 0.8 MG/DL
DIFFERENTIAL METHOD: ABNORMAL
EOSINOPHIL # BLD AUTO: 0 K/UL
EOSINOPHIL NFR BLD: 0.4 %
ERYTHROCYTE [DISTWIDTH] IN BLOOD BY AUTOMATED COUNT: 13.4 %
EST. GFR  (AFRICAN AMERICAN): >60 ML/MIN/1.73 M^2
EST. GFR  (NON AFRICAN AMERICAN): >60 ML/MIN/1.73 M^2
GLUCOSE SERPL-MCNC: 121 MG/DL
HCT VFR BLD AUTO: 36.3 %
HGB BLD-MCNC: 11.3 G/DL
IMM GRANULOCYTES # BLD AUTO: 0.03 K/UL
IMM GRANULOCYTES NFR BLD AUTO: 0.6 %
LYMPHOCYTES # BLD AUTO: 0.4 K/UL
LYMPHOCYTES NFR BLD: 7.4 %
MCH RBC QN AUTO: 28 PG
MCHC RBC AUTO-ENTMCNC: 31.1 G/DL
MCV RBC AUTO: 90 FL
MONOCYTES # BLD AUTO: 0.2 K/UL
MONOCYTES NFR BLD: 3.2 %
NEUTROPHILS # BLD AUTO: 4.1 K/UL
NEUTROPHILS NFR BLD: 87.8 %
NRBC BLD-RTO: 0 /100 WBC
PLATELET # BLD AUTO: 217 K/UL
PMV BLD AUTO: 9.3 FL
POTASSIUM SERPL-SCNC: 4 MMOL/L
RBC # BLD AUTO: 4.03 M/UL
SODIUM SERPL-SCNC: 141 MMOL/L
WBC # BLD AUTO: 4.72 K/UL

## 2017-11-27 PROCEDURE — 94761 N-INVAS EAR/PLS OXIMETRY MLT: CPT

## 2017-11-27 PROCEDURE — 36415 COLL VENOUS BLD VENIPUNCTURE: CPT

## 2017-11-27 PROCEDURE — 27000221 HC OXYGEN, UP TO 24 HOURS

## 2017-11-27 PROCEDURE — 25000242 PHARM REV CODE 250 ALT 637 W/ HCPCS: Performed by: PHYSICIAN ASSISTANT

## 2017-11-27 PROCEDURE — 94640 AIRWAY INHALATION TREATMENT: CPT

## 2017-11-27 PROCEDURE — 85025 COMPLETE CBC W/AUTO DIFF WBC: CPT

## 2017-11-27 PROCEDURE — 63600175 PHARM REV CODE 636 W HCPCS: Performed by: PHYSICIAN ASSISTANT

## 2017-11-27 PROCEDURE — 99217 PR OBSERVATION CARE DISCHARGE: CPT | Mod: ,,, | Performed by: PHYSICIAN ASSISTANT

## 2017-11-27 PROCEDURE — G0378 HOSPITAL OBSERVATION PER HR: HCPCS

## 2017-11-27 PROCEDURE — 80048 BASIC METABOLIC PNL TOTAL CA: CPT

## 2017-11-27 PROCEDURE — 99213 OFFICE O/P EST LOW 20 MIN: CPT | Mod: ,,, | Performed by: INTERNAL MEDICINE

## 2017-11-27 PROCEDURE — 25000003 PHARM REV CODE 250: Performed by: INTERNAL MEDICINE

## 2017-11-27 PROCEDURE — 25000003 PHARM REV CODE 250: Performed by: PHYSICIAN ASSISTANT

## 2017-11-27 RX ORDER — PREDNISONE 20 MG/1
40 TABLET ORAL DAILY
Status: DISCONTINUED | OUTPATIENT
Start: 2017-11-27 | End: 2017-11-27 | Stop reason: HOSPADM

## 2017-11-27 RX ORDER — AZITHROMYCIN 500 MG/1
500 TABLET, FILM COATED ORAL DAILY
Qty: 4 TABLET | Refills: 0 | Status: SHIPPED | OUTPATIENT
Start: 2017-11-28 | End: 2017-12-02

## 2017-11-27 RX ORDER — PREDNISONE 20 MG/1
TABLET ORAL
Qty: 13 TABLET | Refills: 0 | Status: SHIPPED | OUTPATIENT
Start: 2017-11-27 | End: 2018-01-03 | Stop reason: DRUGHIGH

## 2017-11-27 RX ADMIN — PANTOPRAZOLE SODIUM 40 MG: 40 TABLET, DELAYED RELEASE ORAL at 09:11

## 2017-11-27 RX ADMIN — AMBRISENTAN 10 MG: 10 TABLET, FILM COATED ORAL at 09:11

## 2017-11-27 RX ADMIN — LEVOTHYROXINE SODIUM 137 MCG: 137 CAPSULE ORAL at 09:11

## 2017-11-27 RX ADMIN — ALBUTEROL SULFATE 2.5 MG: 2.5 SOLUTION RESPIRATORY (INHALATION) at 05:11

## 2017-11-27 RX ADMIN — LORAZEPAM 1 MG: 1 TABLET ORAL at 09:11

## 2017-11-27 RX ADMIN — AZITHROMYCIN 500 MG: 250 TABLET, FILM COATED ORAL at 09:11

## 2017-11-27 RX ADMIN — ASPIRIN 81 MG: 81 TABLET, COATED ORAL at 09:11

## 2017-11-27 RX ADMIN — ALBUTEROL SULFATE 2.5 MG: 2.5 SOLUTION RESPIRATORY (INHALATION) at 12:11

## 2017-11-27 RX ADMIN — MYCOPHENOLATE MOFETIL 1500 MG: 250 CAPSULE ORAL at 09:11

## 2017-11-27 RX ADMIN — SILDENAFIL 20 MG: 20 TABLET ORAL at 05:11

## 2017-11-27 RX ADMIN — CARVEDILOL 12.5 MG: 12.5 TABLET, FILM COATED ORAL at 09:11

## 2017-11-27 RX ADMIN — FUROSEMIDE 40 MG: 40 TABLET ORAL at 09:11

## 2017-11-27 RX ADMIN — PREDNISONE 40 MG: 20 TABLET ORAL at 12:11

## 2017-11-27 NOTE — ASSESSMENT & PLAN NOTE
-con't prednisone 60mg for today and tomorrow (tonight's dose ordered)  -recommend decreasing prednisone to 40mg daily after that before weaning back down

## 2017-11-27 NOTE — CONSULTS
[facility]  Pulmonology  Consult Note    Patient Name: Fatimah Cha  MRN: [id number]  Admission Date: 11/25/2017  Hospital Length of Stay: 0 days  Code Status: Full Code  Attending Physician: SANCHEZ Sanchez MD  Primary Care Provider: Simon Orozco MD   Principal Problem: Shortness of breath    Inpatient consult to Pulmonology  Consult performed by: LUCILLE WHITMORE  Consult ordered by: BONITA OMRAN JR  Reason for consult: ILD        Subjective:     HPI:  Ms. Cha is a 61 y/o woman admitted to evaluate worsened SOB that started suddenly in the morning 2 days ago when she was out shopping. She has chronic congestion that is unchanged from baseline and has not had recent rhinits or post-nasal drip. Following an EGD & c-scope on 11/15 she developed a fever and a sore throat. A few days later when she still had a sore throat she took a few days of Augmentin, which resolved her sore throat. No recent sick contacts. She has chronic hypoxic respiratory failure due to her Scleroderma-related ILD & PH for which she uses 2L O2. Yesterday she was using 4L O2 for comfort and noted that her SpO2 dropped from 96% to  83% with walking while on 4L, which is unusual for her. Sildenafil was recently started this month for Ph, which she has tolerated titrating up. She has had stable daily weights and has been compliant with her diuretics. She noted RLE edema over the past few days with associated redness. She had thrombophlebitis after her daughter was born >30 years ago and has chronically had more edema in that leg over the years, but she reports it has been more noticeable this past week. No h/o malignancy, she is UTD on cancer screenings, and has not recently had long car trips or flown anywhere. Her daughter and brother have had VTE and are on AC chronically. They have an identified prothrombotic source that she reports being tested negative for.     Past Medical History:   Diagnosis  Date    CHF (congestive heart failure)     Colitis     CREST syndrome     Hypercholesteremia     Hypertension     Hypothyroidism     Interstitial lung disease     Osteopenia     Pulmonary hypertension     Raynaud disease     Respiratory distress     Scleroderma     Sjoegren syndrome        Past Surgical History:   Procedure Laterality Date    BREAST BIOPSY      CARDIAC CATHETERIZATION  2013    COLONOSCOPY      COLONOSCOPY N/A 11/15/2017    Procedure: COLONOSCOPY;  Surgeon: Radha Santiago MD;  Location: [facility] ([address] [place] [address]);  Service: Endoscopy;  Laterality: N/A;  2nd floor.  Pulm htn, ILD on o2.  Pls review w anesthesia.  Pt has cardio-pulm workup in progress at [facility] by Dr. Russell.                 reese monsalve RN- I reviewed Ms. Cha's history with Dr. Leopold (Anesthesia) and based on her medical history of:  Pulmonary Hyper    foot surgery x2      lumpectomy (benign)      LUNG BIOPSY  03/2013    THYROIDECTOMY      TONSILLECTOMY      TUBAL LIGATION      UPPER GASTROINTESTINAL ENDOSCOPY         Review of patient's allergies indicates:   Allergen Reactions    Adhesive tape-silicones        Family History     Problem Relation (Age of Onset)    Cancer Maternal Aunt    Colon cancer Maternal Uncle    Deep vein thrombosis Brother    Emphysema Sister    Heart failure Father    Lung cancer Brother    Thyroid cancer Mother    Thyroid disease Sister        Social History Main Topics    Smoking status: Former Smoker     Packs/day: 1.00     Years: 5.00     Types: Cigarettes     Quit date: 3/30/1979    Smokeless tobacco: Never Used    Alcohol use No    Drug use: No    Sexual activity: Not on file         Review of Systems   Constitutional: Negative for activity change, chills, fatigue, fever and unexpected weight change.   HENT: Positive for congestion, sore throat and voice change. Negative for postnasal drip and rhinorrhea.    Respiratory: Positive for cough and shortness of breath.  Negative for chest tightness and wheezing.    Cardiovascular: Positive for leg swelling. Negative for chest pain.        RLE swelling past few days without weight gain   Gastrointestinal: Negative for constipation, diarrhea, nausea and vomiting.   Skin: Negative for rash.     Objective:     Vital Signs (Most Recent):  Temp: 98.3 °F (36.8 °C) (11/26/17 1134)  Pulse: 76 (11/26/17 1134)  Resp: 18 (11/26/17 1134)  BP: (!) 114/56 (11/26/17 1134)  SpO2: 95 % (11/26/17 1134) Vital Signs (24h Range):  Temp:  [98.1 °F (36.7 °C)-98.9 °F (37.2 °C)] 98.3 °F (36.8 °C)  Pulse:  [61-99] 76  Resp:  [16-24] 18  SpO2:  [67 %-100 %] 95 %  BP: (109-131)/(54-72) 114/56     Weight: 81.6 kg (180 lb)  Body mass index is 25.83 kg/m².    No intake or output data in the 24 hours ending 11/26/17 1138    Physical Exam   Constitutional: She appears well-developed and well-nourished. She appears distressed.   HENT:   Head: Normocephalic and atraumatic.   Mouth/Throat: Oropharynx is clear and moist. No oropharyngeal exudate.   Mallampati 1   Eyes: Conjunctivae are normal.   Neck: Neck supple.   Cardiovascular: Normal rate, regular rhythm and normal heart sounds.    Pulmonary/Chest: Effort normal and breath sounds normal. No stridor. No respiratory distress. She has no wheezes. She has no rales.   Abdominal: She exhibits no distension.   Musculoskeletal: She exhibits no edema or deformity.   Skin: Skin is warm and dry. She is not diaphoretic. No erythema. No pallor.   Psychiatric: She has a normal mood and affect. Her behavior is normal.   Vitals reviewed.      Vents:       Lines/Drains/Airways     Peripheral Intravenous Line                 Peripheral IV - Single Lumen 11/25/17 2014 Left Antecubital less than 1 day                Significant Labs:    CBC/Anemia Profile:    Recent Labs  Lab 11/25/17 2014 11/26/17  0336   WBC 4.41 4.21   HGB 11.4* 10.9*   HCT 36.1* 34.2*    224   MCV 89 89   RDW 13.6 13.6        Chemistries:    Recent  Labs  Lab 11/25/17 2014 11/26/17  0336    140   K 3.6 3.4*    107   CO2 25 25   BUN 13 12   CREATININE 0.8 0.7   CALCIUM 9.1 8.7   ALBUMIN 3.7  --    PROT 7.1  --    BILITOT 0.5  --    ALKPHOS 51*  --    ALT 13  --    AST 27  --    MG  --  2.0   PHOS  --  3.6       All pertinent labs within the past 24 hours have been reviewed.    Significant Imaging:   CT: I have reviewed all pertinent results/findings within the past 24 hours and my personal findings are:  no PE; chronic LL ILD, no focal opacities  CXR: I have reviewed all pertinent results/findings within the past 24 hours and my personal findings are:  no significant change         Assessment/Plan:     * Shortness of breath    Acute on chronic hypoxic respiratory failure with slightly increase O2 requirements at admission. Improved since admission. Likely recent sinusitis has caused an ILD flare. CTA negative for PE. No significant worsening of her fibrotic disease. No evidence clinically of hypervolemia.        Pulmonary hypertension    -con't PTA ambrisentan and sildenafil        Pulmonary fibrosis    -con't prednisone 60mg for today and tomorrow (tonight's dose ordered)  -recommend decreasing prednisone to 40mg daily after that before weaning back down              Thank you for your consult.     Ema Sow,   Pulmonology  [facility]

## 2017-11-27 NOTE — DISCHARGE SUMMARY
[facility] Hospital Medicine  Discharge Summary      Patient Name: Fatimah Cha  MRN: [id number]  Admission Date: 11/25/2017  Hospital Length of Stay: 0 days  Discharge Date and Time:  11/27/2017 12:17 PM  Attending Physician: SANCHEZ Sanchez MD   Discharging Provider: Susi Giordano PA-C  Primary Care Provider: Simon Orozco MD  [facility] Medicine Team: [facility] HOSP MED F Susi Giordano PA-C    HPI:   Patient is a 63yo F with a PMHx of chronic respiratory failure 2/2 interstitial lung disease, lung transplant candidate, chronic diastolic heart failure, CREST syndrome, Sjogren's, Raynaud's, HTN, HLD being admitted to observation for evaluation of shortness of breath. Patient reports using 2-2.5L of O2 at her baseline while at work and home. She reports yesterday she became more short of breath requiring up to 4L. The shortness of breath increases with exertion. Patient has a chronic cough. She reports having a fever and sore throat 4 days ago (101) that she took Tylenol and Augmentin for and has since resolved. She is on Cellcept steroid therapy for her autoimmune diseases. She recently was started on Prednisone by her Endocrinologist to evaluate Cortisol levels. She reports using 3 breathing treatments and her other inhalers today which provided mild relief. She denies recent vomiting. She is experiencing chest tightness which started today. Patient denies headache, abdominal pain, dysuria, hematuria, diarrhea, constipation, and extremity weakness.    In the ED, vitals stable. Patient saturating on 3L. Intake labs unremarkable. CXR with honeycombing pattern consistent with ILD. Patient given 60mg prednisone and admitted to observation.    * No surgery found *      Hospital Course:   Pt with interstitial lung disease, pulmonary HTN, fibrosis admitted to observation for SOB.  Pt started on Augmentin prior to admission for possible infection with fever.  Pulmonology consulted and  recommended CTA which showed no evidence of pulmonary embolus, abnormal but grossly stable appearance of the lungs with reticular opacification suggesting an interstitial process (previously characterized as chronic interstitial lung disease/NSIP), prominence of the pulmonary artery suggesting pulmonary arterial hypertension, cardiomegaly, mild pericardial effusion.  Pt improved with 60mg prednisone. On 2.5L of supplementary oxygen, normally on 2L at home.  Suspected SOB likely ILD exacerbation.  Prednisone 40mg daily (started 11/27) with a subsequent slow taper to be determined by [facility] Pulmonologist.  Azithromycin x 5 days (received 11/26 and 11/27).  Continue Lasix 40mg daily, Cellcept 1500mg BID, Sildenafil 20 mg TID, and ambrisentan 10mg daily.     Consults:   Consults         Status Ordering Provider     Inpatient consult to Pulmonology  Once     Provider:  (Not yet assigned)    Completed BONITA MORAN JR          * Shortness of breath-resolved as of 11/27/2017    Interstitial lung disease  Pulmonary hypertension, fibrosis    Patient on 2.5L at baseline, follows closely with transplant. Recently started on Augmentin for possible infection with fever. Saturating on 3L.  - continue Augmentin  - continuous oxygen  - vitals / telemetry  - Prednisone 60mg x 1  - Ambrisentan 10mg daily  - Cellcept 1500mg BID  - Sildenafil 20mg TID  - albuterol PRN  - Lorazepam 1mg BID  - Pulmonology consulted and recommended CTA which showed no evidence of pulmonary embolus, abnormal but grossly stable appearance of the lungs with reticular opacification suggesting an interstitial process (previously characterized as chronic interstitial lung disease/NSIP), prominence of the pulmonary artery suggesting pulmonary arterial hypertension, cardiomegaly, mild pericardial effusion.    - 11/27 pt improved after prednisone 60 mg.  Pulmonology recommended 40 mg daily with slow taper to be determined by her pulmonologist at [facility].   Azithro x 5 days (received 11/26 and 11/27).          Chronic diastolic heart failure    - 2D ECHO 10/17 showed normal/large right ventricle, normal EF  - strict Is/Os  - daily weights  - Lasix 40mg daily  - Carvedilol 12.5mg BID  - 1.5 L cardiac diet  - daily BMP / CBC  - 11/26 pt c/o R leg swelling, US ordered to rule out DVT which was negative          Mixed hyperlipidemia    - ASA 81  - Atorvastatin 10mg        Hypothyroidism due to Hashimoto's thyroiditis    - Tirosint 137 mcg, patient supplied 11/25-11/27          Final Active Diagnoses:    Diagnosis Date Noted POA    Chronic diastolic heart failure [I50.32] 12/29/2016 Yes    Mixed hyperlipidemia [E78.2] 12/29/2016 Yes    Hypothyroidism due to Hashimoto's thyroiditis [E03.8, E06.3] 09/02/2017 Yes    Acute on chronic respiratory failure with hypoxia [J96.21] 11/27/2017 Yes    Interstitial lung disease [J84.9] 11/26/2017 Yes    Pulmonary hypertension [I27.20]  Yes    Pulmonary fibrosis [J84.10] 12/29/2016 Yes      Problems Resolved During this Admission:    Diagnosis Date Noted Date Resolved POA    PRINCIPAL PROBLEM:  Shortness of breath [R06.02] 11/25/2017 11/27/2017 Yes       Discharged Condition: good    Disposition: Home or Self Care    Follow Up:    Patient Instructions:     Diet general     Activity as tolerated         Significant Diagnostic Studies: Labs: All labs within the past 24 hours have been reviewed    Pending Diagnostic Studies:     None         Medications:  Reconciled Home Medications:   Current Discharge Medication List      START taking these medications    Details   azithromycin (ZITHROMAX) 500 MG tablet Take 1 tablet (500 mg total) by mouth once daily.  Qty: 4 tablet, Refills: 0         CONTINUE these medications which have CHANGED    Details   predniSONE (DELTASONE) 20 MG tablet 40 mg x 4 days followed by 20 mg x 5 days  Qty: 13 tablet, Refills: 0         CONTINUE these medications which have NOT CHANGED    Details   albuterol  (PROVENTIL) 2.5 mg /3 mL (0.083 %) nebulizer solution USE ONE VIAL in Nebulizer EVERY 6 HOURS AS NEEDED FOR SHORTNESS OF BREATH  Refills: 6      ALBUTEROL SULFATE (VENTOLIN INHL) Inhale 2 puffs into the lungs every 4 to 6 hours as needed.      albuterol-ipratropium 2.5mg-0.5mg/3mL (DUO-NEB) 0.5 mg-3 mg(2.5 mg base)/3 mL nebulizer solution USE ONE VIAL in Nebulizer EVERY SIX HOURS AS NEEDED FOR SHORTNESS OF BREATH  Refills: 5      ambrisentan (LETAIRIS) 10 MG Tab Take 10 mg by mouth once daily.      aspirin (ECOTRIN) 81 MG EC tablet Take 81 mg by mouth every evening.      atorvastatin (LIPITOR) 10 MG tablet Take 10 mg by mouth once daily.       azelastine (ASTELIN) 137 mcg (0.1 %) nasal spray       carvedilol (COREG) 12.5 MG tablet Take 1 tablet (12.5 mg total) by mouth 2 (two) times daily with meals.  Qty: 180 tablet, Refills: 3      DYMISTA 137-50 mcg/spray Spry nassal spray 1 spray by Each Nare route 2 (two) times daily.  Refills: 6      fluticasone (FLONASE) 50 mcg/actuation nasal spray 1 spray by Each Nare route once daily.  Refills: 3      furosemide (LASIX) 40 MG tablet Take 1 tablet (40 mg total) by mouth once daily.  Qty: 30 tablet, Refills: 0      guaiFENesin (MUCINEX) 600 mg 12 hr tablet Take 1,200 mg by mouth as needed for Congestion.      levocetirizine (XYZAL) 5 MG tablet Take 5 mg by mouth every evening.       lorazepam (ATIVAN) 1 MG tablet Take 1 mg by mouth 2 (two) times daily.       mycophenolate (CELLCEPT) 500 mg Tab Take 1,500 mg by mouth 2 (two) times daily.       pantoprazole (PROTONIX) 40 MG tablet Take 1 tablet (40 mg total) by mouth 2 (two) times daily.  Qty: 180 tablet, Refills: 3      potassium chloride (KLOR-CON) 20 mEq Pack Take 20 mEq by mouth once daily.  Qty: 30 packet, Refills: 1      promethazine (PHENERGAN) 25 MG tablet 1 TABLET BY MOUTH EVERY 6 HOURS AS NEEDED  Refills: 0      sildenafil (VIAGRA) 25 MG tablet Take 20 mg by mouth 3 (three) times daily.      TIROSINT 137 mcg Cap Take  1 capsule by mouth once daily.       VENTOLIN HFA 90 mcg/actuation inhaler       VITAMIN D2 50,000 unit capsule Take 50,000 Units by mouth every 30 days.       multivitamin capsule Take 1 capsule by mouth once daily.      risedronate (ACTONEL) 150 MG Tab Take 150 mg by mouth every 30 days.  Refills: 6         STOP taking these medications       amoxicillin (AMOXIL) 250 MG chewable tablet Comments:   Reason for Stopping:               Indwelling Lines/Drains at time of discharge:   Lines/Drains/Airways          No matching active lines, drains, or airways          Time spent on the discharge of patient: >30 minutes  Patient was seen and examined on the date of discharge and determined to be suitable for discharge.         Susi Giordano PA-C  Department of Hospital Medicine  [facility]

## 2017-11-27 NOTE — PLAN OF CARE
11/27/17 0910   Discharge Assessment   Assessment Type Discharge Planning Assessment   Confirmed/corrected address and phone number on facesheet? Yes   Assessment information obtained from? Patient   Expected Length of Stay (days) 2   Communicated expected length of stay with patient/caregiver yes   Prior to hospitilization cognitive status: Alert/Oriented   Prior to hospitalization functional status: Assistive Equipment   Current cognitive status: Alert/Oriented   Current Functional Status: Needs Assistance   Lives With spouse  (spouse, Bart Cha ([phone]))   Able to Return to Prior Arrangements yes   Patient's perception of discharge disposition home or selfcare   Readmission Within The Last 30 Days current reason for admission unrelated to previous admission   If yes, [place] [facility]   Patient currently being followed by outpatient case management? No   Patient currently receives any other outside agency services? No   Equipment Currently Used at Home oxygen;nebulizer;shower chair;other (see comments)  (pox, BP machine)   Do you have any problems affording any of your prescribed medications? No   Is the patient taking medications as prescribed? yes   Does the patient have transportation home? Yes   Transportation Available family or friend will provide   Does the patient receive services at the [facility] Clinic? No   Discharge Plan A Home with family   Discharge Plan B Home Health   Patient/Family In Agreement With Plan yes   Readmission Questionnaire   At the time of your discharge, did someone talk to you about what your health problems were? Yes   At the time of discharge, did someone talk to you about what to watch out for regarding worsening of your health problem? Yes   At the time of discharge, did someone talk to you about what to do if you experienced worsening of your health problem? Yes   At the time of discharge, did someone talk to you about which medication to take when  you left the hospital and which ones to stop taking? Yes   At the time of discharge, did someone talk to you about when and where to follow up with a doctor after you left the hospital? Yes   What do you believe caused you to be sick enough to be re-admitted? SOB   How often do you need to have someone help you when you read instructions, pamphlets, or other written material from your doctor or pharmacy? Rarely   Do you have problems taking your medications as prescribed? No   Do you have any problems affording any of  your prescribed medications? No   Do you have problems obtaining/receiving your medications? No   Does the patient have transportation to healthcare appointments? Yes   Living Arrangements house   Does the patient have family/friends to help with healtcare needs after discharge? yes   Does your caregiver provide all the help you need? Yes   Are you currently feeling confused? No   Are you currently having problems thinking? No   Are you currently having memory problems? No   Have you felt down, depressed, or hopeless? 0   Have you felt little interest or pleasure in doing things? 0   In the last 7 days, my sleep quality was: fair     Patient awake & alert eating breakfast in bed when CM rounded. No family at the bedside. Patient was admitted with SOB. Patient has a history of chronic respiratory failure secondary to interstitial lung disease & uses 2-2.5L O2 via NC at all times. Order noted for pulm consult. Patient lives with her spouse, Bart Cha ([phone]), & has her portable oxygen tank at the bedside. Plan to discharge patient home with support or home with home health when medically stable. Patient stated that Bart will provide transportation at time of discharge. Will continue to follow.

## 2017-11-27 NOTE — PROGRESS NOTES
Pt is ready for discharge . Pt is A,A, O x 4 . Stable V/S.  No C/O pain . IV access removed , tele box and leads removed . Discharge papers give and explained to pt . Wheelchair ordered and pt waiting fr her  to go home. Pt has her own oxygen tank .

## 2017-11-27 NOTE — ASSESSMENT & PLAN NOTE
- Acute on chronic hypoxic respiratory failure with slightly increase O2 requirements at admission, likely ILD exacerbation   - as above

## 2017-11-27 NOTE — DISCHARGE INSTRUCTIONS
Take prednisone 40 mg daily for 4 more days (11/28-12/1).  Please call pulmonologist at [facility] to get recommendations for steroid taper.  I wrote prescription to go to 20 mg for 5 days just as a precaution so you wouldn't run out of pills but please call your pulmonologist.

## 2017-11-27 NOTE — ASSESSMENT & PLAN NOTE
- likely 2/2 underlying ILD exacerbation   - recommend prednisone 40mg daily with a subsequent slow taper  - Azithromycin x 5 days  - Continue Lasix 40mg daily  - Continue Cellcept 1500mg BID  - cont Sildenafil 20 mg TID  - cont ambrisentan 10mg daily  - post hospitalization f/u with her Pulmonologist

## 2017-11-27 NOTE — ASSESSMENT & PLAN NOTE
Interstitial lung disease  Pulmonary hypertension, fibrosis    Patient on 2.5L at baseline, follows closely with transplant. Recently started on Augmentin for possible infection with fever. Saturating on 3L.  - continue Augmentin  - continuous oxygen  - vitals / telemetry  - Prednisone 60mg x 1  - Ambrisentan 10mg daily  - Cellcept 1500mg BID  - Sildenafil 20mg TID  - albuterol PRN  - Lorazepam 1mg BID  - Pulmonology consulted and recommended CTA which showed no evidence of pulmonary embolus, abnormal but grossly stable appearance of the lungs with reticular opacification suggesting an interstitial process (previously characterized as chronic interstitial lung disease/NSIP), prominence of the pulmonary artery suggesting pulmonary arterial hypertension, cardiomegaly, mild pericardial effusion.    - 11/27 pt improved after prednisone 60 mg.  Pulmonology recommended 40 mg daily with slow taper to be determined by her pulmonologist at [facility].  [place] x 5 days (received 11/26 and 11/27).

## 2017-11-27 NOTE — PLAN OF CARE
11/27/2017      Fatimah Cha  [address] [place] [id number]          Hospital Medicine Dept.  [facility] [address] [place] [address]  [phone] after hours  [phone] fax Fatimah Cha has been hospitalized at the [facility] since 11/25/2017.  Please excuse the patient from duties.  Patient may return on 11/29/2017.  No restrictions.     Please contact me if you have any questions.              Susi Giordano PA-C    __________________________  Susi Giordano PA-C  11/27/2017

## 2017-11-27 NOTE — ASSESSMENT & PLAN NOTE
- 2D ECHO 10/17 showed normal/large right ventricle, normal EF  - strict Is/Os  - daily weights  - Lasix 40mg daily  - Carvedilol 12.5mg BID  - 1.5 L cardiac diet  - daily BMP / CBC  - 11/26 pt c/o R leg swelling, US ordered to rule out DVT which was negative

## 2017-11-27 NOTE — PLAN OF CARE
11/27/17 1615    CM informed the patient via phone ([phone]) of hospital follow up appointment scheduled for the patient with Dr. Simon Orozco (PCP) on 12/12/17 at 1415. Patient verbalized understanding. LIBRADO was informed by Luis Ken's (pul at [facility]) nurse that she has sent a message to Dr. Ken requesting a hospital follow up appointment in 1-2 weeks. Nurse to call the patient with appointment date & time. No additional needs noted at this time.

## 2017-11-27 NOTE — SUBJECTIVE & OBJECTIVE
Interval History:     Doing well. Improved with 60mg prednisone. On 2.5L of supplementary oxygen, normally on 2L at home. No more having fevers. Resting in bed comfortably.      albuterol sulfate  2.5 mg Nebulization Q4H    ambrisentan  10 mg Oral Daily    aspirin  81 mg Oral Daily    atorvastatin  10 mg Oral QHS    azithromycin  500 mg Oral Daily    carvedilol  12.5 mg Oral BID WM    furosemide  40 mg Oral Daily    levothyroxine  137 mcg Oral Daily    LORazepam  1 mg Oral BID    mycophenolate  1,500 mg Oral BID    pantoprazole  40 mg Oral BID    sildenafil  20 mg Oral TID       Objective:     Vital Signs (Most Recent):  Temp: 98.1 °F (36.7 °C) (11/27/17 0757)  Pulse: 65 (11/27/17 0757)  Resp: 17 (11/27/17 0757)  BP: (!) 108/58 (11/27/17 0757)  SpO2: 97 % (11/27/17 0757) Vital Signs (24h Range):  Temp:  [98.1 °F (36.7 °C)-99.1 °F (37.3 °C)] 98.1 °F (36.7 °C)  Pulse:  [60-82] 65  Resp:  [16-18] 17  SpO2:  [95 %-99 %] 97 %  BP: ()/(53-58) 108/58     Weight: 81.6 kg (180 lb)  Body mass index is 25.83 kg/m².      Intake/Output Summary (Last 24 hours) at 11/27/17 0854  Last data filed at 11/26/17 1800   Gross per 24 hour   Intake              500 ml   Output                0 ml   Net              500 ml       Physical Exam   Constitutional: She is oriented to person, place, and time. She appears well-developed and well-nourished. No distress.   HENT:   Head: Normocephalic and atraumatic.   Mallampati 1   Eyes: Conjunctivae and EOM are normal. Pupils are equal, round, and reactive to light.   Neck: Normal range of motion. Neck supple.   Cardiovascular: Normal rate, regular rhythm, normal heart sounds and intact distal pulses.    No murmur heard.  Pulmonary/Chest: Effort normal. No respiratory distress.   Mildly decreased breath sounds at lung bases bilat, otherwise no wheezes or crackles   Abdominal: Soft. Bowel sounds are normal.   Musculoskeletal: Normal range of motion. She exhibits no edema,  tenderness or deformity.   Neurological: She is alert and oriented to person, place, and time. No cranial nerve deficit.   Skin: Skin is warm and dry. She is not diaphoretic. There is erythema. No pallor.   Spider angiomas on chest   Psychiatric: She has a normal mood and affect. Her behavior is normal.   Vitals reviewed.      Vents:       Lines/Drains/Airways     Peripheral Intravenous Line                 Peripheral IV - Single Lumen 11/25/17 2014 Left Antecubital 1 day                Significant Labs:    CBC/Anemia Profile:    Recent Labs  Lab 11/25/17 2014 11/26/17 0336 11/27/17  0438   WBC 4.41 4.21 4.72   HGB 11.4* 10.9* 11.3*   HCT 36.1* 34.2* 36.3*    224 217   MCV 89 89 90   RDW 13.6 13.6 13.4        Chemistries:    Recent Labs  Lab 11/25/17 2014 11/26/17 0336 11/27/17  0438    140 141   K 3.6 3.4* 4.0    107 106   CO2 25 25 27   BUN 13 12 14   CREATININE 0.8 0.7 0.8   CALCIUM 9.1 8.7 8.8   ALBUMIN 3.7  --   --    PROT 7.1  --   --    BILITOT 0.5  --   --    ALKPHOS 51*  --   --    ALT 13  --   --    AST 27  --   --    MG  --  2.0  --    PHOS  --  3.6  --

## 2017-11-27 NOTE — PROGRESS NOTES
[facility]  Pulmonology  Progress Note    Patient Name: Fatimah Cha  MRN: [id number]  Admission Date: 11/25/2017  Hospital Length of Stay: 0 days  Code Status: Full Code  Attending Provider: SANCHEZ Sanchez MD  Primary Care Provider: Simon Orozco MD   Principal Problem: Shortness of breath    Subjective:     Interval History:     Doing well. Improved with 60mg prednisone. On 2.5L of supplementary oxygen, normally on 2L at home. No more having fevers. Resting in bed comfortably.      albuterol sulfate  2.5 mg Nebulization Q4H    ambrisentan  10 mg Oral Daily    aspirin  81 mg Oral Daily    atorvastatin  10 mg Oral QHS    azithromycin  500 mg Oral Daily    carvedilol  12.5 mg Oral BID WM    furosemide  40 mg Oral Daily    levothyroxine  137 mcg Oral Daily    LORazepam  1 mg Oral BID    mycophenolate  1,500 mg Oral BID    pantoprazole  40 mg Oral BID    sildenafil  20 mg Oral TID       Objective:     Vital Signs (Most Recent):  Temp: 98.1 °F (36.7 °C) (11/27/17 0757)  Pulse: 65 (11/27/17 0757)  Resp: 17 (11/27/17 0757)  BP: (!) 108/58 (11/27/17 0757)  SpO2: 97 % (11/27/17 0757) Vital Signs (24h Range):  Temp:  [98.1 °F (36.7 °C)-99.1 °F (37.3 °C)] 98.1 °F (36.7 °C)  Pulse:  [60-82] 65  Resp:  [16-18] 17  SpO2:  [95 %-99 %] 97 %  BP: ()/(53-58) 108/58     Weight: 81.6 kg (180 lb)  Body mass index is 25.83 kg/m².      Intake/Output Summary (Last 24 hours) at 11/27/17 0854  Last data filed at 11/26/17 1800   Gross per 24 hour   Intake              500 ml   Output                0 ml   Net              500 ml       Physical Exam   Constitutional: She is oriented to person, place, and time. She appears well-developed and well-nourished. No distress.   HENT:   Head: Normocephalic and atraumatic.   Mallampati 1   Eyes: Conjunctivae and EOM are normal. Pupils are equal, round, and reactive to light.   Neck: Normal range of motion. Neck supple.   Cardiovascular: Normal rate,  regular rhythm, normal heart sounds and intact distal pulses.    No murmur heard.  Pulmonary/Chest: Effort normal. No respiratory distress.   Mildly decreased breath sounds at lung bases bilat, otherwise no wheezes or crackles   Abdominal: Soft. Bowel sounds are normal.   Musculoskeletal: Normal range of motion. She exhibits no edema, tenderness or deformity.   Neurological: She is alert and oriented to person, place, and time. No cranial nerve deficit.   Skin: Skin is warm and dry. She is not diaphoretic. There is erythema. No pallor.   Spider angiomas on chest   Psychiatric: She has a normal mood and affect. Her behavior is normal.   Vitals reviewed.      Vents:       Lines/Drains/Airways     Peripheral Intravenous Line                 Peripheral IV - Single Lumen 11/25/17 2014 Left Antecubital 1 day                Significant Labs:    CBC/Anemia Profile:    Recent Labs  Lab 11/25/17 2014 11/26/17 0336 11/27/17  0438   WBC 4.41 4.21 4.72   HGB 11.4* 10.9* 11.3*   HCT 36.1* 34.2* 36.3*    224 217   MCV 89 89 90   RDW 13.6 13.6 13.4        Chemistries:    Recent Labs  Lab 11/25/17 2014 11/26/17 0336 11/27/17  0438    140 141   K 3.6 3.4* 4.0    107 106   CO2 25 25 27   BUN 13 12 14   CREATININE 0.8 0.7 0.8   CALCIUM 9.1 8.7 8.8   ALBUMIN 3.7  --   --    PROT 7.1  --   --    BILITOT 0.5  --   --    ALKPHOS 51*  --   --    ALT 13  --   --    AST 27  --   --    MG  --  2.0  --    PHOS  --  3.6  --          Assessment/Plan:     * Shortness of breath    - Acute on chronic hypoxic respiratory failure with slightly increase O2 requirements at admission, likely ILD exacerbation   - as above        Acute on chronic respiratory failure with hypoxia    - likely 2/2 underlying ILD exacerbation   - recommend prednisone 40mg daily with a subsequent slow taper  - Azithromycin x 5 days  - Continue Lasix 40mg daily  - Continue Cellcept 1500mg BID  - cont Sildenafil 20 mg TID  - cont ambrisentan 10mg daily  -  post hospitalization f/u with her Pulmonologist        Interstitial lung disease    - as above        Pulmonary fibrosis    -as above        Pulmonary hypertension    -con't ambrisentan and sildenafil          F/u with [facility] Pul clinic for post hosp f/u and further corticosteroid management.   Will sign off. Pt stable for d/c    Discussed with Dr Omar Gann MD  Pulmonology  [facility]

## 2017-11-27 NOTE — ASSESSMENT & PLAN NOTE
Acute on chronic hypoxic respiratory failure with slightly increase O2 requirements at admission. Improved since admission. Likely recent sinusitis has caused an ILD flare. CTA negative for PE. No significant worsening of her fibrotic disease. No evidence clinically of hypervolemia.

## 2017-11-27 NOTE — PLAN OF CARE
Problem: Fall Risk (Adult)  Goal: Absence of Falls  Patient will demonstrate the desired outcomes by discharge/transition of care.   Outcome: Outcome(s) achieved Date Met: 11/27/17  Pt is free of falls and injuries .     Problem: Patient Care Overview  Goal: Plan of Care Review  Outcome: Outcome(s) achieved Date Met: 11/27/17  Pt is awake ,alert and oriented x 4 . Plan of care reviewed with pt . Pt is D/C to home . Stable V/S. No C/O pain . No C/O SOB . Pt is waiting for her ride .

## 2017-11-28 NOTE — PLAN OF CARE
11/28/17 0805   Final Note   Assessment Type Final Discharge Note     Patient discharged home with no needs 11/27/17. Discharge summary faxed to Dr. Simon Orozco (PCP) f [phone] & Dr. Luis Ken (pulm at [facility]) f [phone].

## 2017-12-04 ENCOUNTER — OFFICE VISIT (OUTPATIENT)
Dept: GASTROENTEROLOGY | Facility: CLINIC | Age: 62
End: 2017-12-04
Payer: COMMERCIAL

## 2017-12-04 VITALS
DIASTOLIC BLOOD PRESSURE: 64 MMHG | SYSTOLIC BLOOD PRESSURE: 111 MMHG | HEIGHT: 71 IN | BODY MASS INDEX: 25.12 KG/M2 | WEIGHT: 179.44 LBS | HEART RATE: 76 BPM

## 2017-12-04 DIAGNOSIS — R13.10 DYSPHAGIA, UNSPECIFIED TYPE: Primary | ICD-10-CM

## 2017-12-04 DIAGNOSIS — K21.9 GASTROESOPHAGEAL REFLUX DISEASE, ESOPHAGITIS PRESENCE NOT SPECIFIED: ICD-10-CM

## 2017-12-04 DIAGNOSIS — R11.2 NAUSEA AND VOMITING, INTRACTABILITY OF VOMITING NOT SPECIFIED, UNSPECIFIED VOMITING TYPE: ICD-10-CM

## 2017-12-04 DIAGNOSIS — R14.0 BLOATING: ICD-10-CM

## 2017-12-04 DIAGNOSIS — R10.9 ABDOMINAL PAIN, UNSPECIFIED ABDOMINAL LOCATION: ICD-10-CM

## 2017-12-04 PROCEDURE — 99214 OFFICE O/P EST MOD 30 MIN: CPT | Mod: NTX,S$GLB,, | Performed by: INTERNAL MEDICINE

## 2017-12-04 PROCEDURE — 99999 PR PBB SHADOW E&M-EST. PATIENT-LVL IV: CPT | Mod: PBBFAC,TXP,, | Performed by: INTERNAL MEDICINE

## 2017-12-04 RX ORDER — PREDNISONE 10 MG/1
5 TABLET ORAL DAILY
COMMUNITY
Start: 2017-11-30 | End: 2018-03-28

## 2017-12-04 NOTE — PROGRESS NOTES
[facility] Consultation [facility]    Reason for Consult:    Chief Complaint   Patient presents with    Heartburn    Dysphagia    Gas    Bloated         PCP:   Simon Orozco       Referring MD:  Esmer Vitale Md  [address] [place] [address]    Pulm: Dr. Garcia/Dr. Ken   Gi: Dr. Carrillo    HPI:  Fatimah Cha is a 62 y.o. female with history CREST, Sjogran, Scleroderma, ILD, pulmonary HTN, HTN, HLD, hashimotos thyroiditis, hypothyroidsm here for evaluation of the following GI problems:    GERD.  Taking 40 mg Protonix BID. With improvement. S/s also got better after steroid taper per ED/hospital obs for ILD exacerbation.  regur now occurring once weekly usually after drinking citrus smoothies. Takes tums w/ prn. H/o   bothersome heartburn in mid chest.  Regurgitation of acidic liquids.   Symtoms started many years ago.  Since childhood.    Occur: multiple times per day   Currently take protonix 40mg daily.  Previously tried prevacid, prilosec   Has bothersome nocturnal symptoms.   Sleeps with head of the bed elevated.   Avoids eating prior to bedtime.       Atypical Symptoms:  Hoarseness: yes  Cough: yes  Throat clearing: yes     Dysphagia.  Better with swallow precautions (eating and chewing slowly and thoroughly). H/o difficulty swallowing solids, no problems with liquids. Frequent choking on pills, small particles of food.  Sensitive gag reflex. Feels that food gets lodged in cervical esophagus.  Has dry mouth.  Biotin does not help. Symptoms started after thyroid surgery 12 years ago.  Got progressively worse over years.  Seen by ENT without an explanation.   Occur: few times per week or less    Denies food impactions requiring ED visit.          Nausea.  Improved. Taking phenergan PRN.  H/o nausea occurring few times per month.  Started years ago.  Worse with sweets. Improve phenergan. Unable to tolerate reglan due to difficulty sleeping.      Emesis.   Improved. Vomiting few times per month or less     Gas and bloating. Improved. Now drinks 1% milk vs whole milk. Does not drink through straws. does not chew gum. Avoiding ice cream.  H/o bothersome gas and bloating with abdominal distension.  Symptoms get worse at night. Consumes milk products, no artificial sugars.   Also with bothersome belching.  Worse after meals.  Previously w positive SIBO testing, treated w Flagyl.      Hiccups.  Unchanged.  Frequent hiccups for years.  Last minutes to hours.      Abdominal pain. Improved.  Bentyl PRN with good control.  With RLQ abd tenderness to touch or tight clothing  x 3 months. Evaluated per [facility]. abd us and CT unrevealing per pt. States she was told it could be muscluloskeletal d/t coughing and may take time to heal. h/o dull abd discomfort.  Located in mid abdomen.  Recurrent episodes of sharp abd pain lower abdomen.   Started few years ago. Occurs few times per year. Last hours.  Better with bentyl.  Followed by BM.   Nocturnal pain:  no  Using narcotic pain medication: no       Regular BMs. No diarrhea or constipation for the past few months.  Previously w constipation. Also H/o Colitis.  Was admitted to the hospital w bloody diarrhea 10/2016.  CT showed colitis. Had similar episode 5 years prior. Negative stools studies.        Weight loss. 5 lbs over thanksgiving break. States she was having problems breathing and could not eat well.    Denies BRBPR, melena.       Rheumatology: CREST, Sjogran, Scleroderma, ILD, pulmonary HTN.  On cellcept and prednisone. Treated after thanksgiving for ILD exacerbation with steroid taper, amoxicillin, azithromycin.  Hashimotos thyroiditis. Hypothyroid      Total visit time was 25 minutes, more than 50% of which was spent in face-to-face counseling with patient regarding symptoms, diagnostic results, prognosis, risks and benefits of treatment options, instructions for management, importance of compliance with chosen treatment  options, risk factor reduction, stress reduction, coping strategies.        Previous Studies:   EGD 11/15/17: - Normal esophagus. Z-line irregular (-). 6 cm hiatal hernia.  G erythema (-). Multiple gastric polyps (FGP). Normal examined duodenum (-).   Colon 11/15/17: GPTTI. Two 2 to 5 mm polyps at the recto-sigmoid colon (-). Nl colon (-). Non-bleeding internal hemorrhoids. Nl ileum.  MBS 10/19/17: oropharyngeal swallow function WNL  Ct chst 8/30/17: progression of fibrosis.   EGD 6/16/14: HH. Short segment of salmon fingers(IM). Esophagitis. Fund gland polyps (FGP). Nl stomach (-). Duodenitis (-).   Colon 6/6/2014: GPTC. 3 tiny polyps in asc colon (TA). Int hemorrhoids.   CBC nl   Smart pill: slow motility of gi tract    ROS:  ROS   Constitutional: No fevers, no chills, no night sweats,  ENT: No congestion, no rhinorrhea, + chronic sinus problems  CV: No chest pain, + palpitations per EKGs/holter monitor. Pt reports she dose not feel them.  Pulm: + cough, + shortness of breath  Ophtho: No blurry vision, no eye redness  GI: see HPI  Derm: No rash  Heme: No lymphadenopathy, + bruising  MSK: No arthritis, no joint swelling, + Raynauds  : No dysuria, no frequent urination, no blood in urine  Endo: + hot or cold intolerance  Neuro: No dizziness, no syncope, no seizure  Psych: + anxiety-controlled with ativan AM and PM, no depression      Medical History:   Past Medical History:   Diagnosis Date    CHF (congestive heart failure)     Colitis     CREST syndrome     GERD (gastroesophageal reflux disease)     Hypercholesteremia     Hypertension     Hypothyroidism     Interstitial lung disease     Osteopenia     Pulmonary hypertension     Raynaud disease     Respiratory distress     Scleroderma     Sjoegren syndrome         Surgical History:   Past Surgical History:   Procedure Laterality Date    BREAST BIOPSY      CARDIAC CATHETERIZATION  2013    COLONOSCOPY      COLONOSCOPY N/A 11/15/2017    Procedure:  COLONOSCOPY;  Surgeon: Radha Santiago MD;  Location: [facility] ([address] [place] [address]);  Service: Endoscopy;  Laterality: N/A;  2nd floor.  Pulm htn, ILD on o2.  Pls review w anesthesia.  Pt has cardio-pulm workup in progress at [facility] by Dr. Russell.                 reese monsalve RN- I reviewed Ms. Cha's history with Dr. Leopold (Anesthesia) and based on her medical history of:  Pulmonary Hyper    foot surgery x2      lumpectomy (benign)      LUNG BIOPSY  03/2013    THYROIDECTOMY      TONSILLECTOMY      TUBAL LIGATION      UPPER GASTROINTESTINAL ENDOSCOPY          Family History:   Family History   Problem Relation Age of Onset    Thyroid cancer Mother     Heart failure Father     Emphysema Sister     Thyroid disease Sister     Deep vein thrombosis Brother     Lung cancer Brother     Colon cancer Maternal Uncle      60s    Cancer Maternal Aunt     Pulmonary embolism Daughter     Breast cancer Neg Hx     Ovarian cancer Neg Hx     Celiac disease Neg Hx     Cirrhosis Neg Hx     Colon polyps Neg Hx     Crohn's disease Neg Hx     Cystic fibrosis Neg Hx     Esophageal cancer Neg Hx     Hemochromatosis Neg Hx     Inflammatory bowel disease Neg Hx     Irritable bowel syndrome Neg Hx     Liver cancer Neg Hx     Liver disease Neg Hx     Rectal cancer Neg Hx     Stomach cancer Neg Hx     Ulcerative colitis Neg Hx     Phoenix's disease Neg Hx     Lymphoma Neg Hx     Tuberculosis Neg Hx     Rheum arthritis Neg Hx     Scleroderma Neg Hx     Melanoma Neg Hx     Lupus Neg Hx     Multiple sclerosis Neg Hx     Psoriasis Neg Hx     Skin cancer Neg Hx         Social History:   Social History     Social History    Marital status:      Spouse name: N/A    Number of children: N/A    Years of education: N/A     Social History Main Topics    Smoking status: Former Smoker     Packs/day: 1.00     Years: 5.00     Types: Cigarettes     Quit date: 3/30/1979    Smokeless tobacco: Never Used     Alcohol use No    Drug use: No    Sexual activity: Not Asked     Other Topics Concern    None     Social History Narrative    None        Review of patient's allergies indicates:  No Known Allergies    Current Outpatient Prescriptions   Medication Sig Dispense Refill    albuterol (PROVENTIL) 2.5 mg /3 mL (0.083 %) nebulizer solution USE ONE VIAL in Nebulizer EVERY 6 HOURS AS NEEDED FOR SHORTNESS OF BREATH  6    ALBUTEROL SULFATE (VENTOLIN INHL) Inhale 2 puffs into the lungs every 4 to 6 hours as needed.      albuterol-ipratropium 2.5mg-0.5mg/3mL (DUO-NEB) 0.5 mg-3 mg(2.5 mg base)/3 mL nebulizer solution USE ONE VIAL in Nebulizer EVERY SIX HOURS AS NEEDED FOR SHORTNESS OF BREATH  5    ambrisentan (LETAIRIS) 10 MG Tab Take 10 mg by mouth once daily.      aspirin (ECOTRIN) 81 MG EC tablet Take 81 mg by mouth every evening.      atorvastatin (LIPITOR) 10 MG tablet Take 10 mg by mouth once daily.       carvedilol (COREG) 12.5 MG tablet Take 1 tablet (12.5 mg total) by mouth 2 (two) times daily with meals. 180 tablet 3    fluticasone (FLONASE) 50 mcg/actuation nasal spray 1 spray by Each Nare route once daily.  3    furosemide (LASIX) 40 MG tablet Take 1 tablet (40 mg total) by mouth once daily. 30 tablet 0    guaiFENesin (MUCINEX) 600 mg 12 hr tablet Take 1,200 mg by mouth as needed for Congestion.      lorazepam (ATIVAN) 1 MG tablet Take 1 mg by mouth 2 (two) times daily.       multivitamin capsule Take 1 capsule by mouth once daily.      mycophenolate (CELLCEPT) 500 mg Tab Take 1,500 mg by mouth 2 (two) times daily.       pantoprazole (PROTONIX) 40 MG tablet Take 1 tablet (40 mg total) by mouth 2 (two) times daily. 180 tablet 3    potassium chloride (KLOR-CON) 20 mEq Pack Take 20 mEq by mouth once daily. 30 packet 1    predniSONE (DELTASONE) 10 MG tablet       predniSONE (DELTASONE) 20 MG tablet 40 mg x 4 days followed by 20 mg x 5 days 13 tablet 0    promethazine (PHENERGAN) 25 MG tablet 1 TABLET  "BY MOUTH EVERY 6 HOURS AS NEEDED  0    risedronate (ACTONEL) 150 MG Tab Take 150 mg by mouth every 30 days.  6    sildenafil (VIAGRA) 25 MG tablet Take 20 mg by mouth 3 (three) times daily.      TIROSINT 137 mcg Cap Take 1 capsule by mouth once daily.       VENTOLIN HFA 90 mcg/actuation inhaler       VITAMIN D2 50,000 unit capsule Take 50,000 Units by mouth every 30 days.        No current facility-administered medications for this visit.         Objective Findings:  Vital Signs:  /64   Pulse 76   Ht 5' 11" (1.803 m)   Wt 81.4 kg (179 lb 7.3 oz)   BMI 25.03 kg/m²   Body mass index is 25.03 kg/m².    Physical Exam:  General appearance: alert, cooperative, no distress  HENT: Normocephalic, atraumatic, neck symmetrical, no nasal discharge  Eyes: conjunctivae/corneas clear, PERRL, EOM's intact  Lungs: clear to auscultation bilaterally, no dullness to percussion bilaterally  Heart: regular rate and rhythm without rub; no displacement of the PMI  Abdomen: soft, mild RLQ tenderness; bowel sounds normoactive; no organomegaly  Extremities: extremities symmetric; no clubbing, cyanosis, or edema  Integument: Skin color, texture, turgor normal; no rashes; hair distrubution normal  Neurologic: Alert and oriented X 3, normal strength, normal coordination and gait  Psychiatric: no pressured speech; normal affect; no evidence of impaired cognition    Labs:  Lab Results   Component Value Date    WBC 4.72 11/27/2017    HGB 11.3 (L) 11/27/2017    HCT 36.3 (L) 11/27/2017    MCV 90 11/27/2017     11/27/2017     No results found for: FERRITIN  Lab Results   Component Value Date     11/27/2017    K 4.0 11/27/2017     11/27/2017    CO2 27 11/27/2017     (H) 11/27/2017    BUN 14 11/27/2017    CREATININE 0.8 11/27/2017    CALCIUM 8.8 11/27/2017    PROT 7.1 11/25/2017    ALBUMIN 3.7 11/25/2017    BILITOT 0.5 11/25/2017    ALKPHOS 51 (L) 11/25/2017    AST 27 11/25/2017    ALT 13 11/25/2017     No " results found for: TSH  No results found for: SEDRATE  No results found for: CRP  No results found for: LABA1C, HGBA1C        Assessment and Plan:  Fatimah Cha is a 62 y.o. female with with history CREST, Sjogran, Scleroderma, ILD on 02, pulmonary HTN, HTN, HLD, hashimotos thyroiditis, hypothyroidsm here for evaluation of the following GI problems:    GERD. Regurgitation  -Cont protonix 40mg BID     Little's esophagus.  No seen on most recent EGD   -Consider repeat in 3 years depending on clinical status.  High risk for procedures    Dysphagia. Choking. Seen by ENT in the past.  No EoE.  Nl MBS. Improved.   -PPI BID          Nausea.  Vomiting.  Improved.   -Phenergan prn    -Unable to tolerate reglan due to difficulty sleeping.      -Need report of smart pill by Dr. Carrillo at [facility]   -Will consider GES pending above     Gas and bloating. Belching.  History of SIBO testing, treated w Flagyl.    -Cont lactose elimination.  Avoid artificial sugars     -Would consider SIBO treatment if symptoms get worse    Abdominal pain related to BMs.  RLQ pain that started 3 months ago, possibly musculosceletal  -Bentyl prn.    -Start IBguard prn      Colitis.  Admitted to the hospital w self limited bloody diarrhea 10/2016.  CT showed colitis. Had similar episode 5 years prior. Negative stools studies.   No diarrhea or constipation for the past few months.  Colonoscopy negative.     3 tiny TA in 2013.    -Repeat in 2027     CREST, Sjogran, Scleroderma, ILD, pulmonary HTN.  On cellcept and prednisone.   -Lung transplant workup in progress by Dr. Garcia    Hashimotos thyroiditis. Hypothyroid    -On synthroid    Return in about 5 months (around 5/4/2018) for w/ Dr. Santiago.    1. Dysphagia, unspecified type    2. Nausea and vomiting, intractability of vomiting not specified, unspecified vomiting type    3. Bloating    4. Abdominal pain, unspecified abdominal location    5. Gastroesophageal reflux disease, esophagitis presence  not specified             Thank you so much for allowing me to participate in the care of CANDE Coats, FNP-C  Radha Santiago MD

## 2017-12-15 ENCOUNTER — TELEPHONE (OUTPATIENT)
Dept: TRANSPLANT | Facility: CLINIC | Age: 62
End: 2017-12-15

## 2017-12-15 DIAGNOSIS — J84.10 PULMONARY FIBROSIS: Primary | ICD-10-CM

## 2017-12-15 DIAGNOSIS — Z76.82 LUNG TRANSPLANT CANDIDATE: ICD-10-CM

## 2017-12-15 DIAGNOSIS — M35.02 SJOGREN'S SYNDROME WITH LUNG INVOLVEMENT: ICD-10-CM

## 2017-12-15 DIAGNOSIS — M34.1 CREST (CALCINOSIS, RAYNAUD'S PHENOMENON, ESOPHAGEAL DYSFUNCTION, SCLERODACTYLY, TELANGIECTASIA): ICD-10-CM

## 2017-12-15 NOTE — TELEPHONE ENCOUNTER
----- Message from Rodrick Garcia MD sent at 12/15/2017  2:52 PM CST -----  no  ----- Message -----  From: Beata Quarles RN  Sent: 12/15/2017   2:29 PM  To: Rodrick Garcia MD    Scheduling patient's follow up.  She has Sjogren's and CREST so you wanted an esophagram with the next visit.  She had a swallow study and EGD here with Dr. Santiago since her last visit with you.  Does she still need an esophagram?

## 2017-12-15 NOTE — TELEPHONE ENCOUNTER
----- Message from Garfield Marinelli sent at 12/15/2017 12:59 PM CST -----  Contact: Pt  Pt calling to r/s apt she has on 1/3 because the pt is going to have an infusion at [facility] and won't be able to make it.          Call back number: [phone]

## 2017-12-26 ENCOUNTER — TELEPHONE (OUTPATIENT)
Dept: TRANSPLANT | Facility: CLINIC | Age: 62
End: 2017-12-26

## 2017-12-26 NOTE — TELEPHONE ENCOUNTER
----- Message from Kennedi Woody sent at 12/26/2017  4:31 PM CST -----  Contact: pt   Pt called about the last time she was in the hospital there was an abnormal EKG and she wanted to discuss that.  She can be reached @ [phone].  Thanks!!

## 2017-12-26 NOTE — TELEPHONE ENCOUNTER
Left message that we have no specific recommendations for vascular dept. Provided pt w/phone number for appt via .

## 2017-12-26 NOTE — TELEPHONE ENCOUNTER
----- Message from Rey Maldonado sent at 12/26/2017  3:04 PM CST -----  Contact: patient  Please call pt at [phone]. Patient would like a recommendation from Dr Russell to a vascular MD. Also patient just had Rituxan infusion done on 12/19/17 at [facility] and the next infusion is scheduled on 01/02/18      Thank you

## 2017-12-26 NOTE — TELEPHONE ENCOUNTER
"Spoke w/patient, who reports she had some concern over EKG report, which showed "abnormalities." Pt wants to know what Dr Russell would recommend for follow up. Message sent to Dr Hughes in Dr Russell' absence. Informed pt that she would be contacted, once feedback was received. Pt verbalized understanding of all.   "

## 2017-12-27 ENCOUNTER — PATIENT MESSAGE (OUTPATIENT)
Dept: TRANSPLANT | Facility: CLINIC | Age: 62
End: 2017-12-27

## 2018-01-03 ENCOUNTER — HOSPITAL ENCOUNTER (EMERGENCY)
Facility: HOSPITAL | Age: 63
Discharge: HOME | End: 2018-01-03
Attending: EMERGENCY MEDICINE
Payer: COMMERCIAL

## 2018-01-03 VITALS
TEMPERATURE: 98 F | HEART RATE: 69 BPM | OXYGEN SATURATION: 100 % | RESPIRATION RATE: 18 BRPM | SYSTOLIC BLOOD PRESSURE: 108 MMHG | DIASTOLIC BLOOD PRESSURE: 58 MMHG

## 2018-01-03 DIAGNOSIS — R07.9 CHEST PAIN: ICD-10-CM

## 2018-01-03 DIAGNOSIS — R06.2 WHEEZING: Primary | ICD-10-CM

## 2018-01-03 PROBLEM — J20.8 ACUTE VIRAL BRONCHITIS: Status: ACTIVE | Noted: 2018-01-03

## 2018-01-03 LAB
ALBUMIN SERPL BCP-MCNC: 3.4 G/DL
ALP SERPL-CCNC: 49 U/L
ALT SERPL W/O P-5'-P-CCNC: 17 U/L
ANION GAP SERPL CALC-SCNC: 9 MMOL/L
AST SERPL-CCNC: 39 U/L
BASOPHILS # BLD AUTO: 0.06 K/UL
BASOPHILS NFR BLD: 0.9 %
BILIRUB SERPL-MCNC: 0.4 MG/DL
BNP SERPL-MCNC: 42 PG/ML
BUN SERPL-MCNC: 14 MG/DL
CALCIUM SERPL-MCNC: 8.5 MG/DL
CHLORIDE SERPL-SCNC: 110 MMOL/L
CO2 SERPL-SCNC: 23 MMOL/L
CREAT SERPL-MCNC: 0.7 MG/DL
DIFFERENTIAL METHOD: ABNORMAL
EOSINOPHIL # BLD AUTO: 0.2 K/UL
EOSINOPHIL NFR BLD: 3.6 %
ERYTHROCYTE [DISTWIDTH] IN BLOOD BY AUTOMATED COUNT: 14.7 %
EST. GFR  (AFRICAN AMERICAN): >60 ML/MIN/1.73 M^2
EST. GFR  (NON AFRICAN AMERICAN): >60 ML/MIN/1.73 M^2
GLUCOSE SERPL-MCNC: 86 MG/DL
HCT VFR BLD AUTO: 37.1 %
HGB BLD-MCNC: 11.6 G/DL
IMM GRANULOCYTES # BLD AUTO: 0.07 K/UL
IMM GRANULOCYTES NFR BLD AUTO: 1.1 %
LYMPHOCYTES # BLD AUTO: 0.8 K/UL
LYMPHOCYTES NFR BLD: 11.8 %
MCH RBC QN AUTO: 27.6 PG
MCHC RBC AUTO-ENTMCNC: 31.3 G/DL
MCV RBC AUTO: 88 FL
MONOCYTES # BLD AUTO: 0.7 K/UL
MONOCYTES NFR BLD: 11.4 %
NEUTROPHILS # BLD AUTO: 4.6 K/UL
NEUTROPHILS NFR BLD: 71.2 %
NRBC BLD-RTO: 0 /100 WBC
PLATELET # BLD AUTO: 201 K/UL
PMV BLD AUTO: 9.3 FL
POTASSIUM SERPL-SCNC: 4.2 MMOL/L
PROT SERPL-MCNC: 7.1 G/DL
RBC # BLD AUTO: 4.2 M/UL
SODIUM SERPL-SCNC: 142 MMOL/L
TROPONIN I SERPL DL<=0.01 NG/ML-MCNC: 0.01 NG/ML
WBC # BLD AUTO: 6.42 K/UL

## 2018-01-03 PROCEDURE — 83880 ASSAY OF NATRIURETIC PEPTIDE: CPT

## 2018-01-03 PROCEDURE — 94640 AIRWAY INHALATION TREATMENT: CPT

## 2018-01-03 PROCEDURE — 96374 THER/PROPH/DIAG INJ IV PUSH: CPT

## 2018-01-03 PROCEDURE — 99285 EMERGENCY DEPT VISIT HI MDM: CPT | Mod: ,,, | Performed by: EMERGENCY MEDICINE

## 2018-01-03 PROCEDURE — 84484 ASSAY OF TROPONIN QUANT: CPT

## 2018-01-03 PROCEDURE — 25000242 PHARM REV CODE 250 ALT 637 W/ HCPCS: Performed by: EMERGENCY MEDICINE

## 2018-01-03 PROCEDURE — 99284 EMERGENCY DEPT VISIT MOD MDM: CPT | Mod: 25

## 2018-01-03 PROCEDURE — 85025 COMPLETE CBC W/AUTO DIFF WBC: CPT

## 2018-01-03 PROCEDURE — 63600175 PHARM REV CODE 636 W HCPCS: Performed by: EMERGENCY MEDICINE

## 2018-01-03 PROCEDURE — 80053 COMPREHEN METABOLIC PANEL: CPT

## 2018-01-03 PROCEDURE — 93010 ELECTROCARDIOGRAM REPORT: CPT | Mod: ,,, | Performed by: INTERNAL MEDICINE

## 2018-01-03 RX ORDER — OSELTAMIVIR PHOSPHATE 75 MG/1
75 CAPSULE ORAL 2 TIMES DAILY
Qty: 10 CAPSULE | Refills: 0 | Status: SHIPPED | OUTPATIENT
Start: 2018-01-03 | End: 2018-01-08

## 2018-01-03 RX ORDER — METHYLPREDNISOLONE SOD SUCC 125 MG
125 VIAL (EA) INJECTION
Status: COMPLETED | OUTPATIENT
Start: 2018-01-03 | End: 2018-01-03

## 2018-01-03 RX ORDER — PREDNISONE 20 MG/1
TABLET ORAL
Qty: 9 TABLET | Refills: 0 | Status: SHIPPED | OUTPATIENT
Start: 2018-01-03 | End: 2018-01-10

## 2018-01-03 RX ORDER — ALBUTEROL SULFATE 0.83 MG/ML
15 SOLUTION RESPIRATORY (INHALATION)
Status: COMPLETED | OUTPATIENT
Start: 2018-01-03 | End: 2018-01-03

## 2018-01-03 RX ORDER — IPRATROPIUM BROMIDE 0.5 MG/2.5ML
1 SOLUTION RESPIRATORY (INHALATION)
Status: COMPLETED | OUTPATIENT
Start: 2018-01-03 | End: 2018-01-03

## 2018-01-03 RX ADMIN — METHYLPREDNISOLONE SODIUM SUCCINATE 125 MG: 125 INJECTION, POWDER, FOR SOLUTION INTRAMUSCULAR; INTRAVENOUS at 04:01

## 2018-01-03 RX ADMIN — IPRATROPIUM BROMIDE 1 MG: 0.5 SOLUTION RESPIRATORY (INHALATION) at 04:01

## 2018-01-03 RX ADMIN — ALBUTEROL SULFATE 15 MG: 2.5 SOLUTION RESPIRATORY (INHALATION) at 04:01

## 2018-01-03 NOTE — HPI
63 yo white female with known CREST syndrome with associated ILD and PHTN who presented to ED [facility] with increased cough and wheezing x 3 days but worse in past 24 hours. Has had 10 days of URTI type symptoms starting on 12/21 with sore throat, sinus pain and pressure. Treated with outpatient abx with improvement in sinus symptoms however in past 2-3 days developed producitve cough and increased wheezing. Denies increased SOB. Typically wears 2L NC and has Sp02 of 93% on arrival to Ed. Feels her symptoms acutely worsened today after spending time in the cold while waiting at [facility] to get her 2nd treatment with rituxin. Chronically on 10 mg prednisone.     First seen in Dr Garcia's clinic 7/3/2017 for initial evalation.

## 2018-01-03 NOTE — DISCHARGE INSTRUCTIONS
Please take 40 mg of prednisone for 3 days followed by 20 mg of prednisone for 3 days and then resume your standard 10 mg of prednisone daily.  Please follow-up with her primary care physician or return to the emergency department if symptoms worsen.

## 2018-01-03 NOTE — ED NOTES
Patient remains on respiratory TX. No current complaints at this time. Will continue to monitor patient.

## 2018-01-03 NOTE — SUBJECTIVE & OBJECTIVE
Past Medical History:   Diagnosis Date    CHF (congestive heart failure)     Colitis     CREST syndrome     GERD (gastroesophageal reflux disease)     Hypercholesteremia     Hypertension     Interstitial lung disease     Osteopenia     Pulmonary fibrosis     Pulmonary hypertension     Raynaud disease     Respiratory distress     Scleroderma     Sjoegren syndrome        Past Surgical History:   Procedure Laterality Date    BREAST BIOPSY      CARDIAC CATHETERIZATION  2013    COLONOSCOPY      COLONOSCOPY N/A 11/15/2017    Procedure: COLONOSCOPY;  Surgeon: Radha Santiago MD;  Location: [address]);  Service: Endoscopy;  Laterality: N/A;  2nd floor.  Pulm htn, ILD on o2.  Pls review w anesthesia.  Pt has cardio-pulm workup in progress at [facility] by Dr. Russell.                 reese monsalve RN- I reviewed Ms. Cha's history with Dr. Leopold (Anesthesia) and based on her medical history of:  Pulmonary Hyper    foot surgery x2      lumpectomy (benign)      LUNG BIOPSY  03/2013    THYROIDECTOMY      TONSILLECTOMY      TUBAL LIGATION      UPPER GASTROINTESTINAL ENDOSCOPY         Review of patient's allergies indicates:   Allergen Reactions    Adhesive tape-silicones        Family History     Problem Relation (Age of Onset)    Cancer Maternal Aunt    Colon cancer Maternal Uncle    Deep vein thrombosis Brother    Emphysema Sister    Heart failure Father    Lung cancer Brother    Pulmonary embolism Daughter    Thyroid cancer Mother    Thyroid disease Sister        Social History Main Topics    Smoking status: Former Smoker     Packs/day: 1.00     Years: 5.00     Types: Cigarettes     Quit date: 3/30/1979    Smokeless tobacco: Never Used    Alcohol use No    Drug use: No    Sexual activity: Not on file      Review of Systems   Constitutional: Negative for activity change, appetite change, chills, diaphoresis, fever and unexpected weight change.   HENT: Positive for congestion and sore  throat. Negative for rhinorrhea, sinus pain, sinus pressure and trouble swallowing.    Eyes: Negative for pain and discharge.   Respiratory: Positive for wheezing. Negative for chest tightness and shortness of breath.    Cardiovascular: Negative for chest pain and leg swelling.   Gastrointestinal: Negative for abdominal distention, abdominal pain, diarrhea, nausea and vomiting.   Genitourinary: Negative.    Musculoskeletal: Negative.  Negative for arthralgias and myalgias.   Skin: Negative for color change and rash.   Allergic/Immunologic: Positive for immunocompromised state.   Neurological: Negative for headaches.   Hematological: Negative.      Objective:     Vital Signs (Most Recent):  Temp: 98.1 °F (36.7 °C) (01/03/18 0055)  Pulse: 66 (01/03/18 0502)  Resp: 19 (01/03/18 0416)  BP: (!) 109/59 (01/03/18 0502)  SpO2: 100 % (01/03/18 0502) Vital Signs (24h Range):  Temp:  [98.1 °F (36.7 °C)] 98.1 °F (36.7 °C)  Pulse:  [66-83] 66  Resp:  [19-22] 19  SpO2:  [94 %-100 %] 100 %  BP: (109-139)/(56-65) 109/59      There is no height or weight on file to calculate BMI.    No intake or output data in the 24 hours ending 01/03/18 0525    Physical Exam   Constitutional: She is oriented to person, place, and time. She appears well-developed and well-nourished. No distress.   HENT:   Head: Normocephalic and atraumatic.   Mouth/Throat: Oropharynx is clear and moist. No oropharyngeal exudate.   Eyes: EOM are normal. No scleral icterus.   Neck: Neck supple. No JVD present. No thyromegaly present.   Cardiovascular: Normal rate, regular rhythm, normal heart sounds and intact distal pulses.    No murmur heard.  Pulmonary/Chest: Effort normal. She has wheezes in the right lower field. She has rales in the right lower field and the left lower field.   Abdominal: Soft. Bowel sounds are normal. She exhibits no distension. There is no tenderness.   Musculoskeletal: Normal range of motion. She exhibits no edema.   Lymphadenopathy:     She  has no cervical adenopathy.   Neurological: She is alert and oriented to person, place, and time.   Skin: Skin is warm and dry. Capillary refill takes less than 2 seconds.   Psychiatric: She has a normal mood and affect. Her behavior is normal. Thought content normal.       Vents:     Lines/Drains/Airways     Peripheral Intravenous Line                 Peripheral IV - Single Lumen 01/03/18 0359 Left Antecubital less than 1 day              Significant Labs:    CBC/Anemia Profile:    Recent Labs  Lab 01/03/18  0359   WBC 6.42   HGB 11.6*   HCT 37.1      MCV 88   RDW 14.7*        Chemistries:    Recent Labs  Lab 01/03/18  0359      K 4.2      CO2 23   BUN 14   CREATININE 0.7   CALCIUM 8.5*   ALBUMIN 3.4*   PROT 7.1   BILITOT 0.4   ALKPHOS 49*   ALT 17   AST 39       All pertinent labs within the past 24 hours have been reviewed.    Significant Imaging: CXR: I have reviewed all pertinent results/findings within the past 24 hours and my personal findings are:  chronic interstital changes without evidence of significant change. No evidence of new infiltrate

## 2018-01-03 NOTE — ED PROVIDER NOTES
"Encounter Date: 1/3/2018       History     Chief Complaint   Patient presents with    Wheezing     pt states she has interstial lung disease and has been wheezing.pt denies chest pain and nausea. pt states she has been coughing and non productive with minimal mucus.      Fatimah Cha is a 62-year-old female with significant comorbidities including crest syndrome, interstitial lung disease, pulmonary fibrosis, pulmonary hypertension and chronic respiratory distress that presents with acute on chronic shortness of breath and wheezing.  Patient notes that symptoms have gradually worsened over the last 2 days with the recent change in colder weather.  Patient has been using her nebulizer "around-the-clock" for symptomatic relief.  This suffice until tonight when she attempted to go to bed and could hear audible wheezes.  Patient denies chest pain, fever or change in sputum with cough.  Patient notes that she is chronically oxygen dependent and usually is hypoxic with ambulation however that was not the fact today.  Patient is chronically on steroids and currently on 10 mg however recently needed stress dose steroids for similar infections.    Chart review reveals history of diastolic heart failure and followed by the [facility] transplant service.            Review of patient's allergies indicates:   Allergen Reactions    Adhesive tape-silicones      Past Medical History:   Diagnosis Date    CHF (congestive heart failure)     Colitis     CREST syndrome     GERD (gastroesophageal reflux disease)     Hypercholesteremia     Hypertension     Interstitial lung disease     Osteopenia     Pulmonary fibrosis     Pulmonary hypertension     Raynaud disease     Respiratory distress     Scleroderma     Sjoegren syndrome      Past Surgical History:   Procedure Laterality Date    BREAST BIOPSY      CARDIAC CATHETERIZATION  2013    COLONOSCOPY      COLONOSCOPY N/A 11/15/2017    Procedure: COLONOSCOPY;  Surgeon: " Radha Santiago MD;  Location: [facility] ([address] [place] [address]);  Service: Endoscopy;  Laterality: N/A;  2nd floor.  Pulm htn, ILD on o2.  Pls review w anesthesia.  Pt has cardio-pulm workup in progress at [facility] by Dr. Russell.                 reese monsalve RN- I reviewed Ms. Cha's history with Dr. Leopold (Anesthesia) and based on her medical history of:  Pulmonary Hyper    foot surgery x2      lumpectomy (benign)      LUNG BIOPSY  03/2013    THYROIDECTOMY      TONSILLECTOMY      TUBAL LIGATION      UPPER GASTROINTESTINAL ENDOSCOPY       Family History   Problem Relation Age of Onset    Thyroid cancer Mother     Heart failure Father     Emphysema Sister     Thyroid disease Sister     Deep vein thrombosis Brother     Lung cancer Brother     Colon cancer Maternal Uncle      60s    Cancer Maternal Aunt     Pulmonary embolism Daughter     Breast cancer Neg Hx     Ovarian cancer Neg Hx     Celiac disease Neg Hx     Cirrhosis Neg Hx     Colon polyps Neg Hx     Crohn's disease Neg Hx     Cystic fibrosis Neg Hx     Esophageal cancer Neg Hx     Hemochromatosis Neg Hx     Inflammatory bowel disease Neg Hx     Irritable bowel syndrome Neg Hx     Liver cancer Neg Hx     Liver disease Neg Hx     Rectal cancer Neg Hx     Stomach cancer Neg Hx     Ulcerative colitis Neg Hx     Phoenix's disease Neg Hx     Lymphoma Neg Hx     Tuberculosis Neg Hx     Rheum arthritis Neg Hx     Scleroderma Neg Hx     Melanoma Neg Hx     Lupus Neg Hx     Multiple sclerosis Neg Hx     Psoriasis Neg Hx     Skin cancer Neg Hx      Social History   Substance Use Topics    Smoking status: Former Smoker     Packs/day: 1.00     Years: 5.00     Types: Cigarettes     Quit date: 3/30/1979    Smokeless tobacco: Never Used    Alcohol use No     Review of Systems   Constitutional: Negative for chills, diaphoresis, fatigue and fever.   HENT: Negative for congestion and sore throat.    Respiratory: Positive for shortness of  breath and wheezing.    Cardiovascular: Negative for chest pain, palpitations and leg swelling.   Gastrointestinal: Positive for nausea and vomiting.   Genitourinary: Negative for dysuria.   Musculoskeletal: Negative for back pain.   Skin: Negative for rash.   Allergic/Immunologic: Positive for immunocompromised state (chronic steroids).   Neurological: Negative for weakness.   Hematological: Does not bruise/bleed easily.   Psychiatric/Behavioral: Negative for confusion.   All other systems reviewed and are negative.      Physical Exam     Initial Vitals [01/03/18 0055]   BP Pulse Resp Temp SpO2   139/65 83 20 98.1 °F (36.7 °C) (!) 94 %      MAP       89.67         Physical Exam    Nursing note and vitals reviewed.  Constitutional: She appears well-nourished. She is not diaphoretic. No distress.   HENT:   Head: Normocephalic and atraumatic.   Eyes: Pupils are equal, round, and reactive to light.   Neck: Normal range of motion.   Cardiovascular: Normal rate, regular rhythm, normal heart sounds and intact distal pulses. Exam reveals no gallop and no friction rub.    No murmur heard.  Pulmonary/Chest: Breath sounds normal. No respiratory distress. She has no wheezes. She has no rhonchi. She has no rales.   On 4 L nasal cannula.  Slight prolonged expiratory phase.  No tachypnea or dyspnea noted   Abdominal: Soft.   Musculoskeletal: She exhibits no edema.   Neurological: She is alert and oriented to person, place, and time.   Skin: Skin is warm and dry. Capillary refill takes less than 2 seconds.   Psychiatric: She has a normal mood and affect.         ED Course   Procedures  Labs Reviewed   CBC W/ AUTO DIFFERENTIAL - Abnormal; Notable for the following:        Result Value    Hemoglobin 11.6 (*)     MCHC 31.3 (*)     RDW 14.7 (*)     Immature Granulocytes 1.1 (*)     Immature Grans (Abs) 0.07 (*)     Lymph # 0.8 (*)     Lymph% 11.8 (*)     All other components within normal limits   COMPREHENSIVE METABOLIC PANEL -  Abnormal; Notable for the following:     Calcium 8.5 (*)     Albumin 3.4 (*)     Alkaline Phosphatase 49 (*)     All other components within normal limits   TROPONIN I   B-TYPE NATRIURETIC PEPTIDE     EKG Readings: (Independently Interpreted)   EKG at 3:42 AM shows normal sinus rhythm at 76 bpm with 1 PVC.  Normal axis.  Normal intervals.  Slight tremulousness/wandering baseline but no ST elevation or depression.  Grossly unchanged from prior EKG dated November 25, 2017                APC / Resident Notes:   HO4 MDM  Pt is 62 y.o. female that presents with wheezing. DDx includes but is not limited to asthma, worsening of interstitial lung disease, pneumonia, bronchitis. Will get chest x-ray, labs, EKG and treat with DuoNeb nebs and steroids.  Likely will consult with transplant for recommendations of outpatient treatment versus admission for close monitoring given patient's numerous comorbidities.   Vince Rubi  [facility] Resident  4:35 AM 01/03/2018     Re-evaluation  Patient's workup unremarkable for acute disease.  Patient consulted to the [facility] resident for transplant that evaluated the patient and staff the patient with transplant attending.  However patient is not currently being evaluated for lung transplant and consult was switched to pulmonology.  Luckily this was the same resident covering.  Pulmonology attending evaluated the case and suggested empirical treatment for influenza and a steroid burst.  On reevaluation patient is much improved after continuous duo nebs.  Patient elects to be discharged and will follow-up with primary care and return to the ER if symptoms worsen  Vince Rubi  [facility] Emergency Medicine Resident  6:10 AM 01/03/2018             ED Course      Clinical Impression:   The primary encounter diagnosis was Wheezing. A diagnosis of Chest pain was also pertinent to this visit.                           Vince Rubi MD  Resident  01/03/18 0612

## 2018-01-03 NOTE — ED NOTES
"Chief Complaint   Patient presents with    Wheezing     pt states she has interstial lung disease and has been wheezing.pt denies chest pain and nausea. pt states she has been coughing and non productive with minimal mucus.      Active Ambulatory Problems     Diagnosis Date Noted    Pulmonary fibrosis 12/29/2016    Raynaud disease 12/29/2016    Sjogren's syndrome 12/29/2016    CREST (calcinosis, Raynaud's phenomenon, esophageal dysfunction, sclerodactyly, telangiectasia) 12/29/2016    Chronic diastolic heart failure 12/29/2016    Postoperative hypothyroidism 12/29/2016    Mixed hyperlipidemia 12/29/2016    Abnormal stress echocardiogram 01/13/2017    Pulmonary hypertension     Sjogren's syndrome with lung involvement     Lung transplant candidate     Dyspnea on Exertion 2/2 Fluid overload 09/01/2017    HLD (hyperlipidemia) 09/02/2017    Hypothyroidism due to Hashimoto's thyroiditis 09/02/2017    Chronic respiratory failure with hypoxia 09/02/2017    Orthostatic hypotension 09/02/2017    Interstitial lung disease 11/26/2017    Acute on chronic respiratory failure with hypoxia 11/27/2017     Resolved Ambulatory Problems     Diagnosis Date Noted    Shortness of breath 11/25/2017     Past Medical History:   Diagnosis Date    CHF (congestive heart failure)     Colitis     CREST syndrome     GERD (gastroesophageal reflux disease)     Hypercholesteremia     Hypertension     Interstitial lung disease     Osteopenia     Pulmonary fibrosis     Pulmonary hypertension     Raynaud disease     Respiratory distress     Scleroderma     Sjoegren syndrome      Review of patient's allergies indicates:   Allergen Reactions    Adhesive tape-silicones      Patient states "i am wheezing. I have been taking breathing treatments around the clock and it hasn't helped." Last breathing TX at 1030 pm. Reports that she had a sinus infection and was taking augmentin, which she finished on Saturday. +SOB. Denies " SANDY. Marija began this am around 4 am. SXs only present when she is lying down, states that she is ok if she is sitting up or walking around.

## 2018-01-03 NOTE — ED NOTES
Appearance: Patient resting comfortably on stretcher, and is in no acute distress at this time. Patient is clean and well groomed, with clothing properly fastened. Patient has no facial grimacing, and no indications of being in pain currently.  Cardiac: Heart sounds audible and without abnormalities noted. Patient has a normal rate and regular rhythm, no periphreal edema noted. Patient attached to continuous cardiac monitor, automatic/cycling BP cuff, and continuous pulse ox.  Neuro/LOC: Eyes open spontaneously, behavior appropriate to situation, follows commands, facial expression symmetrical, purposeful motor response noted. AAOx4; The patient is awake, alert and aware of environment with an appropriate affect, and speaking appropriately. Denies dizziness and HA.  Respiratory: Breath sounds audible, inspiratory wheezes noted bilaterally. Airway is open and patent, respirations are spontaneous, patient has a normal effort and rate, no accessory muscle use noted.  Skin: Intact, warm and dry. Color is consistent with ethnicity. Normal skin turgor and moist mucous membranes.  Gastro: Bowel sounds audible and active x4 quadrants. No tenderness and no distention are present.  Musculoskeletal: Patient moving all extremities spontaneously, no obvious swelling or deformities noted.    -Patient identifiers verified and correct for this patient.  -Patient resting with bedrails up x2 for safety, bed locked in low position, and call bell within reach.  -Allergy band and fall risk band applied to patient for safety.

## 2018-01-03 NOTE — ASSESSMENT & PLAN NOTE
- No indication for antibiotics  - Recommend increasing steroids to 40 mg daily x 3 days then tapering back to 10 mg daily over the course of 1 week  - Continue PRN albuterol  - Would treat for 5 days with tamiflu given sick contacts and her immunocompromised state   - Ok to DC home if she is doing well off the continuous neb and feels like she can handle her symptoms at home  - Otherwise feel free to admit to OBS  - Have discussed with pulmonary staff on call

## 2018-01-03 NOTE — CONSULTS
[facility]  Critical Care Medicine  Consult Note    Patient Name: Fatimah Cha  MRN: [id number]  Admission Date: 1/3/2018  Hospital Length of Stay: 0 days  Code Status: Prior  Attending Physician: Homero Clarke MD   Primary Care Provider: Simon Orozco MD   Principal Problem: <principal problem not specified>    Inpatient consult to Pulmonology  Consult performed by: FRANK ZARAGOZA  Consult ordered by: SUSHIL ORTEGA        Subjective:     HPI:  61 yo white female with known CREST syndrome with associated ILD and PHTN who presented to ED tonight with increased cough and wheezing x 3 days but worse in past 24 hours. Has had 10 days of URTI type symptoms starting on 12/21 with sore throat, sinus pain and pressure. Treated with outpatient abx with improvement in sinus symptoms however in past 2-3 days developed producitve cough and increased wheezing. Denies increased SOB. Typically wears 2L NC and has Sp02 of 93% on arrival to Ed. Feels her symptoms acutely worsened today after spending time in the cold while waiting at [facility] to get her 2nd treatment with rituxin. Chronically on 10 mg prednisone.     First seen in Dr Garcia's clinic 7/3/2017 for initial evalation.     Hospital/ICU Course:  No notes on file    Past Medical History:   Diagnosis Date    CHF (congestive heart failure)     Colitis     CREST syndrome     GERD (gastroesophageal reflux disease)     Hypercholesteremia     Hypertension     Interstitial lung disease     Osteopenia     Pulmonary fibrosis     Pulmonary hypertension     Raynaud disease     Respiratory distress     Scleroderma     Sjoegren syndrome        Past Surgical History:   Procedure Laterality Date    BREAST BIOPSY      CARDIAC CATHETERIZATION  2013    COLONOSCOPY      COLONOSCOPY N/A 11/15/2017    Procedure: COLONOSCOPY;  Surgeon: Radha Santiago MD;  Location: [facility] ([address] [place] [address]);  Service: Endoscopy;  Laterality: N/A;  2nd  floor.  Pulm htn, ILD on o2.  Pls review w anesthesia.  Pt has cardio-pulm workup in progress at [facility] by Dr. Russell.                 reese monsalve RN- I reviewed Ms. Cha's history with Dr. Leopold (Anesthesia) and based on her medical history of:  Pulmonary Hyper    foot surgery x2      lumpectomy (benign)      LUNG BIOPSY  03/2013    THYROIDECTOMY      TONSILLECTOMY      TUBAL LIGATION      UPPER GASTROINTESTINAL ENDOSCOPY         Review of patient's allergies indicates:   Allergen Reactions    Adhesive tape-silicones        Family History     Problem Relation (Age of Onset)    Cancer Maternal Aunt    Colon cancer Maternal Uncle    Deep vein thrombosis Brother    Emphysema Sister    Heart failure Father    Lung cancer Brother    Pulmonary embolism Daughter    Thyroid cancer Mother    Thyroid disease Sister        Social History Main Topics    Smoking status: Former Smoker     Packs/day: 1.00     Years: 5.00     Types: Cigarettes     Quit date: 3/30/1979    Smokeless tobacco: Never Used    Alcohol use No    Drug use: No    Sexual activity: Not on file      Review of Systems   Constitutional: Negative for activity change, appetite change, chills, diaphoresis, fever and unexpected weight change.   HENT: Positive for congestion and sore throat. Negative for rhinorrhea, sinus pain, sinus pressure and trouble swallowing.    Eyes: Negative for pain and discharge.   Respiratory: Positive for wheezing. Negative for chest tightness and shortness of breath.    Cardiovascular: Negative for chest pain and leg swelling.   Gastrointestinal: Negative for abdominal distention, abdominal pain, diarrhea, nausea and vomiting.   Genitourinary: Negative.    Musculoskeletal: Negative.  Negative for arthralgias and myalgias.   Skin: Negative for color change and rash.   Allergic/Immunologic: Positive for immunocompromised state.   Neurological: Negative for headaches.   Hematological: Negative.      Objective:      Vital Signs (Most Recent):  Temp: 98.1 °F (36.7 °C) (01/03/18 0055)  Pulse: 66 (01/03/18 0502)  Resp: 19 (01/03/18 0416)  BP: (!) 109/59 (01/03/18 0502)  SpO2: 100 % (01/03/18 0502) Vital Signs (24h Range):  Temp:  [98.1 °F (36.7 °C)] 98.1 °F (36.7 °C)  Pulse:  [66-83] 66  Resp:  [19-22] 19  SpO2:  [94 %-100 %] 100 %  BP: (109-139)/(56-65) 109/59      There is no height or weight on file to calculate BMI.    No intake or output data in the 24 hours ending 01/03/18 0525    Physical Exam   Constitutional: She is oriented to person, place, and time. She appears well-developed and well-nourished. No distress.   HENT:   Head: Normocephalic and atraumatic.   Mouth/Throat: Oropharynx is clear and moist. No oropharyngeal exudate.   Eyes: EOM are normal. No scleral icterus.   Neck: Neck supple. No JVD present. No thyromegaly present.   Cardiovascular: Normal rate, regular rhythm, normal heart sounds and intact distal pulses.    No murmur heard.  Pulmonary/Chest: Effort normal. She has wheezes in the right lower field. She has rales in the right lower field and the left lower field.   Abdominal: Soft. Bowel sounds are normal. She exhibits no distension. There is no tenderness.   Musculoskeletal: Normal range of motion. She exhibits no edema.   Lymphadenopathy:     She has no cervical adenopathy.   Neurological: She is alert and oriented to person, place, and time.   Skin: Skin is warm and dry. Capillary refill takes less than 2 seconds.   Psychiatric: She has a normal mood and affect. Her behavior is normal. Thought content normal.       Vents:     Lines/Drains/Airways     Peripheral Intravenous Line                 Peripheral IV - Single Lumen 01/03/18 0359 Left Antecubital less than 1 day              Significant Labs:    CBC/Anemia Profile:    Recent Labs  Lab 01/03/18  0359   WBC 6.42   HGB 11.6*   HCT 37.1      MCV 88   RDW 14.7*        Chemistries:    Recent Labs  Lab 01/03/18  0359      K 4.2       CO2 23   BUN 14   CREATININE 0.7   CALCIUM 8.5*   ALBUMIN 3.4*   PROT 7.1   BILITOT 0.4   ALKPHOS 49*   ALT 17   AST 39       All pertinent labs within the past 24 hours have been reviewed.    Significant Imaging: CXR: I have reviewed all pertinent results/findings within the past 24 hours and my personal findings are:  chronic interstital changes without evidence of significant change. No evidence of new infiltrate    Assessment/Plan:     Pulmonary   Acute viral bronchitis    - No indication for antibiotics  - Recommend increasing steroids to 40 mg daily x 3 days then tapering back to 10 mg daily over the course of 1 week  - Continue PRN albuterol  - Would treat for 5 days with tamiflu given sick contacts and her immunocompromised state   - Ok to DC home if she is doing well off the continuous neb and feels like she can handle her symptoms at home  - Otherwise feel free to admit to OBS  - Have discussed with pulmonary staff on call          Blaine Valentin MD  Critical Care Medicine  [facility]

## 2018-01-03 NOTE — ED NOTES
Patient is resting on stretcher with call bell within reach, bed locked in the low position, and side rails up x2 for safety. Patient is in NAD. RR spontaneous, even, and unlabored. Denies pain. Will continue to monitor.

## 2018-01-04 ENCOUNTER — OFFICE VISIT (OUTPATIENT)
Dept: CARDIOLOGY | Facility: CLINIC | Age: 63
End: 2018-01-04
Payer: COMMERCIAL

## 2018-01-04 VITALS
RESPIRATION RATE: 15 BRPM | OXYGEN SATURATION: 95 % | DIASTOLIC BLOOD PRESSURE: 66 MMHG | HEART RATE: 86 BPM | SYSTOLIC BLOOD PRESSURE: 137 MMHG | HEIGHT: 70 IN | BODY MASS INDEX: 25.66 KG/M2 | WEIGHT: 179.25 LBS

## 2018-01-04 DIAGNOSIS — I31.39 PERICARDIAL EFFUSION: ICD-10-CM

## 2018-01-04 DIAGNOSIS — M34.1 CREST (CALCINOSIS, RAYNAUD'S PHENOMENON, ESOPHAGEAL DYSFUNCTION, SCLERODACTYLY, TELANGIECTASIA): ICD-10-CM

## 2018-01-04 DIAGNOSIS — I87.2 VENOUS INSUFFICIENCY: Primary | ICD-10-CM

## 2018-01-04 DIAGNOSIS — M35.02 SJOGREN'S SYNDROME WITH LUNG INVOLVEMENT: ICD-10-CM

## 2018-01-04 PROCEDURE — 99204 OFFICE O/P NEW MOD 45 MIN: CPT | Mod: S$GLB,,, | Performed by: INTERNAL MEDICINE

## 2018-01-04 PROCEDURE — 99999 PR PBB SHADOW E&M-EST. PATIENT-LVL III: CPT | Mod: PBBFAC,,, | Performed by: INTERNAL MEDICINE

## 2018-01-04 NOTE — PROGRESS NOTES
VASCULAR CONSULTATION    REASON FOR CONSULT:   Fatimah Cha is a 62 y.o. female who presents for eval of RLE venous varices, ?thrombophlebitis.    PCP: Ernesto  Card: Drew/Enriqueta  Pulm: Dr. Garcia/Dr. Ken   GI: Gerardo  HISTORY OF PRESENT ILLNESS:   The patient is a very pleasant 62-year-old  woman who has a somewhat complicated past medical history.  She has a diagnosis of CREST and scleroderma as well as associated interstitial lung disease.  She's been worked up by the pulmonary hypertension service and not found to have evidence of pulmonary hypertension.  More recently, she noted progression of right lower extremity venous varices, as well as discoloration of the skin around the right ankle.  She does wear bilateral venous compression stockings.  She denies any prior history of DVT, although there is a family history of factor V Leiden.  The patient tells me she's been checked for factor V Leiden and is negative.  She otherwise denies angina, and has stable dyspnea and wears home oxygen.  There is been no palpitations, lightheadedness, dizziness, or syncope.  She denies PND, orthopnea.  There's been no melena, hematuria, or claudicant symptoms.    Family history is notable for father with coronary artery disease and 2 bypass operations.  As mentioned above a sibling and a daughter of the patient both have factor V Leiden, although the patient is negative for factor V Leiden.    The patient is a distant ex-smoker having quit at age 22.    CARDIOVASCULAR HISTORY:   none    PAST MEDICAL HISTORY:     Past Medical History:   Diagnosis Date    CHF (congestive heart failure)     Colitis     CREST syndrome     GERD (gastroesophageal reflux disease)     Hypercholesteremia     Hypertension     Interstitial lung disease     Osteopenia     Pulmonary fibrosis     Pulmonary hypertension     Raynaud disease     Respiratory distress     Scleroderma     Sjoegren syndrome        PAST SURGICAL  HISTORY:     Past Surgical History:   Procedure Laterality Date    BREAST BIOPSY      CARDIAC CATHETERIZATION  2013    COLONOSCOPY      COLONOSCOPY N/A 11/15/2017    Procedure: COLONOSCOPY;  Surgeon: Radha Santiago MD;  Location: [facility] ([address] [place] [address]);  Service: Endoscopy;  Laterality: N/A;  2nd floor.  Pulm htn, ILD on o2.  Pls review w anesthesia.  Pt has cardio-pulm workup in progress at [facility] by Dr. Russell.                 reese monsalve RN- I reviewed Ms. Cha's history with Dr. Leopold (Anesthesia) and based on her medical history of:  Pulmonary Hyper    foot surgery x2      lumpectomy (benign)      LUNG BIOPSY  03/2013    THYROIDECTOMY      TONSILLECTOMY      TUBAL LIGATION      UPPER GASTROINTESTINAL ENDOSCOPY         ALLERGIES AND MEDICATION:     Review of patient's allergies indicates:   Allergen Reactions    Adhesive tape-silicones      Previous Medications    ALBUTEROL (PROVENTIL) 2.5 MG /3 ML (0.083 %) NEBULIZER SOLUTION    USE ONE VIAL in Nebulizer EVERY 6 HOURS AS NEEDED FOR SHORTNESS OF BREATH    ALBUTEROL SULFATE (VENTOLIN INHL)    Inhale 2 puffs into the lungs every 4 to 6 hours as needed.    ALBUTEROL-IPRATROPIUM 2.5MG-0.5MG/3ML (DUO-NEB) 0.5 MG-3 MG(2.5 MG BASE)/3 ML NEBULIZER SOLUTION    USE ONE VIAL in Nebulizer EVERY SIX HOURS AS NEEDED FOR SHORTNESS OF BREATH    AMBRISENTAN (LETAIRIS) 10 MG TAB    Take 10 mg by mouth once daily.    ASPIRIN (ECOTRIN) 81 MG EC TABLET    Take 81 mg by mouth every evening.    ATORVASTATIN (LIPITOR) 10 MG TABLET    Take 10 mg by mouth once daily.     CARVEDILOL (COREG) 12.5 MG TABLET    Take 1 tablet (12.5 mg total) by mouth 2 (two) times daily with meals.    FLUTICASONE (FLONASE) 50 MCG/ACTUATION NASAL SPRAY    1 spray by Each Nare route once daily.    FUROSEMIDE (LASIX) 40 MG TABLET    Take 1 tablet (40 mg total) by mouth once daily.    GUAIFENESIN (MUCINEX) 600 MG 12 HR TABLET    Take 1,200 mg by mouth as needed for Congestion.    LORAZEPAM  (ATIVAN) 1 MG TABLET    Take 1 mg by mouth 2 (two) times daily.     MULTIVITAMIN CAPSULE    Take 1 capsule by mouth once daily.    MYCOPHENOLATE (CELLCEPT) 500 MG TAB    Take 1,500 mg by mouth 2 (two) times daily.     OSELTAMIVIR (TAMIFLU) 75 MG CAPSULE    Take 1 capsule (75 mg total) by mouth 2 (two) times daily.    PANTOPRAZOLE (PROTONIX) 40 MG TABLET    Take 1 tablet (40 mg total) by mouth 2 (two) times daily.    POTASSIUM CHLORIDE (KLOR-CON) 20 MEQ PACK    Take 20 mEq by mouth once daily.    PREDNISONE (DELTASONE) 10 MG TABLET        PREDNISONE (DELTASONE) 20 MG TABLET    40 mg daily ×3 days followed by 20 mg daily ×3 days    PROMETHAZINE (PHENERGAN) 25 MG TABLET    1 TABLET BY MOUTH EVERY 6 HOURS AS NEEDED    RISEDRONATE (ACTONEL) 150 MG TAB    Take 150 mg by mouth every 30 days.    SILDENAFIL (VIAGRA) 25 MG TABLET    Take 20 mg by mouth 3 (three) times daily.    TIROSINT 137 MCG CAP    Take 1 capsule by mouth once daily.     VENTOLIN HFA 90 MCG/ACTUATION INHALER        VITAMIN D2 50,000 UNIT CAPSULE    Take 50,000 Units by mouth every 30 days.        SOCIAL HISTORY:     Social History     Social History    Marital status:      Spouse name: N/A    Number of children: N/A    Years of education: N/A     Occupational History    Not on file.     Social History Main Topics    Smoking status: Former Smoker     Packs/day: 1.00     Years: 5.00     Types: Cigarettes     Quit date: 3/30/1979    Smokeless tobacco: Never Used    Alcohol use No    Drug use: No    Sexual activity: Not on file     Other Topics Concern    Not on file     Social History Narrative    No narrative on file       FAMILY HISTORY:     Family History   Problem Relation Age of Onset    Thyroid cancer Mother     Heart failure Father     Emphysema Sister     Thyroid disease Sister     Deep vein thrombosis Brother     Lung cancer Brother     Colon cancer Maternal Uncle      60s    Cancer Maternal Aunt     Pulmonary embolism  "Daughter     Breast cancer Neg Hx     Ovarian cancer Neg Hx     Celiac disease Neg Hx     Cirrhosis Neg Hx     Colon polyps Neg Hx     Crohn's disease Neg Hx     Cystic fibrosis Neg Hx     Esophageal cancer Neg Hx     Hemochromatosis Neg Hx     Inflammatory bowel disease Neg Hx     Irritable bowel syndrome Neg Hx     Liver cancer Neg Hx     Liver disease Neg Hx     Rectal cancer Neg Hx     Stomach cancer Neg Hx     Ulcerative colitis Neg Hx     Phoenix's disease Neg Hx     Lymphoma Neg Hx     Tuberculosis Neg Hx     Rheum arthritis Neg Hx     Scleroderma Neg Hx     Melanoma Neg Hx     Lupus Neg Hx     Multiple sclerosis Neg Hx     Psoriasis Neg Hx     Skin cancer Neg Hx        REVIEW OF SYSTEMS:   Review of Systems   Constitutional: Negative for chills, diaphoresis and fever.   HENT: Negative for nosebleeds.    Eyes: Negative for blurred vision, double vision and photophobia.   Respiratory: Positive for shortness of breath. Negative for hemoptysis and wheezing.    Cardiovascular: Negative for chest pain, palpitations, orthopnea, claudication, leg swelling and PND.   Gastrointestinal: Negative for abdominal pain, blood in stool, heartburn, melena, nausea and vomiting.   Genitourinary: Negative for flank pain and hematuria.   Musculoskeletal: Negative for falls, myalgias and neck pain.   Skin: Negative for rash.   Neurological: Negative for dizziness, seizures, loss of consciousness, weakness and headaches.   Endo/Heme/Allergies: Negative for polydipsia. Does not bruise/bleed easily.   Psychiatric/Behavioral: Negative for depression and memory loss. The patient is not nervous/anxious.        PHYSICAL EXAM:     Vitals:    01/04/18 0931   BP: 137/66   Pulse: 86   Resp: 15    Body mass index is 25.72 kg/m².  Weight: 81.3 kg (179 lb 3.7 oz)   Height: 5' 10" (177.8 cm)     Physical Exam   Constitutional: She is oriented to person, place, and time. She appears well-developed and well-nourished. She " is cooperative.  Non-toxic appearance. No distress.   HENT:   Head: Normocephalic and atraumatic.   Eyes: Conjunctivae and EOM are normal. Pupils are equal, round, and reactive to light. No scleral icterus.   Neck: Trachea normal. Neck supple. Normal carotid pulses and no JVD present. Carotid bruit is not present. No neck rigidity. No edema present. No thyromegaly present.   Cardiovascular: Normal rate, regular rhythm, S1 normal, S2 normal and normal heart sounds.  PMI is not displaced.  Exam reveals no gallop and no friction rub.    No murmur heard.  Pulses:       Carotid pulses are 2+ on the right side, and 2+ on the left side.  Reticular varices R>L.  Ecchymosis of posterior distal RLE, ?corona phlebectasia RLE.   Pulmonary/Chest: Effort normal and breath sounds normal. No respiratory distress. She has no wheezes. She has no rales. She exhibits no tenderness.   Abdominal: Soft. Bowel sounds are normal. She exhibits no distension and no mass. There is no hepatosplenomegaly. There is no tenderness.   Musculoskeletal: She exhibits no edema or tenderness.   Feet:   Right Foot:   Skin Integrity: Negative for ulcer.   Left Foot:   Skin Integrity: Negative for ulcer.   Neurological: She is alert and oriented to person, place, and time. No cranial nerve deficit.   Skin: Skin is warm and dry. No rash noted. No erythema.   Psychiatric: She has a normal mood and affect. Her speech is normal and behavior is normal.   Vitals reviewed.      DATA:   EKG: (personally reviewed tracing)  1/3/18 SR 76, PVC    Laboratory:  CBC:    Recent Labs  Lab 11/26/17 0336 11/27/17 0438 01/03/18  0359   WHITE BLOOD CELL COUNT 4.21 4.72 6.42   HEMOGLOBIN 10.9 L 11.3 L 11.6 L   HEMATOCRIT 34.2 L 36.3 L 37.1   PLATELETS 224 217 201       CHEMISTRIES:    Recent Labs  Lab 09/03/17  0558 10/03/17  1427  11/26/17  0336 11/27/17  0438 01/03/18  0359   GLUCOSE 86 106  < > 114 H 121 H 86   SODIUM 137 142  < > 140 141 142   POTASSIUM 3.4 L 3.5  < > 3.4  L 4.0 4.2   BUN BLD 19 11  < > 12 14 14   CREATININE 0.8 0.8  < > 0.7 0.8 0.7   EGFR IF AFRICAN AMERICAN >60.0 >60.0  < > >60.0 >60.0 >60.0   EGFR IF NON- >60.0 >60.0  < > >60.0 >60.0 >60.0   CALCIUM 8.8 9.4  < > 8.7 8.8 8.5 L   MAGNESIUM 2.1 1.9  --  2.0  --   --    < > = values in this interval not displayed.    CARDIAC BIOMARKERS:    Recent Labs  Lab 09/01/17 2016 11/25/17 2014 01/03/18  0359   TROPONIN I <0.006 <0.006 0.008       COAGS:    Recent Labs  Lab 05/17/17  0721   INR 0.9       LIPIDS/LFTS:    Recent Labs  Lab 11/15/17  2159 11/25/17  2014 01/03/18  0359   AST 27 27 39   ALT 14 13 17       Cardiovascular Testing:  CTA Chest 11/26/17  #1. No evidence of pulmonary embolus.  #2.  Abnormal but grossly stable appearance of the lungs with reticular opacification suggesting an interstitial process (previously characterized as chronic interstitial lung disease/NSIP).  #3.  Prominence of the pulmonary artery suggesting pulmonary arterial hypertension.  #4.  Cardiomegaly.  #5.  Mild pericardial effusion.    LE venous US 11/26/17  No evidence of DVT in the bilateral lower extremities.    Echo 10/3/17    1 - Normal left ventricular systolic function (EF 55-60%).     2 - No wall motion abnormalities.     3 - Normal left ventricular diastolic function.     4 - Right ventricle is upper limit of normal in size with normal systolic function.     5 - The estimated PA systolic pressure is 18 mmHg.     6 - Mild mitral regurgitation.     RHC 5/17/17  B. Summary/Post-Operative Diagnosis    Normal right and left Filling Pressures.    Mild Pulmonary Hypertension.    TPG 20 PVR 3.2 SVR 12.3 (984).    Normal CO /CI on letaris.    Compared to outside report of RHC from April 2015 from [facility]. no change in filling pressures or PA pressures (coud not find record of CO / CI).  D. Hemodynamic Results  Condition 1:  AOPRES: 106/70 (82)  AOSAT: 100  FICKCI: 2.96  FICKCO: 6.19  PAPRES: 52/16 (30)  PASAT: 74  PWPRES:  13/12 (10)  RAPRES: 8/7 (6)  RVPRES: 49/2, 6  Condition 2:  PWSAT_POST: 93    Cardiac PET 2/3/17  Nuclear Quantitative Functional Analysis:   At rest:  Quantitative measurements of LV function are over estimated secondary to small ventricular volumes.   At stress:  Quantitative measurements of LV function are over estimated secondary to small ventricular volumes.   CONCLUSIONS: NO CLINICALLY SIGNIFICANT STRESS INDUCED PERFUSION DEFECTS as assessed with PET perfusion imaging.  1. There is no evidence of a discrete hemodynamically significant coronary stenosis.   2. Although no clinically significant stress defect is seen, there is resting flow heterogeneity that improves after Dipyridamole. These results suggest mild endothelial dysfunction due to mild, diffuse, non-obstructive coronary atherosclerosis without clinically significant, localized perfusion defects.   3. Resting wall motion is physiologic. Stress wall motion is physiologic.   4. The ventricular volumes are normal at rest and stress.   5. The extracardiac distribution of radioactivity is normal.   6. There was no previous study available to compare.    Holter 12/30/16  PREDOMINANT RHYTHM  1. Sinus rhythm with heart rates varying between 63 and 133 bpm with an average of 85 bpm.   VENTRICULAR ARRHYTHMIAS  1. There were occasional PVCs totalling 531 and averaging 23 per hour.  There were 6 couplets.  2. There were no episodes of ventricular tachycardia.  SUPRA VENTRICULAR ARRHYTHMIAS  1. There were very rare PACs recorded totalling 20 and averaging less than 1 per hour.  There were 10 couplets.  2. There were no episodes of sustained supraventricular tachycardia.  SINUS NODE FUNCTION  1. HRs averaging 85 bpm during waking hours and 75 bpm during sleep.  2. There was no evidence of high grade SA maciel block.   AV CONDUCTION  1. There was no evidence of high grade AV block.   DIARY  1. The diary was returned, but not completed  MISCELLANEOUS  1. There were  rare hookup related artifacts. Overall, the study was of adequate quality.   2. This was a tape of adequate length (23 hrs).       ASSESSMENT:   # R>LLE venous varices +/- bleeding into skin (no clear variceal hemorrhage)  # peric effusion noted on CTA chest 11/2017  # CREST/Scleroderma  # end-stage lung dz/ILD    PLAN:   Cont compression rx  Check LE venous US/reflux  Check echo to eval peric eff  RTC 1-2 weeks for review of above    John Paul Reese MD, FACC

## 2018-01-10 ENCOUNTER — HOSPITAL ENCOUNTER (OUTPATIENT)
Dept: PULMONOLOGY | Facility: CLINIC | Age: 63
Discharge: HOME | End: 2018-01-10
Payer: COMMERCIAL

## 2018-01-10 ENCOUNTER — OFFICE VISIT (OUTPATIENT)
Dept: TRANSPLANT | Facility: CLINIC | Age: 63
End: 2018-01-10
Payer: COMMERCIAL

## 2018-01-10 VITALS
SYSTOLIC BLOOD PRESSURE: 114 MMHG | HEART RATE: 83 BPM | TEMPERATURE: 98 F | HEIGHT: 69 IN | RESPIRATION RATE: 20 BRPM | WEIGHT: 177 LBS | BODY MASS INDEX: 26.22 KG/M2 | BODY MASS INDEX: 26.12 KG/M2 | HEIGHT: 69 IN | WEIGHT: 176.38 LBS | OXYGEN SATURATION: 97 % | DIASTOLIC BLOOD PRESSURE: 63 MMHG

## 2018-01-10 DIAGNOSIS — Z76.82 LUNG TRANSPLANT CANDIDATE: ICD-10-CM

## 2018-01-10 DIAGNOSIS — M35.02 SJOGREN'S SYNDROME WITH LUNG INVOLVEMENT: ICD-10-CM

## 2018-01-10 DIAGNOSIS — M34.1 CREST (CALCINOSIS, RAYNAUD'S PHENOMENON, ESOPHAGEAL DYSFUNCTION, SCLERODACTYLY, TELANGIECTASIA): ICD-10-CM

## 2018-01-10 DIAGNOSIS — J84.10 PULMONARY FIBROSIS: ICD-10-CM

## 2018-01-10 DIAGNOSIS — K21.9 GASTROESOPHAGEAL REFLUX DISEASE, ESOPHAGITIS PRESENCE NOT SPECIFIED: ICD-10-CM

## 2018-01-10 DIAGNOSIS — J84.9 INTERSTITIAL LUNG DISEASE: ICD-10-CM

## 2018-01-10 DIAGNOSIS — I27.20 PULMONARY HYPERTENSION: ICD-10-CM

## 2018-01-10 DIAGNOSIS — J96.11 CHRONIC RESPIRATORY FAILURE WITH HYPOXIA: ICD-10-CM

## 2018-01-10 DIAGNOSIS — Z76.82 LUNG TRANSPLANT CANDIDATE: Primary | ICD-10-CM

## 2018-01-10 LAB
PRE FEV1 FVC: 82
PRE FEV1: 1.94
PRE FVC: 2.38
PREDICTED FEV1 FVC: 77
PREDICTED FEV1: 2.75
PREDICTED FVC: 3.51

## 2018-01-10 PROCEDURE — 94010 BREATHING CAPACITY TEST: CPT | Mod: S$GLB,,, | Performed by: INTERNAL MEDICINE

## 2018-01-10 PROCEDURE — 94729 DIFFUSING CAPACITY: CPT | Mod: S$GLB,,, | Performed by: INTERNAL MEDICINE

## 2018-01-10 PROCEDURE — 94618 PULMONARY STRESS TESTING: CPT

## 2018-01-10 PROCEDURE — 99213 OFFICE O/P EST LOW 20 MIN: CPT | Mod: 25,S$GLB,, | Performed by: INTERNAL MEDICINE

## 2018-01-10 PROCEDURE — 99999 PR PBB SHADOW E&M-EST. PATIENT-LVL III: CPT | Mod: PBBFAC,,, | Performed by: INTERNAL MEDICINE

## 2018-01-10 RX ORDER — SULFAMETHOXAZOLE AND TRIMETHOPRIM 800; 160 MG/1; MG/1
1 TABLET ORAL
COMMUNITY
Start: 2017-12-12 | End: 2018-03-28 | Stop reason: HOSPADM

## 2018-01-10 RX ORDER — AZELASTINE 1 MG/ML
2 SPRAY, METERED NASAL 2 TIMES DAILY PRN
COMMUNITY
Start: 2018-01-03

## 2018-01-10 NOTE — LETTER
January 10, 2018        Luis Ken  [address]  [facility] [address]  Phone: [phone]  Fax: [phone]             [facility] - [facility] Trans[facility]  [address]  [facility] [address]  Phone: [phone]   Patient: Fatimah Cha   MR Number: [id number]   YOB: 1955   Date of Visit: 1/10/2018       Dear Dr. Luis Ken    Thank you for referring Fatimah Cha to me for evaluation. Attached you will find relevant portions of my assessment and plan of care.    If you have questions, please do not hesitate to call me. I look forward to following Fatimah Cha along with you.    Sincerely,    Rodrick Garcia MD    Enclosure    If you would like to receive this communication electronically, please contact [email or web address] or [phone] to request [employer] access.    [employer] is a tool which provides read-only access to select patient information with whom you have a relationship. Its easy to use and provides real time access to review your patients record including encounter summaries, notes, results, and demographic information.    If you feel you have received this communication in error or would no longer like to receive these types of communications, please e-mail [email or web address]

## 2018-01-11 ENCOUNTER — HOSPITAL ENCOUNTER (OUTPATIENT)
Dept: RADIOLOGY | Facility: HOSPITAL | Age: 63
Discharge: HOME | End: 2018-01-11
Attending: INTERNAL MEDICINE
Payer: COMMERCIAL

## 2018-01-11 ENCOUNTER — HOSPITAL ENCOUNTER (OUTPATIENT)
Dept: CARDIOLOGY | Facility: HOSPITAL | Age: 63
Discharge: HOME | End: 2018-01-11
Attending: INTERNAL MEDICINE
Payer: COMMERCIAL

## 2018-01-11 DIAGNOSIS — I87.2 VENOUS INSUFFICIENCY: ICD-10-CM

## 2018-01-11 DIAGNOSIS — I31.39 PERICARDIAL EFFUSION: ICD-10-CM

## 2018-01-11 PROCEDURE — 93970 EXTREMITY STUDY: CPT | Mod: 26,,, | Performed by: RADIOLOGY

## 2018-01-11 PROCEDURE — 93306 TTE W/DOPPLER COMPLETE: CPT

## 2018-01-11 PROCEDURE — 93306 TTE W/DOPPLER COMPLETE: CPT | Mod: 26,,, | Performed by: INTERNAL MEDICINE

## 2018-01-11 PROCEDURE — 93970 EXTREMITY STUDY: CPT | Mod: TC

## 2018-01-12 LAB
AORTIC VALVE REGURGITATION: NORMAL
DIASTOLIC DYSFUNCTION: NO
GLOBAL PERICARDIAL EFFUSION: NORMAL
MITRAL VALVE REGURGITATION: NORMAL
RETIRED EF AND QEF - SEE NOTES: 55 (ref 55–65)
TRICUSPID VALVE REGURGITATION: NORMAL

## 2018-01-12 NOTE — PROCEDURES
Fatimah Cha is a 62 y.o.  female patient, who presents for a 6 minute walk test ordered by Frederick Garcia MD.  The diagnosis is Pre Lung Transplant Evaluation; Pulmonary Fibrosis.  The patient's BMI is 26.1 kg/m2.  Predicted distance (lower limit of normal) is 347.85 meters.      Test Results:    The test was completed without stopping.  The total time walked was 360 seconds.  During walking, the patient reported:  Dyspnea.  The patient used supplemental oxygen during testing.     01/10/2018---------Distance: 286.51 meters (940 feet)     O2 Sat % Supplemental Oxygen Heart Rate Blood Pressure Kushal Scale   Pre-exercise  (Resting) 96 % 3 L/M 86 bpm 112/56 mmHg 2   During Exercise 88 % 3 L/M 92 bpm 118/57 mmHg 3   Post-exercise  (Recovery) 97 % 3 L/M  91 bpm       Recovery Time:  97 seconds    Performing nurse/tech:  RAYRAY Sampson      PREVIOUS STUDY:   10/03/2017---------Distance: 266.7 meters (875 feet)       O2 Sat % Supplemental Oxygen Heart Rate Blood Pressure Kushal Scale   Pre-exercise  (Resting) 97 % 3 L/M 86 bpm 120/64 mmHg 2   During Exercise 83 % 3 L/M 102 bpm 128/66 mmHg 3   Post-exercise  (Recovery) 94 % 3 L/M  91 bpm   mmHg        CLINICAL INTERPRETATION:  Six minute walk distance is 286.51 meters (940 feet) with moderate dyspnea.  During exercise, there was significant desaturation while breathing supplemental oxygen.  Both blood pressure and heart rate remained stable with walking.  The patient did not report non-pulmonary symptoms during exercise.  Significant exercise impairment is likely due to desaturation and subjective symptoms.  Since the previous study in October 2017, exercise capacity is unchanged.  Based upon age and body mass index, exercise capacity is less than predicted.

## 2018-01-23 DIAGNOSIS — J84.10 PULMONARY FIBROSIS: ICD-10-CM

## 2018-01-23 DIAGNOSIS — I27.20 PULMONARY HYPERTENSION: ICD-10-CM

## 2018-01-23 DIAGNOSIS — Z76.82 LUNG TRANSPLANT CANDIDATE: Primary | ICD-10-CM

## 2018-02-02 ENCOUNTER — OFFICE VISIT (OUTPATIENT)
Dept: CARDIOLOGY | Facility: CLINIC | Age: 63
End: 2018-02-02
Payer: COMMERCIAL

## 2018-02-02 VITALS
HEART RATE: 77 BPM | RESPIRATION RATE: 15 BRPM | SYSTOLIC BLOOD PRESSURE: 121 MMHG | HEIGHT: 69 IN | BODY MASS INDEX: 27.11 KG/M2 | WEIGHT: 183 LBS | DIASTOLIC BLOOD PRESSURE: 67 MMHG | OXYGEN SATURATION: 98 %

## 2018-02-02 DIAGNOSIS — M34.1 CREST (CALCINOSIS, RAYNAUD'S PHENOMENON, ESOPHAGEAL DYSFUNCTION, SCLERODACTYLY, TELANGIECTASIA): ICD-10-CM

## 2018-02-02 DIAGNOSIS — I87.2 VENOUS INSUFFICIENCY OF RIGHT LOWER EXTREMITY: Primary | ICD-10-CM

## 2018-02-02 DIAGNOSIS — I31.39 PERICARDIAL EFFUSION: ICD-10-CM

## 2018-02-02 PROCEDURE — 3008F BODY MASS INDEX DOCD: CPT | Mod: S$GLB,,, | Performed by: INTERNAL MEDICINE

## 2018-02-02 PROCEDURE — 99214 OFFICE O/P EST MOD 30 MIN: CPT | Mod: S$GLB,,, | Performed by: INTERNAL MEDICINE

## 2018-02-02 PROCEDURE — 99999 PR PBB SHADOW E&M-EST. PATIENT-LVL III: CPT | Mod: PBBFAC,,, | Performed by: INTERNAL MEDICINE

## 2018-02-02 NOTE — PROGRESS NOTES
CARDIOVASCULAR PROGRESS NOTE    REASON FOR CONSULT:   Fatimah Cha is a 62 y.o. female who presents for f/u of RLE venous varices, ?thrombophlebitis.    PCP: Ernesto  Card: Drew/Enriqueta  Pulm: Dr. Garcia/Dr. Ken   GI: Gerardo  HISTORY OF PRESENT ILLNESS:   The patient returns for follow-up of her testing.  She denies intercurrent angina.  She does have stable dyspnea and is on home oxygen.  She denies palpitations, lightheadedness, dizziness, or syncope.  There is been no PND, orthopnea, or lower Dasilva E edema.  She denies melena, hematuria, or claudicant symptoms.  She continues to complain of right posterior lower extremity discomfort, and continues to wear bilateral compression stockings.    I reviewed the results of her echocardiogram noting only trivial pericardial effusion.  The venous ultrasound revealed no evidence of DVT.  There was right GSV reflux.    CARDIOVASCULAR HISTORY:   CVI: R GSV reflux (US 1/2018)    PAST MEDICAL HISTORY:     Past Medical History:   Diagnosis Date    CHF (congestive heart failure)     Colitis     CREST syndrome     GERD (gastroesophageal reflux disease)     Hypercholesteremia     Hypertension     Interstitial lung disease     Osteopenia     Pulmonary fibrosis     Pulmonary hypertension     Raynaud disease     Respiratory distress     Scleroderma     Sjoegren syndrome        PAST SURGICAL HISTORY:     Past Surgical History:   Procedure Laterality Date    BREAST BIOPSY      CARDIAC CATHETERIZATION  2013    COLONOSCOPY      COLONOSCOPY N/A 11/15/2017    Procedure: COLONOSCOPY;  Surgeon: Radha Santiago MD;  Location: [address]);  Service: Endoscopy;  Laterality: N/A;  2nd floor.  Pulm htn, ILD on o2.  Pls review w anesthesia.  Pt has cardio-pulm workup in progress at [facility] by Dr. Russell.                 reese monsalve RN- I reviewed Ms. Cha's history with Dr. Leopold (Anesthesia) and based on her medical history of:  Pulmonary Hyper    foot  surgery x2      lumpectomy (benign)      LUNG BIOPSY  03/2013    THYROIDECTOMY      TONSILLECTOMY      TUBAL LIGATION      UPPER GASTROINTESTINAL ENDOSCOPY         ALLERGIES AND MEDICATION:     Review of patient's allergies indicates:   Allergen Reactions    Adhesive tape-silicones      Previous Medications    ALBUTEROL (PROVENTIL) 2.5 MG /3 ML (0.083 %) NEBULIZER SOLUTION    USE ONE VIAL in Nebulizer EVERY 6 HOURS AS NEEDED FOR SHORTNESS OF BREATH    ALBUTEROL-IPRATROPIUM 2.5MG-0.5MG/3ML (DUO-NEB) 0.5 MG-3 MG(2.5 MG BASE)/3 ML NEBULIZER SOLUTION    USE ONE VIAL in Nebulizer EVERY SIX HOURS AS NEEDED FOR SHORTNESS OF BREATH    AMBRISENTAN (LETAIRIS) 10 MG TAB    Take 10 mg by mouth once daily.    ASPIRIN (ECOTRIN) 81 MG EC TABLET    Take 81 mg by mouth every evening.    ATORVASTATIN (LIPITOR) 10 MG TABLET    Take 10 mg by mouth once daily.     AZELASTINE (ASTELIN) 137 MCG (0.1 %) NASAL SPRAY    2 sprays by Nasal route 2 (two) times daily.    CARVEDILOL (COREG) 12.5 MG TABLET    Take 1 tablet (12.5 mg total) by mouth 2 (two) times daily with meals.    FLUTICASONE (FLONASE) 50 MCG/ACTUATION NASAL SPRAY    1 spray by Each Nare route once daily.    FUROSEMIDE (LASIX) 40 MG TABLET    Take 1 tablet (40 mg total) by mouth once daily.    GUAIFENESIN (MUCINEX) 600 MG 12 HR TABLET    Take 1,200 mg by mouth as needed for Congestion.    LORAZEPAM (ATIVAN) 1 MG TABLET    Take 1 mg by mouth 2 (two) times daily.     MULTIVITAMIN CAPSULE    Take 1 capsule by mouth once daily.    MYCOPHENOLATE (CELLCEPT) 500 MG TAB    Take 1,500 mg by mouth 2 (two) times daily.     PANTOPRAZOLE (PROTONIX) 40 MG TABLET    Take 1 tablet (40 mg total) by mouth 2 (two) times daily.    POTASSIUM CHLORIDE (KLOR-CON) 20 MEQ PACK    Take 20 mEq by mouth once daily.    PREDNISONE (DELTASONE) 10 MG TABLET    5 mg.     PROMETHAZINE (PHENERGAN) 25 MG TABLET    1 TABLET BY MOUTH EVERY 6 HOURS AS NEEDED    RISEDRONATE (ACTONEL) 150 MG TAB    Take 150 mg by  mouth every 30 days.    SULFAMETHOXAZOLE-TRIMETHOPRIM 800-160MG (BACTRIM DS) 800-160 MG TAB    Take 1 tablet by mouth every Mon, Wed, Fri.    TIROSINT 137 MCG CAP    Take 1 capsule by mouth once daily.     VENTOLIN HFA 90 MCG/ACTUATION INHALER        VITAMIN D2 50,000 UNIT CAPSULE    Take 50,000 Units by mouth every 30 days.        SOCIAL HISTORY:     Social History     Social History    Marital status:      Spouse name: N/A    Number of children: N/A    Years of education: N/A     Occupational History    Not on file.     Social History Main Topics    Smoking status: Former Smoker     Packs/day: 1.00     Years: 5.00     Types: Cigarettes     Quit date: 3/30/1979    Smokeless tobacco: Never Used    Alcohol use No    Drug use: No    Sexual activity: Not on file     Other Topics Concern    Not on file     Social History Narrative    No narrative on file       FAMILY HISTORY:     Family History   Problem Relation Age of Onset    Thyroid cancer Mother     Heart failure Father     Emphysema Sister     Thyroid disease Sister     Deep vein thrombosis Brother     Lung cancer Brother     Colon cancer Maternal Uncle      60s    Cancer Maternal Aunt     Pulmonary embolism Daughter     Breast cancer Neg Hx     Ovarian cancer Neg Hx     Celiac disease Neg Hx     Cirrhosis Neg Hx     Colon polyps Neg Hx     Crohn's disease Neg Hx     Cystic fibrosis Neg Hx     Esophageal cancer Neg Hx     Hemochromatosis Neg Hx     Inflammatory bowel disease Neg Hx     Irritable bowel syndrome Neg Hx     Liver cancer Neg Hx     Liver disease Neg Hx     Rectal cancer Neg Hx     Stomach cancer Neg Hx     Ulcerative colitis Neg Hx     Phoenix's disease Neg Hx     Lymphoma Neg Hx     Tuberculosis Neg Hx     Rheum arthritis Neg Hx     Scleroderma Neg Hx     Melanoma Neg Hx     Lupus Neg Hx     Multiple sclerosis Neg Hx     Psoriasis Neg Hx     Skin cancer Neg Hx        REVIEW OF SYSTEMS:   Review of  "Systems   Constitutional: Negative for chills, diaphoresis and fever.   HENT: Negative for nosebleeds.    Eyes: Negative for blurred vision, double vision and photophobia.   Respiratory: Positive for shortness of breath. Negative for hemoptysis and wheezing.    Cardiovascular: Negative for chest pain, palpitations, orthopnea, claudication, leg swelling and PND.   Gastrointestinal: Negative for abdominal pain, blood in stool, heartburn, melena, nausea and vomiting.   Genitourinary: Negative for flank pain and hematuria.   Musculoskeletal: Negative for falls, myalgias and neck pain.   Skin: Negative for rash.   Neurological: Negative for dizziness, seizures, loss of consciousness, weakness and headaches.   Endo/Heme/Allergies: Negative for polydipsia. Does not bruise/bleed easily.   Psychiatric/Behavioral: Negative for depression and memory loss. The patient is not nervous/anxious.        PHYSICAL EXAM:     Vitals:    02/02/18 1415   BP: 121/67   Pulse: 77   Resp: 15    Body mass index is 27.02 kg/m².  Weight: 83 kg (183 lb)   Height: 5' 9" (175.3 cm)     Physical Exam   Constitutional: She is oriented to person, place, and time. She appears well-developed and well-nourished. She is cooperative.  Non-toxic appearance. No distress.   HENT:   Head: Normocephalic and atraumatic.   Eyes: Conjunctivae and EOM are normal. Pupils are equal, round, and reactive to light. No scleral icterus.   Neck: Trachea normal. Neck supple. Normal carotid pulses and no JVD present. Carotid bruit is not present. No neck rigidity. No edema present. No thyromegaly present.   Cardiovascular: Normal rate, regular rhythm, S1 normal, S2 normal and normal heart sounds.  PMI is not displaced.  Exam reveals no gallop and no friction rub.    No murmur heard.  Pulses:       Carotid pulses are 2+ on the right side, and 2+ on the left side.  Reticular varices R>L.  Ecchymosis of posterior distal RLE, ?corona phlebectasia RLE.   Pulmonary/Chest: Effort " normal and breath sounds normal. No respiratory distress. She has no wheezes. She has no rales. She exhibits no tenderness.   Abdominal: Soft. Bowel sounds are normal. She exhibits no distension and no mass. There is no hepatosplenomegaly. There is no tenderness.   Musculoskeletal: She exhibits no edema or tenderness.   Feet:   Right Foot:   Skin Integrity: Negative for ulcer.   Left Foot:   Skin Integrity: Negative for ulcer.   Neurological: She is alert and oriented to person, place, and time. No cranial nerve deficit.   Skin: Skin is warm and dry. No rash noted. No erythema.   Psychiatric: She has a normal mood and affect. Her speech is normal and behavior is normal.   Vitals reviewed.      DATA:   EKG: (personally reviewed tracing)  1/3/18 SR 76, PVC    Laboratory:  CBC:    Recent Labs  Lab 11/26/17  0336 11/27/17 0438 01/03/18  0359   WHITE BLOOD CELL COUNT 4.21 4.72 6.42   HEMOGLOBIN 10.9 L 11.3 L 11.6 L   HEMATOCRIT 34.2 L 36.3 L 37.1   PLATELETS 224 217 201       CHEMISTRIES:    Recent Labs  Lab 09/03/17  0558 10/03/17  1427  11/26/17  0336 11/27/17  0438 01/03/18  0359   GLUCOSE 86 106  < > 114 H 121 H 86   SODIUM 137 142  < > 140 141 142   POTASSIUM 3.4 L 3.5  < > 3.4 L 4.0 4.2   BUN BLD 19 11  < > 12 14 14   CREATININE 0.8 0.8  < > 0.7 0.8 0.7   EGFR IF AFRICAN AMERICAN >60.0 >60.0  < > >60.0 >60.0 >60.0   EGFR IF NON- >60.0 >60.0  < > >60.0 >60.0 >60.0   CALCIUM 8.8 9.4  < > 8.7 8.8 8.5 L   MAGNESIUM 2.1 1.9  --  2.0  --   --    < > = values in this interval not displayed.    CARDIAC BIOMARKERS:    Recent Labs  Lab 09/01/17 2016 11/25/17  2014 01/03/18  0359   TROPONIN I <0.006 <0.006 0.008       COAGS:    Recent Labs  Lab 05/17/17  0721   INR 0.9       LIPIDS/LFTS:    Recent Labs  Lab 11/15/17  2159 11/25/17 2014 01/03/18  0359   AST 27 27 39   ALT 14 13 17       Cardiovascular Testing:  Echo 1/11/18    1 - Normal left ventricular systolic function (EF 55-60%).     2 - Concentric  hypertrophy.     3 - Trivial pericardial effusion.     LE venous US/reflux 1/11/18  Bilaterally, the greater and lesser saphenous veins are dilated.  There is hemodynamically significant reflux in the right greater saphenous vein. No hemodynamically significant reflux identified in the right lesser saphenous vein or in the left leg.  There is no evidence of bilateral lower extremity deep venous thrombosis.    CTA Chest 11/26/17  #1. No evidence of pulmonary embolus.  #2.  Abnormal but grossly stable appearance of the lungs with reticular opacification suggesting an interstitial process (previously characterized as chronic interstitial lung disease/NSIP).  #3.  Prominence of the pulmonary artery suggesting pulmonary arterial hypertension.  #4.  Cardiomegaly.  #5.  Mild pericardial effusion.    LE venous US 11/26/17  No evidence of DVT in the bilateral lower extremities.    Echo 10/3/17    1 - Normal left ventricular systolic function (EF 55-60%).     2 - No wall motion abnormalities.     3 - Normal left ventricular diastolic function.     4 - Right ventricle is upper limit of normal in size with normal systolic function.     5 - The estimated PA systolic pressure is 18 mmHg.     6 - Mild mitral regurgitation.     RHC 5/17/17  B. Summary/Post-Operative Diagnosis    Normal right and left Filling Pressures.    Mild Pulmonary Hypertension.    TPG 20 PVR 3.2 SVR 12.3 (984).    Normal CO /CI on letaris.    Compared to outside report of RHC from April 2015 from [facility]. no change in filling pressures or PA pressures (coud not find record of CO / CI).  D. Hemodynamic Results  Condition 1:  AOPRES: 106/70 (82)  AOSAT: 100  FICKCI: 2.96  FICKCO: 6.19  PAPRES: 52/16 (30)  PASAT: 74  PWPRES: 13/12 (10)  RAPRES: 8/7 (6)  RVPRES: 49/2, 6  Condition 2:  PWSAT_POST: 93    Cardiac PET 2/3/17  Nuclear Quantitative Functional Analysis:   At rest:  Quantitative measurements of LV function are over estimated secondary to small ventricular  volumes.   At stress:  Quantitative measurements of LV function are over estimated secondary to small ventricular volumes.   CONCLUSIONS: NO CLINICALLY SIGNIFICANT STRESS INDUCED PERFUSION DEFECTS as assessed with PET perfusion imaging.  1. There is no evidence of a discrete hemodynamically significant coronary stenosis.   2. Although no clinically significant stress defect is seen, there is resting flow heterogeneity that improves after Dipyridamole. These results suggest mild endothelial dysfunction due to mild, diffuse, non-obstructive coronary atherosclerosis without clinically significant, localized perfusion defects.   3. Resting wall motion is physiologic. Stress wall motion is physiologic.   4. The ventricular volumes are normal at rest and stress.   5. The extracardiac distribution of radioactivity is normal.   6. There was no previous study available to compare.    Holter 12/30/16  PREDOMINANT RHYTHM  1. Sinus rhythm with heart rates varying between 63 and 133 bpm with an average of 85 bpm.   VENTRICULAR ARRHYTHMIAS  1. There were occasional PVCs totalling 531 and averaging 23 per hour.  There were 6 couplets.  2. There were no episodes of ventricular tachycardia.  SUPRA VENTRICULAR ARRHYTHMIAS  1. There were very rare PACs recorded totalling 20 and averaging less than 1 per hour.  There were 10 couplets.  2. There were no episodes of sustained supraventricular tachycardia.  SINUS NODE FUNCTION  1. HRs averaging 85 bpm during waking hours and 75 bpm during sleep.  2. There was no evidence of high grade SA maciel block.   AV CONDUCTION  1. There was no evidence of high grade AV block.   DIARY  1. The diary was returned, but not completed  MISCELLANEOUS  1. There were rare hookup related artifacts. Overall, the study was of adequate quality.   2. This was a tape of adequate length (23 hrs).       ASSESSMENT:   # R>LLE venous varices +/- bleeding into skin (no clear variceal hemorrhage).  Venous US 1/2018 with  R GSv reflux.  # peric effusion noted on CTA chest 11/2017.  Echo 1/2018 with trivial effusion.  # CREST/Scleroderma  # end-stage lung dz/ILD    PLAN:   Cont compression rx  Refer to Dr. Jean for consideration of R GSV ablation  RTC 6 months    John Paul Reese MD, FACC

## 2018-02-20 ENCOUNTER — INITIAL CONSULT (OUTPATIENT)
Dept: CARDIOLOGY | Facility: CLINIC | Age: 63
End: 2018-02-20
Payer: COMMERCIAL

## 2018-02-20 VITALS
SYSTOLIC BLOOD PRESSURE: 116 MMHG | HEIGHT: 70 IN | OXYGEN SATURATION: 96 % | HEART RATE: 74 BPM | WEIGHT: 181 LBS | BODY MASS INDEX: 25.91 KG/M2 | DIASTOLIC BLOOD PRESSURE: 65 MMHG

## 2018-02-20 DIAGNOSIS — I83.93 VARICOSE VEINS OF BOTH LOWER EXTREMITIES: ICD-10-CM

## 2018-02-20 DIAGNOSIS — Z76.82 LUNG TRANSPLANT CANDIDATE: ICD-10-CM

## 2018-02-20 DIAGNOSIS — M34.1 CREST (CALCINOSIS, RAYNAUD'S PHENOMENON, ESOPHAGEAL DYSFUNCTION, SCLERODACTYLY, TELANGIECTASIA): ICD-10-CM

## 2018-02-20 DIAGNOSIS — I50.32 CHRONIC DIASTOLIC HEART FAILURE: Primary | ICD-10-CM

## 2018-02-20 DIAGNOSIS — I73.00 RAYNAUD'S DISEASE WITHOUT GANGRENE: ICD-10-CM

## 2018-02-20 DIAGNOSIS — E78.5 HYPERLIPIDEMIA, UNSPECIFIED HYPERLIPIDEMIA TYPE: ICD-10-CM

## 2018-02-20 DIAGNOSIS — M35.02 SJOGREN'S SYNDROME WITH LUNG INVOLVEMENT: ICD-10-CM

## 2018-02-20 PROCEDURE — 99999 PR PBB SHADOW E&M-EST. PATIENT-LVL III: CPT | Mod: PBBFAC,,, | Performed by: INTERNAL MEDICINE

## 2018-02-20 PROCEDURE — 3008F BODY MASS INDEX DOCD: CPT | Mod: S$GLB,,, | Performed by: INTERNAL MEDICINE

## 2018-02-20 PROCEDURE — 99214 OFFICE O/P EST MOD 30 MIN: CPT | Mod: S$GLB,,, | Performed by: INTERNAL MEDICINE

## 2018-02-20 NOTE — LETTER
February 20, 2018      John Paul Reese MD  [address]  [address]  [facility] [address]           [facility]-Interventional Card  [address]  [facility] [address]  Phone: [phone]          Patient: Fatimah Cha   MR Number: [id number]   YOB: 1955   Date of Visit: 2/20/2018       Dear Dr. John Paul Reese:    Thank you for referring Fatimah Cha to me for evaluation. Attached you will find relevant portions of my assessment and plan of care.    If you have questions, please do not hesitate to call me. I look forward to following Fatimah Cha along with you.    Sincerely,    Smooth Jean MD    Enclosure  CC:  No Recipients    If you would like to receive this communication electronically, please contact [email or web address] or [phone] to request more information on [employer] access.    For providers and/or their staff who would like to refer a patient to [facility], please contact us through our one-stop-shop provider referral line, [facility], at [phone].    If you feel you have received this communication in error or would no longer like to receive these types of communications, please e-mail [email or web address]

## 2018-02-20 NOTE — PROGRESS NOTES
Subjective:    Patient ID:  Fatimah Cha is a 62 y.o. female who presents for evaluation of Varicose veins      Referring Physician: John Paul Reese MD  Card: Nicki Russell/Enriqueta  Pulm: Dr. Garcia/Dr. Ken       HPI  61yo F with a PMHx of chronic respiratory failure 2/2 interstitial lung disease, lung transplant candidate, chronic diastolic heart failure, CREST syndrome, Sjogren's, Raynaud's, HTN, HLD referred by Dr. Reese for evaluation of GSV ablatio due to symptomatic RLE venous varices refractory to compression stocking treatment.     She does have stable dyspnea and is on home oxygen. There is been no PND, orthopnea, or lower Dasilva E edema.  She denies melena, hematuria, or claudicant symptoms.  She continues to complain of right posterior lower extremity discomfort, and continues to wear bilateral compression stockings . The venous ultrasound revealed no evidence of DVT.  There was right GSV reflux.      USG LExt 1/12/2018:  Bilaterally, the greater and lesser saphenous veins are dilated.    There is hemodynamically significant reflux in the right greater saphenous vein. No hemodynamically significant reflux identified in the right lesser saphenous vein or in the left leg.    There is no evidence of bilateral lower extremity deep venous thrombosis.      Review of Systems   Constitution: Negative for chills, decreased appetite, fever, weakness, night sweats, weight gain and weight loss.   HENT: Negative for congestion, hoarse voice, stridor and tinnitus.    Eyes: Negative for blurred vision, pain and visual disturbance.   Cardiovascular: Negative for chest pain, claudication, dyspnea on exertion, irregular heartbeat, leg swelling, near-syncope, orthopnea, palpitations, paroxysmal nocturnal dyspnea and syncope.   Respiratory: Positive for shortness of breath. Negative for cough, hemoptysis, snoring and wheezing.    Endocrine: Negative for cold intolerance, heat intolerance and polyuria.    Hematologic/Lymphatic: Negative for bleeding problem. Does not bruise/bleed easily.   Skin: Negative for color change and rash.   Musculoskeletal: Positive for joint pain. Negative for arthritis, back pain, muscle cramps, myalgias and stiffness.   Gastrointestinal: Negative for abdominal pain, anorexia, constipation, diarrhea, dysphagia, heartburn, hemorrhoids, melena, nausea and vomiting.   Genitourinary: Negative for frequency, hematuria, hesitancy, nocturia and urgency.   Neurological: Negative for difficulty with concentration, dizziness, focal weakness, headaches, light-headedness, numbness, seizures, tremors and vertigo.   Psychiatric/Behavioral: Negative for altered mental status and depression. The patient is nervous/anxious. The patient does not have insomnia.    Allergic/Immunologic: Negative for hives.        Objective:    Physical Exam   Constitutional: She appears well-developed and well-nourished.   HENT:   Head: Normocephalic and atraumatic.   Right Ear: External ear normal.   Left Ear: External ear normal.   Eyes: Conjunctivae and EOM are normal. Pupils are equal, round, and reactive to light.   Neck: Normal range of motion. Neck supple. No JVD present. No thyromegaly present.   Cardiovascular: Normal rate, regular rhythm, S1 normal, S2 normal, normal heart sounds and intact distal pulses.  Exam reveals no gallop, no S3 and no friction rub.    No murmur heard.  Pulses:       Radial pulses are 2+ on the right side, and 2+ on the left side.        Femoral pulses are 2+ on the right side, and 2+ on the left side.       Dorsalis pedis pulses are 2+ on the right side, and 2+ on the left side.        Posterior tibial pulses are 2+ on the right side, and 2+ on the left side.   Reticular varices R>L.   Pulmonary/Chest: Effort normal. No stridor. She has no wheezes. She has no rales. She exhibits no tenderness.   Abdominal: Soft. Bowel sounds are normal. She exhibits no distension. There is no tenderness.  There is no rebound and no guarding.   Musculoskeletal: Normal range of motion. She exhibits no edema or tenderness.   Psychiatric: She has a normal mood and affect.         Assessment:       1. Chronic diastolic heart failure with NYHA FC II symptoms   2. Varicose veins of both lower extremities    3. Hyperlipidemia, unspecified hyperlipidemia type    4. Raynaud's disease without gangrene    5. CREST (calcinosis, Raynaud's phenomenon, esophageal dysfunction, sclerodactyly, telangiectasia)    6. Sjogren's syndrome with lung involvement    7. Lung transplant candidate      Plan:       Will arrange for right GSV ablation. Continue with current medication and compression stockings.     -The risks, benefits and alternatives of the procedure were explained to the patient.   - Informed consent was obtained and the patient is agreeable to proceed with the procedure.      Tadeo duarte MD  PGY-7  Interventional Cardiology       I have personally taken the history and examined this patient. I have discussed and agree with the resident's findings and plan as documented in the resident's note.  Smooth Jean

## 2018-02-22 DIAGNOSIS — I87.2 VENOUS INSUFFICIENCY: Primary | ICD-10-CM

## 2018-02-23 DIAGNOSIS — I87.2 VENOUS INSUFFICIENCY: Primary | ICD-10-CM

## 2018-03-26 DIAGNOSIS — I87.2 VENOUS INSUFFICIENCY: Primary | ICD-10-CM

## 2018-03-28 ENCOUNTER — HOSPITAL ENCOUNTER (OUTPATIENT)
Facility: HOSPITAL | Age: 63
Discharge: HOME | End: 2018-03-28
Attending: EMERGENCY MEDICINE
Payer: COMMERCIAL

## 2018-03-28 VITALS
OXYGEN SATURATION: 100 % | SYSTOLIC BLOOD PRESSURE: 124 MMHG | RESPIRATION RATE: 22 BRPM | WEIGHT: 177 LBS | BODY MASS INDEX: 25.34 KG/M2 | TEMPERATURE: 99 F | HEART RATE: 81 BPM | DIASTOLIC BLOOD PRESSURE: 58 MMHG | HEIGHT: 70 IN

## 2018-03-28 DIAGNOSIS — R07.9 CHEST PAIN: ICD-10-CM

## 2018-03-28 DIAGNOSIS — R50.9 FEVER, UNSPECIFIED FEVER CAUSE: Primary | ICD-10-CM

## 2018-03-28 PROBLEM — R50.2 DRUG INDUCED FEVER: Status: ACTIVE | Noted: 2018-03-28

## 2018-03-28 LAB
ALBUMIN SERPL BCP-MCNC: 3.7 G/DL
ALP SERPL-CCNC: 51 U/L
ALT SERPL W/O P-5'-P-CCNC: 11 U/L
ANION GAP SERPL CALC-SCNC: 11 MMOL/L
AST SERPL-CCNC: 19 U/L
BASOPHILS # BLD AUTO: 0.06 K/UL
BASOPHILS # BLD AUTO: 0.11 K/UL
BASOPHILS NFR BLD: 1.2 %
BASOPHILS NFR BLD: 1.4 %
BILIRUB SERPL-MCNC: 0.9 MG/DL
BILIRUB UR QL STRIP: NEGATIVE
BUN SERPL-MCNC: 21 MG/DL
CALCIUM SERPL-MCNC: 9 MG/DL
CHLORIDE SERPL-SCNC: 106 MMOL/L
CLARITY UR REFRACT.AUTO: ABNORMAL
CO2 SERPL-SCNC: 23 MMOL/L
COLOR UR AUTO: YELLOW
CREAT SERPL-MCNC: 0.9 MG/DL
DIFFERENTIAL METHOD: ABNORMAL
DIFFERENTIAL METHOD: ABNORMAL
EOSINOPHIL # BLD AUTO: 0.3 K/UL
EOSINOPHIL # BLD AUTO: 0.4 K/UL
EOSINOPHIL NFR BLD: 4 %
EOSINOPHIL NFR BLD: 8.3 %
ERYTHROCYTE [DISTWIDTH] IN BLOOD BY AUTOMATED COUNT: 13.9 %
ERYTHROCYTE [DISTWIDTH] IN BLOOD BY AUTOMATED COUNT: 14.1 %
EST. GFR  (AFRICAN AMERICAN): >60 ML/MIN/1.73 M^2
EST. GFR  (NON AFRICAN AMERICAN): >60 ML/MIN/1.73 M^2
FLUAV AG SPEC QL IA: NEGATIVE
FLUBV AG SPEC QL IA: NEGATIVE
GLUCOSE SERPL-MCNC: 111 MG/DL
GLUCOSE UR QL STRIP: NEGATIVE
HCT VFR BLD AUTO: 31.5 %
HCT VFR BLD AUTO: 37.7 %
HGB BLD-MCNC: 12.1 G/DL
HGB BLD-MCNC: 9.8 G/DL
HGB UR QL STRIP: NEGATIVE
IMM GRANULOCYTES # BLD AUTO: 0.02 K/UL
IMM GRANULOCYTES # BLD AUTO: 0.03 K/UL
IMM GRANULOCYTES NFR BLD AUTO: 0.4 %
IMM GRANULOCYTES NFR BLD AUTO: 0.4 %
KETONES UR QL STRIP: NEGATIVE
LACTATE SERPL-SCNC: 0.8 MMOL/L
LEUKOCYTE ESTERASE UR QL STRIP: NEGATIVE
LYMPHOCYTES # BLD AUTO: 0.3 K/UL
LYMPHOCYTES # BLD AUTO: 0.4 K/UL
LYMPHOCYTES NFR BLD: 3.8 %
LYMPHOCYTES NFR BLD: 7.9 %
MCH RBC QN AUTO: 28.3 PG
MCH RBC QN AUTO: 28.5 PG
MCHC RBC AUTO-ENTMCNC: 31.1 G/DL
MCHC RBC AUTO-ENTMCNC: 32.1 G/DL
MCV RBC AUTO: 89 FL
MCV RBC AUTO: 91 FL
MONOCYTES # BLD AUTO: 0.5 K/UL
MONOCYTES # BLD AUTO: 0.5 K/UL
MONOCYTES NFR BLD: 10.5 %
MONOCYTES NFR BLD: 5.9 %
NEUTROPHILS # BLD AUTO: 3.6 K/UL
NEUTROPHILS # BLD AUTO: 6.9 K/UL
NEUTROPHILS NFR BLD: 71.7 %
NEUTROPHILS NFR BLD: 84.5 %
NITRITE UR QL STRIP: NEGATIVE
NRBC BLD-RTO: 0 /100 WBC
NRBC BLD-RTO: 0 /100 WBC
PH UR STRIP: 5 [PH] (ref 5–8)
PLATELET # BLD AUTO: 183 K/UL
PLATELET # BLD AUTO: 220 K/UL
PMV BLD AUTO: 8.9 FL
PMV BLD AUTO: 9.3 FL
POTASSIUM SERPL-SCNC: 3.5 MMOL/L
PROT SERPL-MCNC: 6.8 G/DL
PROT UR QL STRIP: NEGATIVE
RBC # BLD AUTO: 3.46 M/UL
RBC # BLD AUTO: 4.25 M/UL
SODIUM SERPL-SCNC: 140 MMOL/L
SP GR UR STRIP: 1.03 (ref 1–1.03)
SPECIMEN SOURCE: NORMAL
URN SPEC COLLECT METH UR: ABNORMAL
UROBILINOGEN UR STRIP-ACNC: NEGATIVE EU/DL
WBC # BLD AUTO: 5.07 K/UL
WBC # BLD AUTO: 8.1 K/UL

## 2018-03-28 PROCEDURE — 87400 INFLUENZA A/B EACH AG IA: CPT

## 2018-03-28 PROCEDURE — 99284 EMERGENCY DEPT VISIT MOD MDM: CPT | Mod: ,,, | Performed by: EMERGENCY MEDICINE

## 2018-03-28 PROCEDURE — 94640 AIRWAY INHALATION TREATMENT: CPT

## 2018-03-28 PROCEDURE — 96361 HYDRATE IV INFUSION ADD-ON: CPT

## 2018-03-28 PROCEDURE — 85025 COMPLETE CBC W/AUTO DIFF WBC: CPT | Mod: 91

## 2018-03-28 PROCEDURE — 80053 COMPREHEN METABOLIC PANEL: CPT

## 2018-03-28 PROCEDURE — 99284 EMERGENCY DEPT VISIT MOD MDM: CPT | Mod: 25

## 2018-03-28 PROCEDURE — 25000003 PHARM REV CODE 250: Performed by: HOSPITALIST

## 2018-03-28 PROCEDURE — 25000003 PHARM REV CODE 250: Performed by: EMERGENCY MEDICINE

## 2018-03-28 PROCEDURE — 93010 ELECTROCARDIOGRAM REPORT: CPT | Mod: ,,, | Performed by: INTERNAL MEDICINE

## 2018-03-28 PROCEDURE — 25000242 PHARM REV CODE 250 ALT 637 W/ HCPCS: Performed by: HOSPITALIST

## 2018-03-28 PROCEDURE — 93005 ELECTROCARDIOGRAM TRACING: CPT | Mod: 59

## 2018-03-28 PROCEDURE — 87040 BLOOD CULTURE FOR BACTERIA: CPT

## 2018-03-28 PROCEDURE — 63600175 PHARM REV CODE 636 W HCPCS: Performed by: HOSPITALIST

## 2018-03-28 PROCEDURE — 96374 THER/PROPH/DIAG INJ IV PUSH: CPT

## 2018-03-28 PROCEDURE — 83605 ASSAY OF LACTIC ACID: CPT

## 2018-03-28 PROCEDURE — 81003 URINALYSIS AUTO W/O SCOPE: CPT

## 2018-03-28 PROCEDURE — 99219 PR INITIAL OBSERVATION CARE,LEVL II: CPT | Mod: ,,, | Performed by: HOSPITALIST

## 2018-03-28 PROCEDURE — G0378 HOSPITAL OBSERVATION PER HR: HCPCS

## 2018-03-28 RX ORDER — PREDNISONE 1 MG/1
1 TABLET ORAL DAILY
Start: 2018-03-28 | End: 2018-04-19

## 2018-03-28 RX ORDER — PANTOPRAZOLE SODIUM 40 MG/1
40 TABLET, DELAYED RELEASE ORAL 2 TIMES DAILY
Status: DISCONTINUED | OUTPATIENT
Start: 2018-03-28 | End: 2018-03-28 | Stop reason: HOSPADM

## 2018-03-28 RX ORDER — ATORVASTATIN CALCIUM 10 MG/1
10 TABLET, FILM COATED ORAL DAILY
Status: DISCONTINUED | OUTPATIENT
Start: 2018-03-28 | End: 2018-03-28 | Stop reason: HOSPADM

## 2018-03-28 RX ORDER — ASPIRIN 81 MG/1
81 TABLET ORAL NIGHTLY
Status: DISCONTINUED | OUTPATIENT
Start: 2018-03-28 | End: 2018-03-28 | Stop reason: HOSPADM

## 2018-03-28 RX ORDER — AMBRISENTAN 10 MG/1
10 TABLET, FILM COATED ORAL DAILY
Status: DISCONTINUED | OUTPATIENT
Start: 2018-03-28 | End: 2018-03-28

## 2018-03-28 RX ORDER — POTASSIUM CHLORIDE 750 MG/1
20 CAPSULE, EXTENDED RELEASE ORAL ONCE
Status: COMPLETED | OUTPATIENT
Start: 2018-03-28 | End: 2018-03-28

## 2018-03-28 RX ORDER — FUROSEMIDE 40 MG/1
40 TABLET ORAL DAILY
Status: DISCONTINUED | OUTPATIENT
Start: 2018-03-28 | End: 2018-03-28 | Stop reason: HOSPADM

## 2018-03-28 RX ORDER — FUROSEMIDE 10 MG/ML
40 INJECTION INTRAMUSCULAR; INTRAVENOUS ONCE
Status: COMPLETED | OUTPATIENT
Start: 2018-03-28 | End: 2018-03-28

## 2018-03-28 RX ORDER — ZINC GLUCONATE 50 MG
50 TABLET ORAL DAILY
COMMUNITY
End: 2019-09-10

## 2018-03-28 RX ORDER — AMBRISENTAN 5 MG/1
10 TABLET, FILM COATED ORAL DAILY
Status: DISCONTINUED | OUTPATIENT
Start: 2018-03-28 | End: 2018-03-28 | Stop reason: HOSPADM

## 2018-03-28 RX ORDER — IPRATROPIUM BROMIDE AND ALBUTEROL SULFATE 2.5; .5 MG/3ML; MG/3ML
3 SOLUTION RESPIRATORY (INHALATION) EVERY 6 HOURS PRN
Status: DISCONTINUED | OUTPATIENT
Start: 2018-03-28 | End: 2018-03-28 | Stop reason: HOSPADM

## 2018-03-28 RX ORDER — MYCOPHENOLATE MOFETIL 250 MG/1
1500 CAPSULE ORAL 2 TIMES DAILY
Status: DISCONTINUED | OUTPATIENT
Start: 2018-03-28 | End: 2018-03-28 | Stop reason: HOSPADM

## 2018-03-28 RX ORDER — LORAZEPAM 1 MG/1
1 TABLET ORAL 2 TIMES DAILY
Status: DISCONTINUED | OUTPATIENT
Start: 2018-03-28 | End: 2018-03-28 | Stop reason: HOSPADM

## 2018-03-28 RX ORDER — PREDNISONE 1 MG/1
1 TABLET ORAL DAILY
Status: DISCONTINUED | OUTPATIENT
Start: 2018-03-28 | End: 2018-03-28 | Stop reason: HOSPADM

## 2018-03-28 RX ORDER — CARVEDILOL 12.5 MG/1
12.5 TABLET ORAL 2 TIMES DAILY WITH MEALS
Status: DISCONTINUED | OUTPATIENT
Start: 2018-03-28 | End: 2018-03-28 | Stop reason: HOSPADM

## 2018-03-28 RX ADMIN — AMBRISENTAN 10 MG: 5 TABLET, FILM COATED ORAL at 10:03

## 2018-03-28 RX ADMIN — CARVEDILOL 12.5 MG: 12.5 TABLET, FILM COATED ORAL at 05:03

## 2018-03-28 RX ADMIN — PANTOPRAZOLE SODIUM 40 MG: 40 TABLET, DELAYED RELEASE ORAL at 10:03

## 2018-03-28 RX ADMIN — CARVEDILOL 12.5 MG: 12.5 TABLET, FILM COATED ORAL at 10:03

## 2018-03-28 RX ADMIN — FUROSEMIDE 40 MG: 40 TABLET ORAL at 10:03

## 2018-03-28 RX ADMIN — SODIUM CHLORIDE 2409 ML: 0.9 INJECTION, SOLUTION INTRAVENOUS at 03:03

## 2018-03-28 RX ADMIN — IPRATROPIUM BROMIDE AND ALBUTEROL SULFATE 3 ML: .5; 3 SOLUTION RESPIRATORY (INHALATION) at 02:03

## 2018-03-28 RX ADMIN — MYCOPHENOLATE MOFETIL 1500 MG: 250 CAPSULE ORAL at 10:03

## 2018-03-28 RX ADMIN — LORAZEPAM 1 MG: 1 TABLET ORAL at 10:03

## 2018-03-28 RX ADMIN — POTASSIUM CHLORIDE 20 MEQ: 750 CAPSULE, EXTENDED RELEASE ORAL at 10:03

## 2018-03-28 RX ADMIN — PREDNISONE 1 MG: 1 TABLET ORAL at 10:03

## 2018-03-28 RX ADMIN — FUROSEMIDE 40 MG: 10 INJECTION, SOLUTION INTRAMUSCULAR; INTRAVENOUS at 02:03

## 2018-03-28 NOTE — ED PROVIDER NOTES
Encounter Date: 3/28/2018    SCRIBE #1 NOTE: I, Evelio Singer, am scribing for, and in the presence of,  Jeffery Alexander MD. I have scribed the following portions of the note - Other sections scribed: HPI, ROS, PE, MDM.       History     Chief Complaint   Patient presents with    Fever     101 at home, last dose tylenol 1 hour ago. hx of pulm htn pulm fibrosis and ILD, arrives on 2.5L NC.      Time seen by provider: 2:57 AM    This is a 62 y.o. female with histry of pulmonary hypertension, pulmonary fibrosis, who presents to the Emergency Department with complaints of fever and chills that started tonight. Patient reports temperature of 101 F at home and chills that started during breathing treatment tonight. Patient also reports right sided low back pain. Patient states that she just recently decreased her dosing of prednisone from 2.5 mg to 1 mg. Patient states that she is receiving Retuxin therapy which is being managed at [facility], last infusion on 3/19.       The history is provided by the patient and medical records.     Review of patient's allergies indicates:   Allergen Reactions    Adhesive tape-silicones      Past Medical History:   Diagnosis Date    CHF (congestive heart failure)     Colitis     CREST syndrome     GERD (gastroesophageal reflux disease)     Hypercholesteremia     Hypertension     Interstitial lung disease     Osteopenia     Pulmonary fibrosis     Pulmonary hypertension     Raynaud disease     Respiratory distress     Scleroderma     Sjoegren syndrome      Past Surgical History:   Procedure Laterality Date    BREAST BIOPSY      CARDIAC CATHETERIZATION  2013    COLONOSCOPY      COLONOSCOPY N/A 11/15/2017    Procedure: COLONOSCOPY;  Surgeon: Radha Santiago MD;  Location: [facility] ([address] [place] [address]);  Service: Endoscopy;  Laterality: N/A;  2nd floor.  Pulm htn, ILD on o2.  Pls review w anesthesia.  Pt has cardio-pulm workup in progress at [facility] by Dr. Russell.                 reese monsalve  RN- I reviewed Ms. Cha's history with Dr. Leopold (Anesthesia) and based on her medical history of:  Pulmonary Hyper    foot surgery x2      lumpectomy (benign)      LUNG BIOPSY  03/2013    THYROIDECTOMY      TONSILLECTOMY      TUBAL LIGATION      UPPER GASTROINTESTINAL ENDOSCOPY       Family History   Problem Relation Age of Onset    Thyroid cancer Mother     Heart failure Father     Emphysema Sister     Thyroid disease Sister     Deep vein thrombosis Brother     Lung cancer Brother     Colon cancer Maternal Uncle      60s    Cancer Maternal Aunt     Pulmonary embolism Daughter     Breast cancer Neg Hx     Ovarian cancer Neg Hx     Celiac disease Neg Hx     Cirrhosis Neg Hx     Colon polyps Neg Hx     Crohn's disease Neg Hx     Cystic fibrosis Neg Hx     Esophageal cancer Neg Hx     Hemochromatosis Neg Hx     Inflammatory bowel disease Neg Hx     Irritable bowel syndrome Neg Hx     Liver cancer Neg Hx     Liver disease Neg Hx     Rectal cancer Neg Hx     Stomach cancer Neg Hx     Ulcerative colitis Neg Hx     Phoenix's disease Neg Hx     Lymphoma Neg Hx     Tuberculosis Neg Hx     Rheum arthritis Neg Hx     Scleroderma Neg Hx     Melanoma Neg Hx     Lupus Neg Hx     Multiple sclerosis Neg Hx     Psoriasis Neg Hx     Skin cancer Neg Hx      Social History   Substance Use Topics    Smoking status: Former Smoker     Packs/day: 1.00     Years: 5.00     Types: Cigarettes     Quit date: 3/30/1979    Smokeless tobacco: Never Used    Alcohol use No     Review of Systems   Constitutional: Positive for chills and fever.   HENT: Negative for sore throat.    Respiratory: Negative for shortness of breath.    Cardiovascular: Negative for chest pain.   Gastrointestinal: Negative for nausea.   Genitourinary: Negative for dysuria.   Musculoskeletal: Positive for back pain.   Skin: Negative for rash.   Neurological: Negative for weakness.   Hematological: Does not bruise/bleed  easily.       Physical Exam     Initial Vitals [03/28/18 0220]   BP Pulse Resp Temp SpO2   127/66 96 (!) 22 99.4 °F (37.4 °C) 97 %      MAP       86.33         Physical Exam    Nursing note and vitals reviewed.  Constitutional: She appears well-developed and well-nourished. No distress.   HENT:   Head: Atraumatic.   Eyes: Conjunctivae and EOM are normal. Pupils are equal, round, and reactive to light.   Cardiovascular: Normal rate, regular rhythm and normal heart sounds.   Pulmonary/Chest: Breath sounds normal. No respiratory distress. She has no wheezes. She has no rhonchi. She has no rales.   Abdominal: Bowel sounds are normal. There is no tenderness. There is no CVA tenderness.   Musculoskeletal:   Superficial muscle tenderness in the right lumbar area.   Neurological: She is alert and oriented to person, place, and time.   Skin: Skin is warm and dry.         ED Course   Procedures  Labs Reviewed   CBC W/ AUTO DIFFERENTIAL - Abnormal; Notable for the following:        Result Value    MPV 8.9 (*)     Lymph # 0.3 (*)     Gran% 84.5 (*)     Lymph% 3.8 (*)     All other components within normal limits   COMPREHENSIVE METABOLIC PANEL - Abnormal; Notable for the following:     Glucose 111 (*)     Alkaline Phosphatase 51 (*)     All other components within normal limits   URINALYSIS, REFLEX TO URINE CULTURE - Abnormal; Notable for the following:     Appearance, UA Hazy (*)     All other components within normal limits    Narrative:     Preferred Collection Type->Urine, Clean Catch   CULTURE, BLOOD   CULTURE, BLOOD   LACTIC ACID, PLASMA   INFLUENZA A AND B ANTIGEN          X-Rays:   Independently Interpreted Readings:   Chest X-Ray: No acute abnormalities.     Medical Decision Making:   History:   Old Medical Records: I decided to obtain old medical records.  Initial Assessment:   62 y.o. female who is a chronically immunosuppressed patient due to immunotherapy and steroid use, comes in with fever and chills that started  tonight. She also reports back pain. Will check UA and do a septic workup. Patient notes that she recently transitioned prednisone dosing from 2.5 mg to 1 mg.     Independently Interpreted Test(s):   I have ordered and independently interpreted X-rays - see prior notes.  Clinical Tests:   Lab Tests: Ordered and Reviewed  Radiological Study: Ordered and Reviewed  Medical Tests: Ordered and Reviewed  ED Management:  0457. Workup does not show an obvious source of her fever. Given her immunocompromised state, patient will be placed for admission to Hospital Medicine.   Other:   I have discussed this case with another health care provider.            Scribe Attestation:   Scribe #1: I performed the above scribed service and the documentation accurately describes the services I performed. I attest to the accuracy of the note.               Clinical Impression:   The primary encounter diagnosis was Fever, unspecified fever cause. A diagnosis of Chest pain was also pertinent to this visit.                           Jeffrey Alexander MD  03/28/18 4464

## 2018-03-28 NOTE — ED NOTES
LOC: The patient is awake, alert and aware of environment with an appropriate affect, the patient is oriented x 3 and speaking appropriately.       APPEARANCE: Patient resting comfortably.    SKIN: The skin is warm and dry,patient does suffer from Raynaud and wears gloves periodically.     MUSKULOSKELETAL: Patient moving all extremities well, no obvious swelling or deformities noted, mild weakness noted due to decreased Shortness of breath.     RESPIRATORY: Airway is open and patent, respirations are spontaneous, patient has a normal effort and rate. Breath sounds noted in assessment. Patient has Pulmonary HTN.  She is on home oxygen at @2L per NC.    CARDIAC: Normal heart sounds. Mild edema upper extremities.     ABDOMEN: Soft and non tender to palpation, no distention noted. Bowel sounds present.    NEURO: No neuro deficits, hand grasp equal, no drift noted, no facial droop noted. Speech is clear.

## 2018-03-28 NOTE — ED NOTES
Pt O2 desaturates when ambulating; decreased to 86% when ambulated to bathroom with 2L O2 via NC on

## 2018-03-28 NOTE — PROVIDER PROGRESS NOTES - EMERGENCY DEPT.
Encounter Date: 3/28/2018    ED Physician Progress Notes         EKG - STEMI Decision  Initial Reading: No STEMI present.    SCRIBE NOTE: Evelio BURLESON, am scribing for, and in the presence of, Jeffery Alexander MD.

## 2018-03-29 NOTE — ASSESSMENT & PLAN NOTE
Fever of 101 occurred at home, a few hours after taking 2000 mg of amoxicillin. Besides fever, pt has no other symptoms of infection. Denies increased SOB, no cough. No leukocytosis. Chest xray and UA clean. Pt feels she is at her baseline. Monitored pt in the Er all day, and she never developed a fever in the setting of no antibiotics or APAP.  After receiving 2.5 L of NS ordered by the Ed, she did de-sat, but oxygenation improved with push of IV lasix. Pt prefers to go home instead of being monitored at the hospital for fever. I had a lengthy discussion with patient and her  that fever was likely drug  induced by PCN, but that if she were to spike another temperature at home, she needs to immediately return to Ed. Patient is extremely reliable and also has follow up with PCP tomorrow.

## 2018-03-29 NOTE — DISCHARGE SUMMARY
[facility] Hospital Medicine  Discharge Summary      Patient Name: Fatimah Cha  MRN: [id number]  Admission Date: 3/28/2018  Hospital Length of Stay: 0 days  Discharge Date and Time:  03/28/2018 9:20 PM  Attending Physician: Jeffery Alexander MD   Discharging Provider: Myles Johnson MD  Primary Care Provider: Simon Orozco MD  [facility] Medicine Team: [facility] HOSP MED A Myles Johnson MD    HPI:   Ms. Cha is a 63 yo woman with crest, scleroderma, srogren's, ILD on 2 L, pulm HTN who presented to ED 3/28 due to fever of 101. Pt took 2000 mg of Amoxicillin for anticipated dental cleaning 3/27 and then a few hours later developed chills and temp was 101. While febrile, pt noticed that her oxygen requirement increased, so decided to come to Ed. Once fever resolved, pt no longer required increased oxygen. Denies new cough, sob, dysuria. According to patient, she is at her baseline.     In Ed, pt received 2.5 L of fluids.     * No surgery found *      Hospital Course:   Fever of 101 occurred at home, a few hours after taking 2000 mg of amoxicillin. Besides fever, pt has no other symptoms of infection. Denies increased SOB, no cough. No leukocytosis. Chest xray and UA clean. Pt feels she is at her baseline. Monitored pt in the Er all day, and she never developed a fever in the setting of no antibiotics or APAP.  After receiving 2.5 L of NS ordered by the Ed, she did de-sat, but oxygenation improved with push of IV lasix. Pt prefers to go home instead of being monitored at the hospital for fever. I had a lengthy discussion with patient and her  that fever was likely drug  induced by PCN, but that if she were to spike another temperature at home, she needs to immediately return to Ed. Patient is extremely reliable and also has follow up with PCP tomorrow.  No changes were made to home meds. Continued home dose of 1 mg of prednisone.     Consults:     No new Assessment & Plan  notes have been filed under this hospital service since the last note was generated.  Service: Hospital Medicine    Final Active Diagnoses:    Diagnosis Date Noted POA    PRINCIPAL PROBLEM:  Drug induced fever [R50.2] 03/28/2018 Unknown      Problems Resolved During this Admission:    Diagnosis Date Noted Date Resolved POA       Discharged Condition: good    Disposition: Home or Self Care    Follow Up:  Has follow up with PCP tomorrow    Patient Instructions:   No discharge procedures on file.    Significant Diagnostic Studies: Labs:   BMP:   Recent Labs  Lab 03/28/18  0304   *      K 3.5      CO2 23   BUN 21   CREATININE 0.9   CALCIUM 9.0   , CBC   Recent Labs  Lab 03/28/18  0304 03/28/18  1700   WBC 8.10 5.07   HGB 12.1 9.8*   HCT 37.7 31.5*    183    and All labs within the past 24 hours have been reviewed    Pending Diagnostic Studies:     None         Medications:  Reconciled Home Medications:      Medication List      CHANGE how you take these medications    predniSONE 1 MG tablet  Commonly known as:  DELTASONE  Take 1 tablet (1 mg total) by mouth once daily.  What changed:  · medication strength  · how much to take  · how to take this        CONTINUE taking these medications    albuterol-ipratropium 2.5mg-0.5mg/3mL 0.5 mg-3 mg(2.5 mg base)/3 mL nebulizer solution  Commonly known as:  DUO-NEB     aspirin 81 MG EC tablet  Commonly known as:  ECOTRIN     atorvastatin 10 MG tablet  Commonly known as:  LIPITOR     azelastine 137 mcg (0.1 %) nasal spray  Commonly known as:  ASTELIN     carvedilol 12.5 MG tablet  Commonly known as:  COREG  Take 1 tablet (12.5 mg total) by mouth 2 (two) times daily with meals.     fluticasone 50 mcg/actuation nasal spray  Commonly known as:  FLONASE     furosemide 40 MG tablet  Commonly known as:  LASIX  Take 1 tablet (40 mg total) by mouth once daily.     guaiFENesin 600 mg 12 hr tablet  Commonly known as:  MUCINEX     LETAIRIS 10 MG Tab  Generic drug:   ambrisentan     LORazepam 1 MG tablet  Commonly known as:  ATIVAN     multivitamin capsule     mycophenolate 500 mg Tab  Commonly known as:  CELLCEPT     pantoprazole 40 MG tablet  Commonly known as:  PROTONIX  Take 1 tablet (40 mg total) by mouth 2 (two) times daily.     potassium chloride 20 mEq Pack  Commonly known as:  KLOR-CON  Take 20 mEq by mouth once daily.     promethazine 25 MG tablet  Commonly known as:  PHENERGAN     risedronate 150 MG Tab  Commonly known as:  ACTONEL     TIROSINT 137 mcg Cap  Generic drug:  levothyroxine     * VENTOLIN HFA 90 mcg/actuation inhaler  Generic drug:  albuterol     * albuterol 2.5 mg /3 mL (0.083 %) nebulizer solution  Commonly known as:  PROVENTIL     VITAMIN D2 50,000 unit Cap  Generic drug:  ergocalciferol     zinc gluconate 50 mg tablet        * This list has 2 medication(s) that are the same as other medications prescribed for you. Read the directions carefully, and ask your doctor or other care provider to review them with you.            STOP taking these medications    sulfamethoxazole-trimethoprim 800-160mg 800-160 mg Tab  Commonly known as:  BACTRIM DS           Where to Get Your Medications      Information about where to get these medications is not yet available    Ask your nurse or doctor about these medications  · predniSONE 1 MG tablet         Indwelling Lines/Drains at time of discharge:   Lines/Drains/Airways          No matching active lines, drains, or airways          Time spent on the discharge of patient: 40 minutes  Patient was seen and examined on the date of discharge and determined to be suitable for discharge.       JENNY OHARA MD  [facility]  Pager: [phone]  Spectralink: [phone]   Cell: [phone]

## 2018-03-29 NOTE — H&P
[facility] Hospital Medicine  History & Physical    Patient Name: Fatimah Cha  MRN: [id number]  Admission Date: 3/28/2018  Attending Physician: Jeffery Alexander MD   Primary Care Provider: Simon Orozco MD    [facility] Medicine Team: [facility] HOSP MED A Myles Johnson MD     Patient information was obtained from patient and ER records.     Subjective:     Principal Problem:Drug induced fever    Chief Complaint:   Chief Complaint   Patient presents with    Fever     101 at home, last dose tylenol 1 hour ago. hx of pulm htn pulm fibrosis and ILD, arrives on 2.5L NC.         HPI: Ms. Cha is a 61 yo woman with crest, scleroderma, srogren's, ILD on 2 L, pulm HTN who presented to ED 3/28 due to fever of 101. Pt took 2000 mg of Amoxicillin for anticipated dental cleaning 3/27 and then a few hours later developed chills and temp was 101. While febrile, pt noticed that her oxygen requirement increased, so decided to come to Ed. Once fever resolved, pt no longer required increased oxygen. Denies new cough, sob, dysuria. According to patient, she is at her baseline.     In Ed, pt received 2.5 L of fluids.     Past Medical History:   Diagnosis Date    CHF (congestive heart failure)     Colitis     CREST syndrome     GERD (gastroesophageal reflux disease)     Hypercholesteremia     Hypertension     Interstitial lung disease     Osteopenia     Pulmonary fibrosis     Pulmonary hypertension     Raynaud disease     Respiratory distress     Scleroderma     Sjoegren syndrome        Past Surgical History:   Procedure Laterality Date    BREAST BIOPSY      CARDIAC CATHETERIZATION  2013    COLONOSCOPY      COLONOSCOPY N/A 11/15/2017    Procedure: COLONOSCOPY;  Surgeon: Radha Santiago MD;  Location: [facility] ([address] [place] [address]);  Service: Endoscopy;  Laterality: N/A;  2nd floor.  Pulm htn, ILD on o2.  Pls review w anesthesia.  Pt has cardio-pulm workup in progress at [facility] by Dr. Russell.                  reese monsalve RN- I reviewed Ms. Cha's history with Dr. Leopold (Anesthesia) and based on her medical history of:  Pulmonary Hyper    foot surgery x2      lumpectomy (benign)      LUNG BIOPSY  03/2013    THYROIDECTOMY      TONSILLECTOMY      TUBAL LIGATION      UPPER GASTROINTESTINAL ENDOSCOPY         Review of patient's allergies indicates:   Allergen Reactions    Adhesive tape-silicones        No current facility-administered medications on file prior to encounter.      Current Outpatient Prescriptions on File Prior to Encounter   Medication Sig    albuterol (PROVENTIL) 2.5 mg /3 mL (0.083 %) nebulizer solution USE ONE VIAL in Nebulizer EVERY 6 HOURS AS NEEDED FOR SHORTNESS OF BREATH    albuterol-ipratropium 2.5mg-0.5mg/3mL (DUO-NEB) 0.5 mg-3 mg(2.5 mg base)/3 mL nebulizer solution USE ONE VIAL in Nebulizer EVERY SIX HOURS AS NEEDED FOR SHORTNESS OF BREATH    ambrisentan (LETAIRIS) 10 MG Tab Take 10 mg by mouth once daily.    aspirin (ECOTRIN) 81 MG EC tablet Take 81 mg by mouth every evening.    atorvastatin (LIPITOR) 10 MG tablet Take 10 mg by mouth once daily.     azelastine (ASTELIN) 137 mcg (0.1 %) nasal spray 2 sprays by Nasal route 2 (two) times daily.    carvedilol (COREG) 12.5 MG tablet Take 1 tablet (12.5 mg total) by mouth 2 (two) times daily with meals.    fluticasone (FLONASE) 50 mcg/actuation nasal spray 1 spray by Each Nare route once daily.    furosemide (LASIX) 40 MG tablet Take 1 tablet (40 mg total) by mouth once daily.    guaiFENesin (MUCINEX) 600 mg 12 hr tablet Take 1,200 mg by mouth as needed for Congestion.    lorazepam (ATIVAN) 1 MG tablet Take 1 mg by mouth 2 (two) times daily.     multivitamin capsule Take 1 capsule by mouth once daily.    mycophenolate (CELLCEPT) 500 mg Tab Take 1,500 mg by mouth 2 (two) times daily.     pantoprazole (PROTONIX) 40 MG tablet Take 1 tablet (40 mg total) by mouth 2 (two) times daily.    potassium chloride (KLOR-CON)  20 mEq Pack Take 20 mEq by mouth once daily.    promethazine (PHENERGAN) 25 MG tablet 1 TABLET BY MOUTH EVERY 6 HOURS AS NEEDED    risedronate (ACTONEL) 150 MG Tab Take 150 mg by mouth every 30 days.    TIROSINT 137 mcg Cap Take 1 capsule by mouth once daily.     VENTOLIN HFA 90 mcg/actuation inhaler     VITAMIN D2 50,000 unit capsule Take 50,000 Units by mouth every 30 days.     [DISCONTINUED] predniSONE (DELTASONE) 10 MG tablet 5 mg once daily.     [DISCONTINUED] sulfamethoxazole-trimethoprim 800-160mg (BACTRIM DS) 800-160 mg Tab Take 1 tablet by mouth every Mon, Wed, Fri.     Family History     Problem Relation (Age of Onset)    Cancer Maternal Aunt    Colon cancer Maternal Uncle    Deep vein thrombosis Brother    Emphysema Sister    Heart failure Father    Lung cancer Brother    Pulmonary embolism Daughter    Thyroid cancer Mother    Thyroid disease Sister        Social History Main Topics    Smoking status: Former Smoker     Packs/day: 1.00     Years: 5.00     Types: Cigarettes     Quit date: 3/30/1979    Smokeless tobacco: Never Used    Alcohol use No    Drug use: No    Sexual activity: Not on file     Review of Systems   Constitutional: Negative for appetite change, chills, fever and unexpected weight change.   HENT: Negative for congestion, rhinorrhea and sinus pain.    Eyes: Negative for visual disturbance.   Respiratory: Negative for cough, chest tightness, shortness of breath and wheezing.    Cardiovascular: Negative for chest pain, palpitations and leg swelling.   Gastrointestinal: Negative for abdominal distention, abdominal pain, blood in stool, constipation, diarrhea, nausea and vomiting.   Genitourinary: Negative for dysuria, flank pain, pelvic pain and vaginal discharge.   Skin: Negative for rash.   Neurological: Negative for dizziness, seizures and headaches.   Psychiatric/Behavioral: Negative for suicidal ideas.     Objective:     Vital Signs (Most Recent):  Temp: 98.5 °F (36.9 °C)  (03/28/18 1528)  Pulse: 81 (03/28/18 1725)  Resp: (!) 22 (03/28/18 1528)  BP: (!) 124/58 (03/28/18 1725)  SpO2: 100 % (03/28/18 0806) Vital Signs (24h Range):  Temp:  [98 °F (36.7 °C)-99.4 °F (37.4 °C)] 98.5 °F (36.9 °C)  Pulse:  [67-96] 81  Resp:  [20-29] 22  SpO2:  [97 %-100 %] 100 %  BP: ()/(51-84) 124/58     Weight: 80.3 kg (177 lb)  Body mass index is 25.4 kg/m².    Physical Exam   Constitutional: She is oriented to person, place, and time. She appears well-developed and well-nourished.   HENT:   Head: Normocephalic and atraumatic.   Nose: Nose normal.   Mouth/Throat: Oropharynx is clear and moist.   Eyes: EOM are normal. Pupils are equal, round, and reactive to light. Right eye exhibits no discharge. Left eye exhibits no discharge. No scleral icterus.   Neck: Normal range of motion. Neck supple. No JVD present.   Cardiovascular: Normal rate, regular rhythm, normal heart sounds and intact distal pulses.    Pulmonary/Chest: Effort normal and breath sounds normal. No respiratory distress. She exhibits no tenderness.   On oxygen   Abdominal: Soft. Bowel sounds are normal. She exhibits no distension. There is no tenderness. There is no guarding.   Musculoskeletal: Normal range of motion. She exhibits no edema.   Lymphadenopathy:     She has no cervical adenopathy.   Neurological: She is alert and oriented to person, place, and time. No cranial nerve deficit or sensory deficit.   Skin: Skin is warm and dry. No rash noted. No erythema.   Facial telangiectasia. Thickening of skin on hands    Psychiatric: She has a normal mood and affect. Her behavior is normal. Judgment and thought content normal.         CRANIAL NERVES     CN III, IV, VI   Pupils are equal, round, and reactive to light.  Extraocular motions are normal.        Significant Labs: All pertinent labs within the past 24 hours have been reviewed.    Significant Imaging: I have reviewed and interpreted all pertinent imaging results/findings within the  past 24 hours.    Assessment/Plan:     * Drug induced fever    Fever of 101 occurred at home, a few hours after taking 2000 mg of amoxicillin. Besides fever, pt has no other symptoms of infection. Denies increased SOB, no cough. No leukocytosis. Chest xray and UA clean. Pt feels she is at her baseline. Monitored pt in the Er all day, and she never developed a fever in the setting of no antibiotics or APAP.  After receiving 2.5 L of NS ordered by the Ed, she did de-sat, but oxygenation improved with push of IV lasix. Pt prefers to go home instead of being monitored at the hospital for fever. I had a lengthy discussion with patient and her  that fever was likely drug  induced by PCN, but that if she were to spike another temperature at home, she needs to immediately return to Ed. Patient is extremely reliable and also has follow up with PCP tomorrow.           VTE Risk Mitigation         Ordered     IP VTE HIGH RISK PATIENT  Once      03/28/18 0525           JENNY OHARA MD  [facility] Medicine  Pager: [phone]  Spectralink: [phone]   Cell: [phone]

## 2018-03-29 NOTE — SUBJECTIVE & OBJECTIVE
Past Medical History:   Diagnosis Date    CHF (congestive heart failure)     Colitis     CREST syndrome     GERD (gastroesophageal reflux disease)     Hypercholesteremia     Hypertension     Interstitial lung disease     Osteopenia     Pulmonary fibrosis     Pulmonary hypertension     Raynaud disease     Respiratory distress     Scleroderma     Sjoegren syndrome        Past Surgical History:   Procedure Laterality Date    BREAST BIOPSY      CARDIAC CATHETERIZATION  2013    COLONOSCOPY      COLONOSCOPY N/A 11/15/2017    Procedure: COLONOSCOPY;  Surgeon: Radha Santiago MD;  Location: [address]);  Service: Endoscopy;  Laterality: N/A;  2nd floor.  Pulm htn, ILD on o2.  Pls review w anesthesia.  Pt has cardio-pulm workup in progress at [facility] by Dr. Russell.                 reese monsalve RN- I reviewed Ms. Cha's history with Dr. Leopold (Anesthesia) and based on her medical history of:  Pulmonary Hyper    foot surgery x2      lumpectomy (benign)      LUNG BIOPSY  03/2013    THYROIDECTOMY      TONSILLECTOMY      TUBAL LIGATION      UPPER GASTROINTESTINAL ENDOSCOPY         Review of patient's allergies indicates:   Allergen Reactions    Adhesive tape-silicones        No current facility-administered medications on file prior to encounter.      Current Outpatient Prescriptions on File Prior to Encounter   Medication Sig    albuterol (PROVENTIL) 2.5 mg /3 mL (0.083 %) nebulizer solution USE ONE VIAL in Nebulizer EVERY 6 HOURS AS NEEDED FOR SHORTNESS OF BREATH    albuterol-ipratropium 2.5mg-0.5mg/3mL (DUO-NEB) 0.5 mg-3 mg(2.5 mg base)/3 mL nebulizer solution USE ONE VIAL in Nebulizer EVERY SIX HOURS AS NEEDED FOR SHORTNESS OF BREATH    ambrisentan (LETAIRIS) 10 MG Tab Take 10 mg by mouth once daily.    aspirin (ECOTRIN) 81 MG EC tablet Take 81 mg by mouth every evening.    atorvastatin (LIPITOR) 10 MG tablet Take 10 mg by mouth once daily.     azelastine (ASTELIN) 137 mcg (0.1  %) nasal spray 2 sprays by Nasal route 2 (two) times daily.    carvedilol (COREG) 12.5 MG tablet Take 1 tablet (12.5 mg total) by mouth 2 (two) times daily with meals.    fluticasone (FLONASE) 50 mcg/actuation nasal spray 1 spray by Each Nare route once daily.    furosemide (LASIX) 40 MG tablet Take 1 tablet (40 mg total) by mouth once daily.    guaiFENesin (MUCINEX) 600 mg 12 hr tablet Take 1,200 mg by mouth as needed for Congestion.    lorazepam (ATIVAN) 1 MG tablet Take 1 mg by mouth 2 (two) times daily.     multivitamin capsule Take 1 capsule by mouth once daily.    mycophenolate (CELLCEPT) 500 mg Tab Take 1,500 mg by mouth 2 (two) times daily.     pantoprazole (PROTONIX) 40 MG tablet Take 1 tablet (40 mg total) by mouth 2 (two) times daily.    potassium chloride (KLOR-CON) 20 mEq Pack Take 20 mEq by mouth once daily.    promethazine (PHENERGAN) 25 MG tablet 1 TABLET BY MOUTH EVERY 6 HOURS AS NEEDED    risedronate (ACTONEL) 150 MG Tab Take 150 mg by mouth every 30 days.    TIROSINT 137 mcg Cap Take 1 capsule by mouth once daily.     VENTOLIN HFA 90 mcg/actuation inhaler     VITAMIN D2 50,000 unit capsule Take 50,000 Units by mouth every 30 days.     [DISCONTINUED] predniSONE (DELTASONE) 10 MG tablet 5 mg once daily.     [DISCONTINUED] sulfamethoxazole-trimethoprim 800-160mg (BACTRIM DS) 800-160 mg Tab Take 1 tablet by mouth every Mon, Wed, Fri.     Family History     Problem Relation (Age of Onset)    Cancer Maternal Aunt    Colon cancer Maternal Uncle    Deep vein thrombosis Brother    Emphysema Sister    Heart failure Father    Lung cancer Brother    Pulmonary embolism Daughter    Thyroid cancer Mother    Thyroid disease Sister        Social History Main Topics    Smoking status: Former Smoker     Packs/day: 1.00     Years: 5.00     Types: Cigarettes     Quit date: 3/30/1979    Smokeless tobacco: Never Used    Alcohol use No    Drug use: No    Sexual activity: Not on file     Review of  Systems   Constitutional: Negative for appetite change, chills, fever and unexpected weight change.   HENT: Negative for congestion, rhinorrhea and sinus pain.    Eyes: Negative for visual disturbance.   Respiratory: Negative for cough, chest tightness, shortness of breath and wheezing.    Cardiovascular: Negative for chest pain, palpitations and leg swelling.   Gastrointestinal: Negative for abdominal distention, abdominal pain, blood in stool, constipation, diarrhea, nausea and vomiting.   Genitourinary: Negative for dysuria, flank pain, pelvic pain and vaginal discharge.   Skin: Negative for rash.   Neurological: Negative for dizziness, seizures and headaches.   Psychiatric/Behavioral: Negative for suicidal ideas.     Objective:     Vital Signs (Most Recent):  Temp: 98.5 °F (36.9 °C) (03/28/18 1528)  Pulse: 81 (03/28/18 1725)  Resp: (!) 22 (03/28/18 1528)  BP: (!) 124/58 (03/28/18 1725)  SpO2: 100 % (03/28/18 0806) Vital Signs (24h Range):  Temp:  [98 °F (36.7 °C)-99.4 °F (37.4 °C)] 98.5 °F (36.9 °C)  Pulse:  [67-96] 81  Resp:  [20-29] 22  SpO2:  [97 %-100 %] 100 %  BP: ()/(51-84) 124/58     Weight: 80.3 kg (177 lb)  Body mass index is 25.4 kg/m².    Physical Exam   Constitutional: She is oriented to person, place, and time. She appears well-developed and well-nourished.   HENT:   Head: Normocephalic and atraumatic.   Nose: Nose normal.   Mouth/Throat: Oropharynx is clear and moist.   Eyes: EOM are normal. Pupils are equal, round, and reactive to light. Right eye exhibits no discharge. Left eye exhibits no discharge. No scleral icterus.   Neck: Normal range of motion. Neck supple. No JVD present.   Cardiovascular: Normal rate, regular rhythm, normal heart sounds and intact distal pulses.    Pulmonary/Chest: Effort normal and breath sounds normal. No respiratory distress. She exhibits no tenderness.   On oxygen   Abdominal: Soft. Bowel sounds are normal. She exhibits no distension. There is no tenderness.  There is no guarding.   Musculoskeletal: Normal range of motion. She exhibits no edema.   Lymphadenopathy:     She has no cervical adenopathy.   Neurological: She is alert and oriented to person, place, and time. No cranial nerve deficit or sensory deficit.   Skin: Skin is warm and dry. No rash noted. No erythema.   Facial telangiectasia. Thickening of skin on hands    Psychiatric: She has a normal mood and affect. Her behavior is normal. Judgment and thought content normal.         CRANIAL NERVES     CN III, IV, VI   Pupils are equal, round, and reactive to light.  Extraocular motions are normal.        Significant Labs: All pertinent labs within the past 24 hours have been reviewed.    Significant Imaging: I have reviewed and interpreted all pertinent imaging results/findings within the past 24 hours.

## 2018-03-29 NOTE — HPI
Ms. Cha is a 63 yo woman with crest, scleroderma, srogren's, ILD on 2 L, pulm HTN who presented to ED 3/28 due to fever of 101. Pt took 2000 mg of Amoxicillin for anticipated dental cleaning 3/27 and then a few hours later developed chills and temp was 101. While febrile, pt noticed that her oxygen requirement increased, so decided to come to Ed. Once fever resolved, pt no longer required increased oxygen. Denies new cough, sob, dysuria. According to patient, she is at her baseline.     In Ed, pt received 2.5 L of fluids.

## 2018-04-02 LAB
BACTERIA BLD CULT: NORMAL
BACTERIA BLD CULT: NORMAL

## 2018-04-03 ENCOUNTER — CLINICAL SUPPORT (OUTPATIENT)
Dept: CARDIOLOGY | Facility: CLINIC | Age: 63
End: 2018-04-03
Attending: INTERNAL MEDICINE
Payer: COMMERCIAL

## 2018-04-03 DIAGNOSIS — I87.2 VENOUS INSUFFICIENCY: ICD-10-CM

## 2018-04-03 PROCEDURE — 93970 EXTREMITY STUDY: CPT | Mod: S$GLB,,, | Performed by: INTERNAL MEDICINE

## 2018-04-16 ENCOUNTER — PATIENT MESSAGE (OUTPATIENT)
Dept: TRANSPLANT | Facility: CLINIC | Age: 63
End: 2018-04-16

## 2018-04-18 ENCOUNTER — PATIENT MESSAGE (OUTPATIENT)
Dept: TRANSPLANT | Facility: CLINIC | Age: 63
End: 2018-04-18

## 2018-04-19 ENCOUNTER — OFFICE VISIT (OUTPATIENT)
Dept: TRANSPLANT | Facility: CLINIC | Age: 63
End: 2018-04-19
Payer: COMMERCIAL

## 2018-04-19 VITALS
HEIGHT: 70 IN | TEMPERATURE: 97 F | HEART RATE: 85 BPM | BODY MASS INDEX: 26.2 KG/M2 | SYSTOLIC BLOOD PRESSURE: 124 MMHG | WEIGHT: 183 LBS | RESPIRATION RATE: 20 BRPM | OXYGEN SATURATION: 97 % | DIASTOLIC BLOOD PRESSURE: 57 MMHG

## 2018-04-19 DIAGNOSIS — J84.9 INTERSTITIAL LUNG DISEASE: ICD-10-CM

## 2018-04-19 DIAGNOSIS — M35.02 SJOGREN'S SYNDROME WITH LUNG INVOLVEMENT: ICD-10-CM

## 2018-04-19 DIAGNOSIS — J96.11 CHRONIC RESPIRATORY FAILURE WITH HYPOXIA: ICD-10-CM

## 2018-04-19 DIAGNOSIS — Z76.82 LUNG TRANSPLANT CANDIDATE: Primary | ICD-10-CM

## 2018-04-19 DIAGNOSIS — I27.20 PULMONARY HYPERTENSION: ICD-10-CM

## 2018-04-19 PROCEDURE — 99999 PR PBB SHADOW E&M-EST. PATIENT-LVL III: CPT | Mod: PBBFAC,,, | Performed by: INTERNAL MEDICINE

## 2018-04-19 PROCEDURE — 99213 OFFICE O/P EST LOW 20 MIN: CPT | Mod: 25,S$GLB,, | Performed by: INTERNAL MEDICINE

## 2018-04-19 RX ORDER — NYSTATIN 100000 [USP'U]/ML
5 SUSPENSION ORAL 2 TIMES DAILY PRN
COMMUNITY
Start: 2018-02-09 | End: 2018-06-20

## 2018-04-19 RX ORDER — SULFAMETHOXAZOLE AND TRIMETHOPRIM 800; 160 MG/1; MG/1
1 TABLET ORAL
COMMUNITY
Start: 2018-04-16 | End: 2019-09-10

## 2018-04-19 RX ORDER — SILDENAFIL CITRATE 20 MG/1
20 TABLET ORAL 3 TIMES DAILY
COMMUNITY
Start: 2018-04-16 | End: 2019-01-02 | Stop reason: ALTCHOICE

## 2018-04-19 RX ORDER — CEVIMELINE HYDROCHLORIDE 30 MG/1
30 CAPSULE ORAL 3 TIMES DAILY
COMMUNITY
Start: 2018-04-16 | End: 2020-08-25

## 2018-04-19 NOTE — LETTER
April 19, 2018        Luis Ken  [address]  [facility] [address]  Phone: [phone]  Fax: [phone]             Dem[facility] García - Lung Transplant  [address]  [facility] [address]  Phone: [phone]   Patient: Fatimah Cha   MR Number: [id number]   YOB: 1955   Date of Visit: 4/19/2018       Dear Dr. Luis Ken    Thank you for referring Fatimah Cha to me for evaluation. Attached you will find relevant portions of my assessment and plan of care.    If you have questions, please do not hesitate to call me. I look forward to following Fatimah Cha along with you.    Sincerely,    Rodrick Garcia MD    Enclosure    If you would like to receive this communication electronically, please contact [email or web address] or [phone] to request "[employer] Link access.    "[employer] Link is a tool which provides read-only access to select patient information with whom you have a relationship. Its easy to use and provides real time access to review your patients record including encounter summaries, notes, results, and demographic information.    If you feel you have received this communication in error or would no longer like to receive these types of communications, please e-mail [email or web address]

## 2018-04-19 NOTE — PROGRESS NOTES
"LUNG TRANSPLANT PRE FOLLOW-UP    Referring Physician: Luis Ken    Reason for Visit:  Pre-lung transplant follow-up.         Date of Initial Evaluation:                                                                                              History of Present Illness: Fatimah Cha is a 62 y.o. female who is on 2L of oxygen while resting, and 3L while exercising. She is on no assisted ventilation.  Her New York Heart Association Class is II and a Karnofsky score of 80% - Normal activity with effort: some symptoms of disease. She is not diabetic. Pt sees Dr. Ken and Dr. Vitale at [facility]. Pt is off imuran. Pt started Cellcept, and has now been completely weaned off of steroids for the past week. Pt completed 2 sessions of IV Rituximab, with another session in June. Pt tolerating medications well without side effects. Pt denies any recent infections, however, she went to ER recently for a drug induced fever, which resolved and patient was Dc'd from ER. Still c/o non productive cough, which is at baseline.         BP (!) 124/57 (BP Location: Left arm, Patient Position: Sitting, BP Method: Medium (Automatic))   Pulse 85   Temp 97.4 °F (36.3 °C) (Oral)   Resp 20   Ht 5' 10" (1.778 m)   Wt 83 kg (183 lb)   SpO2 97% Comment: 2.5L NC  BMI 26.26 kg/m²      Review of Systems   Constitutional: Negative for chills, fever, malaise/fatigue and weight loss.   HENT: Negative for congestion, ear pain, hearing loss, sinus pain and sore throat.    Eyes: Negative for blurred vision, double vision and photophobia.   Respiratory: Positive for cough (dry). Negative for sputum production, shortness of breath and wheezing.    Cardiovascular: Negative for chest pain, orthopnea and leg swelling.   Gastrointestinal: Negative for abdominal pain, constipation, diarrhea, heartburn, nausea and vomiting.   Genitourinary: Negative for flank pain, frequency and urgency.   Musculoskeletal: Negative for back pain, falls, joint " pain and myalgias.   Skin: Negative for itching and rash.   Neurological: Negative for dizziness, focal weakness, seizures, loss of consciousness, weakness and headaches.   Endo/Heme/Allergies: Does not bruise/bleed easily.   Psychiatric/Behavioral: Negative for depression. The patient is not nervous/anxious.      Objective:   Physical Exam   Constitutional: She is oriented to person, place, and time and well-developed, well-nourished, and in no distress.   HENT:   Head: Normocephalic and atraumatic.   Eyes: Pupils are equal, round, and reactive to light.   Neck: Normal range of motion. Neck supple.   Cardiovascular: Normal rate and regular rhythm.    Pulmonary/Chest: Effort normal. No respiratory distress. She has no wheezes. She has rales (bibasilar). She exhibits no tenderness.   Abdominal: Soft. There is no tenderness. There is no guarding.   Musculoskeletal: Normal range of motion. She exhibits edema (RLE mild pitting edema, wearing compression stockings on both legs. LLE has no edema).   Neurological: She is alert and oriented to person, place, and time.   Skin: Skin is warm and dry. No rash noted. No erythema. No pallor.     Labs:  No visits with results within 7 Day(s) from this visit.   Latest known visit with results is:   Admission on 03/28/2018, Discharged on 03/28/2018   Component Date Value    Blood Culture, Routine 03/28/2018 No growth after 5 days.     Blood Culture, Routine 03/28/2018 No growth after 5 days.     WBC 03/28/2018 8.10     RBC 03/28/2018 4.25     Hemoglobin 03/28/2018 12.1     Hematocrit 03/28/2018 37.7     MCV 03/28/2018 89     MCH 03/28/2018 28.5     MCHC 03/28/2018 32.1     RDW 03/28/2018 13.9     Platelets 03/28/2018 220     MPV 03/28/2018 8.9*    Immature Granulocytes 03/28/2018 0.4     Gran # (ANC) 03/28/2018 6.9     Immature Grans (Abs) 03/28/2018 0.03     Lymph # 03/28/2018 0.3*    Mono # 03/28/2018 0.5     Eos # 03/28/2018 0.3     Baso # 03/28/2018 0.11      nRBC 03/28/2018 0     Gran% 03/28/2018 84.5*    Lymph% 03/28/2018 3.8*    Mono% 03/28/2018 5.9     Eosinophil% 03/28/2018 4.0     Basophil% 03/28/2018 1.4     Differential Method 03/28/2018 Automated     Sodium 03/28/2018 140     Potassium 03/28/2018 3.5     Chloride 03/28/2018 106     CO2 03/28/2018 23     Glucose 03/28/2018 111*    BUN, Bld 03/28/2018 21     Creatinine 03/28/2018 0.9     Calcium 03/28/2018 9.0     Total Protein 03/28/2018 6.8     Albumin 03/28/2018 3.7     Total Bilirubin 03/28/2018 0.9     Alkaline Phosphatase 03/28/2018 51*    AST 03/28/2018 19     ALT 03/28/2018 11     Anion Gap 03/28/2018 11     eGFR if African American 03/28/2018 >60.0     eGFR if non African Amer* 03/28/2018 >60.0     Lactate (Lactic Acid) 03/28/2018 0.8     Specimen UA 03/28/2018 Urine, Clean Catch     Color, UA 03/28/2018 Yellow     Appearance, UA 03/28/2018 Hazy*    pH, UA 03/28/2018 5.0     Specific Gravity, UA 03/28/2018 1.030     Protein, UA 03/28/2018 Negative     Glucose, UA 03/28/2018 Negative     Ketones, UA 03/28/2018 Negative     Bilirubin (UA) 03/28/2018 Negative     Occult Blood UA 03/28/2018 Negative     Nitrite, UA 03/28/2018 Negative     Urobilinogen, UA 03/28/2018 Negative     Leukocytes, UA 03/28/2018 Negative     Influenza A Ag, EIA 03/28/2018 Negative     Influenza B Ag, EIA 03/28/2018 Negative     Flu A & B Source 03/28/2018 Nasal Swab     WBC 03/28/2018 5.07     RBC 03/28/2018 3.46*    Hemoglobin 03/28/2018 9.8*    Hematocrit 03/28/2018 31.5*    MCV 03/28/2018 91     MCH 03/28/2018 28.3     MCHC 03/28/2018 31.1*    RDW 03/28/2018 14.1     Platelets 03/28/2018 183     MPV 03/28/2018 9.3     Immature Granulocytes 03/28/2018 0.4     Gran # (ANC) 03/28/2018 3.6     Immature Grans (Abs) 03/28/2018 0.02     Lymph # 03/28/2018 0.4*    Mono # 03/28/2018 0.5     Eos # 03/28/2018 0.4     Baso # 03/28/2018 0.06     nRBC 03/28/2018 0     Gran% 03/28/2018  71.7     Lymph% 03/28/2018 7.9*    Mono% 03/28/2018 10.5     Eosinophil% 03/28/2018 8.3*    Basophil% 03/28/2018 1.2     Differential Method 03/28/2018 Automated        Pulmonary Function Tests 1/10/2018 7/3/2017 6/13/2017   FVC 2.38 2.6 2.6   FEV1 1.94 2.2 2.23   TLC (liters) - 3.3 3.3   DLCO (ml/mmHg sec) 6.7 6.4 6.2   FVC% 68 74 65   FEV1% 71 80 72   FEF 25-75 2.21 2.99 2.94   FEF 25-75% 84 113 112   TLC% - 56 55   RV - 0.78 0.68   RV% - 34 29   DLCO% 34 32 25     Other: 6MWT: 4/16/18   Pt walked 1000 feet. Lowest O2 sat was 87% with highest . She required 3L NC to maintain O2 sat > 90%.        Assessment:-  1. Lung transplant candidate    2. Interstitial lung disease    3. Sjogren's syndrome with lung involvement    4. Chronic respiratory failure with hypoxia    5. Pulmonary hypertension      Plan:   1. ILD secondary to Sjogren's. Biopsy suggestive of IPF, however CT scan looks more like NSIP. Being followed by Dr. Ken and Dr. Vitale. Currently on Rituximab infusions and Cellcept and is off of steroids for the first time in 5 years. Doing well and symptomatically stable. PFTs stable compared to June 2017.     - No indications to start transplant workup at this time as patient is symptomatically improved since last visit.       - Will reevaluate sooner if patient has clinical decline.     2. Secondary to Sjogren's. Continue management per Dr. Ken and Dr. Vitale.     3. Continue Cellcept and Rituximab infusions per Dr Ken and Dr. Vitale.     4. 2L NC supplemental O2 to keep sats > 90%.     5. Pt seeing Dr. Russell and believes she will get repeat RHC soon, but is unsure when. Echo showed normal EF with concentric hypertrophy       Lakeisha Light MD   [facility] Fellow   Lung Transplant Clinic     Attending Note:    I have seen and evaluated the patient with the fellow. Their note reflects the content of our discussion and my plan of care.      Rodrick Garcia MD  Pulmonary/Critical  Care Medicine

## 2018-09-05 DIAGNOSIS — J84.10 PULMONARY FIBROSIS: Primary | ICD-10-CM

## 2018-10-03 ENCOUNTER — TELEPHONE (OUTPATIENT)
Dept: GASTROENTEROLOGY | Facility: CLINIC | Age: 63
End: 2018-10-03

## 2018-10-03 NOTE — TELEPHONE ENCOUNTER
----- Message from Delia Díaz MA sent at 10/1/2018  3:18 PM CDT -----  Contact: [phone]  Needs Advice    Reason for call: wanting to see Dr Santiago for her colitis         Communication Preference: [phone]    Additional Information: She is an EP to Dr Santiago

## 2018-10-19 ENCOUNTER — TELEPHONE (OUTPATIENT)
Dept: TRANSPLANT | Facility: CLINIC | Age: 63
End: 2018-10-19

## 2018-10-19 NOTE — TELEPHONE ENCOUNTER
Received PFT's and 6MWT from [facility] completed on 10/16/18.  Canceled appointments scheduled here on 10/23.  Notified patient that she is only scheduled for the 11:00 appointment in the transplant clinic.  Pt verbalized understanding.

## 2018-10-23 ENCOUNTER — OFFICE VISIT (OUTPATIENT)
Dept: TRANSPLANT | Facility: CLINIC | Age: 63
End: 2018-10-23
Payer: COMMERCIAL

## 2018-10-23 VITALS
BODY MASS INDEX: 25.76 KG/M2 | WEIGHT: 184 LBS | DIASTOLIC BLOOD PRESSURE: 61 MMHG | OXYGEN SATURATION: 97 % | HEART RATE: 73 BPM | RESPIRATION RATE: 20 BRPM | TEMPERATURE: 98 F | HEIGHT: 71 IN | SYSTOLIC BLOOD PRESSURE: 122 MMHG

## 2018-10-23 DIAGNOSIS — J84.89 INTERSTITIAL LUNG DISEASE DUE TO CONNECTIVE TISSUE DISEASE: Primary | ICD-10-CM

## 2018-10-23 DIAGNOSIS — M34.1 CREST (CALCINOSIS, RAYNAUD'S PHENOMENON, ESOPHAGEAL DYSFUNCTION, SCLERODACTYLY, TELANGIECTASIA): ICD-10-CM

## 2018-10-23 DIAGNOSIS — M35.02 SJOGREN'S SYNDROME WITH LUNG INVOLVEMENT: ICD-10-CM

## 2018-10-23 DIAGNOSIS — J96.11 CHRONIC RESPIRATORY FAILURE WITH HYPOXIA: ICD-10-CM

## 2018-10-23 DIAGNOSIS — M35.9 INTERSTITIAL LUNG DISEASE DUE TO CONNECTIVE TISSUE DISEASE: Primary | ICD-10-CM

## 2018-10-23 DIAGNOSIS — I27.20 PULMONARY HYPERTENSION: ICD-10-CM

## 2018-10-23 DIAGNOSIS — Z76.82 LUNG TRANSPLANT CANDIDATE: ICD-10-CM

## 2018-10-23 PROCEDURE — 99999 PR PBB SHADOW E&M-EST. PATIENT-LVL III: CPT | Mod: PBBFAC,,, | Performed by: INTERNAL MEDICINE

## 2018-10-23 PROCEDURE — 3008F BODY MASS INDEX DOCD: CPT | Mod: CPTII,S$GLB,, | Performed by: INTERNAL MEDICINE

## 2018-10-23 PROCEDURE — 99213 OFFICE O/P EST LOW 20 MIN: CPT | Mod: 25,S$GLB,, | Performed by: INTERNAL MEDICINE

## 2018-10-23 RX ORDER — UMECLIDINIUM 62.5 UG/1
1 AEROSOL, POWDER ORAL DAILY
Refills: 3 | COMMUNITY
Start: 2018-10-12 | End: 2020-08-25

## 2018-10-23 RX ORDER — AZELASTINE HYDROCHLORIDE, FLUTICASONE PROPIONATE 137; 50 UG/1; UG/1
1 SPRAY, METERED NASAL
COMMUNITY
End: 2019-04-23

## 2018-10-23 RX ORDER — HYDROXYCHLOROQUINE SULFATE 200 MG/1
1 TABLET, FILM COATED ORAL 2 TIMES DAILY
Refills: 3 | COMMUNITY
Start: 2018-10-05 | End: 2020-02-14

## 2018-10-23 NOTE — PROGRESS NOTES
LUNG TRANSPLANT PRE FOLLOW-UP    Referring Physician: Luis Ken    Reason for Visit:  Pre-lung transplant follow-up.         Date of Initial Evaluation:                                                                                              History of Present Illness: Fatimah Cha is a 63 y.o. female who is on 2L of oxygen while resting, and 3L while exercising. She is on no assisted ventilation.  Her New York Heart Association Class is II and a Karnofsky score of 80% - Normal activity with effort: some symptoms of disease. She is not diabetic. Returns today for follow up appointment on her transplant candidacy. Since her last clinic visit she has been doing well. She denies hospitalizations. She still has a cough which is non-productive and mild shortness of breath on exertion. Continues to follow with Dr. Vitale at [facility] and is on hydroxychloroquine, mycophenolate and rituximab for her scleroderma/sjogren's disease. Also follows with Dr. Briones for PH and continue therapy with ambrisentan.     Review of Systems   Constitutional: Negative for chills, fever, malaise/fatigue and weight loss.   HENT: Negative for congestion, ear pain, hearing loss, sinus pain and sore throat.    Eyes: Negative for blurred vision, double vision and photophobia.   Respiratory: Positive for cough (dry). Negative for sputum production, shortness of breath and wheezing.    Cardiovascular: Negative for chest pain, orthopnea and leg swelling.   Gastrointestinal: Negative for abdominal pain, constipation, diarrhea, heartburn, nausea and vomiting.   Genitourinary: Negative for flank pain, frequency and urgency.   Musculoskeletal: Negative for back pain, falls, joint pain and myalgias.   Skin: Negative for itching and rash.   Neurological: Negative for dizziness, focal weakness, seizures, loss of consciousness, weakness and headaches.   Endo/Heme/Allergies: Does not bruise/bleed easily.   Psychiatric/Behavioral: Negative for  "depression. The patient is not nervous/anxious.      Objective:   /61   Pulse 73   Temp 97.8 °F (36.6 °C) (Oral)   Resp 20   Ht 5' 11" (1.803 m)   Wt 83.5 kg (184 lb)   SpO2 97% Comment: 2 liters  BMI 25.66 kg/m²     Physical Exam   Constitutional: She is oriented to person, place, and time and well-developed, well-nourished, and in no distress. No distress.   HENT:   Head: Normocephalic and atraumatic.   Nose: Nose normal.   Mouth/Throat: Oropharynx is clear and moist. No oropharyngeal exudate.   Eyes: Conjunctivae and EOM are normal. Pupils are equal, round, and reactive to light. No scleral icterus.   Neck: Normal range of motion. Neck supple. No JVD present. No tracheal deviation present. No thyromegaly present.   Cardiovascular: Normal rate, regular rhythm and intact distal pulses. Exam reveals no gallop and no friction rub.   No murmur heard.  Pulmonary/Chest: Effort normal. No stridor. No respiratory distress. She has no wheezes. She has rales (bibasilar). She exhibits no tenderness.   Abdominal: Soft. Bowel sounds are normal. She exhibits no distension and no mass. There is no tenderness. There is no rebound and no guarding.   Musculoskeletal: Normal range of motion. She exhibits no edema.   Wearing gloves today for her Raynaud's   Lymphadenopathy:     She has no cervical adenopathy.   Neurological: She is alert and oriented to person, place, and time. No cranial nerve deficit. Gait normal. Coordination normal.   Skin: Skin is warm and dry. No rash noted. She is not diaphoretic. No erythema. No pallor.   Psychiatric: Mood and affect normal.     Labs:  No visits with results within 7 Day(s) from this visit.   Latest known visit with results is:   Office Visit on 06/20/2018   Component Date Value    Blood Stool 06/20/2018 Negative      Acceptab* 06/20/2018 Yes     SOURCE: 06/20/2018 Unspecified     Slides: 06/20/2018 1     LMP: 06/20/2018 UNKNOWN     Specimen adequacy: " 06/20/2018 (NOTE)     Interpretation 06/20/2018 NO EPITHELIAL ABNORMALITY SEE BELOW     Comments: 06/20/2018 (NOTE)     Cytotechnologist: 06/20/2018 BRYON CAR,CT(ASCP)     LOCATION 06/20/2018 (NOTE)     CPT Codes: 06/20/2018 (NOTE)     HPV HIGH RISK IF ASC-US,* 06/20/2018 CRITERIA NOT MET        Pulmonary Function Tests 10/16/2018 4/19/2018 1/10/2018 7/3/2017 6/13/2017   FVC 2.59 2.52 2.38 2.6 2.6   FEV1 2.11 2.11 1.94 2.2 2.23   TLC (liters) 3.76 3.31 - 3.3 3.3   DLCO (ml/mmHg sec) 5.2 4.1 6.7 6.4 6.2   FVC% 64 - 68 74 65   FEV1% 67 - 71 80 72   FEF 25-75 2.46 2.87 2.21 2.99 2.94   FEF 25-75% 95 - 84 113 112   TLC% 62 - - 56 55   RV 1.17 0.79 - 0.78 0.68   RV% 49 - - 34 29   DLCO% 21 - 34 32 25     Other: 6MWT: 10/16/2018  Pt walked 900 feet. Lowest O2 sat was 87% with highest . She required 3L NC to maintain O2 sat > 90%. Moderate dyspnea.      Assessment:-  1. 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 Pulmonary hypertension    3. Chronic respiratory failure with hypoxia    4. Sjogren's syndrome with lung involvement    5. CREST (calcinosis, Raynaud's phenomenon, esophageal dysfunction, sclerodactyly, telangiectasia)    6. Lung transplant candidate      Plan:   1. She will continue therapy for her CT-ILD with mycophenolate and rituximab. Dr. Vitale managing her therapy. Her FVC and TLC have remained stable and continue to show moderate restriction. Severe decrease in her DLCO which can also reflect her pulmonary hypertension.    2. Continue therapy with ambrisentan and follow up with Dr. Briones.     3. Continue oxygen supplementation    4. Therapy for Sjogren's and Scleroderma as per Dr. Vitale.     5. She remains stable so will not consider workup at this moment. RTC in 6 months

## 2018-10-23 NOTE — LETTER
October 23, 2018        Luis Ken  [address]  [facility] [address]  Phone: [phone]  Fax: [phone]             Demet[facility] García - Lung Transplant  [address]  [facility] [address]  Phone: [phone]   Patient: Fatimah Cha   MR Number: [id number]   YOB: 1955   Date of Visit: 10/23/2018       Dear Dr. Luis Ken    Thank you for referring Fatimah Cha to me for evaluation. Attached you will find relevant portions of my assessment and plan of care.    If you have questions, please do not hesitate to call me. I look forward to following Fatimha Cha along with you.    Sincerely,    Rodrick Garcia MD    Enclosure    If you would like to receive this communication electronically, please contact [email or web address] or [phone] to request [employer] access.    [employer] is a tool which provides read-only access to select patient information with whom you have a relationship. Its easy to use and provides real time access to review your patients record including encounter summaries, notes, results, and demographic information.    If you feel you have received this communication in error or would no longer like to receive these types of communications, please e-mail [email or web address]

## 2018-11-20 ENCOUNTER — LAB VISIT (OUTPATIENT)
Dept: LAB | Facility: HOSPITAL | Age: 63
End: 2018-11-20
Attending: INTERNAL MEDICINE
Payer: COMMERCIAL

## 2018-11-20 ENCOUNTER — OFFICE VISIT (OUTPATIENT)
Dept: TRANSPLANT | Facility: CLINIC | Age: 63
End: 2018-11-20
Payer: COMMERCIAL

## 2018-11-20 VITALS
HEART RATE: 82 BPM | OXYGEN SATURATION: 100 % | WEIGHT: 184.75 LBS | BODY MASS INDEX: 25.86 KG/M2 | SYSTOLIC BLOOD PRESSURE: 119 MMHG | HEIGHT: 71 IN | DIASTOLIC BLOOD PRESSURE: 68 MMHG

## 2018-11-20 DIAGNOSIS — Z79.899 POLYPHARMACY: ICD-10-CM

## 2018-11-20 DIAGNOSIS — Z76.82 LUNG TRANSPLANT CANDIDATE: ICD-10-CM

## 2018-11-20 DIAGNOSIS — M34.1 CREST (CALCINOSIS, RAYNAUD'S PHENOMENON, ESOPHAGEAL DYSFUNCTION, SCLERODACTYLY, TELANGIECTASIA): ICD-10-CM

## 2018-11-20 DIAGNOSIS — M35.02 SJOGREN'S SYNDROME WITH LUNG INVOLVEMENT: ICD-10-CM

## 2018-11-20 DIAGNOSIS — I95.1 ORTHOSTATIC HYPOTENSION: ICD-10-CM

## 2018-11-20 DIAGNOSIS — R06.82 TACHYPNEA: ICD-10-CM

## 2018-11-20 DIAGNOSIS — J96.11 CHRONIC RESPIRATORY FAILURE WITH HYPOXIA: Primary | ICD-10-CM

## 2018-11-20 DIAGNOSIS — I83.893 VARICOSE VEINS OF BILATERAL LOWER EXTREMITIES WITH OTHER COMPLICATIONS: ICD-10-CM

## 2018-11-20 DIAGNOSIS — I50.32 CHRONIC DIASTOLIC HEART FAILURE: ICD-10-CM

## 2018-11-20 DIAGNOSIS — J84.10 PULMONARY FIBROSIS: ICD-10-CM

## 2018-11-20 DIAGNOSIS — E78.5 HYPERLIPIDEMIA, UNSPECIFIED HYPERLIPIDEMIA TYPE: ICD-10-CM

## 2018-11-20 DIAGNOSIS — E87.70 HYPERVOLEMIA, UNSPECIFIED HYPERVOLEMIA TYPE: ICD-10-CM

## 2018-11-20 DIAGNOSIS — J84.9 INTERSTITIAL LUNG DISEASE: ICD-10-CM

## 2018-11-20 DIAGNOSIS — I27.20 PULMONARY HYPERTENSION: ICD-10-CM

## 2018-11-20 PROBLEM — J96.21 ACUTE ON CHRONIC RESPIRATORY FAILURE WITH HYPOXIA: Status: RESOLVED | Noted: 2017-11-27 | Resolved: 2018-11-20

## 2018-11-20 LAB
ALBUMIN SERPL BCP-MCNC: 3.7 G/DL
ALP SERPL-CCNC: 59 U/L
ALT SERPL W/O P-5'-P-CCNC: 9 U/L
ANION GAP SERPL CALC-SCNC: 8 MMOL/L
AST SERPL-CCNC: 17 U/L
BASOPHILS # BLD AUTO: 0.08 K/UL
BASOPHILS NFR BLD: 1.5 %
BILIRUB SERPL-MCNC: 0.5 MG/DL
BNP SERPL-MCNC: 57 PG/ML
BUN SERPL-MCNC: 14 MG/DL
CALCIUM SERPL-MCNC: 9 MG/DL
CHLORIDE SERPL-SCNC: 105 MMOL/L
CO2 SERPL-SCNC: 26 MMOL/L
CREAT SERPL-MCNC: 0.7 MG/DL
DIFFERENTIAL METHOD: ABNORMAL
EOSINOPHIL # BLD AUTO: 0.4 K/UL
EOSINOPHIL NFR BLD: 7.9 %
ERYTHROCYTE [DISTWIDTH] IN BLOOD BY AUTOMATED COUNT: 14 %
EST. GFR  (AFRICAN AMERICAN): >60 ML/MIN/1.73 M^2
EST. GFR  (NON AFRICAN AMERICAN): >60 ML/MIN/1.73 M^2
GLUCOSE SERPL-MCNC: 85 MG/DL
HCT VFR BLD AUTO: 36.2 %
HGB BLD-MCNC: 11.3 G/DL
IMM GRANULOCYTES # BLD AUTO: 0.03 K/UL
IMM GRANULOCYTES NFR BLD AUTO: 0.6 %
LYMPHOCYTES # BLD AUTO: 0.5 K/UL
LYMPHOCYTES NFR BLD: 9.6 %
MAGNESIUM SERPL-MCNC: 2 MG/DL
MCH RBC QN AUTO: 27.5 PG
MCHC RBC AUTO-ENTMCNC: 31.2 G/DL
MCV RBC AUTO: 88 FL
MONOCYTES # BLD AUTO: 0.8 K/UL
MONOCYTES NFR BLD: 13.8 %
NEUTROPHILS # BLD AUTO: 3.6 K/UL
NEUTROPHILS NFR BLD: 66.6 %
NRBC BLD-RTO: 0 /100 WBC
PLATELET # BLD AUTO: 230 K/UL
PMV BLD AUTO: 9.1 FL
POTASSIUM SERPL-SCNC: 3.7 MMOL/L
PROT SERPL-MCNC: 6.8 G/DL
RBC # BLD AUTO: 4.11 M/UL
SODIUM SERPL-SCNC: 139 MMOL/L
WBC # BLD AUTO: 5.42 K/UL

## 2018-11-20 PROCEDURE — 36415 COLL VENOUS BLD VENIPUNCTURE: CPT

## 2018-11-20 PROCEDURE — 85025 COMPLETE CBC W/AUTO DIFF WBC: CPT

## 2018-11-20 PROCEDURE — 99999 PR PBB SHADOW E&M-EST. PATIENT-LVL III: CPT | Mod: PBBFAC,,, | Performed by: INTERNAL MEDICINE

## 2018-11-20 PROCEDURE — 3008F BODY MASS INDEX DOCD: CPT | Mod: CPTII,S$GLB,, | Performed by: INTERNAL MEDICINE

## 2018-11-20 PROCEDURE — 83880 ASSAY OF NATRIURETIC PEPTIDE: CPT

## 2018-11-20 PROCEDURE — 80053 COMPREHEN METABOLIC PANEL: CPT

## 2018-11-20 PROCEDURE — 83735 ASSAY OF MAGNESIUM: CPT

## 2018-11-20 PROCEDURE — 99214 OFFICE O/P EST MOD 30 MIN: CPT | Mod: S$GLB,,, | Performed by: INTERNAL MEDICINE

## 2018-11-20 RX ORDER — CARVEDILOL 12.5 MG/1
12.5 TABLET ORAL 2 TIMES DAILY WITH MEALS
Qty: 180 TABLET | Refills: 3 | Status: SHIPPED | OUTPATIENT
Start: 2018-11-20 | End: 2019-02-25

## 2018-11-20 RX ORDER — IBUPROFEN 800 MG/1
800 TABLET ORAL 3 TIMES DAILY PRN
Refills: 5 | COMMUNITY
Start: 2018-10-24 | End: 2019-09-10

## 2018-11-20 NOTE — LETTER
November 20, 2018        Luis Ken  [address]  [facility] [address]  Phone: [phone]  Fax: [phone]             [facility]  Tamika García  [facility] [address]  Phone: [phone]   Patient: Fatimah Cha   MR Number: [id number]   YOB: 1955   Date of Visit: 11/20/2018       Dear Dr. Luis Ken    Thank you for referring Fatimah Cha to me for evaluation. Attached you will find relevant portions of my assessment and plan of care.    If you have questions, please do not hesitate to call me. I look forward to following Fatimah Cha along with you.    Sincerely,    Renetta Russell MD    Enclosure    If you would like to receive this communication electronically, please contact [email or web address] or [phone] to request [employer] access.    [employer] is a tool which provides read-only access to select patient information with whom you have a relationship. Its easy to use and provides real time access to review your patients record including encounter summaries, notes, results, and demographic information.    If you feel you have received this communication in error or would no longer like to receive these types of communications, please e-mail [email or web address]

## 2018-11-20 NOTE — PATIENT INSTRUCTIONS
Check your weights every morning after getting out of bed and urinating. If your weight goes up 3# overnight or 5# in one week take an extra lasix and call us if the fluid doesn't come off.    Keep salt intake to under 2000 mg sodium, fluids to under 2 L (64 oz)    Echo between now and January

## 2018-11-20 NOTE — PROGRESS NOTES
Subjective:    Patient ID:  Fatimah Cha is a 63 y.o. female who presents for follow-up of Pulmonary Hypertension (13mo clinic visit, though Pt has been seen by Dr. Garcia in between...Pt has been retaining fluid, and c/o some discomfort in her chest area that comes and goes. Pt also has Qst' re RHC. 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 She underwent a VATS mediated lung biopsy (2013) at an OSH. (the results of this lung biopsy are unclear). She was then referred to Dr. Ken at [facility] for management of her ILD. There she has been followed with relative stability on azathioprine and prednisone.   In 02/2017- she developed worsening of her sx secondary to the flu and at that time notes that her pulmonary function had declined significantly. Lately, she has started feeling a little better but still feels she is not back to her pre-flu baseline.   Previously she was on 3L Nc at home nocturnally and with severe exertion, now she feels she has to use her oxygen more. (still not 24/7).   She also has pulmonary HTN that she is on ambrisentan for. Her last RHC was 05/2017 and noted a PA mean pressure of 30.      Since last visit, pt has been doing low impact exercise twice a day- gets on the MyFit machine and says she has been doing PT- did 10 min on bike with arm and leg crank and plans to go 15 min next time and would like to get the same machine at home. Does not have to stop and catch her breath on the machines. Walks around [facility] [employer] with her O2 on, sometimes has to stop and rest. Says she retains fluid no matter what she does- yest wt was 177# in am, this am 181#.  Watches her Na at home < 1500 mg, does go out and eat, and if they will cook for her without salt she asks them to do it. Lasix is 40mg daily, which makes her urinate some days more than others. Has stockings on as she sits all day at work. Says its rare that her breathing stops her from doing what she wants to do- shops, goes to the shows, just says it just takes her a little slower    She has been off steroids since April.       No 6mw today  Other: 6MWT: 10/16/2018  Pt walked 900 feet. Lowest O2 sat was 87% with highest . She required 3L NC to maintain O2 sat > 90%. Moderate dyspnea.     TTE 1/18  The right ventricle is normal in size measuring 3.5 cm at the base in the apical right ventricle-focused view. Global right ventricular systolic function appears normal. Tricuspid annular plane systolic excursion (TAPSE) is 2.3 cm.   Tissue Doppler-derived tricuspid annular peak systolic velocity (S prime) is 9.5 cm/s.     10/17  266m  m ( 282  m in  July  )                                              O2 sat 97  -> 83 %                                                           HR   86->  102                                                                BP   120/ 64  ->128 /66                                                         Kushal    2 -> 3    Repeat PFts 10/18 relatively stable although the DLCO is cont to fall (now 21% pred)    PFTs   9 / 19 /17    FVC  2.55   L    64 % pred  FEV1  2.08 L  68   % pred  FEV1/FVC   83  %  TLC 3.35  L  56 %pred  DLCO        19    % pred     Ct chest with contrast 8/30/17  Fibrosis in periphery of lungs, progressed since prior     Echo 8/30/17  Normal RV size and RV systolic fxn, PASp 72  TAPSE 2.35  Normal RA size, LA size  Normal LV EF 60-65%  Grade 1 DD    Lung bx 2013 with interstitial fibrosis, endstaged honeycomb    RHC 5/17  AOPRES: 106/70 (82)  AOSAT: 100  FICKCI: 2.96  FICKCO: 6.19  PAPRES: 52/16 (30)  PASAT: 74  PWPRES: 13/12 (10)  RAPRES: 8/7 (6)  RVPRES: 49/2,  "6    Condition 2:  PWSAT_POST: 93    Review of Systems   Constitution: Negative for chills, fever, malaise/fatigue and weight gain.   HENT: Negative.    Eyes: Negative.    Cardiovascular: Positive for dyspnea on exertion and leg swelling. Negative for chest pain, near-syncope, orthopnea, palpitations, paroxysmal nocturnal dyspnea and syncope.   Respiratory: Negative for cough and shortness of breath.    Endocrine: Negative.    Skin: Negative.    Musculoskeletal: Negative.    Gastrointestinal: Negative for bloating, abdominal pain and change in bowel habit.   Neurological: Negative for dizziness and light-headedness.   Psychiatric/Behavioral: Negative for depression.        Objective: /68   Pulse 82   Ht 5' 11" (1.803 m)   Wt 83.8 kg (184 lb 11.9 oz)   SpO2 100% Comment: Pt on 1.5L O2 at reading  BMI 25.77 kg/m²       Physical Exam   Constitutional: She is oriented to person, place, and time. She appears well-developed and well-nourished.   HENT:   Head: Normocephalic and atraumatic.   Eyes: Right eye exhibits no discharge. Left eye exhibits no discharge.   Neck: Neck supple. No JVD present. No thyromegaly present.   Cardiovascular: Normal rate and regular rhythm. Exam reveals no gallop and no friction rub.   No murmur heard.       Pulmonary/Chest: Effort normal and breath sounds normal. No respiratory distress. She has no wheezes. She has no rales.   Abdominal: Soft. Bowel sounds are normal. She exhibits no distension. There is no tenderness.   Musculoskeletal: Normal range of motion. She exhibits edema. She exhibits no tenderness.   Mild nonpitting edema to above ankles today, compression stockings on   Neurological: She is alert and oriented to person, place, and time. No cranial nerve deficit. Coordination normal.   Skin: Skin is warm and dry. No rash noted.   Psychiatric: She has a normal mood and affect. Judgment and thought content normal.     Lab Results   Component Value Date    BNP 57 11/20/2018 "     11/20/2018    K 3.7 11/20/2018    MG 2.0 11/20/2018     11/20/2018    CO2 26 11/20/2018    BUN 14 11/20/2018    CREATININE 0.7 11/20/2018    GLU 85 11/20/2018    AST 17 11/20/2018    ALT 9 (L) 11/20/2018    ALBUMIN 3.7 11/20/2018    PROT 6.8 11/20/2018    BILITOT 0.5 11/20/2018       Magnesium   Date Value Ref Range Status   11/20/2018 2.0 1.6 - 2.6 mg/dL Final       Lab Results   Component Value Date    WBC 5.42 11/20/2018    HGB 11.3 (L) 11/20/2018    HCT 36.2 (L) 11/20/2018    MCV 88 11/20/2018     11/20/2018       Lab Results   Component Value Date    INR 0.9 05/17/2017       BNP   Date Value Ref Range Status   11/20/2018 57 0 - 99 pg/mL Final     Comment:     Values of less than 100 pg/ml are consistent with non-CHF populations.   01/03/2018 42 0 - 99 pg/mL Final     Comment:     Values of less than 100 pg/ml are consistent with non-CHF populations.   11/25/2017 172 (H) 0 - 99 pg/mL Final     Comment:     Values of less than 100 pg/ml are consistent with non-CHF populations.       No results found for: LDH            Assessment:       1. Pulmonary hypertension- mild on RHC in May 2017, with normal RV size and fxn on our last echo-  Appears dry today with normal BNP  FC II    WHO group 1,3 (ILD assoc with scleroderma)   2. CREST (calcinosis, Raynaud's phenomenon, esophageal dysfunction, sclerodactyly, telangiectasia)    3. Chronic respiratory failure with hypoxia    4. Orthostatic hypotension    5. Pulmonary fibrosis    6. Raynaud's disease without gangrene    7. Sjogren's syndrome with lung involvement    8. Chronic diastolic heart failure    9. Hypervolemia, unspecified hypervolemia type    10. Lung transplant candidate    11. Abnormal stress echocardiogram         Plan:   Cont letairis for now    Will repeat echo-   if PASP low and RV looks ok will plan to see pt back with annual screening echo in 1 year- my fear is that her other physicians are all correct, her SOB is mostly due to her  pulm fibrosis and in this setting adding pulmonary VD tx will likely only make her more hypoxic.    Got prevnar yesterday and shingles in June     Keep salt intake to under 2000 mg sodium, fluids to under 2 L (64 oz)    Check weights every morning after getting out of bed and urinating. If weight goes up 3# overnight or 5# in one week she should take an extra lasix and call us if the fluid doesn't come off.

## 2018-12-03 ENCOUNTER — HOSPITAL ENCOUNTER (OUTPATIENT)
Dept: CARDIOLOGY | Facility: CLINIC | Age: 63
Discharge: HOME | End: 2018-12-03
Attending: INTERNAL MEDICINE
Payer: COMMERCIAL

## 2018-12-03 VITALS
BODY MASS INDEX: 25.9 KG/M2 | HEIGHT: 71 IN | DIASTOLIC BLOOD PRESSURE: 70 MMHG | HEART RATE: 86 BPM | SYSTOLIC BLOOD PRESSURE: 120 MMHG | WEIGHT: 185 LBS

## 2018-12-03 DIAGNOSIS — J96.11 CHRONIC RESPIRATORY FAILURE WITH HYPOXIA: ICD-10-CM

## 2018-12-03 DIAGNOSIS — I27.20 PULMONARY HYPERTENSION: ICD-10-CM

## 2018-12-03 LAB
ASCENDING AORTA: 3.8 CM
AV INDEX (PROSTH): 0.76
AV MEAN GRADIENT: 2.71 MMHG
AV PEAK GRADIENT: 5.02 MMHG
AV VALVE AREA: 3.11 CM2
BSA FOR ECHO PROCEDURE: 2.05 M2
CV ECHO LV RWT: 0.36 CM
DOP CALC AO PEAK VEL: 1.12 M/S
DOP CALC AO VTI: 25.97 CM
DOP CALC LVOT AREA: 4.12 CM2
DOP CALC LVOT DIAMETER: 2.29 CM
DOP CALC LVOT STROKE VOLUME: 80.85 CM3
DOP CALCLVOT PEAK VEL VTI: 19.64 CM
E WAVE DECELERATION TIME: 201.3 MSEC
E/A RATIO: 0.91
E/E' RATIO: 6.7
ECHO LV POSTERIOR WALL: 0.9 CM (ref 0.6–1.1)
FRACTIONAL SHORTENING: 37 % (ref 28–44)
INTERVENTRICULAR SEPTUM: 0.9 CM (ref 0.6–1.1)
LA MAJOR: 5.9 CM
LA MINOR: 5.88 CM
LA WIDTH: 2.92 CM
LEFT ATRIUM SIZE: 4.2 CM
LEFT ATRIUM VOLUME INDEX: 30 ML/M2
LEFT ATRIUM VOLUME: 61.4 CM3
LEFT INTERNAL DIMENSION IN SYSTOLE: 3.1 CM (ref 2.1–4)
LEFT VENTRICLE DIASTOLIC VOLUME INDEX: 56.06 ML/M2
LEFT VENTRICLE DIASTOLIC VOLUME: 114.93 ML
LEFT VENTRICLE MASS INDEX: 75.6 G/M2
LEFT VENTRICLE SYSTOLIC VOLUME INDEX: 24.2 ML/M2
LEFT VENTRICLE SYSTOLIC VOLUME: 49.53 ML
LEFT VENTRICULAR INTERNAL DIMENSION IN DIASTOLE: 4.94 CM (ref 3.5–6)
LEFT VENTRICULAR MASS: 155.04 G
LV LATERAL E/E' RATIO: 6.09
LV SEPTAL E/E' RATIO: 7.44
MV PEAK A VEL: 0.74 M/S
MV PEAK E VEL: 0.67 M/S
PULM VEIN S/D RATIO: 1.61
PV PEAK D VEL: 0.28 M/S
PV PEAK S VEL: 0.45 M/S
RA MAJOR: 5.19 CM
RA PRESSURE: 3 MMHG
RA WIDTH: 2.91 CM
RIGHT VENTRICULAR END-DIASTOLIC DIMENSION: 4.5 CM
RV TISSUE DOPPLER FREE WALL SYSTOLIC VELOCITY 1 (APICAL 4 CHAMBER VIEW): 11 M/S
SINUS: 3.31 CM
STJ: 2.92 CM
TDI LATERAL: 0.11
TDI SEPTAL: 0.09
TDI: 0.1
TRICUSPID ANNULAR PLANE SYSTOLIC EXCURSION: 1.71 CM

## 2018-12-03 PROCEDURE — 93306 TTE W/DOPPLER COMPLETE: CPT | Mod: S$GLB,,, | Performed by: INTERNAL MEDICINE

## 2018-12-04 ENCOUNTER — PATIENT MESSAGE (OUTPATIENT)
Dept: TRANSPLANT | Facility: CLINIC | Age: 63
End: 2018-12-04

## 2018-12-04 ENCOUNTER — TELEPHONE (OUTPATIENT)
Dept: TRANSPLANT | Facility: CLINIC | Age: 63
End: 2018-12-04

## 2018-12-04 DIAGNOSIS — Z79.01 LONG TERM (CURRENT) USE OF ANTICOAGULANTS: ICD-10-CM

## 2018-12-04 DIAGNOSIS — Z79.899 POLYPHARMACY: ICD-10-CM

## 2018-12-04 DIAGNOSIS — I27.9 CHRONIC PULMONARY HEART DISEASE: ICD-10-CM

## 2018-12-04 DIAGNOSIS — R06.82 TACHYPNEA: Primary | ICD-10-CM

## 2018-12-04 NOTE — TELEPHONE ENCOUNTER
----- Message from Renetta Russell MD sent at 12/4/2018  9:28 AM CST -----  Her RV looks worse. I think we should repeat a RHC. If she can get it done with me either this month or next I can go over treatment options if RHC confirms the PH is worse that day...    S  ----- Message -----  From: CAMI Cedillo, RN  Sent: 12/3/2018   3:18 PM  To: Renetta Russell MD    Hi-  Please review echo and let me know if anything to tell patient.  Thanks!  Jalyn

## 2018-12-04 NOTE — TELEPHONE ENCOUNTER
Patient returned call and confirmed she received email regarding needing RHC. She is willing to schedule--Will ask scheduling to reach out to patient.

## 2018-12-20 ENCOUNTER — HOSPITAL ENCOUNTER (OUTPATIENT)
Facility: HOSPITAL | Age: 63
Discharge: HOME | End: 2018-12-20
Attending: INTERNAL MEDICINE | Admitting: INTERNAL MEDICINE
Payer: COMMERCIAL

## 2018-12-20 DIAGNOSIS — I27.20 PULMONARY HYPERTENSION: ICD-10-CM

## 2018-12-20 PROCEDURE — C1894 INTRO/SHEATH, NON-LASER: HCPCS | Performed by: INTERNAL MEDICINE

## 2018-12-20 PROCEDURE — C1751 CATH, INF, PER/CENT/MIDLINE: HCPCS | Performed by: INTERNAL MEDICINE

## 2018-12-20 PROCEDURE — 93451 RIGHT HEART CATH: CPT | Performed by: INTERNAL MEDICINE

## 2018-12-20 PROCEDURE — 25000003 PHARM REV CODE 250: Performed by: INTERNAL MEDICINE

## 2018-12-20 PROCEDURE — 93451 RIGHT HEART CATH: CPT | Mod: 26,,, | Performed by: INTERNAL MEDICINE

## 2018-12-20 RX ORDER — LIDOCAINE HYDROCHLORIDE 20 MG/ML
INJECTION, SOLUTION INFILTRATION; PERINEURAL
Status: DISCONTINUED | OUTPATIENT
Start: 2018-12-20 | End: 2018-12-20 | Stop reason: HOSPADM

## 2018-12-20 NOTE — DISCHARGE SUMMARY
Date of admit to cath lab: 12/20/2018      Date of discharge from cath lab: 12/20/2018      Principal diagnosis: PH    Discharge attending physician: Renetta Russell MD    Hospital Course/Outcome of the treatment, procedures or surgery: Pt admitted for RHC.   See CVIS/cath lab procedure report in EPIC  for full report of today's procedure.    Disposition of the case (d/c disposition): home    Discharge Medication List: see med card    Plan for follow up care, diet, activity level: F/U as scheduled. Resume low Na diet.  Activity as tolerated    Condition on discharge from Cath lab: Stable.

## 2018-12-20 NOTE — Clinical Note
The PA catheter is repositioned to the main pulmonary artery. Hemodynamics performed. Cardiac output obtained at 7 L/min. Oxygen saturation obtained at 73%.

## 2018-12-20 NOTE — H&P
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 She underwent a VATS mediated lung biopsy (2013) at an OSH. (the results of this lung biopsy are unclear). She was then referred to Dr. Ken at [facility] for management of her ILD. There she has been followed with relative stability on azathioprine and prednisone. In 02/2017- she developed worsening of her sx secondary to the flu and at that time notes that her pulmonary function had declined significantly. Lately, she has started feeling a little better but still feels she is not back to her pre-flu baseline.   Previously she was on 3L Nc at home nocturnally and with severe exertion, now she feels she has to use her oxygen more. (still not 24/7).   She also has pulmonary HTN that she is on ambrisentan for. Her last RHC was 05/2017 and noted a PA mean pressure of 30.    Patient is been evaluate today for RHC for reevaluation for invasive hemodynamics.    Echocardiogram (12/03/2018):    · Normal left ventricular systolic function. The estimated ejection fraction is 60%  · Normal LV diastolic function.  · Mild mitral regurgitation.  · Moderate right ventricular enlargement. Mildly to moderately reduced right ventricular systolic function.  · Normal central venous pressure (3 mm Hg).  · No TR to estimate sPAP  · Mild atrial enlargement.        Review of Systems   Constitution: Negative for chills, fever, malaise/fatigue and weight gain.   HENT: Negative.    Eyes: Negative.    Cardiovascular: Positive for dyspnea on exertion and leg swelling. Negative for chest pain, near-syncope, orthopnea, palpitations, paroxysmal nocturnal dyspnea and syncope.   Respiratory: Negative for cough and  shortness of breath.    Endocrine: Negative.    Skin: Negative.    Musculoskeletal: Negative.    Gastrointestinal: Negative for bloating, abdominal pain and change in bowel habit.   Neurological: Negative for dizziness and light-headedness.   Psychiatric/Behavioral: Negative for depression.            Physical Exam   Constitutional: She is oriented to person, place, and time. She appears well-developed and well-nourished.   HENT:   Head: Normocephalic and atraumatic.   Eyes: Right eye exhibits no discharge. Left eye exhibits no discharge.   Neck: Neck supple. No JVD present. No thyromegaly present.   Cardiovascular: Normal rate and regular rhythm. Exam reveals no gallop and no friction rub.   No murmur heard.  Pulmonary/Chest: Effort normal and breath sounds normal. No respiratory distress. She has no wheezes. She has no rales.   Abdominal: Soft. Bowel sounds are normal. She exhibits no distension. There is no tenderness.   Musculoskeletal: Normal range of motion. She exhibits edema. She exhibits no tenderness.   Neurological: She is alert and oriented to person, place, and time. No cranial nerve deficit. Coordination normal.   Skin: Skin is warm and dry. No rash noted.   Psychiatric: She has a normal mood and affect. Judgment and thought content normal.      Lab Results   Component Value Date    BNP 79 12/20/2018     12/20/2018    K 3.6 12/20/2018    MG 1.9 12/20/2018     12/20/2018    CO2 26 12/20/2018    BUN 14 12/20/2018    CREATININE 0.7 12/20/2018    GLU 92 12/20/2018    AST 16 12/20/2018    ALT 8 (L) 12/20/2018    ALBUMIN 3.5 12/20/2018    PROT 6.7 12/20/2018    BILITOT 0.6 12/20/2018       Magnesium   Date Value Ref Range Status   12/20/2018 1.9 1.6 - 2.6 mg/dL Final       Lab Results   Component Value Date    WBC 4.11 12/20/2018    HGB 11.2 (L) 12/20/2018    HCT 36.3 (L) 12/20/2018    MCV 89 12/20/2018     12/20/2018       Lab Results   Component Value Date    INR 1.0 12/20/2018    INR  0.9 05/17/2017       BNP   Date Value Ref Range Status   12/20/2018 79 0 - 99 pg/mL Final     Comment:     Values of less than 100 pg/ml are consistent with non-CHF populations.   11/20/2018 57 0 - 99 pg/mL Final     Comment:     Values of less than 100 pg/ml are consistent with non-CHF populations.   01/03/2018 42 0 - 99 pg/mL Final     Comment:     Values of less than 100 pg/ml are consistent with non-CHF populations.       Assessment:       1. Pulmonary hypertension- mild on RHC in May 2017, with normal RV size and fxn on our last echo-  Appears dry today with normal BNP  FC II    WHO group 1,3 (ILD assoc with scleroderma)   2. CREST (calcinosis, Raynaud's phenomenon, esophageal dysfunction, sclerodactyly, telangiectasia)    3. Chronic respiratory failure with hypoxia    4. Orthostatic hypotension    5. Pulmonary fibrosis    6. Raynaud's disease without gangrene    7. Sjogren's syndrome with lung involvement    8. Chronic diastolic heart failure    9. Hypervolemia, unspecified hypervolemia type    10. Lung transplant candidate    11. Abnormal stress echocardiogram       Plan:   -RHC thru the right IJV with sonographic guidance.  - I have explained the risks, benefits and alternatives of the procedure in detail. The patient voices understanding and all questions have been answered,. The patient agrees to proceed as planned.

## 2018-12-20 NOTE — DISCHARGE INSTRUCTIONS
We are going to start you on adempas 1 mg three times a day.  The medicine will come to you through a specialty pharmacy that will ship the drug to  Your house and a specialty pharmacy nurse will come check on you as we increase the dose.    STOP revatio 24 hours before starting adempas.    AFTER THE PROCEDURE:  -You may remove the bandage in 24 hours and wash with soap and water.  -You may shower, but do not soak in a tub for three days.   PRECAUTIONS FOR THE NEXT 24 HOURS:  -If you need to cough, sneeze, have a bowel movement, or bear down, hold pressure over your bandage.  -Do not  anything heavier than a gallon of milk(about 5 pounds)  -Avoid excessive bending over.  SYMPTOMS TO WATCH FOR AND REPORT TO YOUR DOCTOR:  -BLEEDING: hold pressure over the site until bleeding stops. Proceed to Emergency Room by ambulance (do not drive yourself) if unable to stop bleeding. Notify your doctor.  -HEMATOMA(hard bruise under the skin): Blake around the bruise if one develops. Call your doctor if it increases in size or if you have difficulty talking, swallowing, breathing or anything unusual.  SIGNS OF INFECTION:Fever (temperature over 100.5 F), pus or redness  -RASH  -CHEST PAIN OR SHORTNESS OF BREATH    You may call you coordinator in the Heart Failure/Heart Transplant/Pulmonary Hypertension Clinic at [phone] during normal business hours(Monday through Friday from 8 A.M. to 5 P.M.) After hours, call the Heart Transplant Service doctor on call at [phone].

## 2018-12-21 ENCOUNTER — TELEPHONE (OUTPATIENT)
Dept: TRANSPLANT | Facility: CLINIC | Age: 63
End: 2018-12-21

## 2018-12-21 NOTE — TELEPHONE ENCOUNTER
Spoke w/pt following RHC regarding initiation of Adempas. The following were discussed:    The Adempas is a different pathway than the letairis you take, but the same family as Revatio, so you will be stopping revatio for 24 hrs before starting Adempas. This medication is another approach to help bring down your pulmonary pressures. The  has a great website about the medication, but here are a few things to know about it:     1. The Adempas will be supplied to you by a specialty pharmacy. I will send the referral for your medication to the  of the medication ([employer]) and they will contact you and eventually send the medication referral to the pharmacy (I will request [facility], since you use them already). They will tell you about the copay for the med, if there is one at all. If it is too high for you, please let them know this. They can triage you to patient assistance programs, but they can't do so unless you say something. Because the medication also has specific guidelines regarding women and reproduction, they will  you on this as well, even if you are not of childbearing potential (they must  all women).     2. After the medication is shipped to you, please do NOT start it right away. Please wait to hear from a specialty pharmacy nurse, who will set up an appointment with you to come to your home to do teaching and assess you for readiness to start the medication (blood pressure, etc). Every few weeks, the nurse will make a visit with you to see how you are doing, and to determine if we can increase the dose. The tablet strengths are 0.5mg, 1.0 mg, 1.5 mg, 2.0 mg and 2.5 mg. You will likely start at 1mg. Once you've reached the goal dose, you won't likely need additional visits from the nurse.     3. Possible side effects might include low blood pressure, headache, indigestion, dizziness, nosebleeds. Some people don't experience any side effects at all. Please call us with  anything that is concerning for you. If you need to take an antacid, please take it one hour before or one hour after the Adempas, as they can lessen the effect of Adempas.     4. If you are ever hospitalized at a facility other than [facility] while taking Adempas, you will need to bring the medication with you, in case it is not on their formulary. Missing more than 3 days of the medication means having to restart at the lowest dose and titrate up again.     5. The specialty pharmacy will need to make contact with you monthly to arrange shipment of the medication.     All questions/concerns addressed to patient satisfaction. Pt has my direct number for any further concerns/questions.

## 2018-12-26 ENCOUNTER — TELEPHONE (OUTPATIENT)
Dept: TRANSPLANT | Facility: CLINIC | Age: 63
End: 2018-12-26

## 2019-01-07 ENCOUNTER — PATIENT MESSAGE (OUTPATIENT)
Dept: TRANSPLANT | Facility: CLINIC | Age: 64
End: 2019-01-07

## 2019-01-07 DIAGNOSIS — I27.9 CHRONIC PULMONARY HEART DISEASE: Primary | ICD-10-CM

## 2019-02-25 ENCOUNTER — OFFICE VISIT (OUTPATIENT)
Dept: TRANSPLANT | Facility: CLINIC | Age: 64
End: 2019-02-25
Payer: COMMERCIAL

## 2019-02-25 ENCOUNTER — LAB VISIT (OUTPATIENT)
Dept: LAB | Facility: HOSPITAL | Age: 64
End: 2019-02-25
Attending: INTERNAL MEDICINE
Payer: COMMERCIAL

## 2019-02-25 VITALS
HEART RATE: 66 BPM | HEIGHT: 71 IN | SYSTOLIC BLOOD PRESSURE: 84 MMHG | BODY MASS INDEX: 25.86 KG/M2 | OXYGEN SATURATION: 100 % | DIASTOLIC BLOOD PRESSURE: 46 MMHG | WEIGHT: 184.75 LBS

## 2019-02-25 DIAGNOSIS — I50.32 CHRONIC DIASTOLIC HEART FAILURE: ICD-10-CM

## 2019-02-25 DIAGNOSIS — M34.1 CREST (CALCINOSIS, RAYNAUD'S PHENOMENON, ESOPHAGEAL DYSFUNCTION, SCLERODACTYLY, TELANGIECTASIA): ICD-10-CM

## 2019-02-25 DIAGNOSIS — J84.10 PULMONARY FIBROSIS: ICD-10-CM

## 2019-02-25 DIAGNOSIS — I27.20 PULMONARY HYPERTENSION: Primary | ICD-10-CM

## 2019-02-25 DIAGNOSIS — J84.9 INTERSTITIAL LUNG DISEASE: ICD-10-CM

## 2019-02-25 DIAGNOSIS — Z79.899 POLYPHARMACY: ICD-10-CM

## 2019-02-25 DIAGNOSIS — R06.82 TACHYPNEA: ICD-10-CM

## 2019-02-25 DIAGNOSIS — M35.02 SJOGREN'S SYNDROME WITH LUNG INVOLVEMENT: ICD-10-CM

## 2019-02-25 DIAGNOSIS — J96.11 CHRONIC RESPIRATORY FAILURE WITH HYPOXIA: ICD-10-CM

## 2019-02-25 LAB
ALBUMIN SERPL BCP-MCNC: 3.9 G/DL
ALP SERPL-CCNC: 57 U/L
ALT SERPL W/O P-5'-P-CCNC: 13 U/L
ANION GAP SERPL CALC-SCNC: 7 MMOL/L
AST SERPL-CCNC: 22 U/L
BASOPHILS # BLD AUTO: 0.08 K/UL
BASOPHILS NFR BLD: 2 %
BILIRUB SERPL-MCNC: 0.7 MG/DL
BNP SERPL-MCNC: 56 PG/ML
BUN SERPL-MCNC: 13 MG/DL
CALCIUM SERPL-MCNC: 9.4 MG/DL
CHLORIDE SERPL-SCNC: 107 MMOL/L
CO2 SERPL-SCNC: 26 MMOL/L
CREAT SERPL-MCNC: 0.7 MG/DL
DIFFERENTIAL METHOD: ABNORMAL
EOSINOPHIL # BLD AUTO: 0.3 K/UL
EOSINOPHIL NFR BLD: 7.4 %
ERYTHROCYTE [DISTWIDTH] IN BLOOD BY AUTOMATED COUNT: 13.6 %
EST. GFR  (AFRICAN AMERICAN): >60 ML/MIN/1.73 M^2
EST. GFR  (NON AFRICAN AMERICAN): >60 ML/MIN/1.73 M^2
GLUCOSE SERPL-MCNC: 77 MG/DL
HCT VFR BLD AUTO: 38.9 %
HGB BLD-MCNC: 11.9 G/DL
IMM GRANULOCYTES # BLD AUTO: 0.02 K/UL
IMM GRANULOCYTES NFR BLD AUTO: 0.5 %
LYMPHOCYTES # BLD AUTO: 0.3 K/UL
LYMPHOCYTES NFR BLD: 8.1 %
MAGNESIUM SERPL-MCNC: 2.3 MG/DL
MCH RBC QN AUTO: 27.5 PG
MCHC RBC AUTO-ENTMCNC: 30.6 G/DL
MCV RBC AUTO: 90 FL
MONOCYTES # BLD AUTO: 0.5 K/UL
MONOCYTES NFR BLD: 13.2 %
NEUTROPHILS # BLD AUTO: 2.8 K/UL
NEUTROPHILS NFR BLD: 68.8 %
NRBC BLD-RTO: 0 /100 WBC
PLATELET # BLD AUTO: 210 K/UL
PMV BLD AUTO: 8.9 FL
POTASSIUM SERPL-SCNC: 3.6 MMOL/L
PROT SERPL-MCNC: 6.8 G/DL
RBC # BLD AUTO: 4.33 M/UL
SODIUM SERPL-SCNC: 140 MMOL/L
WBC # BLD AUTO: 4.08 K/UL

## 2019-02-25 PROCEDURE — 85025 COMPLETE CBC W/AUTO DIFF WBC: CPT

## 2019-02-25 PROCEDURE — 99999 PR PBB SHADOW E&M-EST. PATIENT-LVL III: ICD-10-PCS | Mod: PBBFAC,,, | Performed by: INTERNAL MEDICINE

## 2019-02-25 PROCEDURE — 99214 OFFICE O/P EST MOD 30 MIN: CPT | Mod: S$GLB,,, | Performed by: INTERNAL MEDICINE

## 2019-02-25 PROCEDURE — 36415 COLL VENOUS BLD VENIPUNCTURE: CPT

## 2019-02-25 PROCEDURE — 83880 ASSAY OF NATRIURETIC PEPTIDE: CPT

## 2019-02-25 PROCEDURE — 99214 PR OFFICE/OUTPT VISIT, EST, LEVL IV, 30-39 MIN: ICD-10-PCS | Mod: S$GLB,,, | Performed by: INTERNAL MEDICINE

## 2019-02-25 PROCEDURE — 99999 PR PBB SHADOW E&M-EST. PATIENT-LVL III: CPT | Mod: PBBFAC,,, | Performed by: INTERNAL MEDICINE

## 2019-02-25 PROCEDURE — 83735 ASSAY OF MAGNESIUM: CPT

## 2019-02-25 PROCEDURE — 3008F PR BODY MASS INDEX (BMI) DOCUMENTED: ICD-10-PCS | Mod: CPTII,S$GLB,, | Performed by: INTERNAL MEDICINE

## 2019-02-25 PROCEDURE — 80053 COMPREHEN METABOLIC PANEL: CPT

## 2019-02-25 PROCEDURE — 3008F BODY MASS INDEX DOCD: CPT | Mod: CPTII,S$GLB,, | Performed by: INTERNAL MEDICINE

## 2019-02-25 RX ORDER — CARVEDILOL 3.12 MG/1
3.12 TABLET ORAL 2 TIMES DAILY WITH MEALS
Qty: 60 TABLET | Refills: 11 | Status: SHIPPED | OUTPATIENT
Start: 2019-02-25 | End: 2019-03-11 | Stop reason: SDUPTHER

## 2019-02-25 NOTE — LETTER
February 25, 2019        Luis Ken  [address]  [facility] [address]  Phone: [phone]  Fax: [phone]             [facility]  Tamika García  [facility] [address]  Phone: [phone]   Patient: Fatimah Cha   MR Number: [id number]   YOB: 1955   Date of Visit: 2/25/2019       Dear Dr. Luis Ken    Thank you for referring Fatimah Cha to me for evaluation. Attached you will find relevant portions of my assessment and plan of care.    If you have questions, please do not hesitate to call me. I look forward to following Fatimah Cha along with you.    Sincerely,    Renetta Russell MD    Enclosure    If you would like to receive this communication electronically, please contact [email or web address] or [phone] to request [employer] access.    [employer] is a tool which provides read-only access to select patient information with whom you have a relationship. Its easy to use and provides real time access to review your patients record including encounter summaries, notes, results, and demographic information.    If you feel you have received this communication in error or would no longer like to receive these types of communications, please e-mail [email or web address]

## 2019-02-25 NOTE — PROGRESS NOTES
Subjective:    Patient ID:  Fatimah Cha is a 63 y.o. 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 She underwent a VATS mediated lung biopsy (2013) at an OSH. (the results of this lung biopsy are unclear). She was then referred to Dr. Ken at [facility] for management of her ILD. There she has been followed with relative stability on azathioprine and prednisone.   In 02/2017- she developed worsening of her sx secondary to the flu and at that time notes that her pulmonary function had declined significantly. Lately, she has started feeling a little better but still feels she is not back to her pre-flu baseline.   Previously she was on 3L Nc at home nocturnally and with severe exertion, now she feels she has to use her oxygen more. (still not 24/7).   She also has pulmonary HTN that she is on ambrisentan for. Her last RHC was 05/2017 and noted a PA mean pressure of 30.      Since last visit, pt has been slowly increasing adempas- was having issues with her stomach, and so at this time she is on 1.5mg tid, about to increase to 2mg.  Says she had a 6mw done at [facility] and says it was exactly the same.  Her bp has been running low, but says she is not getting LH with this. Thinks the adempas has caused a little fluid build up, takes lasix 40mg daily, some days urinates more than others.  Cont to exercise (gazelle, stationary bike) and work  Does her shopping, though walking is hard.  Says she used to have a lot of PVCs which have gone away  Plans to get another round of ritux    RHC 12/20/18  · RA: 5/ 2/ 3 RV: 50/ 1/ 6 PA: 47/ 17/ 30 PWP: 14/ 13/ 15 . Cardiac output was 6.5 by  Naveed. Cardiac index is 3.23 L/min/m2    No 6mw today  Other: 6MWT: 10/16/2018  Pt walked 900 feet. Lowest O2 sat was 87% with highest . She required 3L NC to maintain O2 sat > 90%. Moderate dyspnea.     TTE 1/18  The right ventricle is normal in size measuring 3.5 cm at the base in the apical right ventricle-focused view. Global right ventricular systolic function appears normal. Tricuspid annular plane systolic excursion (TAPSE) is 2.3 cm.   Tissue Doppler-derived tricuspid annular peak systolic velocity (S prime) is 9.5 cm/s.     10/17  266m  m ( 282  m in  July  )                                              O2 sat 97  -> 83 %                                                           HR   86->  102                                                                BP   120/ 64  ->128 /66                                                         Kushal    2 -> 3    Repeat PFts 10/18 relatively stable although the DLCO is cont to fall (now 21% pred)    PFTs   9 / 19 /17    FVC  2.55   L    64 % pred  FEV1  2.08 L  68   % pred  FEV1/FVC   83  %  TLC 3.35  L  56 %pred  DLCO        19    % pred     Ct chest with contrast 8/30/17  Fibrosis in periphery of lungs, progressed since prior     Echo 8/30/17  Normal RV size and RV systolic fxn, PASp 72  TAPSE 2.35  Normal RA size, LA size  Normal LV EF 60-65%  Grade 1 DD    Lung bx 2013 with interstitial fibrosis, endstaged honeycomb    RHC 5/17  AOPRES: 106/70 (82)  AOSAT: 100  FICKCI: 2.96  FICKCO: 6.19  PAPRES: 52/16 (30)  PASAT: 74  PWPRES: 13/12 (10)  RAPRES: 8/7 (6)  RVPRES: 49/2, 6    Condition 2:  PWSAT_POST: 93    Review of Systems   Constitution: Negative for chills, fever, malaise/fatigue and weight gain.   HENT: Negative.    Eyes: Negative.    Cardiovascular: Positive for dyspnea on exertion. Negative for chest pain, leg swelling, near-syncope, orthopnea, palpitations, paroxysmal nocturnal dyspnea and syncope.   Respiratory: Negative for cough and shortness of  "breath.    Endocrine: Negative.    Skin: Negative.    Musculoskeletal: Negative.    Gastrointestinal: Negative for bloating, abdominal pain and change in bowel habit.   Neurological: Negative for dizziness and light-headedness.   Psychiatric/Behavioral: Negative for depression.        Objective: BP (!) 84/46 (BP Location: Left arm, Patient Position: Sitting, BP Method: Medium (Automatic))   Pulse 66   Ht 5' 11" (1.803 m)   Wt 83.8 kg (184 lb 11.9 oz)   SpO2 100% Comment: 2L  BMI 25.77 kg/m²       Physical Exam   Constitutional: She is oriented to person, place, and time. She appears well-developed and well-nourished.   HENT:   Head: Normocephalic and atraumatic.   Eyes: Right eye exhibits no discharge. Left eye exhibits no discharge.   Neck: Neck supple. No JVD present. No thyromegaly present.   Cardiovascular: Normal rate and regular rhythm. Exam reveals no gallop and no friction rub.   No murmur heard.       Pulmonary/Chest: Effort normal and breath sounds normal. No respiratory distress. She has no wheezes. She has no rales.   Abdominal: Soft. Bowel sounds are normal. She exhibits no distension. There is no tenderness.   Musculoskeletal: Normal range of motion. She exhibits edema. She exhibits no tenderness.   Mild nonpitting edema to above ankles today, compression stockings on   Neurological: She is alert and oriented to person, place, and time. No cranial nerve deficit. Coordination normal.   Skin: Skin is warm and dry. No rash noted.   Psychiatric: She has a normal mood and affect. Judgment and thought content normal.     Lab Results   Component Value Date    BNP 56 02/25/2019     02/25/2019    K 3.6 02/25/2019    MG 2.3 02/25/2019     02/25/2019    CO2 26 02/25/2019    BUN 13 02/25/2019    CREATININE 0.7 02/25/2019    GLU 77 02/25/2019    AST 22 02/25/2019    ALT 13 02/25/2019    ALBUMIN 3.9 02/25/2019    PROT 6.8 02/25/2019    BILITOT 0.7 02/25/2019       Magnesium   Date Value Ref Range " Status   02/25/2019 2.3 1.6 - 2.6 mg/dL Final       Lab Results   Component Value Date    WBC 4.08 02/25/2019    HGB 11.9 (L) 02/25/2019    HCT 38.9 02/25/2019    MCV 90 02/25/2019     02/25/2019       Lab Results   Component Value Date    INR 1.0 12/20/2018    INR 0.9 05/17/2017       BNP   Date Value Ref Range Status   02/25/2019 56 0 - 99 pg/mL Final     Comment:     Values of less than 100 pg/ml are consistent with non-CHF populations.   12/20/2018 79 0 - 99 pg/mL Final     Comment:     Values of less than 100 pg/ml are consistent with non-CHF populations.   11/20/2018 57 0 - 99 pg/mL Final     Comment:     Values of less than 100 pg/ml are consistent with non-CHF populations.       No results found for: LDH            Assessment:       1. Pulmonary hypertension- mild on RHC in May 2017, with normal RV size and fxn on our last echo-  Appears dry today with normal BNP  FC II    WHO group 1,3 (ILD assoc with scleroderma)   2. CREST (calcinosis, Raynaud's phenomenon, esophageal dysfunction, sclerodactyly, telangiectasia)    3. Chronic respiratory failure with hypoxia    4. Orthostatic hypotension    5. Pulmonary fibrosis    6. Raynaud's disease without gangrene    7. Sjogren's syndrome with lung involvement    8. Chronic diastolic heart failure    9. Hypervolemia, unspecified hypervolemia type    10. Lung transplant candidate    11. Abnormal stress echocardiogram         Plan:   Cont letairis and uptitrating adempas as tolerated    Cont supplemental O2    Pt will send us her 6mw [facility]    Will cut coreg back to 3.125mg bid given that her bp has been running so low- may ultimately d/c it all together (was started after PET stress in 2017 with mild endothelial dysfunction) but I do think it has helped with her PVCs    Due to see Dr Garcia in April. Has been stable.    Keep salt intake to under 2000 mg sodium, fluids to under 2 L (64 oz)    Check weights every morning after getting out of bed and  urinating. If weight goes up 3# overnight or 5# in one week she should take an extra lasix and call us if the fluid doesn't come off.    F/u June with repeat echo, walk and labs

## 2019-02-25 NOTE — PATIENT INSTRUCTIONS
Cut coreg to 3.125mg twice a day    Continue increasing adempas as your pressure allows- if the systolic is <90 we will not increase    PH on Facebook: A couple of our patients created a group on Facebook for people living in [place] with PH, it's called [place] [facility].    Check it out!    PH José March is March 31, 9am, at [facility], consider walking!!!      Call us if you find yourself getting more short of breath, have more swelling or unexpected weight changes    Jalyn's fax [phone]

## 2019-03-06 ENCOUNTER — DOCUMENTATION ONLY (OUTPATIENT)
Dept: TRANSPLANT | Facility: CLINIC | Age: 64
End: 2019-03-06

## 2019-03-07 ENCOUNTER — DOCUMENTATION ONLY (OUTPATIENT)
Dept: TRANSPLANT | Facility: CLINIC | Age: 64
End: 2019-03-07

## 2019-03-07 LAB
EXT ALBUMIN: 4.2
EXT ALT: 9
EXT AST: 15
EXT BUN: 20
EXT CALCIUM: 9.2
EXT CHLORIDE: 104
EXT CHOLESTEROL (LIPID PANEL): 156
EXT CREATININE: 0.69 MG/DL
EXT GLUCOSE: 84
EXT HDL: 59
EXT LDL CHOLESTEROL: 78
EXT POTASSIUM: 4
EXT PROTEIN TOTAL: 6.4
EXT SODIUM: 140 MMOL/L
EXT TRIGLYCERIDES: 103

## 2019-03-08 ENCOUNTER — DOCUMENTATION ONLY (OUTPATIENT)
Dept: TRANSPLANT | Facility: CLINIC | Age: 64
End: 2019-03-08

## 2019-03-08 NOTE — PROGRESS NOTES
PFTs reviewed by Dr Russell (from OSH)    Mild restriction, has really low DLCO thats her PH    Renetta Russell MD, [facility]  Medical Director, Pulmonary Hypertension   , Advanced Heart Failure and Transplant Cardiology Fellowship  Associate , Cardiovascular Disease Fellowship Program  [facility]  Section of Cardiomyopathy and Heart Transplantation  [facility] [place], [place], [address]    [phone] (phone)  [phone] (fax)    From: Jalyn Barfield   Sent: Thursday, March 07, 2019 10:36 AM  To: Renetta Russell M.D. [email or web address]>  Subject: AC PFTs    A [facility]debosc[facility] PFTs. I sent for scanning.    From: [email or web address]>   Sent: Thursday, March 07, 2019 10:35 AM  To: Jalyn Barfield <[email or web address]>  Subject: Ricoh Doris Scan to Me

## 2019-03-12 RX ORDER — CARVEDILOL 3.12 MG/1
TABLET ORAL
Qty: 60 TABLET | Refills: 11 | Status: SHIPPED | OUTPATIENT
Start: 2019-03-12 | End: 2019-11-23 | Stop reason: SDUPTHER

## 2019-03-20 ENCOUNTER — TELEPHONE (OUTPATIENT)
Dept: TRANSPLANT | Facility: CLINIC | Age: 64
End: 2019-03-20

## 2019-03-20 NOTE — TELEPHONE ENCOUNTER
Patient requesting a 90 day supply for her carvedilol 3.125. Spoke to pharmacy and the refill request will be changed to 180 pills and 3 refills per patient request.

## 2019-03-22 DIAGNOSIS — I27.20 PULMONARY HYPERTENSION: ICD-10-CM

## 2019-03-22 DIAGNOSIS — J84.10 PULMONARY FIBROSIS: Primary | ICD-10-CM

## 2019-03-22 DIAGNOSIS — Z76.82 LUNG TRANSPLANT CANDIDATE: ICD-10-CM

## 2019-04-08 ENCOUNTER — TELEPHONE (OUTPATIENT)
Dept: TRANSPLANT | Facility: CLINIC | Age: 64
End: 2019-04-08

## 2019-04-08 ENCOUNTER — DOCUMENTATION ONLY (OUTPATIENT)
Dept: TRANSPLANT | Facility: CLINIC | Age: 64
End: 2019-04-08

## 2019-04-09 ENCOUNTER — TELEPHONE (OUTPATIENT)
Dept: TRANSPLANT | Facility: CLINIC | Age: 64
End: 2019-04-09

## 2019-04-23 ENCOUNTER — HOSPITAL ENCOUNTER (OUTPATIENT)
Dept: PULMONOLOGY | Facility: CLINIC | Age: 64
Discharge: HOME | End: 2019-04-23
Payer: COMMERCIAL

## 2019-04-23 ENCOUNTER — OFFICE VISIT (OUTPATIENT)
Dept: TRANSPLANT | Facility: CLINIC | Age: 64
End: 2019-04-23
Payer: COMMERCIAL

## 2019-04-23 VITALS
WEIGHT: 185 LBS | RESPIRATION RATE: 20 BRPM | HEIGHT: 69 IN | SYSTOLIC BLOOD PRESSURE: 109 MMHG | HEART RATE: 81 BPM | BODY MASS INDEX: 27.4 KG/M2 | DIASTOLIC BLOOD PRESSURE: 59 MMHG | TEMPERATURE: 98 F | OXYGEN SATURATION: 96 %

## 2019-04-23 VITALS — HEIGHT: 69 IN | WEIGHT: 185 LBS | BODY MASS INDEX: 27.4 KG/M2

## 2019-04-23 DIAGNOSIS — J96.11 CHRONIC RESPIRATORY FAILURE WITH HYPOXIA: ICD-10-CM

## 2019-04-23 DIAGNOSIS — J84.10 PULMONARY FIBROSIS: ICD-10-CM

## 2019-04-23 DIAGNOSIS — I27.20 PULMONARY HYPERTENSION: ICD-10-CM

## 2019-04-23 DIAGNOSIS — Z76.82 LUNG TRANSPLANT CANDIDATE: ICD-10-CM

## 2019-04-23 DIAGNOSIS — M35.9 INTERSTITIAL LUNG DISEASE DUE TO CONNECTIVE TISSUE DISEASE: ICD-10-CM

## 2019-04-23 DIAGNOSIS — I27.20 PULMONARY HYPERTENSION: Primary | ICD-10-CM

## 2019-04-23 DIAGNOSIS — J84.89 INTERSTITIAL LUNG DISEASE DUE TO CONNECTIVE TISSUE DISEASE: ICD-10-CM

## 2019-04-23 LAB
PRE FEV1 FVC: 85
PRE FEV1: 2.04
PRE FVC: 2.41
PREDICTED FEV1 FVC: 76
PREDICTED FEV1: 2.73
PREDICTED FVC: 3.5

## 2019-04-23 PROCEDURE — 94618 PULMONARY STRESS TESTING: CPT | Mod: S$GLB,,, | Performed by: INTERNAL MEDICINE

## 2019-04-23 PROCEDURE — 99999 PR PBB SHADOW E&M-EST. PATIENT-LVL III: CPT | Mod: PBBFAC,,, | Performed by: PHYSICIAN ASSISTANT

## 2019-04-23 PROCEDURE — 3008F BODY MASS INDEX DOCD: CPT | Mod: CPTII,S$GLB,, | Performed by: PHYSICIAN ASSISTANT

## 2019-04-23 PROCEDURE — 94729 DIFFUSING CAPACITY: CPT | Mod: S$GLB,,, | Performed by: INTERNAL MEDICINE

## 2019-04-23 PROCEDURE — 94010 BREATHING CAPACITY TEST: CPT | Mod: S$GLB,,, | Performed by: INTERNAL MEDICINE

## 2019-04-23 PROCEDURE — 94618 PULMONARY STRESS TESTING: ICD-10-PCS | Mod: S$GLB,,, | Performed by: INTERNAL MEDICINE

## 2019-04-23 PROCEDURE — 94729 PR C02/MEMBANE DIFFUSE CAPACITY: ICD-10-PCS | Mod: S$GLB,,, | Performed by: INTERNAL MEDICINE

## 2019-04-23 PROCEDURE — 99999 PR PBB SHADOW E&M-EST. PATIENT-LVL III: ICD-10-PCS | Mod: PBBFAC,,, | Performed by: PHYSICIAN ASSISTANT

## 2019-04-23 PROCEDURE — 99214 OFFICE O/P EST MOD 30 MIN: CPT | Mod: S$GLB,,, | Performed by: PHYSICIAN ASSISTANT

## 2019-04-23 PROCEDURE — 94010 BREATHING CAPACITY TEST: ICD-10-PCS | Mod: S$GLB,,, | Performed by: INTERNAL MEDICINE

## 2019-04-23 PROCEDURE — 99214 PR OFFICE/OUTPT VISIT, EST, LEVL IV, 30-39 MIN: ICD-10-PCS | Mod: S$GLB,,, | Performed by: PHYSICIAN ASSISTANT

## 2019-04-23 PROCEDURE — 3008F PR BODY MASS INDEX (BMI) DOCUMENTED: ICD-10-PCS | Mod: CPTII,S$GLB,, | Performed by: PHYSICIAN ASSISTANT

## 2019-04-23 RX ORDER — PANTOPRAZOLE SODIUM 40 MG/1
40 TABLET, DELAYED RELEASE ORAL 2 TIMES DAILY
COMMUNITY
End: 2020-04-14

## 2019-04-23 RX ORDER — LEVOTHYROXINE SODIUM 13 UG/1
150 CAPSULE ORAL DAILY
COMMUNITY
Start: 2019-04-05 | End: 2020-02-14

## 2019-04-23 RX ORDER — TIZANIDINE 2 MG/1
2 TABLET ORAL 2 TIMES DAILY
COMMUNITY
Start: 2019-04-18 | End: 2019-09-10

## 2019-04-23 RX ORDER — RIOCIGUAT 2.5 MG/1
2.5 TABLET, FILM COATED ORAL 3 TIMES DAILY
COMMUNITY
Start: 2019-04-19 | End: 2021-08-05

## 2019-04-23 NOTE — PROGRESS NOTES
LUNG TRANSPLANT PRE FOLLOW-UP    Referring Physician: Luis Ken    Reason for Visit:  Pre-lung transplant follow-up.         Date of Initial Evaluation:  7/3/2017                                                                                            History of Present Illness: Fatimah Cha is a 63 y.o. female who is on 2-3L of oxygen continuously at night and upon exertion during the day. She is on no assisted ventilation.  Her New York Heart Association Class is II and a Karnofsky score of 80% - Normal activity with effort: some symptoms of disease. She is not diabetic.  Patient presents today for routine follow up for consideration of lung transplantation. Patient reports doing well over the last 6 months. Patient reports chronic, dyspnea on exertion that is unchanged from baseline. Reports intermittent dry cough that is non-frequent in nature. Continues to stay active with working and attends pulmonary rehab twice a week as well as exercising at home on an elliptical approximately 30 minutes each day. Denies any orthopnea, chest pain, or leg swelling. Reports intermittent episodes of diarrhea that she relates to medication side effects and is well controlled with bland diet consumption for a few days. Continues to follow with Dr. Vitale and Dr. Russell.     Review of Systems   Constitutional: Negative for chills, diaphoresis, fever, malaise/fatigue and weight loss.   HENT: Negative for congestion, ear discharge, ear pain, hearing loss, nosebleeds, sinus pain, sore throat and tinnitus.    Eyes: Negative for blurred vision, double vision, photophobia, pain, discharge and redness.   Respiratory: Positive for cough (dry) and shortness of breath (stable; upon exertion). Negative for hemoptysis, sputum production, wheezing and stridor.    Cardiovascular: Negative for chest pain, palpitations, orthopnea, claudication, leg swelling and PND.   Gastrointestinal: Positive for diarrhea (intermittent  "episodes). Negative for abdominal pain, blood in stool, constipation, heartburn, melena, nausea and vomiting.   Genitourinary: Negative for dysuria, flank pain, frequency, hematuria and urgency.   Musculoskeletal: Positive for back pain (left sciatica). Negative for falls, joint pain, myalgias and neck pain.   Skin: Negative for itching and rash.   Neurological: Negative for dizziness, tingling, tremors, sensory change, speech change, focal weakness, seizures, loss of consciousness, weakness and headaches.   Endo/Heme/Allergies: Negative for environmental allergies and polydipsia. Does not bruise/bleed easily.   Psychiatric/Behavioral: Negative for depression, hallucinations, memory loss, substance abuse and suicidal ideas. The patient is not nervous/anxious and does not have insomnia.      Objective:   BP (!) 109/59   Pulse 81   Temp 98.1 °F (36.7 °C) (Oral)   Resp 20   Ht 5' 9" (1.753 m)   Wt 83.9 kg (185 lb)   SpO2 96% Comment: 1 liter  BMI 27.32 kg/m²   Physical Exam   Constitutional: She is oriented to person, place, and time and well-developed, well-nourished, and in no distress. No distress.   HENT:   Head: Normocephalic and atraumatic.   Right Ear: External ear normal.   Left Ear: External ear normal.   Nose: Nose normal.   Mouth/Throat: Oropharynx is clear and moist. No oropharyngeal exudate.   Eyes: Pupils are equal, round, and reactive to light. Conjunctivae and EOM are normal. No scleral icterus.   Neck: Normal range of motion. Neck supple. No JVD present. No tracheal deviation present. No thyromegaly present.   Cardiovascular: Normal rate, regular rhythm and intact distal pulses. Exam reveals no gallop and no friction rub.   No murmur heard.  Pulmonary/Chest: Effort normal. No stridor. No respiratory distress. She has no wheezes. She has rales (bibasilar). She exhibits no tenderness.   Abdominal: Soft. Bowel sounds are normal. She exhibits no distension and no mass. There is no tenderness. There " is no rebound and no guarding.   Musculoskeletal: Normal range of motion. She exhibits no edema.   Lymphadenopathy:     She has no cervical adenopathy.   Neurological: She is alert and oriented to person, place, and time. No cranial nerve deficit. Gait normal. Coordination normal.   Skin: Skin is warm and dry. No rash noted. She is not diaphoretic. No erythema. No pallor.   Psychiatric: Mood and affect normal.   Nursing note and vitals reviewed.    Labs:  No visits with results within 7 Day(s) from this visit.   Latest known visit with results is:   Documentation Only on 03/07/2019   Component Date Value    EXT Glucose 09/19/2018 84     EXT BUN 09/19/2018 20     EXT Creatinine 09/19/2018 0.69     EXT Sodium 09/19/2018 140     EXT Potassium 09/19/2018 4.0     EXT Chloride 09/19/2018 104     EXT Calcium 09/19/2018 9.2     EXT Albumin 09/19/2018 4.2     EXT Protein total 09/19/2018 6.4     EXT AST 09/19/2018 15     EXT ALT 09/19/2018 9     EXT Cholesterol (Lipid P* 09/19/2018 156     EXT LDL Cholesterol 09/19/2018 78     EXT HDL 09/19/2018 59     EXT Triglycerides 09/19/2018 103        Pulmonary Function Tests 4/23/2019 10/16/2018 4/19/2018 1/10/2018 7/3/2017 6/13/2017   FVC 2.41 2.59 2.52 2.38 2.6 2.6   FEV1 2.04 2.11 2.11 1.94 2.2 2.23   TLC (liters) - 3.76 3.31 - 3.3 3.3   DLCO (ml/mmHg sec) 5.6 5.2 4.1 6.7 6.4 6.2   FVC% 70 64 - 68 74 65   FEV1% 75 67 - 71 80 72   FEF 25-75 2.97 2.46 2.87 2.21 2.99 2.94   FEF 25-75% 115 95 - 84 113 112   TLC% - 62 - - 56 55   RV - 1.17 0.79 - 0.78 0.68   RV% - 49 - - 34 29   DLCO% 28 21 - 34 32 25     6MW 4/23/2019 1/10/2018 10/3/2017 7/3/2017 3/30/2017   6MWT Status completed without stopping completed without stopping completed without stopping completed without stopping not completed   Patient Reported Dyspnea;Leg pain Dyspnea Dyspnea Dyspnea Dyspnea   Was O2 used? Yes Yes Yes - No   Delivery Method Cannula;Pull Tank;Continuous Flow Cannula;Pull Tank;Continuous  Flow Cannula;Pull Tank;Continuous Flow - -   6MW Distance walked (feet) 1000 940 875 - 400   Distance walked (meters) 304.8 286.51 266.7 - 121.92   Did patient stop? No No No No Yes   Oxygen Saturation 96 96 97 96 93   Supplemental Oxygen 3 L/M 3 L/M 3 L/M Room Air Room Air   Heart Rate 86 86 86 79 93   Blood Pressure 118/57 112/56 120/64 106/55 133/78   Kushal Dyspnea Rating  very light light light very light moderate   Oxygen Saturation 80 88 83 80 78   Supplemental Oxygen 3 L/M 3 L/M 3 L/M Room Air Room Air   Heart Rate 90 92 102 93 111   Blood Pressure 119/54 118/57 128/66 114/64 139/65   Kushal Dyspnea Rating  somewhat heavy moderate moderate somewhat heavy somewhat heavy   Recovery Time (seconds) 96 97 116 142 151   Oxygen Saturation 95 97 94 94 92   Supplemental Oxygen 3 L/M 3 L/M 3 L/M Room Air Room Air   Heart Rate 91 91 91 76 95       Assessment:-  1. Pulmonary hypertension    2. 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 Chronic respiratory failure with hypoxia    4. Lung transplant candidate      Plan:   1. Continue Adempas, Letairis, and Lasix per Dr. Russell. TTE and RHC from 12/2018 reviewed. Next TTE due in 06/2019 with Dr. Russell.     2. Spirometry reviewed and continue to demonstrate mild restrictive disease. Spirometry has remained overall stable over the past year. 6MWT remains stable with 1000 feet completed. Next Rituxan infusion due on 4/29/2019. Continue MMF 1500 mg BID and Plaquenil per Dr. Vitale for Sjogren's and scleroderma.     3. Continue with nightly oxygen supplementation and oxygen supplementation upon exertion.     4. In regards to lung transplantation, patient remains clinically stable based upon spirometry and 6MWT results as well as stable from a functional standpoint. Will continue to defer work-up for lung transplantation at this time as the risks of transplant currently outweigh the benefits. Advised patient to inform lung transplant team should she have  worsening respiratory symptoms or oxygen requirements.     5.  Follow up in 6 months or earlier if needed.     Maria Del Carmen Tripathi PA-C  Lung Transplant

## 2019-04-23 NOTE — LETTER
April 23, 2019        Luis Ken  [address]  [facility] [address]  Phone: [phone]  Fax: [phone]             Demetrio García - Lung Transplant  [address]  [facility] [address]  Phone: [phone]   Patient: Fatimah Cha   MR Number: [id number]   YOB: 1955   Date of Visit: 4/23/2019       Dear Dr. Luis Ken    Thank you for referring Fatimah Cha to me for evaluation. Attached you will find relevant portions of my assessment and plan of care.    If you have questions, please do not hesitate to call me. I look forward to following Fatimah Cha along with you.    Sincerely,    Maria Del Carmen Tripathi PA-C    Enclosure    If you would like to receive this communication electronically, please contact [email or web address] or [phone] to request [employer] access.    [employer] is a tool which provides read-only access to select patient information with whom you have a relationship. Its easy to use and provides real time access to review your patients record including encounter summaries, notes, results, and demographic information.    If you feel you have received this communication in error or would no longer like to receive these types of communications, please e-mail [email or web address]

## 2019-04-23 NOTE — PROCEDURES
Fatimah Cha is a 63 y.o.  female patient, who presents for a 6 minute walk test ordered by Frederick Garcia MD.  The diagnosis is Pre Lung Transplant Evaluation; Pulmonary Fibrosis.  The patient's BMI is 27.4 kg/m2.  Predicted distance (lower limit of normal) is 333.9 meters.      Test Results:    The test was completed without stopping.  The total time walked was 360 seconds.  During walking, the patient reported:  Dyspnea, Leg pain.  The patient used supplemental oxygen during testing.     04/23/2019---------Distance: 304.8 meters (1000 feet)     O2 Sat % Supplemental Oxygen Heart Rate Blood Pressure Kushal Scale   Pre-exercise  (Resting) 96 % 3 L/M 86 bpm 118/57 mmHg 1   During Exercise 80 % 3 L/M 90 bpm 119/54 mmHg 4   Post-exercise  (Recovery) 95 % 3 L/M  91 bpm       Recovery Time:  96 seconds    Performing nurse/tech:  CAIT Moscoso RRT      PREVIOUS STUDY:   01/10/2018---------Distance: 286.51 meters (940 feet)       O2 Sat % Supplemental Oxygen Heart Rate Blood Pressure Kushal Scale   Pre-exercise  (Resting) 96 % 3 L/M 86 bpm 112/56 mmHg 2   During Exercise 88 % 3 L/M 92 bpm 118/57 mmHg 3   Post-exercise  (Recovery) 97 % 3 L/M  91 bpm            CLINICAL INTERPRETATION:  Six minute walk distance is 304.8 meters (1000 feet) with somewhat heavy dyspnea.  During exercise, there was significant desaturation while breathing supplemental oxygen.  Both blood pressure and heart rate remained stable with walking.  The patient reported non-pulmonary symptoms during exercise.  Since the previous study in January 2018, exercise capacity is unchanged.  Based upon age and body mass index, exercise capacity is less than predicted.

## 2019-05-21 ENCOUNTER — TELEPHONE (OUTPATIENT)
Dept: GASTROENTEROLOGY | Facility: CLINIC | Age: 64
End: 2019-05-21

## 2019-05-21 NOTE — TELEPHONE ENCOUNTER
----- Message from Cristela Barbour sent at 5/21/2019 10:32 AM CDT -----  Contact: pt#[phone]  Needs Advice    Reason for call:Pt states that she needs to make a f/u appt with Dr Santiago for stomach issues         Communication Preference:call    Additional Information:

## 2019-05-29 ENCOUNTER — TELEPHONE (OUTPATIENT)
Dept: TRANSPLANT | Facility: CLINIC | Age: 64
End: 2019-05-29

## 2019-05-29 NOTE — TELEPHONE ENCOUNTER
Message sent to scheduling to contact pt to schedule F/u appt with Dr Russell only, to include echo, labs and walk.

## 2019-05-29 NOTE — TELEPHONE ENCOUNTER
----- Message from CAMI Cedillo, RN sent at 5/29/2019 10:41 AM CDT -----  Message   Received: Today   Message Contents   Paula PEDRAZA [facility] Pulmonary Hypertension Clinical  Caller: Patient (Today, 10:29 AM)         The Pt is calling to see if she needs to have another ECHO done because the last one she had done was 12/2018. Please call her back @ [phone]. Thanks, Paula

## 2019-05-30 ENCOUNTER — OFFICE VISIT (OUTPATIENT)
Dept: GASTROENTEROLOGY | Facility: CLINIC | Age: 64
End: 2019-05-30
Payer: COMMERCIAL

## 2019-05-30 VITALS
HEIGHT: 71 IN | WEIGHT: 184.75 LBS | DIASTOLIC BLOOD PRESSURE: 62 MMHG | HEART RATE: 81 BPM | BODY MASS INDEX: 25.86 KG/M2 | SYSTOLIC BLOOD PRESSURE: 101 MMHG

## 2019-05-30 DIAGNOSIS — R13.19 ESOPHAGEAL DYSPHAGIA: ICD-10-CM

## 2019-05-30 DIAGNOSIS — R14.0 BLOATING: ICD-10-CM

## 2019-05-30 DIAGNOSIS — K21.9 GASTROESOPHAGEAL REFLUX DISEASE, ESOPHAGITIS PRESENCE NOT SPECIFIED: ICD-10-CM

## 2019-05-30 DIAGNOSIS — K63.8219 SMALL INTESTINAL BACTERIAL OVERGROWTH: Primary | ICD-10-CM

## 2019-05-30 PROCEDURE — 99215 PR OFFICE/OUTPT VISIT, EST, LEVL V, 40-54 MIN: ICD-10-PCS | Mod: S$GLB,,, | Performed by: NURSE PRACTITIONER

## 2019-05-30 PROCEDURE — 99999 PR PBB SHADOW E&M-EST. PATIENT-LVL V: ICD-10-PCS | Mod: PBBFAC,,, | Performed by: NURSE PRACTITIONER

## 2019-05-30 PROCEDURE — 3008F PR BODY MASS INDEX (BMI) DOCUMENTED: ICD-10-PCS | Mod: CPTII,S$GLB,, | Performed by: NURSE PRACTITIONER

## 2019-05-30 PROCEDURE — 3008F BODY MASS INDEX DOCD: CPT | Mod: CPTII,S$GLB,, | Performed by: NURSE PRACTITIONER

## 2019-05-30 PROCEDURE — 99215 OFFICE O/P EST HI 40 MIN: CPT | Mod: S$GLB,,, | Performed by: NURSE PRACTITIONER

## 2019-05-30 PROCEDURE — 99999 PR PBB SHADOW E&M-EST. PATIENT-LVL V: CPT | Mod: PBBFAC,,, | Performed by: NURSE PRACTITIONER

## 2019-05-30 RX ORDER — NEOMYCIN SULFATE 500 MG/1
500 TABLET ORAL 2 TIMES DAILY
Qty: 28 TABLET | Refills: 0 | Status: SHIPPED | OUTPATIENT
Start: 2019-05-30 | End: 2019-06-13

## 2019-05-30 RX ORDER — METRONIDAZOLE 250 MG/1
250 TABLET ORAL EVERY 8 HOURS
Qty: 42 TABLET | Refills: 0 | Status: SHIPPED | OUTPATIENT
Start: 2019-05-30 | End: 2019-06-13

## 2019-05-30 RX ORDER — SUCRALFATE 1 G/1
TABLET ORAL
COMMUNITY
Start: 2019-05-21 | End: 2019-09-10

## 2019-05-30 NOTE — PATIENT INSTRUCTIONS
Take over the counter gaviscon w alginate (may have to order from [employer]) 1-4 times daily as needed for reflux (in addtion to the protonix 40 mg twice daily).     Take flagyl and neomycin for small intestinal bacterial overgrowth. This will help the bloating and gas.

## 2019-05-30 NOTE — PROGRESS NOTES
[facility] Consultation Note    Reason for Consult:    Chief Complaint   Patient presents with    Heartburn    Bloated    Gas    Dysphagia         PCP:   Simon Orozco       Referring MD:  Esmer Vitale Md  [address] [place] [address]    Pulm: Dr. Garcia/Dr. Ken   Gi: Dr. Carrillo    HPI:  Fatimah Cha is a 63 y.o. female with history CREST, Sjogran, Scleroderma, ILD, pulmonary HTN, HTN, HLD, hashimotos thyroiditis, hypothyroidsm here for evaluation of the following GI problems:    GERD.  Belching. Regurgitation. now occurring daily. Worse with starting Adempas.  H/o bothersome heartburn in mid chest.    Previously good control with protonix 40 mg BID.   S/s previously got better after steroid taper per ED/hospital obs for ILD exacerbation.   Some improvement with carafate 1 g TID   Minimal improvement with tums and gas ex prn   Symtoms started many years ago.  Since childhood.    Previously tried prevacid, prilosec   Has bothersome nocturnal symptoms.   Sleeps with head of the bed elevated.   Avoids eating prior to bedtime.       Atypical Symptoms:  Hoarseness: yes  Cough: yes  Throat clearing: yes     Dysphagia.  Previously better with swallow precautions (eating and chewing slowly and thoroughly).  Recurrence in the past 6-7 months.  difficulty swallowing daily.   Problems with solids, no problems with liquids.   Frequent choking on pills, small particles of food.  Sensitive gag reflex. Feels that food gets lodged in cervical esophagus.    Seen by ENT without an explanation.   Denies food impactions requiring ED visit.    Has dry mouth.  Biotin does not help. Symptoms started after thyroid surgery several years ago.  Got progressively worse over years.           Nausea.  Improved.   Taking phenergan PRN.    H/o nausea occurring few times per month.  Started years ago.  Worse with sweets.   Improve phenergan.   Unable to tolerate reglan due to  difficulty sleeping.      Emesis.  Improved. Vomiting few times per month or less     Abdominal pain. Improved.    Bentyl PRN with good control.    With RLQ abd tenderness to touch or tight clothing in the past. Evaluated per [facility] abd us and CT unrevealing per pt. States she was told it could be muscluloskeletal d/t coughing and may take time to heal.    h/o dull abd discomfort.  Located in mid abdomen.  Recurrent episodes of sharp abd pain lower abdomen.  Started few years ago. Occurs few times per year. Last hours.  Better with bentyl.  Followed by BM.   Nocturnal pain:  no  Using narcotic pain medication: no   Has not tried IBgard, FDgard    Gas and bloating. No distention. Worse.   Avoids artificial sugars.   Does not drink through straws. does not chew gum.  No improvement with lactose elimination.   Now back on  whole milk.  Avoiding ice cream.    Symptoms get worse at night. Worse after meals  Previously w positive SIBO testing, treated w Flagyl.      Hiccups.  Resolved. Previously with frequent hiccups for years lasting minutes to hours.      Regular BMs. No diarrhea or constipation for at least 1 yr.  Previously w constipation. Also H/o Colitis.  Was admitted to the hospital w bloody diarrhea 10/2016.  CT showed colitis. Had similar episode 5 years prior. Negative stools studies.        Weight loss. Weight stable.    Denies BRBPR, melena, anxiety, depression       Rheumatology: CREST, Sjogran, Scleroderma, ILD, pulmonary HTN.  On cellcept 1,500 mg BID, letaris 10 mg daily, evoxac 30 mg TID,  plaquenil 200 mg BID, adempas 2.5 mg daily. Pulmonary rehab twice weekly. Followed by pulmonology and rheumatology.    Hashimotos thyroiditis. Is s/p thyroidectomy.      Total visit time was 40 minutes, more than 50% of which was spent in face-to-face counseling with patient regarding symptoms, diagnostic results, prognosis, risks and benefits of treatment options, instructions for management, importance of  compliance with chosen treatment options, risk factor reduction, stress reduction, coping strategies.        Previous Studies:   EGD 11/15/17: - Normal esophagus. Z-line irregular (-). 6 cm hiatal hernia.  G erythema (-). Multiple gastric polyps (FGP). Normal examined duodenum (-).   Colon 11/15/17: GPTTI. Two 2 to 5 mm polyps at the recto-sigmoid colon (-). Nl colon (-). Non-bleeding internal hemorrhoids. Nl ileum.  MBS 10/19/17: oropharyngeal swallow function WNL  Ct chst 8/30/17: progression of fibrosis.   EGD 6/16/14: HH. Short segment of salmon fingers(IM). Esophagitis. Fund gland polyps (FGP). Nl stomach (-). Duodenitis (-).   Colon 6/6/2014: GPTC. 3 tiny polyps in asc colon (TA). Int hemorrhoids.   CBC nl   Smart pill: slow motility of gi tract    ROS:  ROS   Constitutional: No fevers, no chills, no night sweats,  ENT: No congestion, no rhinorrhea, + chronic sinus problems  CV: No chest pain, no palpitations  Pulm: + cough, + shortness of breath  Ophtho: No blurry vision, + eye redness  GI: see HPI  Derm: No rash  Heme: No lymphadenopathy, + bruising  MSK: +joint pain, no joint swelling, + Raynauds  : No dysuria, no frequent urination, no blood in urine  Endo: + cold intolerance  Neuro: No dizziness, no syncope, no seizure  Psych: no anxiety, no depression      Medical History:   Past Medical History:   Diagnosis Date    CHF (congestive heart failure)     Colitis     CREST syndrome     GERD (gastroesophageal reflux disease)     Hypercholesteremia     Hypertension     Interstitial lung disease     Osteopenia     Pulmonary fibrosis     Pulmonary hypertension     Raynaud disease     Respiratory distress     Sciatica     Scleroderma     Sjoegren syndrome         Surgical History:   Past Surgical History:   Procedure Laterality Date    BREAST BIOPSY      CARDIAC CATHETERIZATION  2013    COLONOSCOPY      COLONOSCOPY N/A 11/15/2017    Performed by [facility] at [facility] (2ND F[facility])     ESOPHAGOGASTRODUODENOSCOPY (EGD) N/A 11/15/2017    Performed by [facility] at [facility] ENDO (2ND FLR)    foot surgery x2      HEART CATH-RIGHT Right 5/17/2017    Performed by Cherise Bower MD at [facility] CATH LAB    INSERTION, CATHETER, RIGHT HEART Right 12/20/2018    Performed by Renetta Russell MD at [facility] CATH LAB    lumpectomy (benign)      LUNG BIOPSY  03/2013    THYROIDECTOMY      TONSILLECTOMY      TUBAL LIGATION      UPPER GASTROINTESTINAL ENDOSCOPY          Family History:   Family History   Problem Relation Age of Onset    Thyroid cancer Mother     Heart failure Father     Emphysema Sister     Thyroid disease Sister     Deep vein thrombosis Brother     Lung cancer Brother     Colon cancer Maternal Uncle         60s    Cancer Maternal Aunt     Pulmonary embolism Daughter     Breast cancer Neg Hx     Ovarian cancer Neg Hx     Celiac disease Neg Hx     Cirrhosis Neg Hx     Colon polyps Neg Hx     Crohn's disease Neg Hx     Cystic fibrosis Neg Hx     Esophageal cancer Neg Hx     Hemochromatosis Neg Hx     Inflammatory bowel disease Neg Hx     Irritable bowel syndrome Neg Hx     Liver cancer Neg Hx     Liver disease Neg Hx     Rectal cancer Neg Hx     Stomach cancer Neg Hx     Ulcerative colitis Neg Hx     Phoenix's disease Neg Hx     Lymphoma Neg Hx     Tuberculosis Neg Hx     Rheum arthritis Neg Hx     Scleroderma Neg Hx     Melanoma Neg Hx     Lupus Neg Hx     Multiple sclerosis Neg Hx     Psoriasis Neg Hx     Skin cancer Neg Hx         Social History:   Social History     Socioeconomic History    Marital status:      Spouse name: Not on file    Number of children: Not on file    Years of education: Not on file    Highest education level: Not on file   Occupational History    Not on file   Social Needs    Financial resource strain: Not on file    Food insecurity:     Worry: Not on file     Inability: Not on file    Transportation needs:      Medical: Not on file     Non-medical: Not on file   Tobacco Use    Smoking status: Former Smoker     Packs/day: 1.00     Years: 5.00     Pack years: 5.00     Types: Cigarettes     Last attempt to quit: 3/30/1979     Years since quittin.1    Smokeless tobacco: Never Used   Substance and Sexual Activity    Alcohol use: No    Drug use: No    Sexual activity: Not on file   Lifestyle    Physical activity:     Days per week: Not on file     Minutes per session: Not on file    Stress: Not on file   Relationships    Social connections:     Talks on phone: Not on file     Gets together: Not on file     Attends Latter-day service: Not on file     Active member of club or organization: Not on file     Attends meetings of clubs or organizations: Not on file     Relationship status: Not on file   Other Topics Concern    Not on file   Social History Narrative    Not on file        Review of patient's allergies indicates:  No Known Allergies    Current Outpatient Medications   Medication Sig Dispense Refill    ADEMPAS 2.5 mg tablet Take 2.5 mg by mouth once daily.      albuterol (PROVENTIL) 2.5 mg /3 mL (0.083 %) nebulizer solution USE ONE VIAL in Nebulizer EVERY 6 HOURS AS NEEDED FOR SHORTNESS OF BREATH  6    ambrisentan (LETAIRIS) 10 MG Tab Take 10 mg by mouth once daily.      aspirin (ECOTRIN) 81 MG EC tablet Take 81 mg by mouth every evening.      atorvastatin (LIPITOR) 10 MG tablet Take 10 mg by mouth once daily.       carvedilol (COREG) 3.125 MG tablet TAKE ONE TABLET BY MOUTH TWICE DAILY WITH MEALS 60 tablet 11    cevimeline (EVOXAC) 30 mg capsule Take 30 mg by mouth 3 (three) times daily.      furosemide (LASIX) 40 MG tablet Take 1 tablet (40 mg total) by mouth once daily. (Patient taking differently: Take 40 mg by mouth once daily. Take BID PRN) 30 tablet 0    hydroxychloroquine (PLAQUENIL) 200 mg tablet Take 1 tablet by mouth 2 (two) times daily.  3    multivitamin capsule Take 1 capsule by mouth  "once daily.      mycophenolate (CELLCEPT) 500 mg Tab Take 1,500 mg by mouth 2 (two) times daily.       pantoprazole (PROTONIX) 40 MG tablet Take 40 mg by mouth 2 (two) times daily.      potassium chloride (KLOR-CON) 20 mEq Pack Take 20 mEq by mouth once daily. 30 packet 1    risedronate (ACTONEL) 150 MG Tab Take 150 mg by mouth every 30 days.  6    sucralfate (CARAFATE) 1 gram tablet       TIROSINT 150 mcg Cap Take 150 mcg by mouth once daily.      VITAMIN D2 50,000 unit capsule Take 50,000 Units by mouth As instructed. Twice a month      zinc gluconate 50 mg tablet Take 50 mg by mouth once daily.      azelastine (ASTELIN) 137 mcg (0.1 %) nasal spray 2 sprays by Nasal route 2 (two) times daily as needed.       fluticasone (FLONASE) 50 mcg/actuation nasal spray 1 spray by Each Nare route as needed.   3    guaiFENesin (MUCINEX) 600 mg 12 hr tablet Take 1,200 mg by mouth as needed for Congestion.      ibuprofen (ADVIL,MOTRIN) 800 MG tablet Take 800 mg by mouth 3 (three) times daily as needed.  5    INCRUSE ELLIPTA 62.5 mcg/actuation DsDv Take 1 puff by mouth as needed.   3    metroNIDAZOLE (FLAGYL) 250 MG tablet Take 1 tablet (250 mg total) by mouth every 8 (eight) hours. for 14 days 42 tablet 0    neomycin (MYCIFRADIN) 500 mg Tab Take 1 tablet (500 mg total) by mouth 2 (two) times daily. for 14 days 28 tablet 0    RITUXIMAB (RITUXAN IV) Inject into the vein every 14 (fourteen) days. Not taking yet. Starting in March 2019      sulfamethoxazole-trimethoprim 800-160mg (BACTRIM DS) 800-160 mg Tab Take 1 tablet by mouth every Mon, Wed, Fri. Will start in March with Ritux infusions      tiZANidine (ZANAFLEX) 2 MG tablet Take 2 mg by mouth 2 (two) times daily.      VENTOLIN HFA 90 mcg/actuation inhaler 2 puffs every 4 (four) hours as needed.        No current facility-administered medications for this visit.         Objective Findings:  Vital Signs:  /62   Pulse 81   Ht 5' 11" (1.803 m)   Wt 83.8 " kg (184 lb 11.9 oz)   BMI 25.77 kg/m²   Body mass index is 25.77 kg/m².    Physical Exam:  General appearance: alert, cooperative, no distress  HENT: Normocephalic, atraumatic, neck symmetrical, no nasal discharge  Eyes: conjunctivae/corneas clear, PERRL, EOM's intact  Lungs: clear to auscultation bilaterally, no dullness to percussion bilaterally  Heart: regular rate and rhythm without rub; no displacement of the PMI  Abdomen: soft, mild diffuse tenderness; bowel sounds normoactive; no organomegaly  Extremities: extremities symmetric; no clubbing, cyanosis, or edema  Integument: Skin color, texture, turgor normal; no rashes; hair distrubution normal  Neurologic: Alert and oriented X 3, normal strength, normal coordination and gait  Psychiatric: no pressured speech; normal affect; no evidence of impaired cognition    Labs:  Lab Results   Component Value Date    WBC 4.08 02/25/2019    HGB 11.9 (L) 02/25/2019    HCT 38.9 02/25/2019    MCV 90 02/25/2019     02/25/2019     No results found for: FERRITIN  Lab Results   Component Value Date     02/25/2019    K 3.6 02/25/2019     02/25/2019    CO2 26 02/25/2019    GLU 77 02/25/2019    BUN 13 02/25/2019    CREATININE 0.7 02/25/2019    CALCIUM 9.4 02/25/2019    PROT 6.8 02/25/2019    ALBUMIN 3.9 02/25/2019    BILITOT 0.7 02/25/2019    ALKPHOS 57 02/25/2019    AST 22 02/25/2019    ALT 13 02/25/2019     No results found for: TSH  No results found for: SEDRATE  No results found for: CRP  No results found for: LABA1C, HGBA1C        Assessment and Plan:  Fatimah Cha is a 63 y.o. female with with history CREST, Sjogran, Scleroderma, ILD on 02, pulmonary HTN, HTN, HLD, hashimotos thyroiditis, hypothyroidsm here for evaluation of the following GI problems:    GERD. Regurgitation. Belching. 6 cm hiatal hernia.   Worse since starting Adempas.  No improvement with prevacid and prilosec  Minimal improvement with tums and gas ex prn   Some improvement with  protonix 40 mg BID.  Some improvement with carafate 1 g TID   -Cont protonix 40mg BID  -Start gaviscon with alginate 1-4 times daily as needed.     Little's esophagus.  No seen on most recent EGD   -Consider repeat in 3 years (2020) depending on clinical status.  High risk for procedures    Has dry mouth.  Biotin does not help. Symptoms started after thyroid surgery several years ago.  Got progressively worse over years.      Dysphagia. Choking. Seen by ENT in the past.  No EoE.  Nl MBS. Recurred.  -Cont PPI BID and swallow precautions.         Nausea.  Vomiting.  Improved.   Unable to tolerate reglan due to difficulty sleeping.     -cont Phenergan prn     -Need report of smart pill by Dr. Carrillo at [facility]   -Will consider GES pending above     Abdominal pain related to BMs. Resolved.  -Bentyl prn.    -Has not tried FDgard, IBguard     Gas and bloating.  History of SIBO testing, treated w Flagyl.    Avoids artificial sugars    No improvement with lactose elimination   -Treat SIBO w flagyl and neomycin    Hiccups. Resolved. Previously with frequent hiccups for years lasting minutes to hours.     Colitis.  Admitted to the hospital w self limited bloody diarrhea 10/2016.  CT showed colitis. Had similar episode 5 years prior. Negative stools studies.   No diarrhea or constipation for the past few months.  Colonoscopy negative.     3 tiny TA in 2013.    -Repeat in 2027     CREST, Sjogran, Scleroderma, ILD, pulmonary HTN.  On cellcept 1,500 mg BID, letaris 10 mg daily, evoxac 30 mg TID,  plaquenil 200 mg BID, adempas 2.5 mg daily. Pulmonary rehab twice weekly.   Followed by pulmonology and rheumatology.    Hashimotos thyroiditis. Hypothyroid    -On synthroid    Follow up in about 2 months (around 7/30/2019) for Motility w Dr. Santiago.    1. Small intestinal bacterial overgrowth    2. Bloating    3. Gastroesophageal reflux disease, esophagitis presence not specified    4. Esophageal dysphagia      Orders Placed This  Encounter    metroNIDAZOLE (FLAGYL) 250 MG tablet    neomycin (MYCIFRADIN) 500 mg Tab         Thank you so much for allowing me to participate in the care of CANDE Randolph, FNP-C

## 2019-06-25 ENCOUNTER — TELEPHONE (OUTPATIENT)
Dept: GASTROENTEROLOGY | Facility: CLINIC | Age: 64
End: 2019-06-25

## 2019-06-25 NOTE — TELEPHONE ENCOUNTER
----- Message from Savanna Myers sent at 6/25/2019  1:46 PM CDT -----  Contact: Self- [phone]  Bolivar- pt states she is returning a missed call from Ciarra- please contact pt at [phone]

## 2019-06-25 NOTE — TELEPHONE ENCOUNTER
Spoke w/ pt and she informed me that she finished flagyl and neomycin last weekend. Pt is still experiencing bloating, gas, and indigeston.Pt is currently taking protonix BID,Carafate daily,Tums and Gasx;none seems to help .  Please advise

## 2019-06-26 ENCOUNTER — PATIENT MESSAGE (OUTPATIENT)
Dept: GASTROENTEROLOGY | Facility: CLINIC | Age: 64
End: 2019-06-26

## 2019-08-12 DIAGNOSIS — J84.10 PULMONARY FIBROSIS: Primary | ICD-10-CM

## 2019-08-12 DIAGNOSIS — Z76.82 LUNG TRANSPLANT CANDIDATE: ICD-10-CM

## 2019-08-12 DIAGNOSIS — I27.20 PULMONARY HYPERTENSION: ICD-10-CM

## 2019-08-30 ENCOUNTER — PATIENT MESSAGE (OUTPATIENT)
Dept: TRANSPLANT | Facility: CLINIC | Age: 64
End: 2019-08-30

## 2019-08-30 DIAGNOSIS — I27.9 CHRONIC PULMONARY HEART DISEASE: Primary | ICD-10-CM

## 2019-09-10 ENCOUNTER — OFFICE VISIT (OUTPATIENT)
Dept: TRANSPLANT | Facility: CLINIC | Age: 64
End: 2019-09-10
Payer: MEDICARE

## 2019-09-10 ENCOUNTER — HOSPITAL ENCOUNTER (OUTPATIENT)
Dept: PULMONOLOGY | Facility: CLINIC | Age: 64
Discharge: HOME | End: 2019-09-10
Payer: MEDICARE

## 2019-09-10 ENCOUNTER — HOSPITAL ENCOUNTER (OUTPATIENT)
Dept: CARDIOLOGY | Facility: CLINIC | Age: 64
Discharge: HOME | End: 2019-09-10
Attending: INTERNAL MEDICINE
Payer: MEDICARE

## 2019-09-10 ENCOUNTER — TELEPHONE (OUTPATIENT)
Dept: TRANSPLANT | Facility: CLINIC | Age: 64
End: 2019-09-10

## 2019-09-10 VITALS
TEMPERATURE: 97 F | OXYGEN SATURATION: 96 % | RESPIRATION RATE: 20 BRPM | WEIGHT: 178 LBS | BODY MASS INDEX: 26.42 KG/M2 | HEIGHT: 69 IN | WEIGHT: 178.38 LBS | DIASTOLIC BLOOD PRESSURE: 57 MMHG | HEIGHT: 69 IN | BODY MASS INDEX: 26.36 KG/M2 | HEART RATE: 88 BPM | SYSTOLIC BLOOD PRESSURE: 120 MMHG

## 2019-09-10 VITALS
BODY MASS INDEX: 24.97 KG/M2 | HEART RATE: 83 BPM | SYSTOLIC BLOOD PRESSURE: 97 MMHG | WEIGHT: 179 LBS | OXYGEN SATURATION: 99 % | DIASTOLIC BLOOD PRESSURE: 55 MMHG

## 2019-09-10 DIAGNOSIS — J96.11 CHRONIC RESPIRATORY FAILURE WITH HYPOXIA: ICD-10-CM

## 2019-09-10 DIAGNOSIS — M35.9 INTERSTITIAL LUNG DISEASE DUE TO CONNECTIVE TISSUE DISEASE: Primary | ICD-10-CM

## 2019-09-10 DIAGNOSIS — M34.1 CREST (CALCINOSIS, RAYNAUD'S PHENOMENON, ESOPHAGEAL DYSFUNCTION, SCLERODACTYLY, TELANGIECTASIA): ICD-10-CM

## 2019-09-10 DIAGNOSIS — Z76.82 LUNG TRANSPLANT CANDIDATE: ICD-10-CM

## 2019-09-10 DIAGNOSIS — I27.9 CHRONIC PULMONARY HEART DISEASE: ICD-10-CM

## 2019-09-10 DIAGNOSIS — E78.2 MIXED HYPERLIPIDEMIA: ICD-10-CM

## 2019-09-10 DIAGNOSIS — E78.5 HYPERLIPIDEMIA, UNSPECIFIED HYPERLIPIDEMIA TYPE: ICD-10-CM

## 2019-09-10 DIAGNOSIS — I27.20 PULMONARY HYPERTENSION: ICD-10-CM

## 2019-09-10 DIAGNOSIS — M35.02 SJOGREN'S SYNDROME WITH LUNG INVOLVEMENT: ICD-10-CM

## 2019-09-10 DIAGNOSIS — I27.20 PULMONARY HYPERTENSION: Primary | ICD-10-CM

## 2019-09-10 DIAGNOSIS — M35.9 CONNECTIVE TISSUE DISEASE: ICD-10-CM

## 2019-09-10 DIAGNOSIS — J84.10 PULMONARY FIBROSIS: ICD-10-CM

## 2019-09-10 DIAGNOSIS — J84.89 INTERSTITIAL LUNG DISEASE DUE TO CONNECTIVE TISSUE DISEASE: Primary | ICD-10-CM

## 2019-09-10 DIAGNOSIS — I50.32 CHRONIC DIASTOLIC HEART FAILURE: ICD-10-CM

## 2019-09-10 PROCEDURE — 99213 OFFICE O/P EST LOW 20 MIN: CPT | Mod: PBBFAC,25 | Performed by: INTERNAL MEDICINE

## 2019-09-10 PROCEDURE — 99213 OFFICE O/P EST LOW 20 MIN: CPT | Mod: PBBFAC,25,27 | Performed by: INTERNAL MEDICINE

## 2019-09-10 PROCEDURE — 99999 PR PBB SHADOW E&M-EST. PATIENT-LVL III: ICD-10-PCS | Mod: PBBFAC,,, | Performed by: INTERNAL MEDICINE

## 2019-09-10 PROCEDURE — 94618 PULMONARY STRESS TESTING: CPT | Mod: PBBFAC | Performed by: INTERNAL MEDICINE

## 2019-09-10 PROCEDURE — 94729 PR C02/MEMBANE DIFFUSE CAPACITY: ICD-10-PCS | Mod: 26,S$PBB,, | Performed by: INTERNAL MEDICINE

## 2019-09-10 PROCEDURE — 99999 PR PBB SHADOW E&M-EST. PATIENT-LVL III: CPT | Mod: PBBFAC,,, | Performed by: INTERNAL MEDICINE

## 2019-09-10 PROCEDURE — 94729 DIFFUSING CAPACITY: CPT | Mod: PBBFAC | Performed by: INTERNAL MEDICINE

## 2019-09-10 PROCEDURE — 99214 OFFICE O/P EST MOD 30 MIN: CPT | Mod: S$PBB,,, | Performed by: INTERNAL MEDICINE

## 2019-09-10 PROCEDURE — 94010 BREATHING CAPACITY TEST: ICD-10-PCS | Mod: 26,59,S$PBB, | Performed by: INTERNAL MEDICINE

## 2019-09-10 PROCEDURE — 94010 BREATHING CAPACITY TEST: CPT | Mod: 26,59,S$PBB, | Performed by: INTERNAL MEDICINE

## 2019-09-10 PROCEDURE — 99214 PR OFFICE/OUTPT VISIT, EST, LEVL IV, 30-39 MIN: ICD-10-PCS | Mod: S$PBB,,, | Performed by: INTERNAL MEDICINE

## 2019-09-10 PROCEDURE — 99213 OFFICE O/P EST LOW 20 MIN: CPT | Mod: 25,S$PBB,, | Performed by: INTERNAL MEDICINE

## 2019-09-10 PROCEDURE — 94010 BREATHING CAPACITY TEST: CPT | Mod: PBBFAC | Performed by: INTERNAL MEDICINE

## 2019-09-10 PROCEDURE — 94618 PULMONARY STRESS TESTING: ICD-10-PCS | Mod: 26,S$PBB,, | Performed by: INTERNAL MEDICINE

## 2019-09-10 PROCEDURE — 99213 PR OFFICE/OUTPT VISIT, EST, LEVL III, 20-29 MIN: ICD-10-PCS | Mod: 25,S$PBB,, | Performed by: INTERNAL MEDICINE

## 2019-09-10 PROCEDURE — 94729 DIFFUSING CAPACITY: CPT | Mod: 26,S$PBB,, | Performed by: INTERNAL MEDICINE

## 2019-09-10 PROCEDURE — 94618 PULMONARY STRESS TESTING: CPT | Mod: 26,S$PBB,, | Performed by: INTERNAL MEDICINE

## 2019-09-10 RX ORDER — POTASSIUM CHLORIDE 750 MG/1
20 CAPSULE, EXTENDED RELEASE ORAL DAILY
Qty: 60 CAPSULE | Refills: 11
Start: 2019-09-10 | End: 2020-08-25 | Stop reason: SDUPTHER

## 2019-09-10 RX ORDER — FUROSEMIDE 40 MG/1
40 TABLET ORAL DAILY
Qty: 30 TABLET | Refills: 12 | Status: SHIPPED | OUTPATIENT
Start: 2019-09-10 | End: 2020-08-25 | Stop reason: SDUPTHER

## 2019-09-10 RX ORDER — BIOTIN 1000 MCG
1 TABLET,CHEWABLE ORAL DAILY
COMMUNITY

## 2019-09-10 NOTE — PROCEDURES
Fatimah Cha is a 64 y.o.  female patient, who presents for a 6 minute walk test ordered by MD Drew.  The diagnosis is Pulmonary Hypertension, Qualify for Oxygen.  The patient's BMI is 26.4kg/m2. Predicted distance (lower limit of normal) is 334.31 meters.    Test Results:    The test was completed with stops.  The patient stopped 1 times for a total of 54 seconds.  The total time walked was 306 seconds.  During walking, the patient reported:  Dyspnea, Lightheadedness. The patient used no assistive devices during testing.     09/10/2019---------Distance: 243.84 meters (800 feet)  Oxygen Qualification:   O2 Sat % Supplemental Oxygen Heart Rate Blood Pressure Kushal Scale   Pre-exercise  (Resting) 88 % Room Air 83 bpm 112/59 3   During Exercise             Post-exercise                  Recovery Time:      Oxygen Titration:  9/10/2019  Distance: 243.84 meters (800 feet)   O2 Sat % Supplemental Oxygen Heart Rate Blood Pressure Kushal Scale   Pre-exercise  (Resting) 98 % 2 L/M  76 bpm  126/61  3    During Exercise 82 %  2 L/M  103 bpm  119/57  4    Post-exercise   98 %  2 L/M  83 bpm            Recovery Time: 159 seconds    Performing nurse/tech: CAIT Moscoso RRT    PREVIOUS STUDY:   The patient had a previous study.    04/23/2019---------Distance: 304.8 meters (1000 feet)       O2 Sat % Supplemental Oxygen Heart Rate Blood Pressure Kushal Scale   Pre-exercise  (Resting) 96 % 3 L/M 86 bpm 118/57 mmHg 1   During Exercise 80 % 3 L/M 90 bpm 119/54 mmHg 4   Post-exercise  (Recovery) 95 % 3 L/M  91 bpm          CLINICAL INTERPRETATION:  Six minute walk distance is 243.84 meters (800 feet) with somewhat heavy dyspnea.  During exercise, there was significant desaturation while breathing supplemental oxygen.  Blood pressure remained stable and Heart rate increased significantly with walking.  This may represent a tachycardic response to exercise.  The patient reported non-pulmonary symptoms during  exercise.  Significant exercise impairment is likely due to desaturation and cardiovascular causes.  The patient did complete the study, walking 306 seconds of the 360 second test.  The patient may benefit from using supplemental oxygen.  Since the previous study in April 2019, exercise capacity is significantly worse.  Based upon age and body mass index, exercise capacity is less than predicted.   Oxygen saturation did improve while breathing supplemental oxygen.

## 2019-09-10 NOTE — LETTER
September 12, 2019        Luis Ken  [address]  [facility] [address]  Phone: [phone]  Fax: [phone]             [facility]  [address]  [facility] [address]  Phone: [phone]   Patient: Fatimah Cha   MR Number: [id number]   YOB: 1955   Date of Visit: 9/10/2019       Dear Dr. Luis Ken    Thank you for referring Fatimah Cha to me for evaluation. Attached you will find relevant portions of my assessment and plan of care.    If you have questions, please do not hesitate to call me. I look forward to following Fatimah Cha along with you.    Sincerely,    Rodrick Garcia MD    Enclosure    If you would like to receive this communication electronically, please contact [email or web address] or [phone] to request [employer] access.    [employer] is a tool which provides read-only access to select patient information with whom you have a relationship. Its easy to use and provides real time access to review your patients record including encounter summaries, notes, results, and demographic information.    If you feel you have received this communication in error or would no longer like to receive these types of communications, please e-mail [email or web address]

## 2019-09-10 NOTE — PATIENT INSTRUCTIONS
Be very careful with the ibuprofen- it can make you retain fluid and hurt the kidney  Gabapentin is better    Keep salt intake to under 2000 mg sodium, fluids to under 2 L (64 oz)    Call us if you find yourself getting more short of breath, have more swelling or unexpected weight changes    PH on Facebook: A couple of our patients created a group on Facebook for people living in [place] with PH, it's called [place] [facility].    Check it out!

## 2019-09-10 NOTE — PROGRESS NOTES
Subjective:    Patient ID:  Fatimah Cha is a 64 y.o. female who presents for follow-up of Pulmonary Hypertension (PH,please document pt. 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 She underwent a VATS mediated lung biopsy (2013) at an OSH. (the results of this lung biopsy are unclear). She was then referred to Dr. Ken at [facility] for management of her ILD. There she has been followed with relative stability on azathioprine and prednisone.  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 Lately, she has started feeling a little better but still feels she is not back to her pre-flu baseline.   Previously she was on 3L Nc at home nocturnally and with severe exertion, now she feels she has to use her oxygen more. (still not 24/7).   She also has pulmonary HTN that she is on ambrisentan for along with adempas.     Since last visit pt has been doing good- remains on adempas though she has had a little trouble with her stomach- saw Dr Santiago who put her on flagyl and it cleared up. Cont to work and exercise- did pulm rehab (March-July)  Has had a rough couple months as her  had an infected knee removed followed by a stroke- cont to follow with Dr Vitale at [facility]- had a recent echo and was put on the bike during the echo though we only have access to the resting echo.  Has had a littld welling, watches her Na and fluid intake- lasix is 40mg daily, some days urinates more than others. occasionally takes an extra lasix if she's really  swollen. Thinks shes doing better in terms of her breathing, which she relates to doing pulm rehab (30 min on bike, treadmill, rowing machine)  Feels the more she moves the better she does.         TTE [facility] 8/27/19  1. TrIvial to mild mitral regurgitation.  2. Normal left ventricular systolic and diastolic functions with LVEF >55%.  3. Normal right ventricular systolic function.  4. No resting pulmonary hypertension. right atrial pressure is estimated to be normal (0-5 mm Hg).  Insufficient TR jet to eval PAP      RHC 12/20/18  · RA: 5/ 2/ 3 RV: 50/ 1/ 6 PA: 47/ 17/ 30 PWP: 14/ 13/ 15 . Cardiac output was 6.5 by Naveed. Cardiac index is 3.23 L/min/m2    No 6mw today  Other: 6MWT: 10/16/2018  Pt walked 900 feet. Lowest O2 sat was 87% with highest . She required 3L NC to maintain O2 sat > 90%. Moderate dyspnea.     TTE 1/18  The right ventricle is normal in size measuring 3.5 cm at the base in the apical right ventricle-focused view. Global right ventricular systolic function appears normal. Tricuspid annular plane systolic excursion (TAPSE) is 2.3 cm.   Tissue Doppler-derived tricuspid annular peak systolic velocity (S prime) is 9.5 cm/s.     10/17  266m  m ( 282  m in  July  )                                              O2 sat 97  -> 83 %                                                           HR   86->  102                                                                BP   120/ 64  ->128 /66                                                         Kushal    2 -> 3    Repeat PFts 10/18 relatively stable although the DLCO is cont to fall (now 21% pred)    PFTs   9 / 19 /17    FVC  2.55   L    64 % pred  FEV1  2.08 L  68   % pred  FEV1/FVC   83  %  TLC 3.35  L  56 %pred  DLCO        19    % pred     Ct chest with contrast 8/30/17  Fibrosis in periphery of lungs, progressed since prior     Echo 8/30/17  Normal RV size and RV systolic fxn, PASp 72  TAPSE 2.35  Normal RA size, LA size  Normal LV EF 60-65%  Grade  1 DD    Lung bx 2013 with interstitial fibrosis, endstaged honeycomb    RHC 5/17  AOPRES: 106/70 (82)  AOSAT: 100  FICKCI: 2.96  FICKCO: 6.19  PAPRES: 52/16 (30)  PASAT: 74  PWPRES: 13/12 (10)  RAPRES: 8/7 (6)  RVPRES: 49/2, 6    Condition 2:  PWSAT_POST: 93    Review of Systems   Constitution: Negative for chills, fever, malaise/fatigue and weight gain.   HENT: Negative.    Eyes: Negative.    Cardiovascular: Positive for dyspnea on exertion. Negative for chest pain, leg swelling, near-syncope, orthopnea, palpitations, paroxysmal nocturnal dyspnea and syncope.   Respiratory: Negative for cough and shortness of breath.    Endocrine: Negative.    Skin: Negative.    Musculoskeletal: Negative.    Gastrointestinal: Negative for bloating, abdominal pain and change in bowel habit.   Neurological: Negative for dizziness and light-headedness.   Psychiatric/Behavioral: Negative for depression.        Objective: BP (!) 97/55 (BP Location: Right arm, Patient Position: Sitting, BP Method: Small (Automatic))   Pulse 83   Wt 81.2 kg (179 lb 0.2 oz)   SpO2 99% Comment: .5 -2.0 liters O2  BMI 24.97 kg/m²       Physical Exam   Constitutional: She is oriented to person, place, and time. She appears well-developed and well-nourished.   HENT:   Head: Normocephalic and atraumatic.   Eyes: Right eye exhibits no discharge. Left eye exhibits no discharge.   Neck: Neck supple. No JVD present. No thyromegaly present.   Cardiovascular: Normal rate and regular rhythm. Exam reveals no gallop and no friction rub.   No murmur heard.       Pulmonary/Chest: Effort normal and breath sounds normal. No respiratory distress. She has no wheezes. She has no rales.   Abdominal: Soft. Bowel sounds are normal. She exhibits no distension. There is no tenderness.   Musculoskeletal: Normal range of motion. She exhibits edema. She exhibits no tenderness.   Mild nonpitting edema to above ankles today, compression stockings on   Neurological: She is alert and  oriented to person, place, and time. No cranial nerve deficit. Coordination normal.   Skin: Skin is warm and dry. No rash noted.   Psychiatric: She has a normal mood and affect. Judgment and thought content normal.     Lab Results   Component Value Date    BNP 84 09/10/2019     09/10/2019    K 4.0 09/10/2019    MG 2.1 09/10/2019     09/10/2019    CO2 25 09/10/2019    BUN 16 09/10/2019    CREATININE 0.7 09/10/2019    GLU 81 09/10/2019    AST 19 09/10/2019    ALT 12 09/10/2019    ALBUMIN 3.9 09/10/2019    PROT 6.7 09/10/2019    BILITOT 0.5 09/10/2019       Magnesium   Date Value Ref Range Status   09/10/2019 2.1 1.6 - 2.6 mg/dL Final       Lab Results   Component Value Date    WBC 4.70 09/10/2019    HGB 11.1 (L) 09/10/2019    HCT 37.0 09/10/2019    MCV 90 09/10/2019     09/10/2019       Lab Results   Component Value Date    INR 1.0 12/20/2018    INR 0.9 05/17/2017       BNP   Date Value Ref Range Status   09/10/2019 84 0 - 99 pg/mL Final     Comment:     Values of less than 100 pg/ml are consistent with non-CHF populations.   02/25/2019 56 0 - 99 pg/mL Final     Comment:     Values of less than 100 pg/ml are consistent with non-CHF populations.   12/20/2018 79 0 - 99 pg/mL Final     Comment:     Values of less than 100 pg/ml are consistent with non-CHF populations.       No results found for: LDH            Assessment:       1. Pulmonary hypertension- mild on RHC in May 2017, with normal RV size and fxn and low PAP on echo in Aug-  Appears dry today with normal BNP  FC II    WHO group 1,3 (ILD assoc with scleroderma)  On adempas and letairis   2. CREST (calcinosis, Raynaud's phenomenon, esophageal dysfunction, sclerodactyly, telangiectasia)    3. Chronic respiratory failure with hypoxia    4. Orthostatic hypotension    5. Pulmonary fibrosis    6. Raynaud's disease without gangrene    7. Sjogren's syndrome with lung involvement    8. Chronic diastolic heart failure    9. Hypervolemia, unspecified  hypervolemia type    10. Lung transplant candidate    11. Abnormal stress echocardiogram         Plan:   Cont letairis and  adempas    Cont supplemental O2- will need this indefinitely for chronic hypoxic resp failure    F/U on 6mw which will be done after this mornings visit- sees Dr Garcia today as well.     cont coreg (was started after PET stress in 2017 with mild endothelial dysfunction) but I do think it has helped with her PVCs    Flu shot    Asked pt to be very careful with the ibuprofen she takes for bulging disc- r/o n fluid retention and DOMINIC- Gabapentin is better    Keep salt intake to under 2000 mg sodium, fluids to under 2 L (64 oz)    Check weights every morning after getting out of bed and urinating. If weight goes up 3# overnight or 5# in one week she should take an extra lasix and call us if the fluid doesn't come off.    F/u 4 mo, walk and labs

## 2019-09-10 NOTE — LETTER
September 10, 2019        Luis Ken  [address]  [facility] [address]  Phone: [phone]  Fax: [phone]             [facility]  [address]  [facility] [address]  Phone: [phone]   Patient: Fatimah Cha   MR Number: [id number]   YOB: 1955   Date of Visit: 9/10/2019       Dear Dr. Luis Ken    Thank you for referring Fatimah Cha to me for evaluation. Attached you will find relevant portions of my assessment and plan of care.    If you have questions, please do not hesitate to call me. I look forward to following Fatimah Cha along with you.    Sincerely,    Renetta Russell MD    Enclosure    If you would like to receive this communication electronically, please contact [email or web address] or [phone] to request [employer] access.    [employer] is a tool which provides read-only access to select patient information with whom you have a relationship. Its easy to use and provides real time access to review your patients record including encounter summaries, notes, results, and demographic information.    If you feel you have received this communication in error or would no longer like to receive these types of communications, please e-mail [email or web address]

## 2019-09-11 LAB
DLCO ADJ PRE: 7.47 ML/(MIN*MMHG) (ref 19.32–30.79)
DLCO SINGLE BREATH LLN: 19.32
DLCO SINGLE BREATH PRE REF: 27.5 %
DLCO SINGLE BREATH REF: 25.05
DLCOC SBVA LLN: 3.1
DLCOC SBVA PRE REF: 46.5 %
DLCOC SBVA REF: 4.35
DLCOC SINGLE BREATH LLN: 19.32
DLCOC SINGLE BREATH PRE REF: 29.8 %
DLCOC SINGLE BREATH REF: 25.05
DLCOCSBVAULN: 5.59
DLCOCSINGLEBREATHULN: 30.79
DLCOSINGLEBREATHULN: 30.79
DLCOVA LLN: 3.1
DLCOVA PRE REF: 42.8 %
DLCOVA PRE: 1.86 ML/(MIN*MMHG*L) (ref 3.1–5.59)
DLCOVA REF: 4.35
DLCOVAULN: 5.59
DLVAADJ PRE: 2.02 ML/(MIN*MMHG*L) (ref 3.1–5.59)
FEF 25 75 LLN: 1.11
FEF 25 75 PRE REF: 114.7 %
FEF 25 75 REF: 2.29
FEV05 LLN: 1.15
FEV05 REF: 2.01
FEV1 FVC LLN: 66
FEV1 FVC PRE REF: 110.9 %
FEV1 FVC REF: 78
FEV1 LLN: 2.04
FEV1 PRE REF: 76.5 %
FEV1 REF: 2.75
FVC LLN: 2.65
FVC PRE REF: 68.3 %
FVC REF: 3.55
IVC PRE: 2.31 L (ref 2.65–4.46)
IVC SINGLE BREATH LLN: 2.65
IVC SINGLE BREATH PRE REF: 65 %
IVC SINGLE BREATH REF: 3.55
IVCSINGLEBREATHULN: 4.46
PEF LLN: 4.79
PEF PRE REF: 85.4 %
PEF REF: 6.78
PRE DLCO: 6.89 ML/(MIN*MMHG) (ref 19.32–30.79)
PRE FEF 25 75: 2.62 L/S (ref 1.11–3.46)
PRE FET 100: 5.77 SEC
PRE FEV05 REF: 86.5 %
PRE FEV1 FVC: 86.62 % (ref 65.59–90.64)
PRE FEV1: 2.1 L (ref 2.04–3.46)
PRE FEV5: 1.74 L (ref 1.15–2.86)
PRE FVC: 2.43 L (ref 2.65–4.46)
PRE PEF: 5.79 L/S (ref 4.79–8.76)
VA PRE: 3.69 L (ref 5.61–5.61)
VA SINGLE BREATH LLN: 5.61
VA SINGLE BREATH PRE REF: 65.9 %
VA SINGLE BREATH REF: 5.61
VASINGLEBREATHULN: 5.61

## 2019-09-12 NOTE — PROGRESS NOTES
LUNG TRANSPLANT PRE FOLLOW-UP    Referring Physician: Luis Ken    Reason for Visit:  Pre-lung transplant follow-up.         Date of Initial Evaluation:                                                                                              History of Present Illness: Fatimah Cha is a 64 y.o. female who is on 2L of oxygen while resting, and 3L while exercising. She is on no assisted ventilation.  Her New [place] Heart Association Class is II and a Karnofsky score of 80% - Normal activity with effort: some symptoms of disease. She is not diabetic. Returns today for follow up appointment on her transplant candidacy. Since her last clinic visit she has been doing well. She denies hospitalizations. She still has a cough which is non-productive and mild shortness of breath on exertion. Continues to follow with Dr. Vitale at [facility] and is on hydroxychloroquine, mycophenolate and rituximab for her scleroderma/sjogren's disease. Also follows with Dr. Russell for PH and continue therapy with ambrisentan and riociguat.    Review of Systems   Constitutional: Negative for chills, fever, malaise/fatigue and weight loss.   HENT: Negative for congestion, ear pain, hearing loss, sinus pain and sore throat.    Eyes: Negative for blurred vision, double vision and photophobia.   Respiratory: Positive for cough (dry). Negative for sputum production, shortness of breath and wheezing.    Cardiovascular: Negative for chest pain, orthopnea and leg swelling.   Gastrointestinal: Negative for abdominal pain, constipation, diarrhea, heartburn, nausea and vomiting.   Genitourinary: Negative for flank pain, frequency and urgency.   Musculoskeletal: Negative for back pain, falls, joint pain and myalgias.   Skin: Negative for itching and rash.   Neurological: Negative for dizziness, focal weakness, seizures, loss of consciousness, weakness and headaches.   Endo/Heme/Allergies: Does not bruise/bleed easily.   Psychiatric/Behavioral:  "Negative for depression. The patient is not nervous/anxious.      Objective:   BP (!) 120/57   Pulse 88   Temp 96.7 °F (35.9 °C) (Oral)   Resp 20   Ht 5' 9" (1.753 m)   Wt 80.7 kg (178 lb)   SpO2 96% Comment: 2 liters  BMI 26.29 kg/m²     Physical Exam   Constitutional: She is oriented to person, place, and time and well-developed, well-nourished, and in no distress. No distress.   HENT:   Head: Normocephalic and atraumatic.   Nose: Nose normal.   Mouth/Throat: Oropharynx is clear and moist. No oropharyngeal exudate.   Eyes: Pupils are equal, round, and reactive to light. Conjunctivae and EOM are normal. No scleral icterus.   Neck: Normal range of motion. Neck supple. No JVD present. No tracheal deviation present. No thyromegaly present.   Cardiovascular: Normal rate, regular rhythm and intact distal pulses. Exam reveals no gallop and no friction rub.   No murmur heard.  Pulmonary/Chest: Effort normal. No stridor. No respiratory distress. She has no wheezes. She has rales (bibasilar). She exhibits no tenderness.   Abdominal: Soft. Bowel sounds are normal. She exhibits no distension and no mass. There is no tenderness. There is no rebound and no guarding.   Musculoskeletal: Normal range of motion. She exhibits no edema.   Wearing gloves today for her Raynaud's   Lymphadenopathy:     She has no cervical adenopathy.   Neurological: She is alert and oriented to person, place, and time. No cranial nerve deficit. Gait normal. Coordination normal.   Skin: Skin is warm and dry. No rash noted. She is not diaphoretic. No erythema. No pallor.   Psychiatric: Mood and affect normal.     Labs:      Pulmonary Function Tests 9/10/2019 4/23/2019 10/16/2018 4/19/2018 1/10/2018 7/3/2017 6/13/2017   FVC 2.43 2.41 2.59 2.52 2.38 2.6 2.6   FEV1 2.1 2.04 2.11 2.11 1.94 2.2 2.23   TLC (liters) - - 3.76 3.31 - 3.3 3.3   DLCO (ml/mmHg sec) 6.9 5.6 5.2 4.1 6.7 6.4 6.2   FVC% 68 70 64 - 68 74 65   FEV1% 76 75 67 - 71 80 72   FEF 25-75 - " 2.97 2.46 2.87 2.21 2.99 2.94   FEF 25-75% - 115 95 - 84 113 112   TLC% - - 62 - - 56 55   RV - - 1.17 0.79 - 0.78 0.68   RV% - - 49 - - 34 29   DLCO% 27 28 21 - 34 32 25     6MW 9/10/2019 4/23/2019 1/10/2018 10/3/2017 7/3/2017 3/30/2017   6MWT Status completed with stops completed without stopping completed without stopping completed without stopping completed without stopping not completed   Patient Reported Dyspnea;Lightheadedness Dyspnea;Leg pain Dyspnea Dyspnea Dyspnea Dyspnea   Was O2 used? Yes Yes Yes Yes - No   Delivery Method Cannula;Pull Tank;Continuous Flow Cannula;Pull Tank;Continuous Flow Cannula;Pull Tank;Continuous Flow Cannula;Pull Tank;Continuous Flow - -   6MW Distance walked (feet) 800 1000 940 875 - 400   Distance walked (meters) 243.84 304.8 286.51 266.7 - 121.92   Did patient stop? Yes No No No No Yes   Oxygen Saturation 88 96 96 97 96 93   Supplemental Oxygen Room Air 3 L/M 3 L/M 3 L/M Room Air Room Air   Heart Rate 83 86 86 86 79 93   Blood Pressure 112/59 118/57 112/56 120/64 106/55 133/78   Kushal Dyspnea Rating  moderate very light light light very light moderate   Oxygen Saturation - 80 88 83 80 78   Supplemental Oxygen - 3 L/M 3 L/M 3 L/M Room Air Room Air   Heart Rate - 90 92 102 93 111   Blood Pressure - 119/54 118/57 128/66 114/64 139/65   Kushal Dyspnea Rating  - somewhat heavy moderate moderate somewhat heavy somewhat heavy   Recovery Time (seconds) - 96 97 116 142 151   Oxygen Saturation - 95 97 94 94 92   Supplemental Oxygen - 3 L/M 3 L/M 3 L/M Room Air Room Air   Heart Rate - 91 91 91 76 95         Assessment:-  1. 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 Chronic respiratory failure with hypoxia    3. Pulmonary hypertension    4. Connective tissue disease      Plan:   1. She will continue therapy for her CT-ILD with mycophenolate and rituximab. Dr. Vitale managing her therapy. Her FVC has remained stable and continue to show moderate restriction. Severe  decrease in her DLCO which can also reflect her pulmonary hypertension.    2. Continue therapy with ambrisentan and riociguat and follow up with Dr. Briones.     3. Continue oxygen supplementation    4. Therapy for Sjogren's and Scleroderma as per Dr. Vitale.     5. She remains stable so will not consider workup at this moment. RTC in 6 months

## 2019-10-09 ENCOUNTER — TELEPHONE (OUTPATIENT)
Dept: GASTROENTEROLOGY | Facility: CLINIC | Age: 64
End: 2019-10-09

## 2019-10-09 NOTE — TELEPHONE ENCOUNTER
----- Message from Madhavi Lutz NP sent at 10/9/2019  1:52 PM CDT -----  Contact: pt [phone] cell      ----- Message -----  From: Rena Johnson  Sent: 10/9/2019  12:23 PM CDT  To: Madhavi Lutz NP    Pt is calling to get a appt. Please call pt.

## 2019-11-11 ENCOUNTER — TELEPHONE (OUTPATIENT)
Dept: TRANSPLANT | Facility: CLINIC | Age: 64
End: 2019-11-11

## 2019-11-25 RX ORDER — CARVEDILOL 3.12 MG/1
TABLET ORAL
Qty: 180 TABLET | Refills: 6 | Status: SHIPPED | OUTPATIENT
Start: 2019-11-25 | End: 2020-08-25 | Stop reason: SDUPTHER

## 2020-01-06 ENCOUNTER — TELEPHONE (OUTPATIENT)
Dept: TRANSPLANT | Facility: CLINIC | Age: 65
End: 2020-01-06

## 2020-01-06 DIAGNOSIS — J84.89 INTERSTITIAL LUNG DISEASE DUE TO CONNECTIVE TISSUE DISEASE: ICD-10-CM

## 2020-01-06 DIAGNOSIS — J84.10 PULMONARY FIBROSIS: ICD-10-CM

## 2020-01-06 DIAGNOSIS — M35.02 SJOGREN'S SYNDROME WITH LUNG INVOLVEMENT: Primary | ICD-10-CM

## 2020-01-06 DIAGNOSIS — M35.9 INTERSTITIAL LUNG DISEASE DUE TO CONNECTIVE TISSUE DISEASE: ICD-10-CM

## 2020-01-06 NOTE — TELEPHONE ENCOUNTER
----- Message from Makayla Goodwin sent at 1/6/2020  1:12 PM CST -----  Contact: Fatimah Han is calling to schedule her f/u appt with Dr. Chaidez office pt wanted to be schedule on 2/14 the same date as her other appt. Pt contact number is [phone].

## 2020-01-06 NOTE — TELEPHONE ENCOUNTER
Returned patient's call, she is aware we will book her appointments to follow Dr. Russell' appointments.

## 2020-02-05 DIAGNOSIS — Z76.82 LUNG TRANSPLANT CANDIDATE: ICD-10-CM

## 2020-02-05 DIAGNOSIS — M35.02 SJOGREN'S SYNDROME WITH LUNG INVOLVEMENT: Primary | ICD-10-CM

## 2020-02-05 DIAGNOSIS — J84.10 PULMONARY FIBROSIS: ICD-10-CM

## 2020-02-11 ENCOUNTER — TELEPHONE (OUTPATIENT)
Dept: GASTROENTEROLOGY | Facility: CLINIC | Age: 65
End: 2020-02-11

## 2020-02-11 NOTE — TELEPHONE ENCOUNTER
----- Message from Heidy Finch sent at 2/11/2020  1:11 PM CST -----  Contact: pt   Pt called want an appt with KRISTIE Lutz for bloating and throwing up. She stated she would like it this Friday 2/14/20 due to she have 2 appts that day.  Call back# [phone]

## 2020-02-11 NOTE — TELEPHONE ENCOUNTER
Informed pt that Tiff is out on 2/14 and that we are currently waiting for 's May to open as she is booked. Pt states she will call back.

## 2020-02-13 ENCOUNTER — TELEPHONE (OUTPATIENT)
Dept: TRANSPLANT | Facility: CLINIC | Age: 65
End: 2020-02-13

## 2020-02-13 PROBLEM — J20.8 ACUTE VIRAL BRONCHITIS: Status: RESOLVED | Noted: 2018-01-03 | Resolved: 2020-02-13

## 2020-02-13 NOTE — PROGRESS NOTES
Subjective:    Patient ID:  Fatimah Cha is a 64 y.o. 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 She underwent a VATS mediated lung biopsy (2013) at an OSH. (the results of this lung biopsy are unclear). She was then referred to Dr. Ken at [facility] for management of her ILD. There she has been followed with relative stability on azathioprine and prednisone.  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 Lately, she has started feeling a little better but still feels she is not back to her pre-flu baseline.   Previously she was on 3L Nc at home nocturnally and with severe exertion, now she feels she has to use her oxygen more. (still not 24/7).   She also has pulmonary HTN that she is on ambrisentan for along with adempas.     Since last visit pt thinks her SOB has been getting worse- has been struggling from bloating, belching that exacerbated the SOB as well from a hiatal hernia. Has had pitting/swelling for the last two weeks (has pitting to almost knees despite compression stockings)  Reports [facility] mentioned giving her Ofev but has trouble getting with Dr Ken- hasnt gained wt but not eating well either    remains on adempas though she cont with the GI issues as above- using protonix, tums and taking it  with food    watches her Na and fluid intake- lasix is 40mg daily, some days has taking it twice which made her go to the BR like crazy she says and the swelling did go down-        243m, (10/17   266m  m ( 282  m in  July  )                                              O2 sat 93  -> 86 %        6L                                                   HR   82->  105                                                                BP                                                        Kushal    2 -> 4    TTE today ( I viewed images today)  + pericardial effusion  RV 4.5 (uchanged from prior,)TAPSE actually better at 2.5   PASP 54  +MR, TR  D shaped septum in systole    TTE [facility] 8/27/19  1. TrIvial to mild mitral regurgitation.  2. Normal left ventricular systolic and diastolic functions with LVEF >55%.  3. Normal right ventricular systolic function.  4. No resting pulmonary hypertension. right atrial pressure is estimated to be normal (0-5 mm Hg).  Insufficient TR jet to eval PAP      RHC 12/20/18  · RA: 5/ 2/ 3 RV: 50/ 1/ 6 PA: 47/ 17/ 30 PWP: 14/ 13/ 15 . Cardiac output was 6.5 by Naveed. Cardiac index is 3.23 L/min/m2    No 6mw today  Other: 6MWT: 10/16/2018  Pt walked 900 feet. Lowest O2 sat was 87% with highest . She required 3L NC to maintain O2 sat > 90%. Moderate dyspnea.     TTE 1/18  The right ventricle is normal in size measuring 3.5 cm at the base in the apical right ventricle-focused view. Global right ventricular systolic function appears normal. Tricuspid annular plane systolic excursion (TAPSE) is 2.3 cm.   Tissue Doppler-derived tricuspid annular peak systolic velocity (S prime) is 9.5 cm/s.         PFTs  1 /7 /20    FVC  2.29   L   57 % pred  FEV1  1.94 L  62   % pred  FEV1/FVC   85  %  DLCO        17   % pred   Repeat PFts 10/18 relatively stable although the DLCO is cont to fall (now 21% pred)    PFTs   9 / 19 /17    FVC  2.55   L    64 % pred  FEV1  2.08 L  68   % pred  FEV1/FVC   83  %  TLC 3.35  L  56 %pred  DLCO        19    % pred     Ct chest with contrast 8/30/17  Fibrosis in periphery of lungs, progressed since prior     Echo 8/30/17  Normal RV size and RV systolic fxn, PASp 72   "TAPSE 2.35  Normal RA size, LA size  Normal LV EF 60-65%  Grade 1 DD    Lung bx 2013 with interstitial fibrosis, endstaged honeycomb    RHC 5/17  AOPRES: 106/70 (82)  AOSAT: 100  FICKCI: 2.96  FICKCO: 6.19  PAPRES: 52/16 (30)  PASAT: 74  PWPRES: 13/12 (10)  RAPRES: 8/7 (6)  RVPRES: 49/2, 6    Condition 2:  PWSAT_POST: 93    Review of Systems   Constitution: Negative for chills, fever, malaise/fatigue and weight gain.   HENT: Negative.    Eyes: Negative.    Cardiovascular: Positive for dyspnea on exertion. Negative for chest pain, leg swelling, near-syncope, orthopnea, palpitations, paroxysmal nocturnal dyspnea and syncope.   Respiratory: Negative for cough and shortness of breath.    Endocrine: Negative.    Skin: Negative.    Musculoskeletal: Negative.    Gastrointestinal: Negative for bloating, abdominal pain and change in bowel habit.   Neurological: Negative for dizziness and light-headedness.   Psychiatric/Behavioral: Negative for depression.        Objective: BP (!) 106/54 (BP Location: Right arm, Patient Position: Sitting, BP Method: Medium (Automatic))   Pulse 80   Ht 5' 11" (1.803 m)   Wt 81.3 kg (179 lb 3.7 oz)   BMI 25.00 kg/m²       Physical Exam   Constitutional: She is oriented to person, place, and time. She appears well-developed and well-nourished.   HENT:   Head: Normocephalic and atraumatic.   Eyes: Right eye exhibits no discharge. Left eye exhibits no discharge.   Neck: Neck supple. No JVD present. No thyromegaly present.   Cardiovascular: Normal rate and regular rhythm. Exam reveals no gallop and no friction rub.   No murmur heard.       Pulmonary/Chest: Effort normal and breath sounds normal. No respiratory distress. She has no wheezes. She has no rales.   Abdominal: Soft. Bowel sounds are normal. She exhibits no distension. There is no tenderness.   Musculoskeletal: Normal range of motion. She exhibits edema. She exhibits no tenderness.   Mild nonpitting edema to above ankles today, " compression stockings on   Neurological: She is alert and oriented to person, place, and time. No cranial nerve deficit. Coordination normal.   Skin: Skin is warm and dry. No rash noted.   Psychiatric: She has a normal mood and affect. Judgment and thought content normal.     Lab Results   Component Value Date    BNP 84 09/10/2019     09/10/2019    K 4.0 09/10/2019    MG 2.1 09/10/2019     09/10/2019    CO2 25 09/10/2019    BUN 16 09/10/2019    CREATININE 0.7 09/10/2019    GLU 81 09/10/2019    AST 19 09/10/2019    ALT 12 09/10/2019    ALBUMIN 3.9 09/10/2019    PROT 6.7 09/10/2019    BILITOT 0.5 09/10/2019       Magnesium   Date Value Ref Range Status   09/10/2019 2.1 1.6 - 2.6 mg/dL Final       Lab Results   Component Value Date    WBC 4.70 09/10/2019    HGB 11.1 (L) 09/10/2019    HCT 37.0 09/10/2019    MCV 90 09/10/2019     09/10/2019       Lab Results   Component Value Date    INR 1.0 12/20/2018    INR 0.9 05/17/2017       BNP   Date Value Ref Range Status   09/10/2019 84 0 - 99 pg/mL Final     Comment:     Values of less than 100 pg/ml are consistent with non-CHF populations.   02/25/2019 56 0 - 99 pg/mL Final     Comment:     Values of less than 100 pg/ml are consistent with non-CHF populations.   12/20/2018 79 0 - 99 pg/mL Final     Comment:     Values of less than 100 pg/ml are consistent with non-CHF populations.       No results found for: LDH            Assessment:       1. Pulmonary hypertension- mild on RHC in May 2017, with normal RV size and fxn and low PAP on echo in Aug-  Appears dry today with normal BNP  FC II    WHO group 1,3 (ILD assoc with scleroderma)  On adempas and letairis   2. CREST (calcinosis, Raynaud's phenomenon, esophageal dysfunction, sclerodactyly, telangiectasia)    3. Chronic respiratory failure with hypoxia    4. Orthostatic hypotension    5. Pulmonary fibrosis    6. Raynaud's disease without gangrene    7. Sjogren's syndrome with lung involvement    8. Chronic  diastolic heart failure    9. Hypervolemia, unspecified hypervolemia type    10. Lung transplant candidate    11. Abnormal stress echocardiogram         Plan:   Cont letairis and  adempas    Cont supplemental O2- will need this indefinitely for chronic hypoxic resp failure    RHC with me to see if her PH is causing her worsening SOB or if it's her fibrosis (sees Dr Garcia today and wants a referral to pulm here since she having such a hard time getting into see Dr tello- will ask Dr Viramontes to see her in her ILD clinic)    cont coreg (was started after PET stress in 2017 with mild endothelial dysfunction) but I do think it has helped with her PVCs      Keep salt intake to under 2000 mg sodium, fluids to under 2 L (64 oz)    Check weights every morning after getting out of bed and urinating. If weight goes up 3# overnight or 5# in one week she should take an extra lasix and call us if the fluid doesn't come off.    F/u 4 mo, walk and labs

## 2020-02-13 NOTE — TELEPHONE ENCOUNTER
Patient contacted to remind of appointment with Dr. Garcia. She faxed results of recent 6MWT, PFTs. Patient is aware of receipt of records, that Dr. Russell may still require 6MWT.

## 2020-02-14 ENCOUNTER — HOSPITAL ENCOUNTER (OUTPATIENT)
Dept: CARDIOLOGY | Facility: CLINIC | Age: 65
Discharge: HOME | End: 2020-02-14
Attending: INTERNAL MEDICINE
Payer: MEDICARE

## 2020-02-14 ENCOUNTER — OFFICE VISIT (OUTPATIENT)
Dept: TRANSPLANT | Facility: CLINIC | Age: 65
End: 2020-02-14
Payer: MEDICARE

## 2020-02-14 VITALS
HEIGHT: 71 IN | HEIGHT: 69 IN | HEART RATE: 85 BPM | BODY MASS INDEX: 25.1 KG/M2 | WEIGHT: 179.25 LBS | BODY MASS INDEX: 26.36 KG/M2 | SYSTOLIC BLOOD PRESSURE: 106 MMHG | SYSTOLIC BLOOD PRESSURE: 110 MMHG | DIASTOLIC BLOOD PRESSURE: 65 MMHG | WEIGHT: 178 LBS | HEART RATE: 80 BPM | DIASTOLIC BLOOD PRESSURE: 54 MMHG

## 2020-02-14 VITALS
WEIGHT: 178.81 LBS | OXYGEN SATURATION: 93 % | HEIGHT: 71 IN | DIASTOLIC BLOOD PRESSURE: 54 MMHG | BODY MASS INDEX: 25.03 KG/M2 | RESPIRATION RATE: 21 BRPM | SYSTOLIC BLOOD PRESSURE: 124 MMHG | HEART RATE: 98 BPM | TEMPERATURE: 97 F

## 2020-02-14 DIAGNOSIS — K21.9 GASTROESOPHAGEAL REFLUX DISEASE, ESOPHAGITIS PRESENCE NOT SPECIFIED: ICD-10-CM

## 2020-02-14 DIAGNOSIS — M34.9 SCLERODERMA WITH PULMONARY INVOLVEMENT: ICD-10-CM

## 2020-02-14 DIAGNOSIS — I27.9 CHRONIC PULMONARY HEART DISEASE: ICD-10-CM

## 2020-02-14 DIAGNOSIS — Z76.82 LUNG TRANSPLANT CANDIDATE: ICD-10-CM

## 2020-02-14 DIAGNOSIS — I73.00 RAYNAUD'S DISEASE WITHOUT GANGRENE: ICD-10-CM

## 2020-02-14 DIAGNOSIS — I27.20 PULMONARY HYPERTENSION: Primary | ICD-10-CM

## 2020-02-14 DIAGNOSIS — E78.2 MIXED HYPERLIPIDEMIA: ICD-10-CM

## 2020-02-14 DIAGNOSIS — M35.02 SJOGREN'S SYNDROME WITH LUNG INVOLVEMENT: ICD-10-CM

## 2020-02-14 DIAGNOSIS — M34.1 CREST (CALCINOSIS, RAYNAUD'S PHENOMENON, ESOPHAGEAL DYSFUNCTION, SCLERODACTYLY, TELANGIECTASIA): ICD-10-CM

## 2020-02-14 DIAGNOSIS — I27.20 PULMONARY HYPERTENSION: ICD-10-CM

## 2020-02-14 DIAGNOSIS — R06.02 SHORTNESS OF BREATH: ICD-10-CM

## 2020-02-14 DIAGNOSIS — J96.11 CHRONIC RESPIRATORY FAILURE WITH HYPOXIA: ICD-10-CM

## 2020-02-14 DIAGNOSIS — I50.32 CHRONIC DIASTOLIC HEART FAILURE: ICD-10-CM

## 2020-02-14 DIAGNOSIS — J84.89 NSIP (NONSPECIFIC INTERSTITIAL PNEUMONIA): Primary | ICD-10-CM

## 2020-02-14 LAB
ASCENDING AORTA: 3.86 CM
AV INDEX (PROSTH): 0.93
AV MEAN GRADIENT: 4 MMHG
AV PEAK GRADIENT: 9 MMHG
AV VALVE AREA: 3.12 CM2
AV VELOCITY RATIO: 0.8
BSA FOR ECHO PROCEDURE: 1.98 M2
CV ECHO LV RWT: 0.38 CM
DOP CALC AO PEAK VEL: 1.5 M/S
DOP CALC AO VTI: 27.78 CM
DOP CALC LVOT AREA: 3.4 CM2
DOP CALC LVOT DIAMETER: 2.07 CM
DOP CALC LVOT PEAK VEL: 1.2 M/S
DOP CALC LVOT STROKE VOLUME: 86.58 CM3
DOP CALCLVOT PEAK VEL VTI: 25.74 CM
E WAVE DECELERATION TIME: 179.97 MSEC
E/A RATIO: 1.14
E/E' RATIO: 6.83 M/S
ECHO LV POSTERIOR WALL: 0.93 CM (ref 0.6–1.1)
FRACTIONAL SHORTENING: 37 % (ref 28–44)
INTERVENTRICULAR SEPTUM: 1.02 CM (ref 0.6–1.1)
IVRT: 0.08 MSEC
LA MAJOR: 6.62 CM
LA MINOR: 6.59 CM
LA WIDTH: 4.27 CM
LEFT ATRIUM SIZE: 4.54 CM
LEFT ATRIUM VOLUME INDEX: 55.4 ML/M2
LEFT ATRIUM VOLUME: 108.84 CM3
LEFT INTERNAL DIMENSION IN SYSTOLE: 3.11 CM (ref 2.1–4)
LEFT VENTRICLE DIASTOLIC VOLUME INDEX: 59.15 ML/M2
LEFT VENTRICLE DIASTOLIC VOLUME: 116.27 ML
LEFT VENTRICLE MASS INDEX: 88 G/M2
LEFT VENTRICLE SYSTOLIC VOLUME INDEX: 19.5 ML/M2
LEFT VENTRICLE SYSTOLIC VOLUME: 38.27 ML
LEFT VENTRICULAR INTERNAL DIMENSION IN DIASTOLE: 4.96 CM (ref 3.5–6)
LEFT VENTRICULAR MASS: 173.59 G
LV LATERAL E/E' RATIO: 5.86 M/S
LV SEPTAL E/E' RATIO: 8.2 M/S
MV PEAK A VEL: 0.72 M/S
MV PEAK E VEL: 0.82 M/S
PISA TR MAX VEL: 3.41 M/S
PULM VEIN S/D RATIO: 1.07
PV PEAK D VEL: 0.41 M/S
PV PEAK S VEL: 0.44 M/S
RA MAJOR: 6.04 CM
RA PRESSURE: 3 MMHG
RA WIDTH: 4.85 CM
RIGHT VENTRICULAR END-DIASTOLIC DIMENSION: 4.51 CM
RV TISSUE DOPPLER FREE WALL SYSTOLIC VELOCITY 1 (APICAL 4 CHAMBER VIEW): 14.91 CM/S
SINUS: 3.55 CM
STJ: 3.15 CM
TDI LATERAL: 0.14 M/S
TDI SEPTAL: 0.1 M/S
TDI: 0.12 M/S
TR MAX PG: 47 MMHG
TRICUSPID ANNULAR PLANE SYSTOLIC EXCURSION: 2.38 CM
TV REST PULMONARY ARTERY PRESSURE: 50 MMHG

## 2020-02-14 PROCEDURE — 99215 OFFICE O/P EST HI 40 MIN: CPT | Mod: PBBFAC,TXP | Performed by: INTERNAL MEDICINE

## 2020-02-14 PROCEDURE — 99999 PR PBB SHADOW E&M-EST. PATIENT-LVL V: CPT | Mod: PBBFAC,TXP,, | Performed by: INTERNAL MEDICINE

## 2020-02-14 PROCEDURE — 99999 PR PBB SHADOW E&M-EST. PATIENT-LVL V: ICD-10-PCS | Mod: PBBFAC,TXP,, | Performed by: INTERNAL MEDICINE

## 2020-02-14 PROCEDURE — 99213 OFFICE O/P EST LOW 20 MIN: CPT | Mod: PBBFAC,27,TXP | Performed by: INTERNAL MEDICINE

## 2020-02-14 PROCEDURE — 99999 PR PBB SHADOW E&M-EST. PATIENT-LVL III: ICD-10-PCS | Mod: PBBFAC,TXP,, | Performed by: INTERNAL MEDICINE

## 2020-02-14 PROCEDURE — 93306 TRANSTHORACIC ECHO (TTE) COMPLETE (CUPID ONLY): ICD-10-PCS | Mod: 26,S$PBB,NTX, | Performed by: INTERNAL MEDICINE

## 2020-02-14 PROCEDURE — 99999 PR PBB SHADOW E&M-EST. PATIENT-LVL III: CPT | Mod: PBBFAC,TXP,, | Performed by: INTERNAL MEDICINE

## 2020-02-14 PROCEDURE — 93306 TTE W/DOPPLER COMPLETE: CPT | Mod: PBBFAC,NTX | Performed by: INTERNAL MEDICINE

## 2020-02-14 PROCEDURE — 99214 PR OFFICE/OUTPT VISIT, EST, LEVL IV, 30-39 MIN: ICD-10-PCS | Mod: S$PBB,NTX,, | Performed by: INTERNAL MEDICINE

## 2020-02-14 PROCEDURE — 99214 OFFICE O/P EST MOD 30 MIN: CPT | Mod: S$PBB,NTX,, | Performed by: INTERNAL MEDICINE

## 2020-02-14 PROCEDURE — 99213 OFFICE O/P EST LOW 20 MIN: CPT | Mod: 25,S$PBB,TXP, | Performed by: INTERNAL MEDICINE

## 2020-02-14 PROCEDURE — 99213 PR OFFICE/OUTPT VISIT, EST, LEVL III, 20-29 MIN: ICD-10-PCS | Mod: 25,S$PBB,TXP, | Performed by: INTERNAL MEDICINE

## 2020-02-14 RX ORDER — BUSPIRONE HYDROCHLORIDE 10 MG/1
10 TABLET ORAL 3 TIMES DAILY PRN
COMMUNITY
End: 2020-08-25

## 2020-02-14 RX ORDER — GABAPENTIN 400 MG/1
400 CAPSULE ORAL 3 TIMES DAILY PRN
COMMUNITY
End: 2020-08-25

## 2020-02-14 RX ORDER — ESCITALOPRAM OXALATE 10 MG/1
10 TABLET ORAL DAILY
COMMUNITY

## 2020-02-14 NOTE — PATIENT INSTRUCTIONS
Increase protonix back  to twice a day    Keep salt intake to under 2000 mg sodium, fluids to under 2 L (64 oz)    Check your weights every morning after getting out of bed and urinating. If your weight goes up 3# overnight or 5# in one week take a second lasix like you have been and call Jalyn if the fluid doesn't off.    PH on Facebook: A couple of our patients created a group on Facebook for people living in [place] with PH, it's called [place] [facility].    Check it out!    PH support group    March 11, 2020     11am-1pm  [facility]  [address] [place] [id number]    Topic: traveling with oxygen  Sponsor: [facility]    RSVP: [email or web address]  or [phone]      [facility] for Pulmonary Hypertension  Sunday March 29  9 am [facility]  [place] at [email or web address]      We will do a right heart catheterization to measure the blood pressure in your lungs-  Take your usual medicines and eat a light breakfast that am.  Labs on 2nd floor- then go to third floor cath lab waiting area and check in

## 2020-02-14 NOTE — LETTER
February 17, 2020        Luis Ken  [address]  [facility] [address]  Phone: [phone]  Fax: [phone]             [facility] - Lung Transplant  [address]  [facility] [address]  Phone: [phone]   Patient: Fatimah Cha   MR Number: [id number]   YOB: 1955   Date of Visit: 2/14/2020       Dear Dr. Luis Ken    Thank you for referring Fatimah Cha to me for evaluation. Attached you will find relevant portions of my assessment and plan of care.    If you have questions, please do not hesitate to call me. I look forward to following Fatimah Cha along with you.    Sincerely,    Rodrick Garcia MD    Enclosure    If you would like to receive this communication electronically, please contact [email or web address] or [phone] to request [employer] access.    [employer] is a tool which provides read-only access to select patient information with whom you have a relationship. Its easy to use and provides real time access to review your patients record including encounter summaries, notes, results, and demographic information.    If you feel you have received this communication in error or would no longer like to receive these types of communications, please e-mail [email or web address]

## 2020-02-14 NOTE — PROGRESS NOTES
Dr. Garcia would like for patient to undergo evaluation testing. Pre transplant binder given to patient. She was asked to review the information it contains.  Discussed evaluation/selection/listing process. Explained the PCP (Dr. Simon Orozco) and Dental (Dr. Shell) forms and that they need to be completed and faxed to us. Patient report she had PAP/GYN exam within the last year (additional form provided for this), Dr. Weber at [facility] in [place], June 2019, due June 2020. Mammogram done October 2019, UTD. Dexa done in the last year with OB/GYN as well. Colonoscopy/EGD done 2018 with Dr. Santiago, next due in ten years per patient. Endocrine MD Cely Boone at .  Notified patient (no other person present) that because they do not live > 1 hour away from [facility], they would not have to relocate to within 1 hour of [facility] for at least 3 months post discharge following transplant. Notified her that she will need a primary caregiver who could commit to being with her 24/7 for at least 3 months post discharge. She would also need two backup caregivers in the event that the primary can not be with them for any reason. Patient was told that the primary caregiver will need to accompany her to evaluation testing appointments. Recommended all potential caregivers attend RN education session during evaluation week. Patient verbalized understanding of all points discussed. Discussed the use of Hep C positive donors and explained that with HepCAb +/MYRIAM - donors, the chance of transmission is 16% and that with HepCAb+/MYRIAM + donors the risk of transmission is 100%.  Explained that with treatment, HepC is now 98% curable and that typically in patients who can not be cured, effects are not seen (cirrhosis) for decades. Explained that once financial clearance is obtained, I will contact her to schedule work up. She is aware Dr. Garcia is agreeing to accept her as a primary pulmonary patient at this time, she is  aware she will not return to Dr. Ken for follow up. This is at the request of Dr. Russell and the patient.

## 2020-02-14 NOTE — PROGRESS NOTES
LUNG TRANSPLANT PRE FOLLOW-UP    Referring Physician: Luis Ken    Reason for Visit:  Pre-lung transplant follow-up.         Date of Initial Evaluation:                                                                                              History of Present Illness: Fatimah Cha is a 64 y.o. female who is on 2L of oxygen. She is on no assisted ventilation.  Her New York Heart Association Class is II and a Karnofsky score of 80% - Normal activity with effort: some symptoms of disease. She is not diabetic. Patient presents as a follow up from her visit on 09/12/2019 for her transplant candidacy given her Hx of ILD related to Scleroderma / CREST syndrome and pulmonary HTN. Patient is currently on adempas and letairis along with MMF and she is followed by Dr. Ken and Dr. Russell. Patient reports a steady shortness of breath and DTE after 100 mt. She continues to use 2L O2 during the day and up to 4L during exertion. Patient had a 6mwt on 01/07/2020 when she was able to walk 800 feet (91 m) with 2L O2 and desaturation to 86% and required 6L to maintain sats above 90%. In addition patient has started to report mild hemoptysis daily (dime size) daily in the mornings and described as dark blood. Patient denies fever, chills, nausea vomiting. She does endorse LE swelling and reports dizziness during bending episodes but no syncopal events.      Review of Systems   Constitutional: Negative for chills, fever, malaise/fatigue and weight loss.   HENT: Negative for congestion, ear discharge, ear pain, hearing loss and nosebleeds.    Eyes: Negative for blurred vision and photophobia.   Respiratory: Positive for cough, hemoptysis and shortness of breath. Negative for sputum production and wheezing.    Cardiovascular: Positive for leg swelling. Negative for chest pain and orthopnea.   Gastrointestinal: Negative for diarrhea, heartburn, melena, nausea and vomiting.   Genitourinary: Negative for dysuria, flank pain,  "hematuria and urgency.   Musculoskeletal: Negative for falls, joint pain, myalgias and neck pain.   Skin: Negative for itching and rash.   Neurological: Positive for dizziness. Negative for loss of consciousness, weakness and headaches.   Endo/Heme/Allergies: Negative for polydipsia. Does not bruise/bleed easily.   Psychiatric/Behavioral: Negative for depression and hallucinations. The patient is not nervous/anxious.      Objective:   BP (!) 124/54   Pulse 98   Temp 97.3 °F (36.3 °C) (Oral)   Resp (!) 21   Ht 5' 11" (1.803 m)   Wt 81.1 kg (178 lb 12.7 oz)   SpO2 (!) 93% Comment: 3 L/min NC  BMI 24.94 kg/m²   Physical Exam   Constitutional: She is oriented to person, place, and time. She appears well-developed and well-nourished. No distress.   HENT:   Head: Normocephalic and atraumatic.   Right Ear: External ear normal.   Left Ear: External ear normal.   Eyes: Pupils are equal, round, and reactive to light. Conjunctivae and EOM are normal. Right eye exhibits no discharge. Left eye exhibits no discharge. No scleral icterus.   Neck: No JVD present. No thyromegaly present.   Cardiovascular: Normal rate, regular rhythm and normal heart sounds. Exam reveals no friction rub.   No murmur heard.  Pulmonary/Chest: Effort normal and breath sounds normal. No respiratory distress. She has no wheezes. She exhibits no tenderness.   velcro-like crackles in the lower 1/3 of lung fields bilaterally   Patient using supplemental O2   Abdominal: Soft. Bowel sounds are normal. She exhibits no distension.   Musculoskeletal: She exhibits edema. She exhibits no tenderness.   Neurological: She is alert and oriented to person, place, and time. Coordination normal.   Skin: Skin is warm and dry. No rash noted. She is not diaphoretic. No erythema. No pallor.   Psychiatric: She has a normal mood and affect. Her behavior is normal. Judgment and thought content normal.     Labs:  No visits with results within 7 Day(s) from this visit. "   Latest known visit with results is:   Hospital Outpatient Visit on 09/10/2019   Component Date Value    Pre FVC 09/11/2019 2.43*    PRE FEV5 09/11/2019 1.74     Pre FEV1 09/11/2019 2.10     Pre FEV1 FVC 09/11/2019 86.62     Pre FEF 25 75 09/11/2019 2.62     Pre PEF 09/11/2019 5.79     Pre  09/11/2019 5.77     Pre DLCO 09/11/2019 6.89*    DLCO ADJ PRE 09/11/2019 7.47*    DLCOVA PRE 09/11/2019 1.86*    DLVAAdj PRE 09/11/2019 2.02*    VA PRE 09/11/2019 3.69*    IVC PRE 09/11/2019 2.31*    FVC Ref 09/11/2019 3.55     FVC LLN 09/11/2019 2.65     FVC Pre Ref 09/11/2019 68.3     FEV05 REF 09/11/2019 2.01     FEV05 LLN 09/11/2019 1.15     PRE FEV05 REF 09/11/2019 86.5     FEV1 Ref 09/11/2019 2.75     FEV1 LLN 09/11/2019 2.04     FEV1 Pre Ref 09/11/2019 76.5     FEV1 FVC Ref 09/11/2019 78     FEV1 FVC LLN 09/11/2019 66     FEV1 FVC Pre Ref 09/11/2019 110.9     FEF 25 75 Ref 09/11/2019 2.29     FEF 25 75 LLN 09/11/2019 1.11     FEF 25 75 Pre Ref 09/11/2019 114.7     PEF Ref 09/11/2019 6.78     PEF LLN 09/11/2019 4.79     PEF Pre Ref 09/11/2019 85.4     DLCO Single Breath Ref 09/11/2019 25.05     DLCO Single Breath LLN 09/11/2019 19.32     DLCO SINGLEBREATH ULN 09/11/2019 30.79     DLCO Single Breath Pre R* 09/11/2019 27.5     DLCOc Single Breath Ref 09/11/2019 25.05     DLCOc Single Breath LLN 09/11/2019 19.32     DLCOC SINGLEBREATH ULN 09/11/2019 30.79     DLCOc Single Breath Pre * 09/11/2019 29.8     DLCOVA Ref 09/11/2019 4.35     DLCOVA LLN 09/11/2019 3.10     DLCOVA ULN 09/11/2019 5.59     DLCOVA Pre Ref 09/11/2019 42.8     DLCOc SBVA Ref 09/11/2019 4.35     DLCOc SBVA LLN 09/11/2019 3.10     DLCOCSBVA ULN 09/11/2019 5.59     DLCOc SBVA Pre Ref 09/11/2019 46.5     VA Single Breath Ref 09/11/2019 5.61     VA Single Breath LLN 09/11/2019 5.61     VA SINGLEBREATH ULN 09/11/2019 5.61     VA Single Breath Pre Ref 09/11/2019 65.9     IVC Single Breath Ref 09/11/2019  3.55     IVC Single Breath LLN 09/11/2019 2.65     IVC SINGLEBREATH ULN 09/11/2019 4.46     IVC Single Breath Pre Ref 09/11/2019 65.0        Pulmonary Function Tests 2/14/2020 9/10/2019 4/23/2019 10/16/2018 4/19/2018 1/10/2018 7/3/2017   FVC 2.29 2.43 2.41 2.59 2.52 2.38 2.6   FEV1 1.99 2.1 2.04 2.11 2.11 1.94 2.2   TLC (liters) - - - 3.76 3.31 - 3.3   DLCO (ml/mmHg sec) 4.4 6.9 5.6 5.2 4.1 6.7 6.4   FVC% 57 68 70 64 - 68 74   FEV1% 62 76 75 67 - 71 80   FEF 25-75 - - 2.97 2.46 2.87 2.21 2.99   FEF 25-75% - - 115 95 - 84 113   TLC% - - - 62 - - 56   RV - - - 1.17 0.79 - 0.78   RV% - - - 49 - - 34   DLCO% 17 27 28 21 - 34 32     6MW 9/10/2019 4/23/2019 1/10/2018 10/3/2017 7/3/2017 3/30/2017   6MWT Status completed with stops completed without stopping completed without stopping completed without stopping completed without stopping not completed   Patient Reported Dyspnea;Lightheadedness Dyspnea;Leg pain Dyspnea Dyspnea Dyspnea Dyspnea   Was O2 used? Yes Yes Yes Yes - No   Delivery Method Cannula;Pull Tank;Continuous Flow Cannula;Pull Tank;Continuous Flow Cannula;Pull Tank;Continuous Flow Cannula;Pull Tank;Continuous Flow - -   6MW Distance walked (feet) 800 1000 940 875 - 400   Distance walked (meters) 243.84 304.8 286.51 266.7 - 121.92   Did patient stop? Yes No No No No Yes   Oxygen Saturation 88 96 96 97 96 93   Supplemental Oxygen Room Air 3 L/M 3 L/M 3 L/M Room Air Room Air   Heart Rate 83 86 86 86 79 93   Blood Pressure 112/59 118/57 112/56 120/64 106/55 133/78   Kushal Dyspnea Rating  moderate very light light light very light moderate   Oxygen Saturation - 80 88 83 80 78   Supplemental Oxygen - 3 L/M 3 L/M 3 L/M Room Air Room Air   Heart Rate - 90 92 102 93 111   Blood Pressure - 119/54 118/57 128/66 114/64 139/65   Kushal Dyspnea Rating  - somewhat heavy moderate moderate somewhat heavy somewhat heavy   Recovery Time (seconds) - 96 97 116 142 151   Oxygen Saturation - 95 97 94 94 92   Supplemental Oxygen - 3  L/M 3 L/M 3 L/M Room Air Room Air   Heart Rate - 91 91 91 76 95       Assessment:-  1. NSIP (nonspecific interstitial pneumonia)    2. Lung transplant candidate    3. Scleroderma with pulmonary involvement    4. Pulmonary hypertension      Plan:   - Patient presents with a significant decrease in her 6mwt of 91.4 m with desaturation to 86%, along with a significant decline in DLCO. Both are considered indications for transplant listing and as such we will start the required workup.   - Start nintedanib 150 mg BID as antifibrotic therapy.  - Continue Supplemental O2  - Continue evaluation and treatment with rheumatology.   - Continue adempas and ambrisentan for pulmonary HTN. We will review the results of the TTE completed today and the RHC scheduled for next week.  - RTC after completing lung transplant work up.    Edgardo Lucia DO, PGY5  [facility] Fellow  [phone]    Attending Note:    I have seen and evaluated the patient with the fellow. Their note reflects the content of our discussion and my plan of care. Will start workup for lung transplant since she has had a symptomatic and spirometric worsening of her disease.       Rodrick Garcia MD  Pulmonary/Critical Care Medicine

## 2020-02-14 NOTE — Clinical Note
sees Frdeerick already but prob good for  Your ILD clinic- was following at [facility] and they mentioned OFEV but she cant get it to see Suellen (like ever zeusl)GRAYSON

## 2020-02-14 NOTE — LETTER
February 14, 2020        Luis Ken  [address]  [facility] [address]  Phone: [phone]  Fax: [phone]             [facility]  [address]  [facility] [address]  Phone: [phone]   Patient: Fatimah Cha   MR Number: [id number]   YOB: 1955   Date of Visit: 2/14/2020       Dear Dr. Luis Ken    Thank you for referring Fatimah Cha to me for evaluation. Attached you will find relevant portions of my assessment and plan of care.    If you have questions, please do not hesitate to call me. I look forward to following Fatimah Cha along with you.    Sincerely,    Renetta Russell MD    Enclosure    If you would like to receive this communication electronically, please contact [email or web address] or [phone] to request [employer] access.    [employer] is a tool which provides read-only access to select patient information with whom you have a relationship. Its easy to use and provides real time access to review your patients record including encounter summaries, notes, results, and demographic information.    If you feel you have received this communication in error or would no longer like to receive these types of communications, please e-mail [email or web address]

## 2020-02-17 ENCOUNTER — TELEPHONE (OUTPATIENT)
Dept: TRANSPLANT | Facility: CLINIC | Age: 65
End: 2020-02-17

## 2020-02-17 DIAGNOSIS — Z76.82 LUNG TRANSPLANT CANDIDATE: ICD-10-CM

## 2020-02-17 DIAGNOSIS — M34.9 SCLERODERMA INVOLVING LUNG: Primary | ICD-10-CM

## 2020-02-17 DIAGNOSIS — I27.20 PULMONARY HYPERTENSION: ICD-10-CM

## 2020-02-17 NOTE — TELEPHONE ENCOUNTER
Spoke with patient about OFEV prescription, she states she is going to the pharmacy today, and will inquire as to status. No request for PA received to date.

## 2020-02-18 ENCOUNTER — PATIENT MESSAGE (OUTPATIENT)
Dept: TRANSPLANT | Facility: CLINIC | Age: 65
End: 2020-02-18

## 2020-02-18 ENCOUNTER — TELEPHONE (OUTPATIENT)
Dept: TRANSPLANT | Facility: CLINIC | Age: 65
End: 2020-02-18

## 2020-02-18 NOTE — TELEPHONE ENCOUNTER
"----- Message from Domonique Coburn sent at 2/18/2020  9:55 AM CST -----  Name of caller:  [facility]   Provider name: Jose Mensah MD   Contact Preference: Ph: [phone]  Is this regarding current patient or new patient?: current   What is the nature of the call?  - will need a PA for : nintedanib (OFEV) 150 mg Cap    Additional Notes:   "Thank you for all that you do for our patients'"     "

## 2020-02-18 NOTE — TELEPHONE ENCOUNTER
Patient called to report she contacted [facility] and they do not carry medication at the local store, informed her PA is pending. Also notified patient that we have obtained clearance for eval. She is selecting the week of March 9. Will submit orders.

## 2020-02-18 NOTE — TELEPHONE ENCOUNTER
Approval received from [facility] for patient's OFEV. Good through 12/31/2020. Sent for document scanning. Patient is aware.

## 2020-02-18 NOTE — TELEPHONE ENCOUNTER
Spoke with patient about authorization for lung transplant. She is requesting week of March 9. Reviewed instructions for preparing for lung transplant evaluation:    Contacted Fatimah Starla and informed her that financial clearance for the lung transplant evaluation testing has been received from the insurance company.  Informed her that we can now schedule the evaluation testing. Inquired as to his availability, offered available dates. Reminded her that the evaluation testing consists of three (3) days of testing (Monday, Tuesday, and Wednesday). Informed her that a right and left heart catheterization will be scheduled either on the Thursday of the same week, or another day depending on the physician's availability. She requested to schedule the testing the week of March 9. She requested testing on consecutive days if possible. Instructed her to bring enough oxygen for round trip travel and for the days of testing. Patient will need to bring her primary caregiver and a current medication list. Instructed her to wear comfortable shoes for the six minute walk test. She verbalized understanding of all information discussed.     Inquired if she has had a recent colonoscopy, yes with Dr. Santiago in 2018, due 2028. She stated that she had a mammogram in October 2019 with Dr. Weber, and PAP June 2019. She also had a DEXA with them ([facility]).  Informed patient that I will request records per the signed release of information during the consultation visit, so I can obtain the reports. She has been reminded that we need to ensure that her mouth is free of infection, so dental clearance is necessary. Informed her that if she has a source of infection in her mouth, then she can aspirate and develop an infection in her lungs after transplant. Informed her that any needed dental work will need to be completed prior to presentation to selection committee for listing.     Informed her that she will receive  a 24 hour urine container (large orange jug) in the mail. Informed her that she will need to collect urine for 24 hours and keep it on ice at all times. Informed her that the test is done to determine kidney function. Informed her that after transplant, some of the medications can affect kidney function. Informed her that she will receive instructions in the mail. Instructed her to start the collection on the Sunday morning prior to appointment.  Informed her that if she wakes up at 07:00 and urinates, then that will be the start of the 24 hour urine collection. Informed her that she will discard that first urine, but it will be the start time of the test. Instructed her to collect every urine thereafter until 07:00 on Monday morning (the following day).  Instructed her to collect the last urine at 07:00 and end the collection. Informed her that she can drop off the 24 hour urine container on Monday morning when checking in for labs.     Informed her that she will receive a small specimen container in the mail for a UDS and Urine nicotine screening, she verbalized understanding.    Also informed her that she will receive a small specimen container in the mail as well. Informed her that cup is for a sputum collection. Informed her to collect sputum or mucus, not spit. Instructed her to drop off the specimen to the lab once able to collect it.     Fatimah Starla spelled her name and stated her date of birth for verification. Informed her that all the specimen containers will be labeled with this information.  Instructed her to verify his name and date of birth on all of the containers once received. She verbalized understanding of all information discussed. All questions answered.

## 2020-02-20 ENCOUNTER — TELEPHONE (OUTPATIENT)
Dept: CARDIOTHORACIC SURGERY | Facility: CLINIC | Age: 65
End: 2020-02-20

## 2020-02-20 NOTE — TELEPHONE ENCOUNTER
Informed pt of her appt with Dr. Hammer for her pre lung Tx work up on 3/9/20. Pt verbalized understanding        ----- Message from Tim Leon RN sent at 2/20/2020  1:24 PM CST -----      ----- Message -----  From: Vani Montaño MA  Sent: 2/20/2020  12:24 PM CST  To: , #    Good Afternoon, can someone please schedule a surgical consult for the above pt., for pre lung tx for the week of March 9, 2020.

## 2020-03-06 ENCOUNTER — TELEPHONE (OUTPATIENT)
Dept: TRANSPLANT | Facility: CLINIC | Age: 65
End: 2020-03-06

## 2020-03-06 ENCOUNTER — INITIAL CONSULT (OUTPATIENT)
Dept: CARDIOLOGY | Facility: CLINIC | Age: 65
End: 2020-03-06
Payer: MEDICARE

## 2020-03-06 ENCOUNTER — EDUCATION (OUTPATIENT)
Dept: CARDIOLOGY | Facility: CLINIC | Age: 65
End: 2020-03-06

## 2020-03-06 VITALS
HEIGHT: 71 IN | HEART RATE: 86 BPM | BODY MASS INDEX: 25.06 KG/M2 | DIASTOLIC BLOOD PRESSURE: 55 MMHG | SYSTOLIC BLOOD PRESSURE: 108 MMHG | WEIGHT: 179 LBS | OXYGEN SATURATION: 91 %

## 2020-03-06 DIAGNOSIS — J84.10 PULMONARY FIBROSIS: ICD-10-CM

## 2020-03-06 DIAGNOSIS — I50.42 CHRONIC COMBINED SYSTOLIC AND DIASTOLIC HEART FAILURE: Primary | ICD-10-CM

## 2020-03-06 DIAGNOSIS — I27.20 PULMONARY HYPERTENSION: ICD-10-CM

## 2020-03-06 DIAGNOSIS — Z76.82 LUNG TRANSPLANT CANDIDATE: ICD-10-CM

## 2020-03-06 DIAGNOSIS — E78.5 HYPERLIPIDEMIA, UNSPECIFIED HYPERLIPIDEMIA TYPE: ICD-10-CM

## 2020-03-06 DIAGNOSIS — M34.9 SCLERODERMA INVOLVING LUNG: ICD-10-CM

## 2020-03-06 DIAGNOSIS — J84.10 PULMONARY FIBROSIS: Primary | ICD-10-CM

## 2020-03-06 DIAGNOSIS — M34.1 CREST (CALCINOSIS, RAYNAUD'S PHENOMENON, ESOPHAGEAL DYSFUNCTION, SCLERODACTYLY, TELANGIECTASIA): ICD-10-CM

## 2020-03-06 DIAGNOSIS — I50.32 CHRONIC DIASTOLIC HEART FAILURE: ICD-10-CM

## 2020-03-06 PROCEDURE — 99999 PR PBB SHADOW E&M-EST. PATIENT-LVL V: CPT | Mod: PBBFAC,TXP,, | Performed by: INTERNAL MEDICINE

## 2020-03-06 PROCEDURE — 99205 PR OFFICE/OUTPT VISIT, NEW, LEVL V, 60-74 MIN: ICD-10-PCS | Mod: S$PBB,TXP,, | Performed by: INTERNAL MEDICINE

## 2020-03-06 PROCEDURE — 99215 OFFICE O/P EST HI 40 MIN: CPT | Mod: PBBFAC,TXP | Performed by: INTERNAL MEDICINE

## 2020-03-06 PROCEDURE — 99999 PR PBB SHADOW E&M-EST. PATIENT-LVL V: ICD-10-PCS | Mod: PBBFAC,TXP,, | Performed by: INTERNAL MEDICINE

## 2020-03-06 PROCEDURE — 99205 OFFICE O/P NEW HI 60 MIN: CPT | Mod: S$PBB,TXP,, | Performed by: INTERNAL MEDICINE

## 2020-03-06 RX ORDER — DIPHENHYDRAMINE HCL 25 MG
50 CAPSULE ORAL ONCE
Status: CANCELLED | OUTPATIENT
Start: 2020-03-06 | End: 2020-03-06

## 2020-03-06 RX ORDER — SODIUM CHLORIDE 9 MG/ML
INJECTION, SOLUTION INTRAVENOUS CONTINUOUS
Status: CANCELLED | OUTPATIENT
Start: 2020-03-06

## 2020-03-06 NOTE — PROGRESS NOTES
Cardiology Clinic Note  Reason for Visit: RHC/LHC Pre-Lung Tx evaluation  Referring MD: Jose    HPI:   65 y/o WF with CREST syndrome, ILD, pHTN, HFpEF, 2l home O2 here for RHC/LHC as part of Pre-Lung Tx evaluation. Nik angina. Has chronic MORAES with minimal activity and at rest. +2 pillow orthopnea and mild LE edema that is chronic. Never had coronary angiogram.     ROS:    Constitution: Negative for fever or chills. Negative for weight loss or gain.   HENT: Negative for sore throat or headaches. Negative for rhinorrhea.  Eyes: Negative for blurred or double vision.   Cardiovascular: See above  Pulmonary: + SOB. Negative for cough.   Gastrointestinal: Negative for abdominal pain. Negative for nausea/ vomiting. Negative for diarrhea.   : Negative for dysuria.   Neurological: Negative for focal weakness or sensory changes.  PMH:     Past Medical History:   Diagnosis Date    Anxiety     CHF (congestive heart failure)     Colitis     CREST syndrome     GERD (gastroesophageal reflux disease)     Hypercholesteremia     Hypertension     Interstitial lung disease     Osteopenia     Pulmonary fibrosis     Pulmonary hypertension     Raynaud disease     Respiratory distress     Sciatica     Scleroderma     Sjoegren syndrome      Past Surgical History:   Procedure Laterality Date    BREAST BIOPSY      CARDIAC CATHETERIZATION  2013    COLONOSCOPY      COLONOSCOPY N/A 11/15/2017    Procedure: COLONOSCOPY;  Surgeon: Radha Santiago MD;  Location: [facility] ([address] [place] [address]);  Service: Endoscopy;  Laterality: N/A;  2nd floor.  Pulm htn, ILD on o2.  Pls review w anesthesia.  Pt has cardio-pulm workup in progress at [facility] by Dr. Russell.                 reese monsalve RN- I reviewed Ms. Cha's history with Dr. Leopold (Anesthesia) and based on her medical history of:  Pulmonary Hyper    foot surgery x2      lumpectomy (benign)      LUNG BIOPSY  03/2013    RIGHT HEART CATHETERIZATION Right 12/20/2018     Procedure: INSERTION, CATHETER, RIGHT HEART;  Surgeon: Renetta Russell MD;  Location: [facility] CATH LAB;  Service: Cardiology;  Laterality: Right;    THYROIDECTOMY      TONSILLECTOMY      TUBAL LIGATION      UPPER GASTROINTESTINAL ENDOSCOPY       Allergies:     Review of patient's allergies indicates:   Allergen Reactions    Adhesive tape-silicones      Medications:     Current Outpatient Medications on File Prior to Visit   Medication Sig Dispense Refill    ADEMPAS 2.5 mg tablet Take 2.5 mg by mouth 3 (three) times daily.       albuterol (PROVENTIL) 2.5 mg /3 mL (0.083 %) nebulizer solution USE ONE VIAL in Nebulizer EVERY 6 HOURS AS NEEDED FOR SHORTNESS OF BREATH  6    ambrisentan (LETAIRIS) 10 MG Tab Take 10 mg by mouth once daily.      aspirin (ECOTRIN) 81 MG EC tablet Take 81 mg by mouth every evening.      atorvastatin (LIPITOR) 10 MG tablet Take 10 mg by mouth once daily.       azelastine (ASTELIN) 137 mcg (0.1 %) nasal spray 2 sprays by Nasal route 2 (two) times daily as needed.       biotin 1,000 mcg Chew Take 1 capsule by mouth once daily.       busPIRone (BUSPAR) 10 MG tablet Take 10 mg by mouth 3 (three) times daily as needed (for anxiety).       carvedilol (COREG) 3.125 MG tablet TAKE ONE TABLET BY MOUTH TWICE DAILY WITH MEALS 180 tablet 6    cevimeline (EVOXAC) 30 mg capsule Take 30 mg by mouth 3 (three) times daily.      escitalopram oxalate (LEXAPRO) 10 MG tablet Take 10 mg by mouth once daily.      fluticasone (FLONASE) 50 mcg/actuation nasal spray 1 spray by Each Nare route as needed.   3    furosemide (LASIX) 40 MG tablet Take 1 tablet (40 mg total) by mouth once daily. 30 tablet 12    gabapentin (NEURONTIN) 400 MG capsule Take 400 mg by mouth 3 (three) times daily as needed (for bulging discs in neck; has not taken in months).      guaiFENesin (MUCINEX) 600 mg 12 hr tablet Take 1,200 mg by mouth as needed for Congestion.      INCRUSE ELLIPTA 62.5 mcg/actuation DsDv Take 1 puff  by mouth once daily.   3    multivitamin capsule Take 1 capsule by mouth once daily.      mycophenolate (CELLCEPT) 500 mg Tab Take 1,500 mg by mouth 2 (two) times daily.       nintedanib (OFEV) 150 mg Cap Take 150 mg by mouth 2 (two) times daily. 60 capsule 3    pantoprazole (PROTONIX) 40 MG tablet Take 40 mg by mouth 2 (two) times daily.      potassium chloride (MICRO-K) 10 MEQ CpSR Take 2 capsules (20 mEq total) by mouth once daily. 60 capsule 11    risedronate (ACTONEL) 150 MG Tab Take 150 mg by mouth every 30 days.  6    VENTOLIN HFA 90 mcg/actuation inhaler 2 puffs every 4 (four) hours as needed.       VITAMIN D2 50,000 unit capsule Take 50,000 Units by mouth As instructed. Twice a month       No current facility-administered medications on file prior to visit.      Social History:     Social History     Tobacco Use    Smoking status: Former Smoker     Packs/day: 1.00     Years: 5.00     Pack years: 5.00     Types: Cigarettes     Last attempt to quit: 3/30/1979     Years since quittin.9    Smokeless tobacco: Never Used   Substance Use Topics    Alcohol use: No     Family History:     Family History   Problem Relation Age of Onset    Thyroid cancer Mother     Heart failure Father     Emphysema Sister     Thyroid disease Sister     Deep vein thrombosis Brother     Lung cancer Brother     Colon cancer Maternal Uncle         60s    Cancer Maternal Aunt     Pulmonary embolism Daughter     Breast cancer Neg Hx     Ovarian cancer Neg Hx     Celiac disease Neg Hx     Cirrhosis Neg Hx     Colon polyps Neg Hx     Crohn's disease Neg Hx     Cystic fibrosis Neg Hx     Esophageal cancer Neg Hx     Hemochromatosis Neg Hx     Inflammatory bowel disease Neg Hx     Irritable bowel syndrome Neg Hx     Liver cancer Neg Hx     Liver disease Neg Hx     Rectal cancer Neg Hx     Stomach cancer Neg Hx     Ulcerative colitis Neg Hx     Phoenix's disease Neg Hx     Lymphoma Neg Hx      "Tuberculosis Neg Hx     Rheum arthritis Neg Hx     Scleroderma Neg Hx     Melanoma Neg Hx     Lupus Neg Hx     Multiple sclerosis Neg Hx     Psoriasis Neg Hx     Skin cancer Neg Hx      Physical Exam:   BP (!) 108/55 (BP Location: Left arm, Patient Position: Sitting, BP Method: Large (Automatic))   Pulse 86   Ht 5' 11" (1.803 m)   Wt 81.2 kg (179 lb 0.2 oz)   SpO2 (!) 91% Comment: pt on 2.5L of O2 NC  BMI 24.97 kg/m²      Constitutional: No acute distress, conversant  HEENT: Sclera anicteric, Pupils equal, round and reactive to light, extraocular motions intact, Oropharynx clear  Neck: No JVD, no carotid bruits  Cardiovascular: regular rate and rhythm, no murmur, rubs or gallops, normal S1/S2  Pulmonary: Clear to auscultation bilaterally  Abdominal: Abdomen soft, nontender, nondistended, positive bowel sounds  Extremities: No lower extremity edema,   Pulses: 2+ BL Radial, 2+ BL carotid, 2+ BL DP, 2+ BL PT, normal Allens Test on R  Skin: No ecchymosis, erythema, or ulcers  Psych: Alert and oriented x 3, appropriate affect  Neuro: CNII-XII intact, no focal deficits    Labs:     Lab Results   Component Value Date     09/10/2019    K 4.0 09/10/2019     09/10/2019    CO2 25 09/10/2019    BUN 16 09/10/2019    CREATININE 0.7 09/10/2019    ANIONGAP 8 09/10/2019     Lab Results   Component Value Date    AST 19 09/10/2019    ALT 12 09/10/2019    ALKPHOS 51 (L) 09/10/2019    BILITOT 0.5 09/10/2019    ALBUMIN 3.9 09/10/2019     Lab Results   Component Value Date    CALCIUM 9.0 09/10/2019    MG 2.1 09/10/2019    PHOS 3.6 11/26/2017     Lab Results   Component Value Date    BNP 84 09/10/2019    BNP 56 02/25/2019    BNP 79 12/20/2018    Lab Results   Component Value Date    WBC 4.70 09/10/2019    HGB 11.1 (L) 09/10/2019    HCT 37.0 09/10/2019     09/10/2019    GRAN 3.4 09/10/2019    GRAN 71.2 09/10/2019     Lab Results   Component Value Date    INR 1.0 12/20/2018     No results found for: CHOL, HDL, " LDLCALC, TRIG  No results found for: HGBA1C  No results found for: TSH, [id number]       Assessment:     1. Pulmonary fibrosis    2. Pulmonary hypertension    3. Scleroderma involving lung    4. Lung transplant candidate    5. CREST (calcinosis, Raynaud's phenomenon, esophageal dysfunction, sclerodactyly, telangiectasia)    6. Chronic diastolic heart failure    7. Hyperlipidemia, unspecified hyperlipidemia type        Plan:     - Will proceed with RHC via R AC vein and LHC via R radial artery as part of Pre-Lung Tx evaluation    The risks (including death, heart attack, limb loss, paralysis, emergency surgery, bleeding, renal failure, and stroke), the potential benefits of the procedure, and the alternatives to the procedure, were discussed in detail and accepted by the patient. All questions were answered. Patient agrees to proceed.      Signed:  Bill Bridges MD  Cardiology Fellow  [phone]  3/6/2020 11:29 AM

## 2020-03-06 NOTE — PROGRESS NOTES
OUTPATIENT CATHETERIZATION INSTRUCTIONS    You have been scheduled for a procedure in the catheterization lab on Tuesday, June 9, 2020.     Please report to the Cardiology Waiting Area on the Third floor of the hospital and check in at 8 AM.   You will then be taken to the SSCU (Short Stay Cardiac Unit) and prepared for your procedure. Please be aware that this is not the time of your procedure but the time you are to arrive. The procedures are scheduled on an hourly basis; however, emergency cases take precedence over all other cases.       You may not have anything to eat or drink after midnight the night before your test. You may take your regular morning medications with water. If there are any medications that you should not take you will be instructed to hold them that morning. If you are diabetic and on Metformin (Glucophage) do not take it the day before, the day of, and for 2 days after your procedure.      The procedure will take 1-2 hours to perform. After the procedure, you will return to SSCU on the third floor of the hospital. You will need to lie still (or keep your arm still) for the next 4 to 6 hours to minimize bleeding from the puncture site. Your family may remain in the room with you during this time.       You may be able to be discharged home that same afternoon if there is someone to drive you home and there were no complications. If you have one of the balloon, stent, or device procedures you may spend the night in the hospital. Your doctor will determine, based on your progress, the date and time of your discharge. The results of your procedure will be discussed with you before you are discharged. Any further testing or procedures will be scheduled for you either before you leave or you will be called with these appointments.       If you should have any questions, concerns, or need to change the date of your procedure, please call RADHA Chavez @ [phone]    Special Instructions:  Do  not stop Aspirin.      THE ABOVE INSTRUCTIONS WERE GIVEN TO THE PATIENT VERBALLY AND THEY VERBALIZED UNDERSTANDING.  THEY DO NOT REQUIRE ANY SPECIAL NEEDS AND DO NOT HAVE ANY LEARNING BARRIERS.          Drop Off:   Have family member drop you off at the front of the Hospital under the green overhang.  Enter through the doors and take a right.  You will be temperature checked and given a mask  You will be escorted up to 3rd floor.   Take the E elevators to the 3rd floor Cardiology Waiting Area.  Check in at the Reception Desk in the waiting room.

## 2020-03-06 NOTE — LETTER
March 6, 2020      Rodrick Garcia MD  [address]  [facility] [address]           [facility]  [address]  [facility] [address]  Phone: [phone]          Patient: Fatimah Cha   MR Number: [id number]   YOB: 1955   Date of Visit: 3/6/2020       Dear Dr. Rodrick Garcia:    Thank you for referring Fatimah Cha to me for evaluation. Attached you will find relevant portions of my assessment and plan of care.    If you have questions, please do not hesitate to call me. I look forward to following Fatimah Cha along with you.    Sincerely,    Irving Flores MD    Enclosure  CC:  No Recipients    If you would like to receive this communication electronically, please contact [email or web address] or [phone] to request more information on [employer] access.    For providers and/or their staff who would like to refer a patient to [facility], please contact us through our one-stop-shop provider referral line, [facility], at [phone].    If you feel you have received this communication in error or would no longer like to receive these types of communications, please e-mail [email or web address]

## 2020-03-09 ENCOUNTER — TELEPHONE (OUTPATIENT)
Dept: TRANSPLANT | Facility: CLINIC | Age: 65
End: 2020-03-09

## 2020-03-09 ENCOUNTER — HOSPITAL ENCOUNTER (OUTPATIENT)
Dept: PULMONOLOGY | Facility: CLINIC | Age: 65
Discharge: HOME | End: 2020-03-09
Payer: MEDICARE

## 2020-03-09 ENCOUNTER — HOSPITAL ENCOUNTER (OUTPATIENT)
Dept: RADIOLOGY | Facility: HOSPITAL | Age: 65
Discharge: HOME | End: 2020-03-09
Attending: INTERNAL MEDICINE
Payer: MEDICARE

## 2020-03-09 ENCOUNTER — HOSPITAL ENCOUNTER (OUTPATIENT)
Dept: CARDIOLOGY | Facility: CLINIC | Age: 65
Discharge: HOME | End: 2020-03-09
Payer: MEDICARE

## 2020-03-09 ENCOUNTER — CLINICAL SUPPORT (OUTPATIENT)
Dept: TRANSPLANT | Facility: CLINIC | Age: 65
End: 2020-03-09
Payer: MEDICARE

## 2020-03-09 ENCOUNTER — OFFICE VISIT (OUTPATIENT)
Dept: CARDIOTHORACIC SURGERY | Facility: CLINIC | Age: 65
End: 2020-03-09
Payer: MEDICARE

## 2020-03-09 VITALS
HEART RATE: 81 BPM | TEMPERATURE: 99 F | SYSTOLIC BLOOD PRESSURE: 110 MMHG | BODY MASS INDEX: 24.41 KG/M2 | DIASTOLIC BLOOD PRESSURE: 62 MMHG | OXYGEN SATURATION: 95 % | WEIGHT: 175 LBS

## 2020-03-09 DIAGNOSIS — M34.9 SCLERODERMA INVOLVING LUNG: ICD-10-CM

## 2020-03-09 DIAGNOSIS — I27.20 PULMONARY HYPERTENSION: ICD-10-CM

## 2020-03-09 DIAGNOSIS — Z76.82 LUNG TRANSPLANT CANDIDATE: ICD-10-CM

## 2020-03-09 DIAGNOSIS — J96.11 CHRONIC RESPIRATORY FAILURE WITH HYPOXIA: ICD-10-CM

## 2020-03-09 DIAGNOSIS — J84.9 INTERSTITIAL LUNG DISEASE: ICD-10-CM

## 2020-03-09 DIAGNOSIS — J84.10 PULMONARY FIBROSIS: Primary | ICD-10-CM

## 2020-03-09 DIAGNOSIS — M34.1 CREST (CALCINOSIS, RAYNAUD'S PHENOMENON, ESOPHAGEAL DYSFUNCTION, SCLERODACTYLY, TELANGIECTASIA): ICD-10-CM

## 2020-03-09 DIAGNOSIS — R16.0 HEPATOMEGALY: ICD-10-CM

## 2020-03-09 DIAGNOSIS — Z76.82 LUNG TRANSPLANT CANDIDATE: Primary | ICD-10-CM

## 2020-03-09 LAB
ALLENS TEST: ABNORMAL
DELSYS: ABNORMAL
FEF 25 75 LLN: 1.1
FEF 25 75 PRE REF: 115.4 %
FEF 25 75 REF: 2.26
FEV05 LLN: 1.15
FEV05 REF: 2.01
FEV1 FVC LLN: 65
FEV1 FVC PRE REF: 111.4 %
FEV1 FVC REF: 78
FEV1 LLN: 2.03
FEV1 PRE REF: 62.3 %
FEV1 REF: 2.73
FIO2: 30
FLOW: 3
FVC LLN: 2.63
FVC PRE REF: 55.4 %
FVC REF: 3.54
HCO3 UR-SCNC: 21.5 MMOL/L (ref 24–28)
MODE: ABNORMAL
PCO2 BLDA: 32.7 MMHG (ref 35–45)
PEF LLN: 4.79
PEF PRE REF: 83 %
PEF REF: 6.78
PH SMN: 7.43 [PH] (ref 7.35–7.45)
PHYSICIAN COMMENT: ABNORMAL
PO2 BLDA: 47 MMHG (ref 80–100)
POC BE: -3 MMOL/L
POC SATURATED O2: 84 % (ref 95–100)
POC TCO2: 22 MMOL/L (ref 23–27)
PRE FEF 25 75: 2.61 L/S (ref 1.1–3.43)
PRE FET 100: 6.12 SEC
PRE FEV05 REF: 72.5 %
PRE FEV1 FVC: 86.98 % (ref 65.47–90.64)
PRE FEV1: 1.7 L (ref 2.03–3.44)
PRE FEV5: 1.46 L (ref 1.15–2.86)
PRE FVC: 1.96 L (ref 2.63–4.45)
PRE PEF: 5.63 L/S (ref 4.79–8.76)
SAMPLE: ABNORMAL
SITE: ABNORMAL

## 2020-03-09 PROCEDURE — 93880 EXTRACRANIAL BILAT STUDY: CPT | Mod: TC,TXP

## 2020-03-09 PROCEDURE — 76700 US EXAM ABDOM COMPLETE: CPT | Mod: TC,TXP

## 2020-03-09 PROCEDURE — 76700 US EXAM ABDOM COMPLETE: CPT | Mod: 26,TXP,, | Performed by: RADIOLOGY

## 2020-03-09 PROCEDURE — 71250 CT THORAX DX C-: CPT | Mod: TC,TXP

## 2020-03-09 PROCEDURE — 71250 CT THORAX DX C-: CPT | Mod: 26,TXP,, | Performed by: RADIOLOGY

## 2020-03-09 PROCEDURE — 93010 ELECTROCARDIOGRAM REPORT: CPT | Mod: S$PBB,TXP,, | Performed by: INTERNAL MEDICINE

## 2020-03-09 PROCEDURE — 82803 BLOOD GASES ANY COMBINATION: CPT | Mod: PBBFAC,TXP | Performed by: INTERNAL MEDICINE

## 2020-03-09 PROCEDURE — 99205 PR OFFICE/OUTPT VISIT, NEW, LEVL V, 60-74 MIN: ICD-10-PCS | Mod: S$PBB,TXP,, | Performed by: THORACIC SURGERY (CARDIOTHORACIC VASCULAR SURGERY)

## 2020-03-09 PROCEDURE — 99205 OFFICE O/P NEW HI 60 MIN: CPT | Mod: S$PBB,TXP,, | Performed by: THORACIC SURGERY (CARDIOTHORACIC VASCULAR SURGERY)

## 2020-03-09 PROCEDURE — 94010 BREATHING CAPACITY TEST: CPT | Mod: 26,S$PBB,TXP, | Performed by: INTERNAL MEDICINE

## 2020-03-09 PROCEDURE — 99211 OFF/OP EST MAY X REQ PHY/QHP: CPT | Mod: PBBFAC,25,TXP

## 2020-03-09 PROCEDURE — 36600 PR WITHDRAWAL OF ARTERIAL BLOOD: ICD-10-PCS | Mod: S$PBB,TXP,, | Performed by: INTERNAL MEDICINE

## 2020-03-09 PROCEDURE — 99999 PR PBB SHADOW E&M-EST. PATIENT-LVL III: CPT | Mod: PBBFAC,TXP,, | Performed by: THORACIC SURGERY (CARDIOTHORACIC VASCULAR SURGERY)

## 2020-03-09 PROCEDURE — 36600 WITHDRAWAL OF ARTERIAL BLOOD: CPT | Mod: PBBFAC,TXP | Performed by: INTERNAL MEDICINE

## 2020-03-09 PROCEDURE — 99999 PR PBB SHADOW E&M-EST. PATIENT-LVL I: ICD-10-PCS | Mod: PBBFAC,TXP,,

## 2020-03-09 PROCEDURE — 71046 XR CHEST PA AND LATERAL: ICD-10-PCS | Mod: 26,TXP,, | Performed by: RADIOLOGY

## 2020-03-09 PROCEDURE — 99999 PR PBB SHADOW E&M-EST. PATIENT-LVL III: ICD-10-PCS | Mod: PBBFAC,TXP,, | Performed by: THORACIC SURGERY (CARDIOTHORACIC VASCULAR SURGERY)

## 2020-03-09 PROCEDURE — 93880 US CAROTID BILATERAL: ICD-10-PCS | Mod: 26,TXP,, | Performed by: RADIOLOGY

## 2020-03-09 PROCEDURE — 93005 ELECTROCARDIOGRAM TRACING: CPT | Mod: PBBFAC,TXP | Performed by: INTERNAL MEDICINE

## 2020-03-09 PROCEDURE — 71250 CT CHEST WITHOUT CONTRAST: ICD-10-PCS | Mod: 26,TXP,, | Performed by: RADIOLOGY

## 2020-03-09 PROCEDURE — 94010 BREATHING CAPACITY TEST: CPT | Mod: PBBFAC,TXP | Performed by: INTERNAL MEDICINE

## 2020-03-09 PROCEDURE — 71046 X-RAY EXAM CHEST 2 VIEWS: CPT | Mod: TC,TXP

## 2020-03-09 PROCEDURE — 94010 BREATHING CAPACITY TEST: ICD-10-PCS | Mod: 26,S$PBB,TXP, | Performed by: INTERNAL MEDICINE

## 2020-03-09 PROCEDURE — 99213 OFFICE O/P EST LOW 20 MIN: CPT | Mod: PBBFAC,25,27,TXP | Performed by: THORACIC SURGERY (CARDIOTHORACIC VASCULAR SURGERY)

## 2020-03-09 PROCEDURE — 36600 WITHDRAWAL OF ARTERIAL BLOOD: CPT | Mod: S$PBB,TXP,, | Performed by: INTERNAL MEDICINE

## 2020-03-09 PROCEDURE — 93010 EKG 12-LEAD: ICD-10-PCS | Mod: S$PBB,TXP,, | Performed by: INTERNAL MEDICINE

## 2020-03-09 PROCEDURE — 99999 PR PBB SHADOW E&M-EST. PATIENT-LVL I: CPT | Mod: PBBFAC,TXP,,

## 2020-03-09 PROCEDURE — 93880 EXTRACRANIAL BILAT STUDY: CPT | Mod: 26,TXP,, | Performed by: RADIOLOGY

## 2020-03-09 PROCEDURE — 71046 X-RAY EXAM CHEST 2 VIEWS: CPT | Mod: 26,TXP,, | Performed by: RADIOLOGY

## 2020-03-09 PROCEDURE — 76700 US ABDOMEN COMPLETE: ICD-10-PCS | Mod: 26,TXP,, | Performed by: RADIOLOGY

## 2020-03-09 RX ORDER — TERIPARATIDE 250 UG/ML
INJECTION, SOLUTION SUBCUTANEOUS
COMMUNITY
End: 2020-08-25

## 2020-03-09 RX ORDER — FLUCONAZOLE 150 MG/1
TABLET ORAL
COMMUNITY
Start: 2020-02-20 | End: 2020-08-25

## 2020-03-09 RX ORDER — AMOXICILLIN AND CLAVULANATE POTASSIUM 875; 125 MG/1; MG/1
TABLET, FILM COATED ORAL
COMMUNITY
Start: 2020-02-20 | End: 2020-08-25

## 2020-03-09 RX ORDER — LEVOTHYROXINE SODIUM 137 UG/1
TABLET ORAL
COMMUNITY
Start: 2020-02-16 | End: 2020-08-25

## 2020-03-09 RX ORDER — NYSTATIN 100000 [USP'U]/ML
SUSPENSION ORAL
COMMUNITY
Start: 2020-02-21 | End: 2021-03-19

## 2020-03-09 NOTE — PROGRESS NOTES
Subjective:      Patient ID: Fatimah Cha is a 64 y.o. female.    Chief Complaint: No chief complaint on file.      HPI:  Fatimah Cha is a 64 y.o. female who presents for surgical evaluation for lung transplantation. Medical conditions include CREST syndrome, ILD (diagnosed 13 years ago), pHTN, HFpEF. She is on no assisted ventilation.  Her New York Heart Association Class is II and a Karnofsky score of 80%. Normal activity with effort: some symptoms of disease. She is not diabetic. Patient is currently on adempas and letairis along with MMF and she is followed by Dr. Ken and Dr. Russell. Was started on home oxygen 7 years ago and continues to use 2L O2 during the day and up to 4L during exertion. Patient had a 6mwt on 01/07/2020 when she was able to walk 800 feet (91 m) with 2L O2 and desaturation to 86% and required 6L to maintain sats above 90%. In addition patient has started to report mild hemoptysis daily (dime size) daily in the mornings and described as dark blood. Patient denies fever, chills, nausea vomiting. She does endorse LE swelling and reports dizziness during bending episodes but no syncopal events. Reports that while trying to do her PFTs today she had to stop as her O2 sat went into the 60s. For the past 3 months she reports a significant decline in functional status. Reports she first noticed that it was taking her longer and longer to get ready for work in the morning and now she can only take several steps before she needs to stop and rest. Quit smoking 40 years ago. Endorses orthopnea and has a bed that allows her to adjust the incline. No chest pain. Occasional palpitations. No prior operations to chest/abdomen.     Family and social history reviewed    Review of patient's allergies indicates:   Allergen Reactions    Adhesive tape-silicones      Past Medical History:   Diagnosis Date    Anxiety     CHF (congestive heart failure)     Colitis     CREST syndrome     GERD  (gastroesophageal reflux disease)     Hypercholesteremia     Hypertension     Interstitial lung disease     Osteopenia     Pulmonary fibrosis     Pulmonary hypertension     Raynaud disease     Respiratory distress     Sciatica     Scleroderma     Sjoegren syndrome      Past Surgical History:   Procedure Laterality Date    BREAST BIOPSY      CARDIAC CATHETERIZATION  2013    COLONOSCOPY      COLONOSCOPY N/A 11/15/2017    Procedure: COLONOSCOPY;  Surgeon: Radha Santiago MD;  Location: [facility] ([address] [place] [address]);  Service: Endoscopy;  Laterality: N/A;  2nd floor.  Pulm htn, ILD on o2.  Pls review w anesthesia.  Pt has cardio-pulm workup in progress at [facility] by Dr. Russell.                 reese monsalve RN- I reviewed Ms. Cha's history with Dr. Leopold (Anesthesia) and based on her medical history of:  Pulmonary Hyper    foot surgery x2      lumpectomy (benign)      LUNG BIOPSY  03/2013    RIGHT HEART CATHETERIZATION Right 12/20/2018    Procedure: INSERTION, CATHETER, RIGHT HEART;  Surgeon: Renetta Russell MD;  Location: [facility] CATH LAB;  Service: Cardiology;  Laterality: Right;    THYROIDECTOMY      TONSILLECTOMY      TUBAL LIGATION      UPPER GASTROINTESTINAL ENDOSCOPY       Family History     Problem Relation (Age of Onset)    Cancer Maternal Aunt    Colon cancer Maternal Uncle    Deep vein thrombosis Brother    Emphysema Sister    Heart failure Father    Lung cancer Brother    Pulmonary embolism Daughter    Thyroid cancer Mother    Thyroid disease Sister        Social History     Socioeconomic History    Marital status:      Spouse name: Not on file    Number of children: Not on file    Years of education: Not on file    Highest education level: Not on file   Occupational History    Not on file   Social Needs    Financial resource strain: Not on file    Food insecurity:     Worry: Not on file     Inability: Not on file    Transportation needs:     Medical: Not on file      Non-medical: Not on file   Tobacco Use    Smoking status: Former Smoker     Packs/day: 1.00     Years: 5.00     Pack years: 5.00     Types: Cigarettes     Last attempt to quit: 3/30/1979     Years since quittin.9    Smokeless tobacco: Never Used   Substance and Sexual Activity    Alcohol use: No    Drug use: No    Sexual activity: Not on file   Lifestyle    Physical activity:     Days per week: Not on file     Minutes per session: Not on file    Stress: Not on file   Relationships    Social connections:     Talks on phone: Not on file     Gets together: Not on file     Attends [employer] service: Not on file     Active member of club or organization: Not on file     Attends meetings of clubs or organizations: Not on file     Relationship status: Not on file   Other Topics Concern    Not on file   Social History Narrative    Not on file       Current medications Reviewed    Review of Systems   Constitutional: Positive for activity change and fatigue.   HENT: Negative for nosebleeds.    Eyes: Negative for visual disturbance.   Respiratory: Positive for cough and shortness of breath.    Cardiovascular: Positive for leg swelling. Negative for chest pain and palpitations.   Gastrointestinal: Negative for nausea and vomiting.   Musculoskeletal: Negative for gait problem.   Skin: Negative for color change.   Neurological: Positive for weakness. Negative for dizziness, seizures and syncope.   Hematological: Does not bruise/bleed easily.   Psychiatric/Behavioral: Negative for sleep disturbance.     Objective:   Physical Exam   Constitutional: She is oriented to person, place, and time. She appears well-developed and well-nourished. No distress.   HENT:   Head: Normocephalic and atraumatic.   Eyes: Pupils are equal, round, and reactive to light. EOM are normal.   Neck: Normal range of motion.   Cardiovascular: Normal rate, regular rhythm and normal pulses.   Pulmonary/Chest: Effort normal. No respiratory  distress.   2L nasal cannula    Abdominal: Soft.   Musculoskeletal: She exhibits edema.   Neurological: She is alert and oriented to person, place, and time.   Skin: Skin is warm, dry and intact. Capillary refill takes less than 2 seconds. She is not diaphoretic.   Psychiatric: She has a normal mood and affect. Her speech is normal and behavior is normal. Judgment and thought content normal.   Vitals reviewed.      Diagnostic Results: reviewed   Carotid US 3/9/2020  No evidence of a hemodynamically significant carotid bifurcation stenosis.    CT Chest 3/9/2020  1. Chronic and severe interstitial lung disease with progression since 2017 likely representing NSIP.  2. Mild cardiomegaly and a moderate volume pericardial fluid.  Chronic dilatation main pulmonary artery correlating with history of pulmonary hypertension.  3. Small right pleural effusion located in major fissure.  4. Dilated esophagus correlating with history of scleroderma.  5. Other findings as above.  6.  This report was flagged in Epic as abnormal.    TTE 2/14/2019  · Normal left ventricular systolic function. The estimated ejection fraction is 65%.  · Indeterminate left ventricular diastolic function.  · No wall motion abnormalities.  · Moderate right ventricular enlargement.  · Normal right ventricular systolic function.  · Severe left atrial enlargement.  · Mild tricuspid regurgitation.  · The estimated PA systolic pressure is 50 mmHg. Pulmonary hypertension present.  · Normal central venous pressure (3 mmHg).  · Small circumferential pericardial effusion. Abnormal thickening of the visceral pericardium seen anterior to to the RV in subcostals and posterior to the LV in the SAX. This may be proteinous exudate from chronic effusion vs. prominent fat pad.  Assessment:   Pulmonary fibrosis   CREST syndrome  pHTN   Lung Transplant Eval   Plan:   Major concerns of hiatal hernia and patient chokes very easily. Continue with workup. Major concern in CREST  patient for wound healing.

## 2020-03-09 NOTE — PROGRESS NOTES
PRE EDUCATION NOTE    Met with Fatimah Cha and her sister for pre-transplant education and to consent for lung transplant evaluation.  Pre-transplant educational binder given to patient at prior meeting.  Thorough pre-transplant education conducted.    Information presented included:  · Evaluation process and testing  · Members of the transplant team  · Selection committee members and role of the committee  · Contraindications to lung transplantation  · Policy for absolute smoking/nicotine cessation for at least 6 months prior to listing  · Relocation pre- and post-transplant (patient lives less than one hour from [facility])  · Listing process for transplant: explained the listing designations, active versus inactive (status 7) and lung allocation score (LAS) and the area of candidates ([place])  · National graft survival statistics, our current survival statistics since reopening of the program to present  · Wait time on the list  · The need to reach patient within 15 minutes with donor offer  · [facility] donors with increased risk of disease transmission, including acceptance of Hep C donors, MYRIAM +   · Donor criteria and testing on donor, procurement of donor lungs and ischemic time, blood transfusions, process for matching donor with recipient  · Transplant surgery, single vs. double, estimated length of surgery, surgical incision, chest tubes and purpose, and ventilator  · Post-transplant immunosuppression for life with the need to be able to afford post transplant medications, immediate post transplant ICU stay - extubation, explained the importance of getting out of bed (once extubated) and the importance of coughing and taking deep breaths despite the pain to prevent pneumonia  · Need for a caregiver to be with her at all times beginning with discharge from ICU and transfer to TSU, through at least the first 3 months post-transplant possibly longer  · Post-transplant medications,  their side effects, and self medications  · Discharge, follow-up clinic visits, testing with each visit, and surveillance bronchoscopies  · Rejection and treatment, how to reach team members at any time; prolong sequale I.e. CLAD  · Health maintenance post-transplant, avoiding large crowds and sick contacts, wearing a mask, good handwashing, pet policy, fishing, dermatology, opthamology, and dental visits post-transplant  · Multiple listing information provided    Fatimah Cha and her sister asked pertinent questions which were answered to their satisfaction.     Informed Consent to Undergo Evaluation for Lung Transplantation reviewed and discussed with patient and her sister.  Consent initialed and signed by patient. HIV testing consent signed by patient. Copy of consent given to patient.  Original to be sent to Medical Records for scanning.

## 2020-03-09 NOTE — TELEPHONE ENCOUNTER
Received a call from ROSEMARY De León in pulmonary lab, with a critical result:  pO2 47.  Genet stated that the blood gas was arterial.      Obtained the other ABG values:  pH 7.43, pCO2 33, BE -3, HCO3  21.5,  oxygen saturation 84% on 2L.      Notified Dr. Garcia of above results.  Instructed for patient to increase her oxygen (3-4L) and to cancel the DLCO and lung volumes.    Contacted Genet in pulmonary lab.  Notified her of Dr. Garcia's orders to increase the oxygen (3-4L) and to cancel the DLCO and lung volumes.  Genet verbalized her understanding.

## 2020-03-09 NOTE — TELEPHONE ENCOUNTER
----- Message from Rodrick Garcia MD sent at 3/9/2020  9:18 AM CDT -----  Hepatology    Prior to the pre-transplant education, Ms. Cha was notified of the abdominal ultrasound results and Dr. Garcia's orders for a Hepatology consult.  Informed her that an appointment will be scheduled and she will be notified of the results.  She verbalized her understanding.    Vani Montaño, , notified Ms. Cha of the appointment date and time and a copy of the appointment was provided to her during the education session.

## 2020-03-09 NOTE — TELEPHONE ENCOUNTER
----- Message from Rodrick Garcia MD sent at 3/9/2020  1:21 PM CDT -----  Needs to see endocrine for her thyroid.  Also, I spoke with Jose and he says that it's ok to just order a fibroscan and they will see the patient if it is abnormal.    Attempted to contact patient regarding thyroid function tests and Dr. Garcia's orders for Endocrine consult.  No answer.  Left a message requesting a return call.  Consult appointment with Hepatology already scheduled.    3/10/20 - Contacted Ms. Cha.  Notified her of her thyroid function test results and Dr. Garcia's orders to see endocrine.  She stated that she saw the results and will contact her endocrinologist, Dr. Teixeira, at [place] regarding the results.

## 2020-03-09 NOTE — LETTER
March 12, 2020      Rodirck Garcia MD  [address]  [facility] [address]           [facility] Cardi[facility]  [address]  [facility] [address]  Phone: [phone]          Patient: Fatimah Cha   MR Number: [id number]   YOB: 1955   Date of Visit: 3/9/2020       Dear Dr. Rodrick Garcia:    Thank you for referring Fatimah Cha to me for evaluation. Attached you will find relevant portions of my assessment and plan of care.    If you have questions, please do not hesitate to call me. I look forward to following Fatimah Cha along with you.    Sincerely,    Saurav Hammer MD    Enclosure  CC:  No Recipients    If you would like to receive this communication electronically, please contact [email or web address] or [phone] to request more information on [employer] access.    For providers and/or their staff who would like to refer a patient to [facility], please contact us through our one-stop-shop provider referral line, [facility], at [phone].    If you feel you have received this communication in error or would no longer like to receive these types of communications, please e-mail [email or web address]

## 2020-03-09 NOTE — TELEPHONE ENCOUNTER
Received call from  Jesus, the lab is calling stating the urine container is leaking and they will be unable to process the specimen. The patient will need to recollect. Did not speak with . Notified primary coordinator of this.

## 2020-03-10 ENCOUNTER — CLINICAL SUPPORT (OUTPATIENT)
Dept: INFECTIOUS DISEASES | Facility: CLINIC | Age: 65
End: 2020-03-10
Payer: MEDICARE

## 2020-03-10 ENCOUNTER — SOCIAL WORK (OUTPATIENT)
Dept: TRANSPLANT | Facility: CLINIC | Age: 65
End: 2020-03-10
Payer: MEDICARE

## 2020-03-10 ENCOUNTER — NUTRITION (OUTPATIENT)
Dept: TRANSPLANT | Facility: CLINIC | Age: 65
End: 2020-03-10
Payer: MEDICARE

## 2020-03-10 ENCOUNTER — OFFICE VISIT (OUTPATIENT)
Dept: INFECTIOUS DISEASES | Facility: CLINIC | Age: 65
End: 2020-03-10
Payer: MEDICARE

## 2020-03-10 ENCOUNTER — HOSPITAL ENCOUNTER (OUTPATIENT)
Dept: RADIOLOGY | Facility: HOSPITAL | Age: 65
Discharge: HOME | End: 2020-03-10
Attending: INTERNAL MEDICINE
Payer: MEDICARE

## 2020-03-10 VITALS
BODY MASS INDEX: 25 KG/M2 | HEART RATE: 80 BPM | HEIGHT: 71 IN | DIASTOLIC BLOOD PRESSURE: 58 MMHG | SYSTOLIC BLOOD PRESSURE: 100 MMHG | TEMPERATURE: 99 F | WEIGHT: 178.56 LBS

## 2020-03-10 VITALS
OXYGEN SATURATION: 95 % | HEIGHT: 71 IN | WEIGHT: 178.38 LBS | SYSTOLIC BLOOD PRESSURE: 114 MMHG | BODY MASS INDEX: 24.97 KG/M2 | TEMPERATURE: 99 F | HEART RATE: 91 BPM | DIASTOLIC BLOOD PRESSURE: 63 MMHG

## 2020-03-10 DIAGNOSIS — J84.10 PULMONARY FIBROSIS: Primary | ICD-10-CM

## 2020-03-10 DIAGNOSIS — Z01.818 PRE-TRANSPLANT EVALUATION FOR LUNG TRANSPLANT: ICD-10-CM

## 2020-03-10 DIAGNOSIS — M34.1 CREST (CALCINOSIS, RAYNAUD'S PHENOMENON, ESOPHAGEAL DYSFUNCTION, SCLERODACTYLY, TELANGIECTASIA): ICD-10-CM

## 2020-03-10 DIAGNOSIS — Z23 NEED FOR PROPHYLACTIC VACCINATION AGAINST HEPATITIS A AND HEPATITIS B: ICD-10-CM

## 2020-03-10 DIAGNOSIS — Z76.82 LUNG TRANSPLANT CANDIDATE: ICD-10-CM

## 2020-03-10 DIAGNOSIS — Z71.85 VACCINE COUNSELING: ICD-10-CM

## 2020-03-10 DIAGNOSIS — Z76.82 LUNG TRANSPLANT CANDIDATE: Primary | ICD-10-CM

## 2020-03-10 DIAGNOSIS — D84.9 IMMUNOCOMPROMISED: ICD-10-CM

## 2020-03-10 DIAGNOSIS — I27.20 PULMONARY HYPERTENSION: ICD-10-CM

## 2020-03-10 DIAGNOSIS — M35.02 SJOGREN'S SYNDROME WITH LUNG INVOLVEMENT: ICD-10-CM

## 2020-03-10 DIAGNOSIS — M34.1 CREST (CALCINOSIS, RAYNAUD'S PHENOMENON, ESOPHAGEAL DYSFUNCTION, SCLERODACTYLY, TELANGIECTASIA): Primary | ICD-10-CM

## 2020-03-10 DIAGNOSIS — M34.9 SCLERODERMA INVOLVING LUNG: ICD-10-CM

## 2020-03-10 DIAGNOSIS — J84.10 PULMONARY FIBROSIS: ICD-10-CM

## 2020-03-10 PROCEDURE — 25500020 PHARM REV CODE 255: Mod: TXP | Performed by: INTERNAL MEDICINE

## 2020-03-10 PROCEDURE — 97802 MEDICAL NUTRITION INDIV IN: CPT | Mod: PBBFAC,TXP | Performed by: DIETITIAN, REGISTERED

## 2020-03-10 PROCEDURE — 99999 PR PBB SHADOW E&M-EST. PATIENT-LVL III: CPT | Mod: PBBFAC,TXP,, | Performed by: INTERNAL MEDICINE

## 2020-03-10 PROCEDURE — 74220 X-RAY XM ESOPHAGUS 1CNTRST: CPT | Mod: 26,TXP,, | Performed by: RADIOLOGY

## 2020-03-10 PROCEDURE — 99999 PR PBB SHADOW E&M-EST. PATIENT-LVL III: ICD-10-PCS | Mod: PBBFAC,TXP,, | Performed by: DIETITIAN, REGISTERED

## 2020-03-10 PROCEDURE — 74220 FL ESOPHAGRAM COMPLETE: ICD-10-PCS | Mod: 26,TXP,, | Performed by: RADIOLOGY

## 2020-03-10 PROCEDURE — 99213 OFFICE O/P EST LOW 20 MIN: CPT | Mod: PBBFAC,25,27,TXP | Performed by: INTERNAL MEDICINE

## 2020-03-10 PROCEDURE — A9698 NON-RAD CONTRAST MATERIALNOC: HCPCS | Mod: TXP | Performed by: INTERNAL MEDICINE

## 2020-03-10 PROCEDURE — 99999 PR PBB SHADOW E&M-EST. PATIENT-LVL III: ICD-10-PCS | Mod: PBBFAC,TXP,, | Performed by: INTERNAL MEDICINE

## 2020-03-10 PROCEDURE — 74220 X-RAY XM ESOPHAGUS 1CNTRST: CPT | Mod: TC,TXP

## 2020-03-10 PROCEDURE — 99999 PR PBB SHADOW E&M-EST. PATIENT-LVL III: CPT | Mod: PBBFAC,TXP,, | Performed by: DIETITIAN, REGISTERED

## 2020-03-10 PROCEDURE — 99205 PR OFFICE/OUTPT VISIT, NEW, LEVL V, 60-74 MIN: ICD-10-PCS | Mod: S$PBB,TXP,, | Performed by: INTERNAL MEDICINE

## 2020-03-10 PROCEDURE — 99205 OFFICE O/P NEW HI 60 MIN: CPT | Mod: S$PBB,TXP,, | Performed by: INTERNAL MEDICINE

## 2020-03-10 PROCEDURE — 99213 OFFICE O/P EST LOW 20 MIN: CPT | Mod: PBBFAC,25,TXP | Performed by: DIETITIAN, REGISTERED

## 2020-03-10 PROCEDURE — 90739 HEPB VACC 2/4 DOSE ADULT IM: CPT | Mod: PBBFAC,TXP

## 2020-03-10 PROCEDURE — 90471 IMMUNIZATION ADMIN: CPT | Mod: PBBFAC,TXP

## 2020-03-10 RX ADMIN — BARIUM SULFATE 175 ML: 0.6 SUSPENSION ORAL at 09:03

## 2020-03-10 NOTE — PROGRESS NOTES
TRANSPLANT NUTRITIONAL ASSESSMENT    Referring Provider: Rodrick Garcia MD    Reason for Visit: Pre-lung transplant work-up    Age: 64 y.o.  Sex: female    Patient Active Problem List   Diagnosis    Pulmonary fibrosis    Raynaud disease    CREST (calcinosis, Raynaud's phenomenon, esophageal dysfunction, sclerodactyly, telangiectasia)    Chronic diastolic heart failure    Postoperative hypothyroidism    Mixed hyperlipidemia    Abnormal stress echocardiogram    Pulmonary hypertension    Sjogren's syndrome with lung involvement    Lung transplant candidate    Dyspnea on Exertion 2/2 Fluid overload    HLD (hyperlipidemia)    Hypothyroidism due to Hashimoto's thyroiditis    Chronic respiratory failure with hypoxia    Orthostatic hypotension    Interstitial lung disease    Varicose veins of both lower extremities    Fever    Drug induced fever    Chronic pulmonary heart disease    Scleroderma involving lung     Past Medical History:   Diagnosis Date    Anxiety     CHF (congestive heart failure)     Colitis     CREST syndrome     GERD (gastroesophageal reflux disease)     Hypercholesteremia     Hypertension     Interstitial lung disease     Osteopenia     Pulmonary fibrosis     Pulmonary hypertension     Raynaud disease     Respiratory distress     Sciatica     Scleroderma     Sjoegren syndrome      Lab Results   Component Value Date    GLU 85 03/09/2020    K 3.2 (L) 03/09/2020    PHOS 3.6 11/26/2017    MG 1.9 03/09/2020    CHOL 120 03/09/2020    HDL 43 03/09/2020    TRIG 94 03/09/2020    ALBUMIN 3.4 (L) 03/09/2020    HGBA1C 5.1 03/09/2020    CALCIUM 8.4 (L) 03/09/2020     Other Pertinent Labs: none    Current Outpatient Medications   Medication Sig    ADEMPAS 2.5 mg tablet Take 2.5 mg by mouth 3 (three) times daily.     albuterol (PROVENTIL) 2.5 mg /3 mL (0.083 %) nebulizer solution USE ONE VIAL in Nebulizer EVERY 6 HOURS AS NEEDED FOR SHORTNESS OF BREATH    ambrisentan  (LETAIRIS) 10 MG Tab Take 10 mg by mouth once daily.    amoxicillin-clavulanate 875-125mg (AUGMENTIN) 875-125 mg per tablet 1 tablet    aspirin (ECOTRIN) 81 MG EC tablet Take 81 mg by mouth every evening.    atorvastatin (LIPITOR) 10 MG tablet Take 10 mg by mouth once daily.     azelastine (ASTELIN) 137 mcg (0.1 %) nasal spray 2 sprays by Nasal route 2 (two) times daily as needed.     biotin 1,000 mcg Chew Take 1 capsule by mouth once daily.     busPIRone (BUSPAR) 10 MG tablet Take 10 mg by mouth 3 (three) times daily as needed (for anxiety).     carvedilol (COREG) 3.125 MG tablet TAKE ONE TABLET BY MOUTH TWICE DAILY WITH MEALS    cevimeline (EVOXAC) 30 mg capsule Take 30 mg by mouth 3 (three) times daily.    escitalopram oxalate (LEXAPRO) 10 MG tablet Take 10 mg by mouth once daily.    fluconazole (DIFLUCAN) 150 MG Tab 1 tablet today, then repeat in a week    fluticasone (FLONASE) 50 mcg/actuation nasal spray 1 spray by Each Nare route as needed.     furosemide (LASIX) 40 MG tablet Take 1 tablet (40 mg total) by mouth once daily.    gabapentin (NEURONTIN) 400 MG capsule Take 400 mg by mouth 3 (three) times daily as needed (for bulging discs in neck; has not taken in months).    guaiFENesin (MUCINEX) 600 mg 12 hr tablet Take 1,200 mg by mouth as needed for Congestion.    INCRUSE ELLIPTA 62.5 mcg/actuation DsDv Take 1 puff by mouth once daily.     levothyroxine (SYNTHROID) 137 MCG Tab tablet     multivitamin capsule Take 1 capsule by mouth once daily.    mycophenolate (CELLCEPT) 500 mg Tab Take 1,500 mg by mouth 2 (two) times daily.     nintedanib (OFEV) 150 mg Cap Take 150 mg by mouth 2 (two) times daily.    nystatin (MYCOSTATIN) 100,000 unit/mL suspension USE 5MLS SWISH AND SWALLOW FOUR TIMES A DAY MOUTH/THROAT 7 DAYS    pantoprazole (PROTONIX) 40 MG tablet Take 40 mg by mouth 2 (two) times daily.    potassium chloride (MICRO-K) 10 MEQ CpSR Take 2 capsules (20 mEq total) by mouth once daily.  "   risedronate (ACTONEL) 150 MG Tab Take 150 mg by mouth every 30 days.    teriparatide (FORTEO) 20 mcg/dose - 600 mcg/2.4 mL PnIj     VENTOLIN HFA 90 mcg/actuation inhaler 2 puffs every 4 (four) hours as needed.     VITAMIN D2 50,000 unit capsule Take 50,000 Units by mouth As instructed. Twice a month     No current facility-administered medications for this visit.      Allergies: Adhesive tape-silicones    Ht Readings from Last 1 Encounters:   03/10/20 5' 11" (1.803 m)     Wt Readings from Last 1 Encounters:   03/10/20 80.9 kg (178 lb 5.6 oz)      BMI: Body mass index is 24.88 kg/m².    Usual Weight: 175 lb  Weight Change/Time: weighed around 190 lb 5yrs ago, lost weight after stopping steroids, following low sodium   Current Diet: low sodium recommendations  Appetite/Current Intake: good   Exercise/Physical Activity: cooking, cleaning around the house with breaks for catching her breath, has exercise equipment at home/completed pulm rehab last year  Nutritional/Herbal Supplements: VIt D, multi vitamin  Potential Food/Medication Interactions: no grapefruit  Chewing/Swallowing Problems: none  Symptoms: none and bloating/reflux  Assessment of Lab Values: K 3.2, Alb 3.4, Ca 8.4  Support System: sister present and voices support in nutrition/diet, spouse is home supportive as well (recovering from knee replacement)    Estimated Kcal Need: 9762-7844 kcal (25-30 kcal/kg)  Estimated Protein Need: 81-97 gm (1-1.2 gm/kg)    Nutritional History:   Pt reports she has a good appetite, no n/v/d/abd pain. Pt reports bloating, pushes up on stomach and causes some indigestion. Pt is following low sodium for a while, several years. Pt has to take abx about 1x/year and during this time she has some GI distress. She is not using salt in any cookingPt provided the following diet recall:  B: 1% milk, 1 egg maybe, 2 toast, oatmeal, occasional frozen coffee drink  Snack: Peanut butter by itself or w/ crackers or bread  L: at work " was having lunch brought in several times /week from restaurants; meat/vegetables/starch, now retired (new) will keep low sodium foods in house; likes chicken salad, tuna      D: typically cooks at home; grilled pork chop/chicken/spaghetti/beef roast, vegetables are frozen/fresh - broccoli/cauliflower/spinach/green beans/mixed vegetables/peas/corn, salmon about 1 x/month, catfish or shrimp  Fruit: banana, apple, grapes, berries when in season  Snack: yogurt, low sodium crackers  Beverages: water, 1% milk  Nutritional Diagnoses  Problem: food- and nutrition-related knowledge deficit  Etiology: r/t no prior edu on protein importance /recommendations, need for reinforcement of low sodium recommendations  Symptoms: aeb diet recall    Educational Need? yes  Barriers: none identified  Discussed with: patient and sister  Interventions: Patient taught nutrition information regarding Pre-lung transplant work-up.    Reviewed Low Sodium packet (low Na diet, foods recommended/not recommended, food label strategies, flavoring tips, & sample menu).   Encouraged increase physical activity, short interval using exercise equipment, take breaks and try to be consistent with activity  Include protein serving at each snack, 2-3 servings at a meal  Goals/Recommendations: diet adherence and small frequent meals and snacks  Actions Taken: instruct/provide written information  Strategies Used: problem solving, goal setting, motivational interviewing  Patient and/or family comprehend instructions: yes , adherence expected  Outcome: Verbalizes understanding  Monitoring:     Contact information provided, will f/u in clinic and communicate with the care team as needed.     Counseling Time: 30 minutes

## 2020-03-10 NOTE — TELEPHONE ENCOUNTER
----- Message from Cesar Gonsalves sent at 6/25/2019 11:27 AM CDT -----  Contact: pt: [phone]  Needs Advice    Reason for call: pt states after taking medication she still feels bad would like to speak with nurse         Communication Preference: pt: [phone]    Additional Information: can leave VM      Physical Therapy Daily Treatment Note    Date:  3/10/2020    Patient Name:  Laisha Jauregui    :  1952  MRN: [id number]  Restrictions/Precautions:     Medical/Treatment Diagnosis Information:   · Diagnosis: M54.5 LBP. Also R hip pain  · Treatment Diagnosis: lumbar post derangement. M54.5 LBP  Insurance/Certification information:  PT Insurance Information: A[employer]na Medicare  Physician Information:  Referring Practitioner: India Lamary of care signed (Y/N): n    Visit# / total visits: 3/8   Pain level: 10     Time In: 9:34   Time Out: 10:15    Progress Note: []  Yes  [x]  No  Next due by: Visit #8, or 20      Subjective: Pt rpts to clinic with mild complaints of LBP with minimal radicular symptoms to R stating \"I was able to walk two miles the other day with minimal pain. I was used to doing 5 miles every day but the two miles left me sore\". Objective: Pt tolerated todays session well indicating no increase in pain post session and noting decreased complaints of pain post session. Pt was able to perform all MYRA per flow chart with vc required for proper performance and exhibits good form with all activity. Minimal complaints of back pain post session not requiring IFC application. Observation:   50% of full motion with PKF  Tenderness to palpation R side of sacrum.    Demos decreased heel strike toe off with gait, able to correct with VC      Test measurements:      Exercises:  Exercise/Equipment Resistance/Repetitions Other comments   Lay prone in R SG 5'    Prone on elbows 2' x2    Press up 10x x2    B PKF 10x x3    Hip ext in prone x10 ea    Modified extension 10x x2    Back Bend 10x x2    Prone hip IR/ER x10 ea    Hip ext, abd 10x      LTR 10x5\"    Lumbar roll      P/A mob     bridges 10x2    TM retro  5'     [x] Provided verbal/tactile cueing for activities related to strengthening, flexibility, endurance, ROM. (20638)  [] Provided verbal/tactile cueing for activities related to improving balance,

## 2020-03-10 NOTE — PROGRESS NOTES
3/9/20 - Informed per Kadie Roland RN, that the 24 hour urine collection has to be recollected since the container leaked.  Notified Ms. Cha that the 24 hour urine collection has to be repeated because the specimen leaked.  Provided her with another 24 hour urine container for collection.  24 hour urine instructions reviewed.  Label verified with patient for correctness.  Patient requested to turn the specimen in on Thursday, 3/12 prior to the Hepatology consult.  Informed patient that she will need to have labs drawn in order for the 24 hour urine to be calculated.     Notified Dr. Garcia of above.  24 hour urine for calculated creatinine clearance and total protein ordered per written order guidelines.  Also a creatinine was ordered for the 24 hour urine to be calculated since previous creatinine will be greater than 48 hours and unable to be used.

## 2020-03-10 NOTE — PROGRESS NOTES
Transplant Recipient Adult Psychosocial Assessment    Fatimah Vencesmani  [address] [place] [address]  Telephone Information:   Mobile [phone]   Home  [phone] (home)  Work  None  E-mail  [email or web address]    Sex: female  YOB: 1955  Age: 64 y.o.    Encounter Date: 3/10/2020  U[place] Citizen: yes  Primary Language: English   Needed: no    Emergency Contact:  Name: Bart Cha  Relationship: spouse  Address: SAME AS Pt  Phone Numbers:  [phone] (home)  [phone] (mobile)  Bart generally drives, but recently had a knee replacement    Family/Social Support:   Number of dependents/: 0 dependents - 1 cat in the home  Marital history: 45 years   Other family dynamics: Parents are ; mother 5 years ago in August, father 30 years ago. Pt had 1 brother die 11 years ago d/t cancer. Pt has 2 brothers & 2 sisters. They all live within 1 hour of Pt. Pt has 3 adult children (all live within 1.25 hours) ages 43, 39, 37.      Household Composition:  Name: Marques Davis  Age: 37  Relationship: son  Does person drive? yes    Name: Bart Cha  Age: 67  Relationship: spouse  Does person drive? yes - but not recently d/t recent knee surgery. He will be able to drive again soon.    Do you and your caregivers have access to reliable transportation? yes  PRIMARY CAREGIVER: Lois Simons (sister) will be primary caregiver, phone number [phone]     provided in-depth information to patient and caregiver regarding pre- and post-transplant caregiver role.   strongly encourages patient and caregiver to have concrete plan regarding post-transplant care giving, including back-up caregiver(s) to ensure care giving needs are met as needed.    Patient and Caregiver states understanding all aspects of caregiver role/commitment and is able/willing/committed to being caregiver to the fullest extent necessary.    Patient and Caregiver  verbalizes understanding of the education provided today and caregiver responsibilities.         remains available. Patient and Caregiver agree to contact  in a timely manner if concerns arise.      Able to take time off work without financial concerns: Not working.     Additional Significant Others who will Assist with Transplant:  Name: Bart Cha  Age: 67  City: [place] State: [place]  Relationship: spouse  Does person drive? yes   Doesn't Smoke    Name: Bebe Barroso  Age: 62  City: [place] State: [place]  Relationship: sister  Does person drive? yes   Doesn't smoke      Advance Care Planning     Living Will: no  Healthcare Power of : no  Advance Directives on file: <<no information> per medical record.  Verbally reviewed LW/HCPA information.   provided patient with copy of LW/HCPA documents and provided education on completion of forms.        Living Donors: No.    Highest Education Level: High School (9-12) or GED  Reading Ability: 12th grade  Reports difficulty with: seeing - Patient wears glasses & cataract surgery in Left eye. Pt sees very well out of this eye  Learns Best By:  Hands-on learning     Status: no  VA Benefits: no     Working for Income: No  If no, reason not working: Patient Choice - Retired  Patient is retired from medical billing for 25 years..    Spouse/Significant Other Employment: Bart is employed as a caterer (full time)    Disabled: no    Monthly Income:  Other: $4000 combined with Pts jail fund & spouse's income  Able to afford all costs now and if transplanted, including medications: yes  Patient and Caregiver verbalizes understanding of personal responsibilities related to transplant costs and the importance of having a financial plan to ensure that patients transplant costs are fully covered.      provided fundraising information/education.  Patient and Caregiver verbalizes understanding. Info sheet on how to set  [employer] bank account for donations provided.   remains available.    Insurance:   Payor/Plan Subscr  Sex Relation Sub. Ins. ID Effective Group Num   1. MEDICARE - ME* GIRISH BUSH* 1955 Female Self [id number]                                    [address]   2. MEDICARE - [place]* GIRISH BUSH* 1955 Female  [id number]                                    [address]     Primary Insurance (for UNOS reporting): Public Insurance - Medicare FFS (Fee For Service)  Secondary Insurance (for UNOS reporting): Public Insurance - Medicare FFS (Fee For Service) - Supplement [place]  Patient and Caregiver verbalizes clear understanding that patient may experience difficulty obtaining and/or be denied insurance coverage post-surgery. This includes and is not limited to disability insurance, life insurance, health insurance, burial insurance, long term care insurance, and other insurances.    Patient and Caregiver also reports understanding that future health concerns related to or unrelated to transplantation may not be covered by patient's insurance.  Resources and information provided and reviewed.      Patient and Caregiver provides verbal permission to release any necessary information to outside resources for patient care and discharge planning.  Resources and information provided are reviewed.      Dialysis Adherence: N/A    Infusion Service: patient utilizing? no  Home Health: patient utilizing? no  DME: yes - Oxygen [facility]  Pulmonary/Cardiac Rehab: [facility] - Patient stopped in July and attended for 6 months prior to this.    ADLS:  Independent & drives    Adherence:   Pt reports high level of adherence with medications, appts, and diet.  Adherence education and counseling provided.     Per History Section:  Past Medical History:   Diagnosis Date    Anxiety     CHF (congestive heart failure)     Colitis     CREST syndrome     GERD (gastroesophageal reflux disease)      Hypercholesteremia     Hypertension     Interstitial lung disease     Osteopenia     Pulmonary fibrosis     Pulmonary hypertension     Raynaud disease     Respiratory distress     Sciatica     Scleroderma     Sjoegren syndrome      Social History     Tobacco Use    Smoking status: Former Smoker     Packs/day: 1.00     Years: 5.00     Pack years: 5.00     Types: Cigarettes     Last attempt to quit: 3/30/1979     Years since quittin.9    Smokeless tobacco: Never Used   Substance Use Topics    Alcohol use: No     Social History     Substance and Sexual Activity   Drug Use No     Social History     Substance and Sexual Activity   Sexual Activity Not on file       Per Today's Psychosocial:  Tobacco: Pt quit 41 years ago.   Alcohol: none, patient denies any use.  Illicit Drugs/Non-prescribed Medications: none, patient denies any use.    Patient and Caregiver states clear understanding of the potential impact of substance use as it relates to transplant candidacy and is aware of possible random substance screening.  Substance abstinence/cessation counseling, education and resources provided and reviewed.     Arrests/DWI/Treatment/Rehab: patient denies    Psychiatric History:    Mental Health: anxiety - Pt reports her first panic attack in November during a doctors appt. Pt reports she occionally has concerns getting on elevators  Psychiatrist/Counselor: None  Medications:  Lexapro   Suicide/Homicide Issues: Pt denies   Safety at home: Pt reports feeling safe    Knowledge: Patient and Caregiver states having clear understanding and realistic expectations regarding the potential risks and potential benefits of organ transplantation and organ donation, agrees to discuss with health care team members and support system members and to utilize available resources and express questions and/or concerns in order to further facilitate the pt informed decision-making.  Resources and information provided and  reviewed.     Patient and Caregiver is aware of [facility]'s affiliation and/or partnership with agencies in home health care, LTAC, SNF, [facility], and other hospitals and clinics.    Understanding: Patient and Caregiver reports having a clear understanding of the many lifetime commitments involved with being a transplant recipient, including costs, compliance, medications, lab work, procedures, appointments, concrete and financial planning, preparedness, timely and appropriate communication of concerns, abstinence (ETOH, tobacco, illicit non-prescribed drugs), adherence to all health care team recommendations, support system and caregiver involvement, appropriate and timely resource utilization and follow-through, mental health counseling as needed/recommended, and patient and caregiver responsibilities.  Social Service Handbook, resources and detailed educational information provided and reviewed.  Educational information provided.    Patient and Caregiver also reports current and expected compliance with health care regime and states having a clear understanding of the importance of compliance.      Patient and Caregiver reports a clear understanding that risks and benefits may be involved with organ transplantation and with organ donation.      Patient and Caregiver also reports clear understanding that psychosocial risk factors may affect patient, and include but are not limited to feelings of depression, generalized anxiety, anxiety regarding dependence on others, post traumatic stress disorder, feelings of guilt and other emotional and/or mental concerns, and/or exacerbation of existing mental health concerns.  Detailed resources provided and discussed.     Patient and Caregiver agrees to access appropriate resources in a timely manner as needed and/or as recommended, and to communicate concerns appropriately.  Patient and Caregiver also reports a clear understanding of treatment options available.       reviewed education, provided additional information, and answered questions.    Feelings or Concerns: Patient reports commitment to her health & desire to be listed for transplant    Coping: Identify Patient & Caregiver Strategies to [place]:   1. Currently & Pre-transplant - Deep breathing exercises & family support   2. At the time of surgery - Family support & determination   3. During post-Transplant & Recovery Period - Family support    Goals:  Patient would like to be able to breathe without the use of oxygen tank/concentrator. Pt has 5 grandchildren who she would like to see continue to grow.      Interview Behavior: Patient and Caregiver Lois Simons presents as alert and oriented x 4, pleasant, good eye contact, well groomed, recall good, concentration/judgement good, calm, communicative, cooperative and asking and answering questions appropriately.          Transplant Social Work - Candidacy  Assessment/Plan:     Psychosocial Suitability: Patient presents as a suitable candidate for transplant at this time. Based on psychosocial risk factors, patient presents as low risk, due to good support system, adequate finances, good adherence to medical regimen.    Recommendations/Additional Comments: Discussed fundraising with patient - recommend she work towards fundraising her OOP/deductible.     Marilia Marshall LCSW

## 2020-03-10 NOTE — PROGRESS NOTES
received Hepatitis A(first dose) and Heplisav-B(first dose) vaccine. Tolerated well. Left unit in NAD.

## 2020-03-10 NOTE — LETTER
March 10, 2020      Rodrick Garcia MD  [address]  [facility] [address]           [facility]  [address]  [place] [address]  Phone: [phone]  Fax: [phone]          Patient: Fatimah Cha   MR Number: [id number]   YOB: 1955   Date of Visit: 3/10/2020       Dear Dr. Rodrick Garcia:    Thank you for referring Fatimah Cha to me for evaluation. Attached you will find relevant portions of my assessment and plan of care.    If you have questions, please do not hesitate to call me. I look forward to following Fatimah Cha along with you.    Sincerely,    Suzanna Avila MD    Enclosure  CC:  No Recipients    If you would like to receive this communication electronically, please contact [email or web address] or [phone] to request more information on [employer] access.    For providers and/or their staff who would like to refer a patient to [facility], please contact us through our one-stop-shop provider referral line, [facility], at [phone].    If you feel you have received this communication in error or would no longer like to receive these types of communications, please e-mail [email or web address]

## 2020-03-10 NOTE — PROGRESS NOTES
Pre Transplant Infectious Diseases Consult  Lung Transplant Recipient Evaluation    Requesting Physician: Jose    Reason for Visit:  Pre-transplant evaluation    History of Present Illness  64 y.o. female with history of idiopathic pulmonary fibrosis, pHTN, CREST syndrome currently being evaluated for Lung transplant.      Patient currently on riociguat, ambrisentan for pHTN.  On nintedanib for IPF.  On MMF.    Patient denies any recent fever, chills, or infective illnesses.    1) Do you have a history of:         YES NO   Autoimmune disease  [x]        []   Cancer              []        [x]   Surgical Removal of Spleen []        [x]       2) Have you had any serious infections in the last year?   History of recurrent sinus infection, most recently one month ago  Reports 4 episodes of sinusitis in the last year  Denied history of hospitalization for infection      No history of fungal or MDRO infection        3)Have you ever had: YES     NO       Chicken Pox   [x]         []          Shingles   [x]         []              4) Tuberculosis             YES NO  Exposure to person with active TB?  []         [x]     5) Travel    What states have you lived in for more than a month?  [place]    What countries have you visited?      [place], [place]                             YES     NO  Did you have any associated infections?   []         [x]     6) Animal Exposure                  YES NO  Do you have pets living in your house?   [x]         []   If yes, describe: Cat   and son clean litter box    Do you spend time or live on a farm?    []         [x]   If yes, which ones:    Do you fish or hunt?      []         [x]     Consume raw or undercooked meat, fish, shellfish?  []         [x]       7) What occupations have you had?  N/A        8) Hobbies                   YES     NO  Do you garden or otherwise work in the soil?   []         [x]   Do you hike, camp, or spend time in wooded areas?  []         [x]        9) The patient's immunization history was reviewed.     Have you ever received:  YES NO DATES  Routine Childhood vaccines  [x]         []       Influenza vaccine this year  [x]         []   2019  Prevnar    [x]         []     Pneumovax    [x]         []     Tetanus-diptheria -pertussis  [x]         []     Hepatitis A vaccine series       []         [x]     Hepatitis B vaccine series         []         [x]      Shingles vaccine   [x]         []    2018           Review of Systems   Constitution: Positive for chills and malaise/fatigue. Negative for decreased appetite, fever, night sweats, weight gain and weight loss.   HENT: Positive for hoarse voice. Negative for congestion, ear pain, hearing loss, sore throat and tinnitus.    Eyes: Negative for blurred vision, redness and visual disturbance.   Cardiovascular: Positive for leg swelling and palpitations. Negative for chest pain.   Respiratory: Positive for cough, shortness of breath and wheezing. Negative for hemoptysis and sputum production.    Hematologic/Lymphatic: Negative for adenopathy. Does not bruise/bleed easily.   Skin: Negative for dry skin, itching, rash and suspicious lesions.   Musculoskeletal: Positive for neck pain. Negative for back pain, joint pain and myalgias.   Gastrointestinal: Positive for heartburn. Negative for abdominal pain, constipation, diarrhea, nausea and vomiting.   Genitourinary: Negative for dysuria, flank pain, frequency, hematuria, hesitancy and urgency.   Neurological: Positive for numbness. Negative for dizziness, headaches, paresthesias and weakness.   Psychiatric/Behavioral: Negative for depression and memory loss. The patient has insomnia. The patient is not nervous/anxious.      Objective:   Physical Exam   Constitutional: She is oriented to person, place, and time. She appears well-developed and well-nourished. No distress.   On nasal cannula   HENT:   Head: Normocephalic and atraumatic.   Eyes: Conjunctivae and EOM  are normal.   Neck: Normal range of motion. Neck supple.   Pulmonary/Chest: Effort normal. No respiratory distress.   Abdominal: Soft. She exhibits no distension.   Musculoskeletal: Normal range of motion. She exhibits no edema.   Neurological: She is alert and oriented to person, place, and time.   Skin: Skin is warm and dry. No rash noted. She is not diaphoretic. No erythema.   Wearing gloves for raynauds   Psychiatric: She has a normal mood and affect. Her behavior is normal.   Vitals reviewed.       Significant Labs Reviewed:    HepA Ab neg  HepBc Ab neg  HepBs Ag neg  HepBs Ab neg    HepC Ab neg    HIV neg  RPR neg    Pending Labs:  Quant gold  Strongy     Assessment   64 y.o. female  - Idiopathic pulmonary fibrosis  - pHTN  - CREST syndrome on MMF  - Organ transplant candidate  - Vaccine counseling    Plan:     Transplant Infectious Disease - Candidacy:   Based on available information, there are no identified significant barriers to transplantation from an infectious disease standpoint pending acceptable serologies.    - Quant gold   - Strongy   Final determination of transplant candidacy will be made once evaluation is complete and reviewed by the Transplant Selection Committee.      Counseling:  I discussed with the patient the risk for increased susceptibility to infections following transplantation including increased risk for infection right after transplant and if rejection should occur.    Specific guidance has been provided to the patient regarding the patients occupation, hobbies and activities to avoid future infectious complications including but not limited to avoiding undercooked meats and seafood, proper hygiene, and contact with animals.  The patients has been counseled on the importance of vaccinations including but not limited to a yearly flu vaccine.    Immunizations:  Based on the patients immunization history and serologies, immunizations were ordered:  - Hepatitis A 0, 6 months  -  Heplisav (Hepatitis B) 0, 1 months           The patient was encouraged to contact us about any problems that may develop after immunization and possible side effects were reviewed.     Suzanna Avila MD MPH  Infectious Diseases [facility]

## 2020-03-11 ENCOUNTER — TELEPHONE (OUTPATIENT)
Dept: HEPATOLOGY | Facility: CLINIC | Age: 65
End: 2020-03-11

## 2020-03-11 NOTE — TELEPHONE ENCOUNTER
MA spoke to the pt. I let her know that Dr. Dc has a meeting that came up for 3, so I asked if we can reschedule her for 1pm. She said that works just fine.

## 2020-03-12 ENCOUNTER — LAB VISIT (OUTPATIENT)
Dept: LAB | Facility: HOSPITAL | Age: 65
End: 2020-03-12
Attending: INTERNAL MEDICINE
Payer: MEDICARE

## 2020-03-12 ENCOUNTER — OFFICE VISIT (OUTPATIENT)
Dept: HEPATOLOGY | Facility: CLINIC | Age: 65
End: 2020-03-12
Attending: INTERNAL MEDICINE
Payer: MEDICARE

## 2020-03-12 VITALS
RESPIRATION RATE: 16 BRPM | BODY MASS INDEX: 24.5 KG/M2 | WEIGHT: 175 LBS | HEIGHT: 71 IN | OXYGEN SATURATION: 96 % | DIASTOLIC BLOOD PRESSURE: 52 MMHG | SYSTOLIC BLOOD PRESSURE: 90 MMHG | HEART RATE: 72 BPM

## 2020-03-12 DIAGNOSIS — E78.2 MIXED HYPERLIPIDEMIA: ICD-10-CM

## 2020-03-12 DIAGNOSIS — R16.0 HEPATOMEGALY: ICD-10-CM

## 2020-03-12 DIAGNOSIS — Z76.82 LUNG TRANSPLANT CANDIDATE: ICD-10-CM

## 2020-03-12 DIAGNOSIS — M35.02 SJOGREN'S SYNDROME WITH LUNG INVOLVEMENT: ICD-10-CM

## 2020-03-12 DIAGNOSIS — E78.5 HYPERLIPIDEMIA, UNSPECIFIED HYPERLIPIDEMIA TYPE: ICD-10-CM

## 2020-03-12 DIAGNOSIS — M34.1 CREST (CALCINOSIS, RAYNAUD'S PHENOMENON, ESOPHAGEAL DYSFUNCTION, SCLERODACTYLY, TELANGIECTASIA): Primary | ICD-10-CM

## 2020-03-12 DIAGNOSIS — J84.10 PULMONARY FIBROSIS: ICD-10-CM

## 2020-03-12 DIAGNOSIS — I27.20 PULMONARY HYPERTENSION: ICD-10-CM

## 2020-03-12 DIAGNOSIS — J84.9 INTERSTITIAL LUNG DISEASE: ICD-10-CM

## 2020-03-12 LAB
CREAT SERPL-MCNC: 0.7 MG/DL (ref 0.5–1.4)
EST. GFR  (AFRICAN AMERICAN): >60 ML/MIN/1.73 M^2
EST. GFR  (NON AFRICAN AMERICAN): >60 ML/MIN/1.73 M^2

## 2020-03-12 PROCEDURE — 99204 PR OFFICE/OUTPT VISIT, NEW, LEVL IV, 45-59 MIN: ICD-10-PCS | Mod: S$PBB,TXP,, | Performed by: INTERNAL MEDICINE

## 2020-03-12 PROCEDURE — 82565 ASSAY OF CREATININE: CPT | Mod: TXP

## 2020-03-12 PROCEDURE — 99999 PR PBB SHADOW E&M-EST. PATIENT-LVL III: CPT | Mod: PBBFAC,TXP,, | Performed by: INTERNAL MEDICINE

## 2020-03-12 PROCEDURE — 99999 PR PBB SHADOW E&M-EST. PATIENT-LVL III: ICD-10-PCS | Mod: PBBFAC,TXP,, | Performed by: INTERNAL MEDICINE

## 2020-03-12 PROCEDURE — 36415 COLL VENOUS BLD VENIPUNCTURE: CPT | Mod: TXP

## 2020-03-12 PROCEDURE — 99213 OFFICE O/P EST LOW 20 MIN: CPT | Mod: PBBFAC,TXP | Performed by: INTERNAL MEDICINE

## 2020-03-12 PROCEDURE — 91200 PR LIVER ELASTOGRAPHY W/OUT IMAG W/INTERP & REPORT: ICD-10-PCS | Mod: 26,S$PBB,TXP, | Performed by: INTERNAL MEDICINE

## 2020-03-12 PROCEDURE — 91200 LIVER ELASTOGRAPHY: CPT | Mod: PBBFAC,TXP | Performed by: INTERNAL MEDICINE

## 2020-03-12 PROCEDURE — 99204 OFFICE O/P NEW MOD 45 MIN: CPT | Mod: S$PBB,TXP,, | Performed by: INTERNAL MEDICINE

## 2020-03-12 PROCEDURE — 91200 LIVER ELASTOGRAPHY: CPT | Mod: 26,S$PBB,TXP, | Performed by: INTERNAL MEDICINE

## 2020-03-12 NOTE — PROCEDURES
Procedures   Fibroscan Procedure     Name: Fatimah VILLATORO Capdeboscq  Date of Procedure : 2020   :: Justyn Dc MD  Diagnosis: CRYPTOGENIC    Probe: M    Fibroscan readin.1 KPa    Fibrosis:F3 (<F3 fibrosis    CAP readin dB/m    Steatosis: :<S1

## 2020-03-12 NOTE — PROGRESS NOTES
HEPATOLOGY CONSULTATION    Referring Physician: Dr. ISAURA Garcia  Current Corresponding Physician: Dr. ISAURA Garcia    Reason for Consultation: Consultation for evaluation of Abnormal Abdominal/Liver Imaging    History of Present Illness: Fatimah Cha is a 64 y.o. femalewho presents for evaluation of   Chief Complaint   Patient presents with    Abnormal Abdominal/Liver Imaging    This 64-year-old woman presents for an evaluation of an enlarged liver.  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  She has scleroderma pulmonary fibrosis and pulmonary hypertension.  She has about being evaluated for a lung transplant.  In the course of her evaluation she had an ultrasound which showed hepatomegaly but no features of portal hypertension.  Her liver biochemistries normal.  There is no history of chronic liver disease.  She has no symptoms of hepatic decompensation.    Past Medical History:   Diagnosis Date    Anxiety     CHF (congestive heart failure)     Colitis     CREST syndrome     GERD (gastroesophageal reflux disease)     Hypercholesteremia     Hypertension     Interstitial lung disease     Osteopenia     Pulmonary fibrosis     Pulmonary hypertension     Raynaud disease     Respiratory distress     Sciatica     Scleroderma     Sjoegren syndrome      Outpatient Encounter Medications as of 3/12/2020   Medication Sig Dispense Refill    ADEMPAS 2.5 mg tablet Take 2.5 mg by mouth 3 (three) times daily.       albuterol (PROVENTIL) 2.5 mg /3 mL (0.083 %) nebulizer solution USE ONE VIAL in Nebulizer EVERY 6 HOURS AS NEEDED FOR SHORTNESS OF BREATH  6    ambrisentan (LETAIRIS) 10 MG Tab Take 10 mg by mouth once daily.      amoxicillin-clavulanate 875-125mg (AUGMENTIN) 875-125 mg per tablet 1 tablet      aspirin (ECOTRIN) 81 MG EC tablet Take 81 mg by mouth every evening.      atorvastatin (LIPITOR) 10 MG tablet Take 10 mg by mouth once daily.        azelastine (ASTELIN) 137 mcg (0.1 %) nasal spray 2 sprays by Nasal route 2 (two) times daily as needed.       biotin 1,000 mcg Chew Take 1 capsule by mouth once daily.       busPIRone (BUSPAR) 10 MG tablet Take 10 mg by mouth 3 (three) times daily as needed (for anxiety).       carvedilol (COREG) 3.125 MG tablet TAKE ONE TABLET BY MOUTH TWICE DAILY WITH MEALS 180 tablet 6    cevimeline (EVOXAC) 30 mg capsule Take 30 mg by mouth 3 (three) times daily.      escitalopram oxalate (LEXAPRO) 10 MG tablet Take 10 mg by mouth once daily.      fluconazole (DIFLUCAN) 150 MG Tab 1 tablet today, then repeat in a week      fluticasone (FLONASE) 50 mcg/actuation nasal spray 1 spray by Each Nare route as needed.   3    furosemide (LASIX) 40 MG tablet Take 1 tablet (40 mg total) by mouth once daily. 30 tablet 12    gabapentin (NEURONTIN) 400 MG capsule Take 400 mg by mouth 3 (three) times daily as needed (for bulging discs in neck; has not taken in months).      guaiFENesin (MUCINEX) 600 mg 12 hr tablet Take 1,200 mg by mouth as needed for Congestion.      INCRUSE ELLIPTA 62.5 mcg/actuation DsDv Take 1 puff by mouth once daily.   3    levothyroxine (SYNTHROID) 137 MCG Tab tablet       multivitamin capsule Take 1 capsule by mouth once daily.      mycophenolate (CELLCEPT) 500 mg Tab Take 1,500 mg by mouth 2 (two) times daily.       nintedanib (OFEV) 150 mg Cap Take 150 mg by mouth 2 (two) times daily. 60 capsule 3    nystatin (MYCOSTATIN) 100,000 unit/mL suspension USE 5MLS SWISH AND SWALLOW FOUR TIMES A DAY MOUTH/THROAT 7 DAYS      pantoprazole (PROTONIX) 40 MG tablet Take 40 mg by mouth 2 (two) times daily.      potassium chloride (MICRO-K) 10 MEQ CpSR Take 2 capsules (20 mEq total) by mouth once daily. 60 capsule 11    risedronate (ACTONEL) 150 MG Tab Take 150 mg by mouth every 30 days.  6    teriparatide (FORTEO) 20 mcg/dose - 600 mcg/2.4 mL PnIj       VENTOLIN HFA 90 mcg/actuation inhaler 2 puffs every  4 (four) hours as needed.       VITAMIN D2 50,000 unit capsule Take 50,000 Units by mouth As instructed. Twice a month       No facility-administered encounter medications on file as of 3/12/2020.      Review of patient's allergies indicates:   Allergen Reactions    Adhesive tape-silicones      Family History   Problem Relation Age of Onset    Thyroid cancer Mother     Heart failure Father     Emphysema Sister     Thyroid disease Sister     Deep vein thrombosis Brother     Lung cancer Brother     Colon cancer Maternal Uncle         60s    Cancer Maternal Aunt     Pulmonary embolism Daughter     Breast cancer Neg Hx     Ovarian cancer Neg Hx     Celiac disease Neg Hx     Cirrhosis Neg Hx     Colon polyps Neg Hx     Crohn's disease Neg Hx     Cystic fibrosis Neg Hx     Esophageal cancer Neg Hx     Hemochromatosis Neg Hx     Inflammatory bowel disease Neg Hx     Irritable bowel syndrome Neg Hx     Liver cancer Neg Hx     Liver disease Neg Hx     Rectal cancer Neg Hx     Stomach cancer Neg Hx     Ulcerative colitis Neg Hx     Phoenix's disease Neg Hx     Lymphoma Neg Hx     Tuberculosis Neg Hx     Rheum arthritis Neg Hx     Scleroderma Neg Hx     Melanoma Neg Hx     Lupus Neg Hx     Multiple sclerosis Neg Hx     Psoriasis Neg Hx     Skin cancer Neg Hx        Social History     Socioeconomic History    Marital status:      Spouse name: Not on file    Number of children: Not on file    Years of education: Not on file    Highest education level: Not on file   Occupational History    Not on file   Social Needs    Financial resource strain: Not on file    Food insecurity:     Worry: Not on file     Inability: Not on file    Transportation needs:     Medical: Not on file     Non-medical: Not on file   Tobacco Use    Smoking status: Former Smoker     Packs/day: 1.00     Years: 5.00     Pack years: 5.00     Types: Cigarettes     Last attempt to quit: 3/30/1979     Years since  quittin.9    Smokeless tobacco: Never Used   Substance and Sexual Activity    Alcohol use: No    Drug use: No    Sexual activity: Not on file   Lifestyle    Physical activity:     Days per week: Not on file     Minutes per session: Not on file    Stress: Not on file   Relationships    Social connections:     Talks on phone: Not on file     Gets together: Not on file     Attends Advent service: Not on file     Active member of club or organization: Not on file     Attends meetings of clubs or organizations: Not on file     Relationship status: Not on file   Other Topics Concern    Not on file   Social History Narrative    Not on file     Review of Systems   Constitutional: Negative for appetite change, fatigue and unexpected weight change.   HENT: Negative for ear pain, hearing loss, sore throat and trouble swallowing.    Eyes: Negative for visual disturbance.   Respiratory: Positive for shortness of breath. Negative for cough.    Cardiovascular: Negative for chest pain and palpitations.   Gastrointestinal: Negative for abdominal pain, nausea and vomiting.   Genitourinary: Negative for difficulty urinating and dysuria.   Musculoskeletal: Negative for arthralgias and back pain.   Skin: Negative for rash.   Neurological: Negative for tremors, seizures and headaches.   Psychiatric/Behavioral: Negative for agitation and decreased concentration.     Vitals:    20 1251   BP: (!) 90/52   Pulse: 72   Resp: 16       Physical Exam   Constitutional: She is oriented to person, place, and time. She appears well-developed and well-nourished.   HENT:   Right Ear: External ear normal.   Left Ear: External ear normal.   Mouth/Throat: Oropharynx is clear and moist.   Eyes: No scleral icterus.   Cardiovascular: Normal rate, regular rhythm and normal heart sounds. Exam reveals no gallop and no friction rub.   No murmur heard.  Pulmonary/Chest: No respiratory distress. She has wheezes.   On O2 by NP   Abdominal:  Soft. Bowel sounds are normal. She exhibits no distension and no mass. There is no tenderness.   Musculoskeletal: Normal range of motion. She exhibits no edema.   Lymphadenopathy:     She has no cervical adenopathy.   Neurological: She is alert and oriented to person, place, and time. She has normal strength.   Skin: Skin is warm, dry and intact. She is not diaphoretic. No pallor.       MELD-Na score: 6 at 3/9/2020  9:30 AM  MELD score: 6 at 3/9/2020  9:30 AM  Calculated from:  Serum Creatinine: 0.7 mg/dL (Rounded to 1 mg/dL) at 3/9/2020  9:30 AM  Serum Sodium: 138 mmol/L (Rounded to 137 mmol/L) at 3/9/2020  9:30 AM  Total Bilirubin: 0.7 mg/dL (Rounded to 1 mg/dL) at 3/9/2020  9:30 AM  INR(ratio): 0.9 (Rounded to 1) at 3/9/2020  9:30 AM  Age: 64 years    Lab Results   Component Value Date    GLU 85 03/09/2020    BUN 9 03/09/2020    CREATININE 0.7 03/09/2020    CALCIUM 8.4 (L) 03/09/2020     03/09/2020    K 3.2 (L) 03/09/2020     03/09/2020    PROT 6.7 03/09/2020    CO2 22 (L) 03/09/2020    ANIONGAP 10 03/09/2020    WBC 7.58 03/09/2020    RBC 4.22 03/09/2020    HGB 10.5 (L) 03/09/2020    HCT 36.3 (L) 03/09/2020    MCV 86 03/09/2020    MCH 24.9 (L) 03/09/2020    MCHC 28.9 (L) 03/09/2020     Lab Results   Component Value Date    RDW 15.9 (H) 03/09/2020     03/09/2020    MPV 9.2 03/09/2020    GRAN 5.7 03/09/2020    GRAN 75.0 (H) 03/09/2020    LYMPH 0.7 (L) 03/09/2020    LYMPH 8.6 (L) 03/09/2020    MONO 0.6 03/09/2020    MONO 8.3 03/09/2020    EOSINOPHIL 6.3 03/09/2020    BASOPHIL 1.5 03/09/2020    EOS 0.5 03/09/2020    BASO 0.11 03/09/2020    APTT 26.7 03/09/2020    GROUPTRH A NEG 03/10/2020     (H) 03/09/2020    CHOL 120 03/09/2020    TRIG 94 03/09/2020    HDL 43 03/09/2020    CHOLHDL 35.8 03/09/2020    TOTALCHOLEST 2.8 03/09/2020    ALBUMIN 3.4 (L) 03/09/2020    AST 18 03/09/2020    ALT 8 (L) 03/09/2020    ALKPHOS 83 03/09/2020    MG 1.9 03/09/2020    LABPROT 9.9 03/09/2020    INR 0.9  03/09/2020       Assessment and Plan:  Patient Active Problem List   Diagnosis    Pulmonary fibrosis    Raynaud disease    CREST (calcinosis, Raynaud's phenomenon, esophageal dysfunction, sclerodactyly, telangiectasia)    Chronic diastolic heart failure    Postoperative hypothyroidism    Mixed hyperlipidemia    Abnormal stress echocardiogram    Pulmonary hypertension    Sjogren's syndrome with lung involvement    Lung transplant candidate    Dyspnea on Exertion 2/2 Fluid overload    HLD (hyperlipidemia)    Hypothyroidism due to Hashimoto's thyroiditis    Chronic respiratory failure with hypoxia    Orthostatic hypotension    Interstitial lung disease    Varicose veins of both lower extremities    Fever    Drug induced fever    Chronic pulmonary heart disease    Scleroderma involving lung     Fatimah Cha is a 64 y.o. female withAbnormal Abdominal/Liver Imaging    Impression:  Hepatomegaly likely related to hepatic congestion, secondary to pulmonary hypertension    Plan:  Fibroscan today suggests <F3/4 fibrosis. In the setting of hepatic congestion, I believe this to be reassuring.  Given normal spleen size, normal platelet count, no further evaluation required.

## 2020-03-12 NOTE — LETTER
March 12, 2020      Rodrick Garcia MD  [address]  [facility] [address]           [facility]  [address]  [facility] [address]  Phone: [phone]  Fax: [phone]          Patient: Fatimah Cha   MR Number: [id number]   YOB: 1955   Date of Visit: 3/12/2020       Dear Dr. Rodrick Garcia:    Thank you for referring Fatimah Cha to me for evaluation. Attached you will find relevant portions of my assessment and plan of care.    If you have questions, please do not hesitate to call me. I look forward to following Fatimah Cha along with you.    Sincerely,    Justyn Dc MD    Enclosure  CC:  No Recipients    If you would like to receive this communication electronically, please contact [email or web address] or [phone] to request more information on [employer] Link access.    For providers and/or their staff who would like to refer a patient to [facility], please contact us through our one-stop-shop provider referral line, [facility], at [phone].    If you feel you have received this communication in error or would no longer like to receive these types of communications, please e-mail [email or web address]

## 2020-03-13 ENCOUNTER — TELEPHONE (OUTPATIENT)
Dept: TRANSPLANT | Facility: CLINIC | Age: 65
End: 2020-03-13

## 2020-03-13 NOTE — TELEPHONE ENCOUNTER
----- Message from Rodrick Garcia MD sent at 3/10/2020  6:03 PM CDT -----  Needs to see Jack    Sent a message to Dr. Garcia informing him that patient has an appointment scheduled with Diane Rice NP in GI, on 3/17/20.  Informed him that I would send a message to Diane to see if she works directly with Dr. Santiago.

## 2020-03-13 NOTE — TELEPHONE ENCOUNTER
----- Message from Diane Rice NP sent at 3/12/2020  7:25 AM CDT -----  I do not work with Dr. Santiago directly. Madhavi is the only NP that does but her last day is this Friday.    Thanks  Diane Rice NP  ----- Message -----  From: Dori Velazquez RN  Sent: 3/11/2020   2:38 PM CDT  To: Diane Rice NP    You are scheduled to see Ms. Cha on 3/17/20.  She is currently being evaluated for lung transplant and had an abnormal esophagram yesterday.  Dr. Garcia requested that she see Dr. Santiago for further evaluation.  In the recent past, our patients undergoing lung transplant evaluations have been seen by either Madhavi Lutz or Dr. Santiago.  However, Madhavi usually sees them due to her availability.  Additional testing is usually ordered to further evaluate their esophagus to determine if they are transplant candidates or not (possibly manometry, EGD, impedance study).  Since she already has a scheduled appointment with you next week, I didn't want to reschedule.  Do you work with Dr. Santiago directly, or should we reschedule the appointment?    Dori

## 2020-03-13 NOTE — TELEPHONE ENCOUNTER
3/12/13 - Notified Dr. Garcia of below message from Diane Rice NP.  Instructed to cancel the appointment on 3/17/20 with Diane Rice and schedule an appointment with Dr. Santiago.     3/13/13 -  contacted GI to schedule a follow-up appointment with Dr. Santiago.  Contacted Ms. Cha.  Notified her of the esophagram results and Dr. Garcia's instructions to cancel the GI appointment with Diane Rice on 3/17/20 and to schedule an appointment with Dr. Santiago instead.  Informed patient that a message was left for GI to schedule as soon as possible since the next availability for Dr. Santiago is not until June / July.  She verbalized her understanding.  She stated that she would keep the appointment on Tuesday since she is having issues with bloating.  She requested that we also schedule the appointment with Jack.  Informed her that she will be contacted with the date and time of the appointment once it is scheduled.    ----- Message from Diane Rice NP sent at 3/12/2020  7:25 AM CDT -----  I do not work with Dr. Santiago directly. Madhavi is the only NP that does but her last day is this Friday.    Thanks  Diane Rice NP  ----- Message -----  From: Dori Velazquez RN  Sent: 3/11/2020   2:38 PM CDT  To: Diane Rice NP    You are scheduled to see Ms. Cha on 3/17/20.  She is currently being evaluated for lung transplant and had an abnormal esophagram yesterday.  Dr. Garcia requested that she see Dr. Santiago for further evaluation.  In the recent past, our patients undergoing lung transplant evaluations have been seen by either Madhavi Lutz or Dr. Santiago.  However, Madhavi usually sees them due to her availability.  Additional testing is usually ordered to further evaluate their esophagus to determine if they are transplant candidates or not (possibly manometry, EGD, impedance study).  Since she already has a scheduled appointment with you next week, I didn't want to reschedule.  Do you work  with Dr. Santiago directly, or should we reschedule the appointment?    Dori

## 2020-03-15 ENCOUNTER — TELEPHONE (OUTPATIENT)
Dept: CARDIOLOGY | Facility: CLINIC | Age: 65
End: 2020-03-15

## 2020-03-15 NOTE — TELEPHONE ENCOUNTER
Left patient message on all numbers that elective angiogram is being cancelled tomorrow due to COVID-19 concerns.  Will reschedule as soon as possible.

## 2020-03-16 ENCOUNTER — TELEPHONE (OUTPATIENT)
Dept: TRANSPLANT | Facility: CLINIC | Age: 65
End: 2020-03-16

## 2020-03-16 DIAGNOSIS — I27.20 PULMONARY HYPERTENSION: ICD-10-CM

## 2020-03-16 DIAGNOSIS — R94.4 DECREASED CREATININE CLEARANCE: ICD-10-CM

## 2020-03-16 DIAGNOSIS — Z76.82 LUNG TRANSPLANT CANDIDATE: ICD-10-CM

## 2020-03-16 DIAGNOSIS — M35.02 SJOGREN'S SYNDROME WITH LUNG INVOLVEMENT: ICD-10-CM

## 2020-03-16 DIAGNOSIS — J84.10 PULMONARY FIBROSIS: Primary | ICD-10-CM

## 2020-03-16 NOTE — TELEPHONE ENCOUNTER
----- Message from Rodrick Garcia MD sent at 3/13/2020  7:06 PM CDT -----  Needs to be repeated      Contacted Ms. Cha and notified her of Dr. Garcia's instructions for a repeat 24 hour urine for calculated creatinine clearance and total protein.  Informed her that we will mail another urine container to her in the mail since her appointments for this week have been canceled.  Instructed her to contact me once her canceled appointments are rescheduled, so we can schedule labs and the specimen drop off.  She verbalized her understanding of all discussed.

## 2020-03-24 ENCOUNTER — CLINICAL SUPPORT (OUTPATIENT)
Dept: PALLIATIVE MEDICINE | Facility: CLINIC | Age: 65
End: 2020-03-24
Payer: MEDICARE

## 2020-03-24 DIAGNOSIS — Z51.5 PALLIATIVE CARE ENCOUNTER: Primary | ICD-10-CM

## 2020-03-24 NOTE — PROGRESS NOTES
[facility]  Palliative Medicine  Clinic Note    Patient Name: Fatimah Cha  MRN: [id number]  Primary Care Physician: Simon Orozco MD  Principal Problem:No chief complaint on file.    The patient location is: home  The chief complaint leading to consultation is: pre-transplant  Visit type: Virtual visit with synchronous audio and video  Total time spent with patient: 45  Each patient to whom he or she provides medical services by telemedicine is:  (1) informed of the relationship between the physician and patient and the respective role of any other health care provider with respect to management of the patient; and (2) notified that he or she may decline to receive medical services by telemedicine and may withdraw from such care at any time.    Notes:     Assessment/Plan:     Palliative Care Encounter / Goals of care discussion:     Narrative:   Fatimah Cha is a 64 y.o. female patient with a history of CREST syndrome, now mainly complicated by pulmonary hypertension and ILD. Currently managed by Dr. Garcia. Was under care of Dr. Ken and is followed by Dr. Russell. She is on Adempas and Letairis along with planned Retuximab for her CREST syndrome.   She has been diagnosed many years ago and has had a gradual decline of her health until earlier this year when she had become increasingly dependent on oxygen with a significant decline in her exercise capacity to where she now is short of breath only walking to the bathroom despite supplemental oxygen.     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 She feels she is very aware of her illness and her prognosis and feels well informed by the medical team at [facility] and her former colleagues.     Meeting with her today via Telemedicine conference due to COVID Pandemic.     1: Symptoms:   - pain assesment: denies any pain currently   - dyspnea assessment: quite severe, just ambulating around the  "house gets her out of breath. Feels "ok" at rest   - anxiety assessment: gets anxious at times, especially with the COVID Outbreak   - depression assessment: does not feel depressed    2: Code Status:   - full code    3: Psychosocial :   - Marital status: ,  supportive  - Children: 3 adult children, all live close.   - POA: her  is next of kin (Bart)  -  Son Marques lives with them.     3: Medicolegal:    - Advance directive: not on file   -     4: Support System:  - Spiritual: yes  - Family: supportive and within a close drive     5: Goals of care Decisions / Recommendations / Plan:   Palliative care introduced:  [facility] and Illness trajectory  - pt. Has medical background as a  in a physicians office.   - she has been dealing with her illlnes (CREST, now associated with pHTN and ILD) for many years and feels well informed about the natural disease progression.   - tells me that Dr. Garcia told her "it's time to get new lungs, medications are no longer working".     Prognostication:  - rapid decline in exercise capacity since earlier this year.   - natural disease progression life expectancy likely months, maybe a year.   - grave concern with recent COVID - 19 outbreak and associated risk for immunosuppressed patients. Her prognosis with COVID related PNA is likely grave.     Goals of Care:  - Palliative care introduction made  - pt. Appears well informed medically and very realistic with her expectation regarding lung transplantation  - she appears well aware of risk with transplantation and feels like she will want to move forward.   - however, given new changes of health system with COVID and halt of transplant workup she is worried about what the future holds  - with permission we discussed advance directives.   - she has not engaged in advanced care planning but has told her  that she would not want to "live on machines"  - encouraged to discuss her wishes in detail " with family and express her wishes. Assistance offered. She reports she would like to discuss with family for now.     - expressed that palliative care team open for assistance with advance care planning at any time. She will reach out.     Symptom Management:  - no sx. To palliate currently    Disposition:  -home    6: Follow up plans:    As needed.     Thank you for your consult. I will follow along with you. Please call [phone] with questions.     Subjective:     Reason for consult:  Pre-transplant    Outpatient Providers:  Simon Orozco MD (Primary Care Physician)  Dr. Garcia      Past Medical History:   Diagnosis Date    Anxiety     CHF (congestive heart failure)     Colitis     CREST syndrome     GERD (gastroesophageal reflux disease)     Hypercholesteremia     Hypertension     Interstitial lung disease     Osteopenia     Pulmonary fibrosis     Pulmonary hypertension     Raynaud disease     Respiratory distress     Sciatica     Scleroderma     Sjoegren syndrome        Past Surgical History:   Procedure Laterality Date    BREAST BIOPSY      CARDIAC CATHETERIZATION  2013    COLONOSCOPY      COLONOSCOPY N/A 11/15/2017    Procedure: COLONOSCOPY;  Surgeon: Radha Santiago MD;  Location: [facility] ENDO ([address] [place] [address]);  Service: Endoscopy;  Laterality: N/A;  2nd floor.  Pulm htn, ILD on o2.  Pls review w anesthesia.  Pt has cardio-pulm workup in progress at [facility] by Dr. Russell.                 reese monsalve RN- I reviewed Ms. Cha's history with Dr. Leopold (Anesthesia) and based on her medical history of:  Pulmonary Hyper    foot surgery x2      lumpectomy (benign)      LUNG BIOPSY  03/2013    RIGHT HEART CATHETERIZATION Right 12/20/2018    Procedure: INSERTION, CATHETER, RIGHT HEART;  Surgeon: Renetta Russell MD;  Location: [facility] CATH LAB;  Service: Cardiology;  Laterality: Right;    THYROIDECTOMY      TONSILLECTOMY      TUBAL LIGATION      UPPER GASTROINTESTINAL ENDOSCOPY           Family History     Problem Relation (Age of Onset)    Cancer Maternal Aunt    Colon cancer Maternal Uncle    Deep vein thrombosis Brother    Emphysema Sister    Heart failure Father    Lung cancer Brother    Pulmonary embolism Daughter    Thyroid cancer Mother    Thyroid disease Sister          Tobacco Use    Smoking status: Former Smoker     Packs/day: 1.00     Years: 5.00     Pack years: 5.00     Types: Cigarettes     Last attempt to quit: 3/30/1979     Years since quittin.0    Smokeless tobacco: Never Used   Substance and Sexual Activity    Alcohol use: No    Drug use: No    Sexual activity: Not on file       Review of patient's allergies indicates:   Allergen Reactions    Adhesive tape-silicones        Medications:  Scheduled Meds:    Review of Symptoms    [unfilled]      Objective:     Vital Signs (Most Recent):  There were no vitals taken for this visit.      There is no height or weight on file to calculate BMI.  Bowel Management Plan (BMP): No    Pain Assessment: denies    OME in 24 hours:     Performance Status: 60    ECOG Performance Status Grade: 2 - Ambulates, capable of self care only    Physical Exam  No Exam due to Telemedicine visit    Significant Imaging: I have reviewed all pertinent imaging results/findings within the past 24 hours.    Advanced Directives::  Living Will: No  [place]POST: No  Do Not Resuscitate Status: No  Medical Power of : No,  Axel is next of kin    Decision-Making Capacity: Patient answered questions      > 50% of 45 min visit spent in chart review, face to face discussion of goals of care,  symptom assessment, coordination of care and emotional support.    Jim Good MD  Palliative Medicine  [facility]

## 2020-04-02 ENCOUNTER — TELEPHONE (OUTPATIENT)
Dept: TRANSPLANT | Facility: CLINIC | Age: 65
End: 2020-04-02

## 2020-04-02 DIAGNOSIS — K22.9 ABNORMALITY OF ESOPHAGUS: Primary | ICD-10-CM

## 2020-04-02 NOTE — TELEPHONE ENCOUNTER
Pre-evaluation Heart Catheterization is currently unable to be performed due to the COVID19 non-essential procedure hold. The patient has been informed by phone call today. Once COVID19 hold is lifted the patient will be scheduled and pre-evaluation will continue.  Patient also needs GI consult for abnormal esophagram.  Will contact GI for possible virtual visit.  Patient aware that she is due for a monthly lab check this month since starting OFEV.  Last lab work drawn 3/9/20.  She stated that she does not want to leave her home for any testing at this time due to her lung disease and the COVID-19 pandemic.  She stated that she will have labs drawn and recollect her 24 hour urine for creatinine clearance once it is safer for her to leave her home.  She stated that her endocrinologist scheduled labs for her in May to check her thyroid.  She is hoping that she will be able to leave her home by then.  Informed her that I would notify Dr. Shah of our conversation.  Also informed her that we are available for any questions or concerns.  Instructed her to contact us if necessary.  She verbalized her understanding of all discussed.

## 2020-04-02 NOTE — TELEPHONE ENCOUNTER
----- Message from Rodrick Garcia MD sent at 4/2/2020  1:43 PM CDT -----  Regarding: RE: monthly labs  Yes      ----- Message -----  From: Dori Velazquez RN  Sent: 4/2/2020  12:43 PM CDT  To: Rodrick Garcia MD  Subject: monthly labs                                     Ms. Cha is due for her OF monthly lab check.  She does not want to leave her home for her monthly labs at this time.  Labs last drawn on 3/9/20.  Can she skip her monthly labs for this month?

## 2020-04-02 NOTE — TELEPHONE ENCOUNTER
----- Message from Rodrick Garcia MD sent at 3/10/2020  6:03 PM CDT -----  Needs to see Jack    Message sent to Dr. Santiago's staff requesting an appointment.

## 2020-04-06 ENCOUNTER — PATIENT MESSAGE (OUTPATIENT)
Dept: GASTROENTEROLOGY | Facility: CLINIC | Age: 65
End: 2020-04-06

## 2020-04-06 ENCOUNTER — TELEPHONE (OUTPATIENT)
Dept: GASTROENTEROLOGY | Facility: CLINIC | Age: 65
End: 2020-04-06

## 2020-04-06 NOTE — TELEPHONE ENCOUNTER
----- Message from Rebecca Flowers RN sent at 4/2/2020  1:14 PM CDT -----  This was a patient of Bola.  There is a referral in for her to be seen for abnormal esophogram. She is a transplant candidate or had a transplant

## 2020-04-13 RX ORDER — ALENDRONATE SODIUM 70 MG/1
70 TABLET ORAL
COMMUNITY
Start: 2020-04-01

## 2020-04-14 ENCOUNTER — TELEPHONE (OUTPATIENT)
Dept: GASTROENTEROLOGY | Facility: CLINIC | Age: 65
End: 2020-04-14

## 2020-04-14 ENCOUNTER — OFFICE VISIT (OUTPATIENT)
Dept: GASTROENTEROLOGY | Facility: CLINIC | Age: 65
End: 2020-04-14
Payer: MEDICARE

## 2020-04-14 DIAGNOSIS — K63.89 INTESTINAL BACTERIAL OVERGROWTH: ICD-10-CM

## 2020-04-14 DIAGNOSIS — K21.9 GASTROESOPHAGEAL REFLUX DISEASE, ESOPHAGITIS PRESENCE NOT SPECIFIED: ICD-10-CM

## 2020-04-14 DIAGNOSIS — R13.10 DYSPHAGIA, UNSPECIFIED TYPE: ICD-10-CM

## 2020-04-14 DIAGNOSIS — R19.7 DIARRHEA, UNSPECIFIED TYPE: ICD-10-CM

## 2020-04-14 DIAGNOSIS — R13.10 DYSPHAGIA, UNSPECIFIED TYPE: Primary | ICD-10-CM

## 2020-04-14 DIAGNOSIS — K58.9 IRRITABLE BOWEL SYNDROME, UNSPECIFIED TYPE: Primary | ICD-10-CM

## 2020-04-14 PROCEDURE — 99214 PR OFFICE/OUTPT VISIT, EST, LEVL IV, 30-39 MIN: ICD-10-PCS | Mod: 95,NTX,, | Performed by: INTERNAL MEDICINE

## 2020-04-14 PROCEDURE — 99214 OFFICE O/P EST MOD 30 MIN: CPT | Mod: 95,NTX,, | Performed by: INTERNAL MEDICINE

## 2020-04-14 RX ORDER — PANTOPRAZOLE SODIUM 40 MG/1
40 TABLET, DELAYED RELEASE ORAL 2 TIMES DAILY
Qty: 180 TABLET | Refills: 3 | Status: SHIPPED | OUTPATIENT
Start: 2020-04-14 | End: 2020-06-29

## 2020-04-14 NOTE — PROGRESS NOTES
The patient location is: home  The chief complaint leading to consultation is: dysphagia, pre-transplant eval, gerd, belching, bloating, diarrhea  Visit type: audiovisual  Total time spent with patient: 37 min + 5 on the phone  Each patient to whom he or she provides medical services by telemedicine is:  (1) informed of the relationship between the physician and patient and the respective role of any other health care provider with respect to management of the patient; and (2) notified that he or she may decline to receive medical services by telemedicine and may withdraw from such care at any time.         [facility] Consultation Note    Reason for Consult:    No chief complaint on file.        PCP:   Simon Orozco       Referring MD:  Esmer Vitale Md  [address] [place] [address]    Pulm: Dr. Garcia/Dr. Ken/Dr. Jaya Santiago    Gi: Dr. Carrillo    HPI:  Fatimah Cha is a 64 y.o. female with history CREST, Sjogran, Scleroderma, ILD, pulmonary HTN, HTN, HLD, hashimotos thyroiditis, hypothyroidsm here for evaluation of the following GI problems:    GERD.  Better  Pyrosis. Controlled   Regurgitation. Every other week   H/o bothersome heartburn in mid chest.  Nocturnal symptoms. None  Sleeps with head of the bed elevated.   Avoids eating prior to bedtime.         MEDS:   protonix 40mg BID  No longer on carafate 1 g TID   tums and gas ex prn     Belching. Still very bothersome.   Worse with starting Adempas. Was going to consider stopping after RHC/LHC    Dysphagia.    Solids: yes  Liquids: no   Daily.  Choking: rare.  Sensitive gag reflex.   Location: cervical esophagus on r side.    .  Dry mouth.   Biotin does not help that much  Symptoms started after thyroid surgery several years ago.    Got progressively worse over years.           Nausea. None  Early satiety.  assocaited w bloating and belching   Vomiting.  None   MEDS:   No meds  Improve w phenergan in the  past     Abdominal pain. Better  No medications  Bentyl PRN with good control.      Gas and bloating.  Recently worse    Distention. None    Avoids artificial sugars.   Low FODMOP - No improvement  Lactose - still drinks milk   Does not drink through straws. does not chew gum.  No improvement with lactose elimination in the past.   Symptoms get worse at night. Worse after meals  Previously w positive SIBO testing, treated w Flagyl, rifaximin.    Hiccups.  Resolved. Previously with frequent hiccups for years lasting minutes to hours.     Diarrhea. Started right after ofev was initiated. .   [place] 4-5  BM daily   -Imodium 2md daily started after started ofev -  w good control of diarrhea      Weight loss. Weight stable at 171lb     Panic attacks. Anxiety.    Buspirone 10mg prn for panic attacks   Lexapro 10mg     Denies BRBPR, melena.      Rheumatology: CREST, Sjogran, Scleroderma, ILD, pulmonary HTN.  On cellcept 1,500 mg BID, letaris 10 mg daily, evoxac 30 mg TID, adempas 2.5 mg daily, ofev. Followed by pulmonology and rheumatology.  Lung transplant eval in progress  -Needs manometry bc esophagogram showed aperistalsis     Hashimotos thyroiditis. Is s/p thyroidectomy.      Total visit time was 40 minutes, more than 50% of which was spent in face-to-face counseling with patient regarding symptoms, diagnostic results, prognosis, risks and benefits of treatment options, instructions for management, importance of compliance with chosen treatment options, risk factor reduction, stress reduction, coping strategies.        Previous Studies:   Esophagogram 3/10/2020: Patulous and mild dilatation of the thoracic esophagus with significantly reduced peristalsis, consistent with patient's history of scleroderma.  US 3/9/2020: . Mild hepatomegaly with no evidence of focal hepatic lesions.  EGD 11/15/17: - Normal esophagus. Z-line irregular (-). 6 cm hiatal hernia.  G erythema (-). Multiple gastric polyps (FGP). Normal  examined duodenum (-).   Colon 11/15/17: GPTTI. Two 2 to 5 mm polyps at the recto-sigmoid colon (-). Nl colon (-). Non-bleeding internal hemorrhoids. Nl ileum.  MBS 10/19/17: oropharyngeal swallow function WNL  Ct chst 8/30/17: progression of fibrosis.   EGD 6/16/14: HH. Short segment of salmon fingers(IM). Esophagitis. Fund gland polyps (FGP). Nl stomach (-). Duodenitis (-).   Colon 6/6/2014: GPTC. 3 tiny polyps in asc colon (TA). Int hemorrhoids.   CBC nl   Smart pill: slow motility of gi tract    ROS:  Review of Systems   Constitutional: Negative for chills, fever and weight loss.   Eyes: Negative for pain and redness.   Respiratory: Positive for cough and shortness of breath.    Cardiovascular: Negative for chest pain.   Gastrointestinal: Positive for heartburn. Negative for abdominal pain, nausea and vomiting.   Genitourinary: Negative for dysuria and hematuria.   Musculoskeletal: Negative for back pain.   Skin: Negative for rash.   Psychiatric/Behavioral: Negative for depression. The patient is not nervous/anxious.       Constitutional: No fevers, no chills, no night sweats,  ENT: No congestion, no rhinorrhea, + chronic sinus problems  CV: No chest pain, no palpitations  Pulm: + cough, + shortness of breath  Ophtho: No blurry vision, + eye redness  GI: see HPI  Derm: No rash  Heme: No lymphadenopathy, + bruising  MSK: +joint pain, no joint swelling, + Raynauds  : No dysuria, no frequent urination, no blood in urine  Endo: + cold intolerance  Neuro: No dizziness, no syncope, no seizure  Psych: no anxiety, no depression      Medical History:   Past Medical History:   Diagnosis Date    Anxiety     CHF (congestive heart failure)     Colitis     CREST syndrome     GERD (gastroesophageal reflux disease)     Hypercholesteremia     Hypertension     Interstitial lung disease     Osteopenia     Pulmonary fibrosis     Pulmonary hypertension     Raynaud disease     Respiratory distress     Sciatica      Scleroderma     Sjoegren syndrome         Surgical History:   Past Surgical History:   Procedure Laterality Date    BREAST BIOPSY      CARDIAC CATHETERIZATION  2013    COLONOSCOPY      COLONOSCOPY N/A 11/15/2017    Procedure: COLONOSCOPY;  Surgeon: Radha Santiago MD;  Location: [facility] ([address] [place] [address]);  Service: Endoscopy;  Laterality: N/A;  2nd floor.  Pulm htn, ILD on o2.  Pls review w anesthesia.  Pt has cardio-pulm workup in progress at [facility] by Dr. Russell.                 reese monsalve RN- I reviewed Ms. Cha's history with Dr. Leopold (Anesthesia) and based on her medical history of:  Pulmonary Hyper    foot surgery x2      lumpectomy (benign)      LUNG BIOPSY  03/2013    RIGHT HEART CATHETERIZATION Right 12/20/2018    Procedure: INSERTION, CATHETER, RIGHT HEART;  Surgeon: Renetta Russell MD;  Location: [facility] CATH LAB;  Service: Cardiology;  Laterality: Right;    THYROIDECTOMY      TONSILLECTOMY      TUBAL LIGATION      UPPER GASTROINTESTINAL ENDOSCOPY          Family History:   Family History   Problem Relation Age of Onset    Thyroid cancer Mother     Heart failure Father     Emphysema Sister     Thyroid disease Sister     Deep vein thrombosis Brother     Lung cancer Brother     Colon cancer Maternal Uncle         60s    Cancer Maternal Aunt     Pulmonary embolism Daughter     Breast cancer Neg Hx     Ovarian cancer Neg Hx     Celiac disease Neg Hx     Cirrhosis Neg Hx     Colon polyps Neg Hx     Crohn's disease Neg Hx     Cystic fibrosis Neg Hx     Esophageal cancer Neg Hx     Hemochromatosis Neg Hx     Inflammatory bowel disease Neg Hx     Irritable bowel syndrome Neg Hx     Liver cancer Neg Hx     Liver disease Neg Hx     Rectal cancer Neg Hx     Stomach cancer Neg Hx     Ulcerative colitis Neg Hx     Phoenix's disease Neg Hx     Lymphoma Neg Hx     Tuberculosis Neg Hx     Rheum arthritis Neg Hx     Scleroderma Neg Hx     Melanoma Neg Hx     Lupus Neg Hx      Multiple sclerosis Neg Hx     Psoriasis Neg Hx     Skin cancer Neg Hx         Social History:   Social History     Socioeconomic History    Marital status:      Spouse name: Not on file    Number of children: Not on file    Years of education: Not on file    Highest education level: Not on file   Occupational History    Not on file   Social Needs    Financial resource strain: Not on file    Food insecurity:     Worry: Not on file     Inability: Not on file    Transportation needs:     Medical: Not on file     Non-medical: Not on file   Tobacco Use    Smoking status: Former Smoker     Packs/day: 1.00     Years: 5.00     Pack years: 5.00     Types: Cigarettes     Last attempt to quit: 3/30/1979     Years since quittin.0    Smokeless tobacco: Never Used   Substance and Sexual Activity    Alcohol use: No    Drug use: No    Sexual activity: Not on file   Lifestyle    Physical activity:     Days per week: Not on file     Minutes per session: Not on file    Stress: Not on file   Relationships    Social connections:     Talks on phone: Not on file     Gets together: Not on file     Attends [employer] service: Not on file     Active member of club or organization: Not on file     Attends meetings of clubs or organizations: Not on file     Relationship status: Not on file   Other Topics Concern    Not on file   Social History Narrative    Not on file        Review of patient's allergies indicates:  No Known Allergies    Current Outpatient Medications   Medication Sig Dispense Refill    ADEMPAS 2.5 mg tablet Take 2.5 mg by mouth 3 (three) times daily.       albuterol (PROVENTIL) 2.5 mg /3 mL (0.083 %) nebulizer solution USE ONE VIAL in Nebulizer EVERY 6 HOURS AS NEEDED FOR SHORTNESS OF BREATH  6    alendronate (FOSAMAX) 70 MG tablet       ambrisentan (LETAIRIS) 10 MG Tab Take 10 mg by mouth once daily.      amoxicillin-clavulanate 875-125mg (AUGMENTIN) 875-125 mg per tablet 1 tablet       aspirin (ECOTRIN) 81 MG EC tablet Take 81 mg by mouth every evening.      atorvastatin (LIPITOR) 10 MG tablet Take 10 mg by mouth once daily.       azelastine (ASTELIN) 137 mcg (0.1 %) nasal spray 2 sprays by Nasal route 2 (two) times daily as needed.       biotin 1,000 mcg Chew Take 1 capsule by mouth once daily.       busPIRone (BUSPAR) 10 MG tablet Take 10 mg by mouth 3 (three) times daily as needed (for anxiety).       carvedilol (COREG) 3.125 MG tablet TAKE ONE TABLET BY MOUTH TWICE DAILY WITH MEALS 180 tablet 6    cevimeline (EVOXAC) 30 mg capsule Take 30 mg by mouth 3 (three) times daily.      escitalopram oxalate (LEXAPRO) 10 MG tablet Take 10 mg by mouth once daily.      fluconazole (DIFLUCAN) 150 MG Tab 1 tablet today, then repeat in a week      fluticasone (FLONASE) 50 mcg/actuation nasal spray 1 spray by Each Nare route as needed.   3    furosemide (LASIX) 40 MG tablet Take 1 tablet (40 mg total) by mouth once daily. 30 tablet 12    gabapentin (NEURONTIN) 400 MG capsule Take 400 mg by mouth 3 (three) times daily as needed (for bulging discs in neck; has not taken in months).      guaiFENesin (MUCINEX) 600 mg 12 hr tablet Take 1,200 mg by mouth as needed for Congestion.      INCRUSE ELLIPTA 62.5 mcg/actuation DsDv Take 1 puff by mouth once daily.   3    levothyroxine (SYNTHROID) 137 MCG Tab tablet       multivitamin capsule Take 1 capsule by mouth once daily.      mycophenolate (CELLCEPT) 500 mg Tab Take 1,500 mg by mouth 2 (two) times daily.       nystatin (MYCOSTATIN) 100,000 unit/mL suspension USE 5MLS SWISH AND SWALLOW FOUR TIMES A DAY MOUTH/THROAT 7 DAYS      potassium chloride (MICRO-K) 10 MEQ CpSR Take 2 capsules (20 mEq total) by mouth once daily. 60 capsule 11    risedronate (ACTONEL) 150 MG Tab Take 150 mg by mouth every 30 days.  6    teriparatide (FORTEO) 20 mcg/dose - 600 mcg/2.4 mL PnIj       VENTOLIN HFA 90 mcg/actuation inhaler 2 puffs every 4 (four) hours as  needed.       VITAMIN D2 50,000 unit capsule Take 50,000 Units by mouth As instructed. Twice a month       No current facility-administered medications for this visit.         Objective Findings:  Vital Signs:  There were no vitals taken for this visit.  There is no height or weight on file to calculate BMI.    Physical Exam:  General appearance: alert, cooperative, no distress  HENT: Normocephalic, atraumatic, neck symmetrical, no nasal discharge  Eyes: conjunctivae/corneas clear, PERRL, EOM's intact  Lungs: clear to auscultation bilaterally, no dullness to percussion bilaterally  Heart: regular rate and rhythm without rub; no displacement of the PMI  Abdomen: soft, mild diffuse tenderness; bowel sounds normoactive; no organomegaly  Extremities: extremities symmetric; no clubbing, cyanosis, or edema  Integument: Skin color, texture, turgor normal; no rashes; hair distrubution normal  Neurologic: Alert and oriented X 3, normal strength, normal coordination and gait  Psychiatric: no pressured speech; normal affect; no evidence of impaired cognition    Labs:  Lab Results   Component Value Date    WBC 7.58 03/09/2020    HGB 10.5 (L) 03/09/2020    HCT 36.3 (L) 03/09/2020    MCV 86 03/09/2020     03/09/2020     No results found for: FERRITIN  Lab Results   Component Value Date     03/09/2020    K 3.2 (L) 03/09/2020     03/09/2020    CO2 22 (L) 03/09/2020    GLU 85 03/09/2020    BUN 9 03/09/2020    CREATININE 0.7 03/12/2020    CALCIUM 8.4 (L) 03/09/2020    PROT 6.7 03/09/2020    ALBUMIN 3.4 (L) 03/09/2020    BILITOT 0.7 03/09/2020    ALKPHOS 83 03/09/2020    AST 18 03/09/2020    ALT 8 (L) 03/09/2020     Lab Results   Component Value Date    TSH 10.507 (H) 03/09/2020     No results found for: SEDRATE  No results found for: CRP  Lab Results   Component Value Date    HGBA1C 5.1 03/09/2020           Assessment and Plan:  Fatimah Cha is a 64 y.o. female with with history CREST, Sjogran, Scleroderma,  ILD on 02, pulmonary HTN, HTN, HLD, hashimotos thyroiditis, hypothyroidsm here for evaluation of the following GI problems:    GERD. Regurgitation. Belching. 6 cm hiatal hernia.   No improvement with prevacid and prilosec  No longer Some improvement with carafate 1 g TID   -Cont protonix 40mg BID  -Will check with Dr. Perera if ph testing needed     Little's esophagus.  Not seen on most recent EGD   -EGD w EoE,  GEJ, d bx.  High risk for procedures    Intractable belching  -manometry r/o supragastric belching  -diaphragmatic breathing     Dry mouth.    Biotin prn  - does not help much     Dysphagia. Occasional choking . Seen by ENT in the past.  No EoE.  Nl MBS. Recent esophagogram w dysmotility  -EGD  -Manometry r/o dysphagia, r/o belching           Nausea.  Vomiting.  Improved.   Unable to tolerate reglan due to difficulty sleeping.     -Phenergan prn     -Need report of smart pill by Dr. Carrillo at [facility]     Abdominal pain related to BMs. Improved. Still w abd discomfort w bloating.  IBS   -iberogast  -Rifaximin    Gas and bloating.    History of SIBO +, treated w flagyl, flagyl and neomycin  Avoids artificial sugars    No improvement with lactose elimination  No improvement w low FODMOP diet   -Rifaximin    Hiccups. Resolved.     Diarrhea. Started after ofev was initiated.   -Imodium prn     Colitis.  Admitted to the hospital w self limited bloody diarrhea 10/2016.  CT showed colitis. Had similar episode 5 years prior. Negative stools studies.   No diarrhea or constipation for the past few months.  Colonoscopy negative.     3 tiny TA in 2013.  No polyps in 2017.    -Repeat in 2027     Hepatomegaly.  Seen by hepatology. Likely related to hepatic congestion, secondary to pulmonary hypertension.    Fibroscan <F3/4 fibrosis. No further evaluation required.     Anxiety.  Panic attacks  -Lexapro and buspar    CREST, Sjogran, Scleroderma, ILD, pulmonary HTN.    -Followed by pulmonology and  rheumatology.    Hashimotos thyroiditis. Hypothyroid    -On synthroid    Follow up in about 3 months (around 7/14/2020).    1. Irritable bowel syndrome, unspecified type    2. Diarrhea, unspecified type    3. Intestinal bacterial overgrowth    4. Gastroesophageal reflux disease, esophagitis presence not specified    5. Dysphagia, unspecified type             Thank you so much for allowing me to participate in the care of Fatimah IRVING Santiago MD            Answers for HPI/ROS submitted by the patient on 4/14/2020   trouble swallowing: No  Joint pain? : No

## 2020-04-14 NOTE — TELEPHONE ENCOUNTER
MOTILITY CLINIC PROCEDURE ORDERS    CLEARANCE FOR PROCEDURES:  No needed     PROCEDURES  EGD   Esophageal manometry with impedance - dysphagia   Esophageal manometry with impedance - belching       FLOOR:  2nd Floor    Reason for 2nd Floor:   Pulmonary HTN (PAP>50 or on meds)     PREP  Standard Prep    MEDICATIONS  OFF PPI/H2 Blocker     Motility Studies (esophageal manometry/anorectal manometry)  Hold Narcotics x 1 days if able   Hold TCA x 1 days if able  Propofol only during sedation.  Discuss with Dr. Santiago if additional sedation needed.     ORDER OF TESTING:  No special requirements - pt lives locally   Urgent reschedule - lung transplant heriberto casarez/ok w other GI MD

## 2020-04-14 NOTE — PATIENT INSTRUCTIONS
-Continue protonix 40mg twice per day  -Continue imodium 2mg three times per day as needed   -Start rifaximin 550mg three times per day for 14 days for bacterial overgrowth    -If you do not have improvement with rifaximin - try iberogast 5 to 20 drops (1 mL), 3 times a day for abdominal pain, nausea, vomiting, bloating.  The duration of taking Iberogast depends on the severity of your symptoms and how long they last.  Try to take it for at least 3 weeks to see if it makes a difference  -Complete EGD and manometry for lung transplant evaluation   -I will ask Dr. Garcia if he wants me to comeplte acid reflux testing   -Practice diaphragmatic breathing three times per day for supragastric belching.  Follow the instructions in this video:   [email or web address]

## 2020-04-16 ENCOUNTER — TELEPHONE (OUTPATIENT)
Dept: PHARMACY | Facility: CLINIC | Age: 65
End: 2020-04-16

## 2020-04-17 ENCOUNTER — TELEPHONE (OUTPATIENT)
Dept: ENDOSCOPY | Facility: HOSPITAL | Age: 65
End: 2020-04-17

## 2020-04-17 NOTE — TELEPHONE ENCOUNTER
Dr. Santiago,    After speaking with patient she is hesitant to get scheduled in the next 2 weeks.  I know your note said urgent reschedule, but is it something that can wait more than 2 weeks?    Thanks,  Diane

## 2020-04-20 ENCOUNTER — TELEPHONE (OUTPATIENT)
Dept: ENDOSCOPY | Facility: HOSPITAL | Age: 65
End: 2020-04-20

## 2020-04-20 RX ORDER — HYDROXYCHLOROQUINE SULFATE 200 MG/1
200 TABLET, FILM COATED ORAL 2 TIMES DAILY
COMMUNITY
End: 2020-11-13

## 2020-04-22 ENCOUNTER — TELEPHONE (OUTPATIENT)
Dept: ENDOSCOPY | Facility: HOSPITAL | Age: 65
End: 2020-04-22

## 2020-04-22 NOTE — TELEPHONE ENCOUNTER
Dr. Santiago,    Patient is scheduled for EGD on 5/19/20 on 2nd floor Endoscopy Unit with you and Esophageal Manometry is scheduled on 5/21/20 on 4th floor Endoscopy Unit.    Thanks,  Mariely

## 2020-05-07 ENCOUNTER — TELEPHONE (OUTPATIENT)
Dept: TRANSPLANT | Facility: CLINIC | Age: 65
End: 2020-05-07

## 2020-05-07 NOTE — TELEPHONE ENCOUNTER
"Contacted Ms. Cha regarding scheduling monthly labs for OFEV and the 24 hour urine.  Also informed her that she will be due for a follow-up appointment this month since it has been almost 3 months since she has seen Dr. Garcia.  Informed her that we need to repeat PFTs and a 6 minute walk test since it has been over 3 months since both were completed.  Ms. Cha verbalized her understanding of all discussed.  She stated, "I have a problem with oxygen tanks.  I am only allowed 3 tanks a month.  I can't do all the testing that I need to do because of the lack of oxygen.  I can't go out too many times a month."  Inquired if her insurance was limiting the oxygen tanks.  She stated, "No, it is not my insurance.  I never had issues with oxygen tanks before.  It is the state.  We are under restrictions from the state to limit oxygen due to COVID-19.  I have an appointment next week for Dr. Santiago and I need to do a COVID test before the test.  I was going to call you to schedule the 24 hour urine and labs on the same day as the COVID test.  I have not received any information on when the test will be scheduled."  Informed her that I would notify Dr. Garcia of above.  Instructed her to keep me updated on when she can drop off the 24 hour urine test and have labs.  She verbalized her understanding of all discussed.  "

## 2020-05-08 ENCOUNTER — TELEPHONE (OUTPATIENT)
Dept: TRANSPLANT | Facility: CLINIC | Age: 65
End: 2020-05-08

## 2020-05-08 DIAGNOSIS — Z76.82 LUNG TRANSPLANT CANDIDATE: ICD-10-CM

## 2020-05-08 DIAGNOSIS — M34.9 SCLERODERMA INVOLVING LUNG: Primary | ICD-10-CM

## 2020-05-08 DIAGNOSIS — R13.10 DYSPHAGIA, UNSPECIFIED TYPE: Primary | ICD-10-CM

## 2020-05-08 DIAGNOSIS — Z79.899 HIGH RISK MEDICATION USE: ICD-10-CM

## 2020-05-08 DIAGNOSIS — J84.10 PULMONARY FIBROSIS: Primary | ICD-10-CM

## 2020-05-08 DIAGNOSIS — I27.20 PULMONARY HYPERTENSION: ICD-10-CM

## 2020-05-08 DIAGNOSIS — J84.10 PULMONARY FIBROSIS: ICD-10-CM

## 2020-05-08 RX ORDER — NINTEDANIB 150 MG/1
CAPSULE ORAL
Qty: 60 CAPSULE | Refills: 2 | Status: SHIPPED | OUTPATIENT
Start: 2020-05-08 | End: 2020-05-26

## 2020-05-12 DIAGNOSIS — R13.10 DYSPHAGIA, UNSPECIFIED TYPE: Primary | ICD-10-CM

## 2020-05-13 ENCOUNTER — PATIENT MESSAGE (OUTPATIENT)
Dept: CARDIOLOGY | Facility: CLINIC | Age: 65
End: 2020-05-13

## 2020-05-13 DIAGNOSIS — Z76.82 LUNG TRANSPLANT CANDIDATE: Primary | ICD-10-CM

## 2020-05-13 DIAGNOSIS — I50.42 CHRONIC COMBINED SYSTOLIC AND DIASTOLIC HEART FAILURE: ICD-10-CM

## 2020-05-13 RX ORDER — SODIUM CHLORIDE 9 MG/ML
INJECTION, SOLUTION INTRAVENOUS CONTINUOUS
Status: CANCELLED | OUTPATIENT
Start: 2020-05-13

## 2020-05-13 RX ORDER — DIPHENHYDRAMINE HCL 25 MG
50 CAPSULE ORAL ONCE
Status: CANCELLED | OUTPATIENT
Start: 2020-05-13 | End: 2020-05-13

## 2020-05-15 ENCOUNTER — DOCUMENTATION ONLY (OUTPATIENT)
Dept: TRANSPLANT | Facility: CLINIC | Age: 65
End: 2020-05-15

## 2020-05-18 ENCOUNTER — CLINICAL SUPPORT (OUTPATIENT)
Dept: INFECTIOUS DISEASES | Facility: CLINIC | Age: 65
End: 2020-05-18
Payer: MEDICARE

## 2020-05-18 ENCOUNTER — LAB VISIT (OUTPATIENT)
Dept: INTERNAL MEDICINE | Facility: CLINIC | Age: 65
End: 2020-05-18
Payer: MEDICARE

## 2020-05-18 DIAGNOSIS — Z76.82 LUNG TRANSPLANT CANDIDATE: ICD-10-CM

## 2020-05-18 DIAGNOSIS — M34.1 CREST (CALCINOSIS, RAYNAUD'S PHENOMENON, ESOPHAGEAL DYSFUNCTION, SCLERODACTYLY, TELANGIECTASIA): ICD-10-CM

## 2020-05-18 DIAGNOSIS — R13.10 DYSPHAGIA, UNSPECIFIED TYPE: ICD-10-CM

## 2020-05-18 DIAGNOSIS — Z71.85 VACCINE COUNSELING: ICD-10-CM

## 2020-05-18 DIAGNOSIS — D84.9 IMMUNOCOMPROMISED: ICD-10-CM

## 2020-05-18 DIAGNOSIS — I27.20 PULMONARY HYPERTENSION: ICD-10-CM

## 2020-05-18 DIAGNOSIS — Z23 NEED FOR PROPHYLACTIC VACCINATION AGAINST HEPATITIS A AND HEPATITIS B: ICD-10-CM

## 2020-05-18 DIAGNOSIS — J84.10 PULMONARY FIBROSIS: ICD-10-CM

## 2020-05-18 LAB — SARS-COV-2 RNA RESP QL NAA+PROBE: NOT DETECTED

## 2020-05-18 PROCEDURE — U0003 INFECTIOUS AGENT DETECTION BY NUCLEIC ACID (DNA OR RNA); SEVERE ACUTE RESPIRATORY SYNDROME CORONAVIRUS 2 (SARS-COV-2) (CORONAVIRUS DISEASE [COVID-19]), AMPLIFIED PROBE TECHNIQUE, MAKING USE OF HIGH THROUGHPUT TECHNOLOGIES AS DESCRIBED BY CMS-2020-01-R: HCPCS

## 2020-05-18 PROCEDURE — G0010 ADMIN HEPATITIS B VACCINE: HCPCS | Mod: PBBFAC,TXP

## 2020-05-19 ENCOUNTER — HOSPITAL ENCOUNTER (OUTPATIENT)
Facility: HOSPITAL | Age: 65
Discharge: HOME | End: 2020-05-19
Attending: INTERNAL MEDICINE | Admitting: INTERNAL MEDICINE
Payer: MEDICARE

## 2020-05-19 ENCOUNTER — ANESTHESIA EVENT (OUTPATIENT)
Dept: ENDOSCOPY | Facility: HOSPITAL | Age: 65
End: 2020-05-19
Payer: MEDICARE

## 2020-05-19 ENCOUNTER — ANESTHESIA (OUTPATIENT)
Dept: ENDOSCOPY | Facility: HOSPITAL | Age: 65
End: 2020-05-19
Payer: MEDICARE

## 2020-05-19 VITALS
HEART RATE: 67 BPM | TEMPERATURE: 98 F | RESPIRATION RATE: 34 BRPM | DIASTOLIC BLOOD PRESSURE: 56 MMHG | SYSTOLIC BLOOD PRESSURE: 92 MMHG | BODY MASS INDEX: 23.8 KG/M2 | HEIGHT: 71 IN | WEIGHT: 170 LBS | OXYGEN SATURATION: 92 %

## 2020-05-19 DIAGNOSIS — R13.10 DYSPHAGIA, UNSPECIFIED TYPE: ICD-10-CM

## 2020-05-19 PROCEDURE — 88305 TISSUE EXAM BY PATHOLOGIST: CPT | Mod: 26,TXP,, | Performed by: PATHOLOGY

## 2020-05-19 PROCEDURE — 43239 EGD BIOPSY SINGLE/MULTIPLE: CPT | Mod: TXP | Performed by: INTERNAL MEDICINE

## 2020-05-19 PROCEDURE — 25000003 PHARM REV CODE 250: Mod: TXP | Performed by: NURSE ANESTHETIST, CERTIFIED REGISTERED

## 2020-05-19 PROCEDURE — 43239 EGD BIOPSY SINGLE/MULTIPLE: CPT | Mod: GC,TXP,, | Performed by: INTERNAL MEDICINE

## 2020-05-19 PROCEDURE — 27201012 HC FORCEPS, HOT/COLD, DISP: Mod: TXP | Performed by: INTERNAL MEDICINE

## 2020-05-19 PROCEDURE — 88305 TISSUE EXAM BY PATHOLOGIST: ICD-10-PCS | Mod: 26,TXP,, | Performed by: PATHOLOGY

## 2020-05-19 PROCEDURE — 37000008 HC ANESTHESIA 1ST 15 MINUTES: Mod: TXP | Performed by: INTERNAL MEDICINE

## 2020-05-19 PROCEDURE — 88305 TISSUE EXAM BY PATHOLOGIST: CPT | Mod: TXP | Performed by: PATHOLOGY

## 2020-05-19 PROCEDURE — 25000003 PHARM REV CODE 250: Mod: TXP | Performed by: INTERNAL MEDICINE

## 2020-05-19 PROCEDURE — 37000009 HC ANESTHESIA EA ADD 15 MINS: Mod: TXP | Performed by: INTERNAL MEDICINE

## 2020-05-19 PROCEDURE — D9220A PRA ANESTHESIA: ICD-10-PCS | Mod: TXP,,, | Performed by: ANESTHESIOLOGY

## 2020-05-19 PROCEDURE — D9220A PRA ANESTHESIA: Mod: TXP,,, | Performed by: ANESTHESIOLOGY

## 2020-05-19 PROCEDURE — 63600175 PHARM REV CODE 636 W HCPCS: Mod: TXP | Performed by: NURSE ANESTHETIST, CERTIFIED REGISTERED

## 2020-05-19 PROCEDURE — 43239 PR EGD, FLEX, W/BIOPSY, SGL/MULTI: ICD-10-PCS | Mod: GC,TXP,, | Performed by: INTERNAL MEDICINE

## 2020-05-19 PROCEDURE — 94761 N-INVAS EAR/PLS OXIMETRY MLT: CPT | Mod: TXP

## 2020-05-19 RX ORDER — GLYCOPYRROLATE 0.2 MG/ML
INJECTION INTRAMUSCULAR; INTRAVENOUS
Status: DISCONTINUED | OUTPATIENT
Start: 2020-05-19 | End: 2020-05-25

## 2020-05-19 RX ORDER — SODIUM CHLORIDE 9 MG/ML
INJECTION, SOLUTION INTRAVENOUS CONTINUOUS PRN
Status: DISCONTINUED | OUTPATIENT
Start: 2020-05-19 | End: 2020-05-25

## 2020-05-19 RX ORDER — PHENYLEPHRINE HYDROCHLORIDE 10 MG/ML
INJECTION INTRAVENOUS
Status: DISCONTINUED | OUTPATIENT
Start: 2020-05-19 | End: 2020-05-25

## 2020-05-19 RX ORDER — FENTANYL CITRATE 50 UG/ML
INJECTION, SOLUTION INTRAMUSCULAR; INTRAVENOUS
Status: DISCONTINUED | OUTPATIENT
Start: 2020-05-19 | End: 2020-05-25

## 2020-05-19 RX ORDER — SUCCINYLCHOLINE CHLORIDE 20 MG/ML
INJECTION INTRAMUSCULAR; INTRAVENOUS
Status: DISCONTINUED | OUTPATIENT
Start: 2020-05-19 | End: 2020-05-25

## 2020-05-19 RX ORDER — PROPOFOL 10 MG/ML
VIAL (ML) INTRAVENOUS CONTINUOUS PRN
Status: DISCONTINUED | OUTPATIENT
Start: 2020-05-19 | End: 2020-05-25

## 2020-05-19 RX ORDER — EPHEDRINE SULFATE 50 MG/ML
INJECTION, SOLUTION INTRAVENOUS
Status: DISCONTINUED | OUTPATIENT
Start: 2020-05-19 | End: 2020-05-25

## 2020-05-19 RX ORDER — SODIUM CHLORIDE 9 MG/ML
INJECTION, SOLUTION INTRAVENOUS CONTINUOUS
Status: DISCONTINUED | OUTPATIENT
Start: 2020-05-19 | End: 2020-05-19 | Stop reason: HOSPADM

## 2020-05-19 RX ORDER — PROPOFOL 10 MG/ML
VIAL (ML) INTRAVENOUS
Status: DISCONTINUED | OUTPATIENT
Start: 2020-05-19 | End: 2020-05-25

## 2020-05-19 RX ORDER — LIDOCAINE HYDROCHLORIDE 20 MG/ML
INJECTION INTRAVENOUS
Status: DISCONTINUED | OUTPATIENT
Start: 2020-05-19 | End: 2020-05-25

## 2020-05-19 RX ORDER — SODIUM CHLORIDE 0.9 % (FLUSH) 0.9 %
10 SYRINGE (ML) INJECTION
Status: DISCONTINUED | OUTPATIENT
Start: 2020-05-19 | End: 2020-05-19 | Stop reason: HOSPADM

## 2020-05-19 RX ADMIN — SUCCINYLCHOLINE CHLORIDE 140 MG: 20 INJECTION, SOLUTION INTRAMUSCULAR; INTRAVENOUS at 09:05

## 2020-05-19 RX ADMIN — FENTANYL CITRATE 50 MCG: 50 INJECTION, SOLUTION INTRAMUSCULAR; INTRAVENOUS at 09:05

## 2020-05-19 RX ADMIN — PHENYLEPHRINE HYDROCHLORIDE 200 MCG: 10 INJECTION INTRAVENOUS at 09:05

## 2020-05-19 RX ADMIN — LIDOCAINE HYDROCHLORIDE 100 MG: 20 INJECTION, SOLUTION INTRAVENOUS at 08:05

## 2020-05-19 RX ADMIN — PROPOFOL 40 MG: 10 INJECTION, EMULSION INTRAVENOUS at 08:05

## 2020-05-19 RX ADMIN — SODIUM CHLORIDE: 0.9 INJECTION, SOLUTION INTRAVENOUS at 08:05

## 2020-05-19 RX ADMIN — SODIUM CHLORIDE: 0.9 INJECTION, SOLUTION INTRAVENOUS at 07:05

## 2020-05-19 RX ADMIN — PROPOFOL 20 MG: 10 INJECTION, EMULSION INTRAVENOUS at 08:05

## 2020-05-19 RX ADMIN — PHENYLEPHRINE HYDROCHLORIDE 100 MCG: 10 INJECTION INTRAVENOUS at 09:05

## 2020-05-19 RX ADMIN — PROPOFOL 100 MG: 10 INJECTION, EMULSION INTRAVENOUS at 09:05

## 2020-05-19 RX ADMIN — GLYCOPYRROLATE 0.2 MG: 0.2 INJECTION, SOLUTION INTRAMUSCULAR; INTRAVENOUS at 08:05

## 2020-05-19 RX ADMIN — PROPOFOL 50 MCG/KG/MIN: 10 INJECTION, EMULSION INTRAVENOUS at 08:05

## 2020-05-19 RX ADMIN — EPHEDRINE SULFATE 10 MG: 50 INJECTION INTRAVENOUS at 09:05

## 2020-05-19 NOTE — DISCHARGE INSTRUCTIONS

## 2020-05-19 NOTE — PLAN OF CARE
Discharge instructions reviewed with pt at bedside and over the phone with . understanding verbalized. No complaints of pain reported. Pt able to tolerate po intake. MD Menard to bedside to address findings and POC. RT Brower to bedside to assess pt's breathing and confirmation of home O2 use. To be transported to car by PCT.

## 2020-05-19 NOTE — ANESTHESIA PREPROCEDURE EVALUATION
05/19/2020  Fatimah Cha is a 64 y.o., female.    Anesthesia Evaluation         Review of Systems  Anesthesia Hx:  No problems with previous Anesthesia   Social:  Former Smoker, No Alcohol Use    Cardiovascular:   Hypertension CHF hyperlipidemia Pulmonary hypertension,   Chronic diastolic heart failure   Pulmonary:   Interstitial lung disease,  Lung transplant candidate   Hepatic/GI:   GERD    Musculoskeletal:   CREST syndrome   Neurological:   Neuromuscular Disease,    Endocrine:   Hypothyroidism due to Hashimoto's thyroiditis   Psych:   anxiety             Anesthesia Plan  Type of Anesthesia, risks & benefits discussed:  Anesthesia Type:  general  Patient's Preference:   Intra-op Monitoring Plan: standard ASA monitors  Intra-op Monitoring Plan Comments:   Post Op Pain Control Plan:   Post Op Pain Control Plan Comments:   Induction:   IV  Beta Blocker:         Informed Consent: Patient understands risks and agrees with Anesthesia plan.  Questions answered. Anesthesia consent signed with patient.  ASA Score: 4     Day of Surgery Review of History & Physical:    H&P update referred to the provider.         Ready For Surgery From Anesthesia Perspective.

## 2020-05-19 NOTE — PROVATION PATIENT INSTRUCTIONS
Discharge Summary/Instructions after an Endoscopic Procedure  Patient Name: Fatimah Cha  Patient MRN: [id number]  Patient YOB: 1955  Tuesday, May 19, 2020  Kemar Maldonado MD  RESTRICTIONS:  During your procedure today, you received medications for sedation.  These   medications may affect your judgment, balance and coordination.  Therefore,   for 24 hours, you have the following restrictions:   - DO NOT drive a car, operate machinery, make legal/financial decisions,   sign important papers or drink alcohol.    ACTIVITY:  Today: no heavy lifting, straining or running due to procedural   sedation/anesthesia.  The following day: return to full activity including work.  DIET:  Eat and drink normally unless instructed otherwise.     TREATMENT FOR COMMON SIDE EFFECTS:  - Mild abdominal pain, nausea, belching, bloating or excessive gas:  rest,   eat lightly and use a heating pad.  - Sore Throat: treat with throat lozenges and/or gargle with warm salt   water.  - Because air was used during the procedure, expelling large amounts of air   from your rectum or belching is normal.  - If a bowel prep was taken, you may not have a bowel movement for 1-3 days.    This is normal.  SYMPTOMS TO WATCH FOR AND REPORT TO YOUR PHYSICIAN:  1. Abdominal pain or bloating, other than gas cramps.  2. Chest pain.  3. Back pain.  4. Signs of infection such as: chills or fever occurring within 24 hours   after the procedure.  5. Rectal bleeding, which would show as bright red, maroon, or black stools.   (A tablespoon of blood from the rectum is not serious, especially if   hemorrhoids are present.)  6. Vomiting.  7. Weakness or dizziness.  GO DIRECTLY TO THE NEAREST EMERGENCY ROOM IF YOU HAVE ANY OF THE FOLLOWING:      Difficulty breathing              Chills and/or fever over 101 F   Persistent vomiting and/or vomiting blood   Severe abdominal pain   Severe chest pain   Black, tarry stools   Bleeding- more than one  tablespoon   Any other symptom or condition that you feel may need urgent attention  Your doctor recommends these additional instructions:  If any biopsies were taken, your doctors clinic will contact you in 1 to 2   weeks with any results.  - Discharge patient to home.   - Follow an antireflux regimen.   - Await pathology results.   - Telephone referring physician for pathology results in 2 weeks.   - Return to referring physician - Radha Santiago MD.   - The findings and recommendations were discussed with the patient.  For questions, problems or results please call your physician - Kemar Maldonado MD at Work:  [phone].  [facility], EMERGENCY ROOM PHONE NUMBER: [phone]  IF A COMPLICATION OR EMERGENCY SITUATION ARISES AND YOU ARE UNABLE TO REACH   YOUR PHYSICIAN - GO DIRECTLY TO THE EMERGENCY ROOM.  Kemar Maldonado MD  5/19/2020 9:37:14 AM  This report has been verified and signed electronically.  [employer]

## 2020-05-19 NOTE — H&P
Short Stay Endoscopy History and Physical    PCP - Simon Orozco MD     Procedure - EGD  ASA - per anesthesia  Mallampati - per anesthesia  History of Anesthesia problems - no  Family history Anesthesia problems -  no   Plan of anesthesia - General    HPI:  This is a 64 y.o. female here for evaluation of :     abdominal pain/bloating  65 y/o female with past history of CREST and pul HTN, here for evaluation of chronic indigestion, reflux. On PPI BID. Reflux improved with PPI      ROS:  Constitutional: No fevers, chills, No weight loss  CV: No chest pain  Pulm: No cough, No shortness of breath  Ophtho: No vision changes  GI: see HPI  Derm: No rash    Medical History:  has a past medical history of Anxiety, CHF (congestive heart failure), Colitis, CREST syndrome, GERD (gastroesophageal reflux disease), Hypercholesteremia, Hypertension, Interstitial lung disease, Osteopenia, Pulmonary fibrosis, Pulmonary hypertension, Raynaud disease, Respiratory distress, Sciatica, Scleroderma, and Sjoegren syndrome.    Surgical History:  has a past surgical history that includes foot surgery x2; Tubal ligation; Breast biopsy; lumpectomy (benign); Lung biopsy (03/2013); Colonoscopy; Upper gastrointestinal endoscopy; Thyroidectomy; Cardiac catheterization (2013); Tonsillectomy; Colonoscopy (N/A, 11/15/2017); and Right heart catheterization (Right, 12/20/2018).    Family History: family history includes Cancer in her maternal aunt and maternal uncle; Colon cancer in her maternal uncle; Deep vein thrombosis in her brother; Emphysema in her sister; Heart failure in her father; Lung cancer in her brother; Pulmonary embolism in her daughter; Thyroid cancer in her mother; Thyroid disease in her sister.. Otherwise no colon cancer, inflammatory bowel disease, or GI malignancies.    Social History:  reports that she quit smoking about 41 years ago. Her smoking use included cigarettes. She has a 5.00 pack-year smoking history. She has  never used smokeless tobacco. She reports that she does not drink alcohol or use drugs.    Review of patient's allergies indicates:   Allergen Reactions    Adhesive tape-silicones        Medications:   Medications Prior to Admission   Medication Sig Dispense Refill Last Dose    ADEMPAS 2.5 mg tablet Take 2.5 mg by mouth 3 (three) times daily.    5/18/2020 at Unknown time    albuterol (PROVENTIL) 2.5 mg /3 mL (0.083 %) nebulizer solution USE ONE VIAL in Nebulizer EVERY 6 HOURS AS NEEDED FOR SHORTNESS OF BREATH  6 5/19/2020 at Unknown time    alendronate (FOSAMAX) 70 MG tablet    Past Week at Unknown time    ambrisentan (LETAIRIS) 10 MG Tab Take 10 mg by mouth once daily.   5/18/2020 at Unknown time    aspirin (ECOTRIN) 81 MG EC tablet Take 81 mg by mouth every evening.   5/18/2020 at Unknown time    atorvastatin (LIPITOR) 10 MG tablet Take 10 mg by mouth once daily.    5/18/2020 at Unknown time    azelastine (ASTELIN) 137 mcg (0.1 %) nasal spray 2 sprays by Nasal route 2 (two) times daily as needed.    5/19/2020 at Unknown time    biotin 1,000 mcg Chew Take 1 capsule by mouth once daily.    5/18/2020 at Unknown time    carvedilol (COREG) 3.125 MG tablet TAKE ONE TABLET BY MOUTH TWICE DAILY WITH MEALS 180 tablet 6 5/18/2020 at Unknown time    escitalopram oxalate (LEXAPRO) 10 MG tablet Take 10 mg by mouth once daily.   5/18/2020 at Unknown time    fluticasone (FLONASE) 50 mcg/actuation nasal spray 1 spray by Each Nare route as needed.   3 5/19/2020 at Unknown time    furosemide (LASIX) 40 MG tablet Take 1 tablet (40 mg total) by mouth once daily. 30 tablet 12 5/18/2020 at Unknown time    hydroxychloroquine (PLAQUENIL) 200 mg tablet Take 200 mg by mouth 2 (two) times daily.   Past Month at Unknown time    INCRUSE ELLIPTA 62.5 mcg/actuation DsDv Take 1 puff by mouth once daily.   3 Past Week at Unknown time    multivitamin capsule Take 1 capsule by mouth once daily.   5/18/2020 at Unknown time     mycophenolate (CELLCEPT) 500 mg Tab Take 1,500 mg by mouth 2 (two) times daily.    5/18/2020 at Unknown time    OFEV 150 mg Cap TAKE 1 CAPSULE BY MOUTH TWICE DAILY (EVERY 12 HOURS) WITH FOOD. CALL [phone] FOR REFILLS. 60 capsule 2 5/18/2020 at Unknown time    pantoprazole (PROTONIX) 40 MG tablet Take 1 tablet (40 mg total) by mouth 2 (two) times daily. 180 tablet 3 5/18/2020 at Unknown time    potassium chloride (MICRO-K) 10 MEQ CpSR Take 2 capsules (20 mEq total) by mouth once daily. 60 capsule 11 5/18/2020 at Unknown time    risedronate (ACTONEL) 150 MG Tab Take 150 mg by mouth every 30 days.  6 Past Month at Unknown time    VENTOLIN HFA 90 mcg/actuation inhaler 2 puffs every 4 (four) hours as needed.    Past Month at Unknown time    VITAMIN D2 50,000 unit capsule Take 50,000 Units by mouth As instructed. Twice a month   Past Week at Unknown time    amoxicillin-clavulanate 875-125mg (AUGMENTIN) 875-125 mg per tablet 1 tablet   Not Taking    busPIRone (BUSPAR) 10 MG tablet Take 10 mg by mouth 3 (three) times daily as needed (for anxiety).    More than a month at Unknown time    cevimeline (EVOXAC) 30 mg capsule Take 30 mg by mouth 3 (three) times daily.   Not Taking    fluconazole (DIFLUCAN) 150 MG Tab 1 tablet today, then repeat in a week   Not Taking    gabapentin (NEURONTIN) 400 MG capsule Take 400 mg by mouth 3 (three) times daily as needed (for bulging discs in neck; has not taken in months).   More than a month at Unknown time    guaiFENesin (MUCINEX) 600 mg 12 hr tablet Take 1,200 mg by mouth as needed for Congestion.   More than a month at Unknown time    levothyroxine (SYNTHROID) 137 MCG Tab tablet    Taking    nystatin (MYCOSTATIN) 100,000 unit/mL suspension USE 5MLS SWISH AND SWALLOW FOUR TIMES A DAY MOUTH/THROAT 7 DAYS   More than a month at Unknown time    teriparatide (FORTEO) 20 mcg/dose - 600 mcg/2.4 mL PnIj    Not Taking       Physical Exam:    Vital Signs:   Vitals:    05/19/20  0745   BP: 121/64   Pulse: 82   Temp: 98.3 °F (36.8 °C)       General Appearance: Well appearing in no acute distress  Eyes:    No scleral icterus  ENT: Neck supple, Lips, mucosa, and tongue normal; teeth and gums normal  Lungs: CTA anteriorly  Heart:  Regular rate, S1, S2 normal, no murmurs heard.  Abdomen: Soft, non tender, non distended with normal bowel sounds. No hepatosplenomegaly, ascites, or mass.  Extremities: No edema  Skin: No rash    Labs:  Lab Results   Component Value Date    WBC 7.58 03/09/2020    HGB 10.5 (L) 03/09/2020    HCT 36.3 (L) 03/09/2020     03/09/2020    CHOL 120 03/09/2020    TRIG 94 03/09/2020    HDL 43 03/09/2020    ALT 9 (L) 05/18/2020    AST 20 05/18/2020     05/18/2020    K 3.9 05/18/2020     05/18/2020    CREATININE 0.7 05/18/2020    BUN 11 05/18/2020    CO2 26 05/18/2020    TSH 10.507 (H) 03/09/2020    INR 0.9 03/09/2020    HGBA1C 5.1 03/09/2020       I have explained the risks and benefits of endoscopy procedures to the patient including but not limited to bleeding, perforation, infection, and death.  The patient was asked if they understand and allowed to ask any further questions to their satisfaction.      Chris Corona MD

## 2020-05-19 NOTE — ANESTHESIA PROCEDURE NOTES
Intubation  Performed by: Arnold King CRNA  Authorized by: Arnold King CRNA     Intubation:     Induction:  Intravenous    Intubated:  Postinduction    Mask Ventilation:  Very difficult with oral airway and nasal airway    Attempts:  1    Attempted By:  CRNA    Method of Intubation:  Video laryngoscopy    Blade:  Garcia 3    Laryngeal View Grade: Grade I - full view of chords      Difficult Airway Encountered?: No      Complications:  Laryngospasm and none (Pt spasmed after induction of MAC procedure. )    Airway Device:  Oral endotracheal tube    Airway Device Size:  7.5    Style/Cuff Inflation:  Cuffed    Tube secured:  22    Secured at:  The lips    Placement Verified By:  Capnometry    Complicating Factors:  None    Findings Post-Intubation:  BS equal bilateral and atraumatic/condition of teeth unchanged

## 2020-05-19 NOTE — TRANSFER OF CARE
"Anesthesia Transfer of Care Note    Patient: Fatimah Cha    Procedure(s) Performed: Procedure(s) (LRB):  ESOPHAGOGASTRODUODENOSCOPY (EGD) (N/A)    Patient location: PACU    Anesthesia Type: general    Transport from OR: Transported from OR on 6-10 L/min O2 by face mask with adequate spontaneous ventilation    Post pain: adequate analgesia    Post assessment: no apparent anesthetic complications and tolerated procedure well    Post vital signs: stable    Level of consciousness: alert and awake    Nausea/Vomiting: no nausea/vomiting    Complications: respiratory complications, Case was converted from a MAC to general     Transfer of care protocol was followed      Last vitals:   Visit Vitals  /64   Pulse 82   Temp 36.8 °C (98.3 °F)   Ht 5' 11" (1.803 m)   Wt 77.1 kg (170 lb)   SpO2 100%   Breastfeeding? No   BMI 23.71 kg/m²     "

## 2020-05-20 ENCOUNTER — HOSPITAL ENCOUNTER (OUTPATIENT)
Dept: PULMONOLOGY | Facility: CLINIC | Age: 65
Discharge: HOME | End: 2020-05-20
Payer: MEDICARE

## 2020-05-20 ENCOUNTER — OFFICE VISIT (OUTPATIENT)
Dept: TRANSPLANT | Facility: CLINIC | Age: 65
End: 2020-05-20
Payer: MEDICARE

## 2020-05-20 VITALS
DIASTOLIC BLOOD PRESSURE: 55 MMHG | BODY MASS INDEX: 26.51 KG/M2 | WEIGHT: 179 LBS | RESPIRATION RATE: 22 BRPM | TEMPERATURE: 99 F | HEIGHT: 69 IN | HEART RATE: 69 BPM | SYSTOLIC BLOOD PRESSURE: 106 MMHG | OXYGEN SATURATION: 97 %

## 2020-05-20 VITALS — HEIGHT: 69 IN | BODY MASS INDEX: 26.51 KG/M2 | WEIGHT: 179 LBS

## 2020-05-20 DIAGNOSIS — Z76.82 LUNG TRANSPLANT CANDIDATE: ICD-10-CM

## 2020-05-20 DIAGNOSIS — J84.10 PULMONARY FIBROSIS: ICD-10-CM

## 2020-05-20 DIAGNOSIS — I27.20 PULMONARY HYPERTENSION: ICD-10-CM

## 2020-05-20 DIAGNOSIS — J96.11 CHRONIC RESPIRATORY FAILURE WITH HYPOXIA: ICD-10-CM

## 2020-05-20 DIAGNOSIS — J84.10 PULMONARY FIBROSIS: Primary | ICD-10-CM

## 2020-05-20 DIAGNOSIS — J84.89 INTERSTITIAL LUNG DISEASE DUE TO CONNECTIVE TISSUE DISEASE: ICD-10-CM

## 2020-05-20 DIAGNOSIS — M35.02 SJOGREN'S SYNDROME WITH LUNG INVOLVEMENT: ICD-10-CM

## 2020-05-20 DIAGNOSIS — M34.9 SCLERODERMA INVOLVING LUNG: ICD-10-CM

## 2020-05-20 DIAGNOSIS — M35.9 INTERSTITIAL LUNG DISEASE DUE TO CONNECTIVE TISSUE DISEASE: ICD-10-CM

## 2020-05-20 LAB
FEF 25 75 LLN: 1.09
FEF 25 75 PRE REF: 118.3 %
FEF 25 75 REF: 2.25
FEV05 LLN: 1.15
FEV05 REF: 2.01
FEV1 FVC LLN: 65
FEV1 FVC PRE REF: 106.6 %
FEV1 FVC REF: 78
FEV1 LLN: 2.02
FEV1 PRE REF: 65.6 %
FEV1 REF: 2.73
FINAL PATHOLOGIC DIAGNOSIS: NORMAL
FVC LLN: 2.62
FVC PRE REF: 60.9 %
FVC REF: 3.53
GROSS: NORMAL
PEF LLN: 4.79
PEF PRE REF: 92.4 %
PEF REF: 6.78
PHYSICIAN COMMENT: ABNORMAL
PRE FEF 25 75: 2.67 L/S (ref 1.09–3.42)
PRE FET 100: 4.82 SEC
PRE FEV05 REF: 80.4 %
PRE FEV1 FVC: 83.21 % (ref 65.42–90.64)
PRE FEV1: 1.79 L (ref 2.02–3.44)
PRE FEV5: 1.62 L (ref 1.15–2.86)
PRE FVC: 2.15 L (ref 2.62–4.44)
PRE PEF: 6.26 L/S (ref 4.79–8.76)

## 2020-05-20 PROCEDURE — 94010 BREATHING CAPACITY TEST: ICD-10-PCS | Mod: 26,S$PBB,TXP, | Performed by: INTERNAL MEDICINE

## 2020-05-20 PROCEDURE — 99213 PR OFFICE/OUTPT VISIT, EST, LEVL III, 20-29 MIN: ICD-10-PCS | Mod: 25,S$PBB,TXP, | Performed by: INTERNAL MEDICINE

## 2020-05-20 PROCEDURE — 99213 OFFICE O/P EST LOW 20 MIN: CPT | Mod: PBBFAC,TXP,25 | Performed by: INTERNAL MEDICINE

## 2020-05-20 PROCEDURE — 94618 PULMONARY STRESS TESTING: CPT | Mod: 26,S$PBB,TXP, | Performed by: INTERNAL MEDICINE

## 2020-05-20 PROCEDURE — 94010 BREATHING CAPACITY TEST: CPT | Mod: PBBFAC,TXP | Performed by: INTERNAL MEDICINE

## 2020-05-20 PROCEDURE — 94618 PULMONARY STRESS TESTING: ICD-10-PCS | Mod: 26,S$PBB,TXP, | Performed by: INTERNAL MEDICINE

## 2020-05-20 PROCEDURE — 99999 PR PBB SHADOW E&M-EST. PATIENT-LVL III: CPT | Mod: PBBFAC,TXP,, | Performed by: INTERNAL MEDICINE

## 2020-05-20 PROCEDURE — 94010 BREATHING CAPACITY TEST: CPT | Mod: 26,S$PBB,TXP, | Performed by: INTERNAL MEDICINE

## 2020-05-20 PROCEDURE — 99213 OFFICE O/P EST LOW 20 MIN: CPT | Mod: 25,S$PBB,TXP, | Performed by: INTERNAL MEDICINE

## 2020-05-20 PROCEDURE — 94618 PULMONARY STRESS TESTING: CPT | Mod: PBBFAC,TXP | Performed by: INTERNAL MEDICINE

## 2020-05-20 PROCEDURE — 99999 PR PBB SHADOW E&M-EST. PATIENT-LVL III: ICD-10-PCS | Mod: PBBFAC,TXP,, | Performed by: INTERNAL MEDICINE

## 2020-05-20 RX ORDER — LEVOCETIRIZINE DIHYDROCHLORIDE 5 MG/1
5 TABLET, FILM COATED ORAL DAILY PRN
COMMUNITY
Start: 2020-04-07

## 2020-05-20 RX ORDER — LEVOTHYROXINE SODIUM 137 UG/1
1 CAPSULE ORAL DAILY
COMMUNITY

## 2020-05-20 NOTE — LETTER
May 21, 2020        Luis Ken  [address]  [facility] [address]  Phone: [phone]  Fax: [phone]             [facility]  [address]  [facility] [address]  Phone: [phone]   Patient: Fatimah Cha   MR Number: [id number]   YOB: 1955   Date of Visit: 5/20/2020       Dear Dr. Luis Ken    Thank you for referring Fatimah Cha to me for evaluation. Attached you will find relevant portions of my assessment and plan of care.    If you have questions, please do not hesitate to call me. I look forward to following Fatimah Cha along with you.    Sincerely,    Rodrick Garcia MD    Enclosure    If you would like to receive this communication electronically, please contact [email or web address] or [phone] to request [employer] access.    [employer] is a tool which provides read-only access to select patient information with whom you have a relationship. Its easy to use and provides real time access to review your patients record including encounter summaries, notes, results, and demographic information.    If you feel you have received this communication in error or would no longer like to receive these types of communications, please e-mail [email or web address]

## 2020-05-20 NOTE — PROCEDURES
Fatimah Cha is a 64 y.o.  female patient, who presents for a 6 minute walk test ordered by MD Jose.  The diagnosis is Pre Lung Transplant Evaluation; Pulmonary Fibrosis.  The patient's BMI is 26.4 kg/m2.  Predicted distance (lower limit of normal) is 334.31 meters.      Test Results:    The test was completed with stops.  The patient stopped 1 time for a total of 158 seconds.  The total time walked was 202 seconds.  During walking, the patient reported:  Dyspnea, Lightheadedness.  The patient used supplemental oxygen during testing.     05/20/2020---------Distance: 110.34 meters (362 feet)     O2 Sat % Supplemental Oxygen Heart Rate Blood Pressure Kushal Scale   Pre-exercise  (Resting) 98 % 4 L/M 72 bpm 108/58 mmHg 0.5   During Exercise 80 % 4 L/M 88 bpm 112/56 mmHg 7-8   Post-exercise  (Recovery) 97 % 4 L/M  65 bpm       Recovery Time: 172 seconds    Performing nurse/tech: Quintin RILEY      PREVIOUS STUDY:   09/10/2019---------Distance: 243.84 meters (800 feet)    Oxygen Qualification:      O2 Sat % Supplemental Oxygen Heart Rate Blood Pressure Kushal Scale   Pre-exercise  (Resting) 88 % Room Air 83 bpm 112/59 3   Pre-exercise  (Resting) 98 % 2 L/M  76 bpm  126/61  3    During Exercise   82 %  2 L/M  103 bpm  119/57  4    Post-exercise    98 %  2 L/M  83 bpm              CLINICAL INTERPRETATION:  Six minute walk distance is 110.34 meters (362 feet) with very heavy dyspnea.  During exercise, there was significant desaturation while breathing supplemental oxygen.  Both blood pressure and heart rate remained stable with walking.  The patient reported non-pulmonary symptoms during exercise.  Severe exercise impairment is likely due to desaturation and subjective symptoms.  The patient did complete the study, walking 202 seconds of the 360 second test.  Since the previous study in September 2019, exercise capacity is significantly worse.  Based upon age and body mass index, exercise capacity is less than  predicted.

## 2020-05-21 ENCOUNTER — TELEPHONE (OUTPATIENT)
Dept: TRANSPLANT | Facility: CLINIC | Age: 65
End: 2020-05-21

## 2020-05-21 DIAGNOSIS — M34.1 CREST (CALCINOSIS, RAYNAUD'S PHENOMENON, ESOPHAGEAL DYSFUNCTION, SCLERODACTYLY, TELANGIECTASIA): ICD-10-CM

## 2020-05-21 DIAGNOSIS — M34.9 SCLERODERMA INVOLVING LUNG: ICD-10-CM

## 2020-05-21 DIAGNOSIS — J84.10 PULMONARY FIBROSIS: Primary | ICD-10-CM

## 2020-05-21 DIAGNOSIS — I27.20 PULMONARY HYPERTENSION: ICD-10-CM

## 2020-05-21 NOTE — PROGRESS NOTES
LUNG TRANSPLANT PRE FOLLOW-UP    Referring Physician: Luis Ken    Reason for Visit:  Pre-lung transplant follow-up.         Date of Initial Evaluation: 3/9/2020                                                                                             History of Present Illness: Fatimah Cha is a 64 y.o. female who is on 2L of oxygen. She is on no assisted ventilation.  Her New York Heart Association Class is III and a Karnofsky score of 60% - Requires occasional assistance but is able to care for needs. She is not diabetic. She presents to clinic today for follow up while being worked up for lung transplant. She still has esophageal workup and catheterization pending before we can discuss her on selection.     Since her last clinic visit she says that she continues to feel short of breath on exertion and the lack of activity because of the sheltering in place order has made it worse. She can do all of her activities of daily living but it will take her longer to do. Has a dry cough. Denies fevers or chest pain. Was not able to complete her six-minute-walk test today because she could not breath well with her mask on.    Continues to take nintedanib. It causes her to have diarrhea which are easily controlled with Imodium.     Review of Systems   Constitutional: Positive for malaise/fatigue. Negative for chills, fever and weight loss.   HENT: Negative for congestion, ear pain, hearing loss, sinus pain and sore throat.    Eyes: Negative for blurred vision, double vision and photophobia.   Respiratory: Positive for cough (dry) and shortness of breath. Negative for sputum production and wheezing.    Cardiovascular: Negative for chest pain, orthopnea and leg swelling.   Gastrointestinal: Negative for abdominal pain, constipation, diarrhea, heartburn, nausea and vomiting.   Genitourinary: Negative for flank pain, frequency and urgency.   Musculoskeletal: Negative for back pain, falls, joint pain and myalgias.  "  Skin: Negative for itching and rash.   Neurological: Negative for dizziness, focal weakness, seizures, loss of consciousness, weakness and headaches.   Endo/Heme/Allergies: Does not bruise/bleed easily.   Psychiatric/Behavioral: Negative for depression. The patient is not nervous/anxious.      Objective:   BP (!) 106/55   Pulse 69   Temp 98.6 °F (37 °C)   Resp (!) 22   Ht 5' 9" (1.753 m)   Wt 81.2 kg (179 lb)   SpO2 97% Comment: 2 liters  BMI 26.43 kg/m²      Physical Exam   Constitutional: She is oriented to person, place, and time. She appears well-developed and well-nourished. No distress.   HENT:   Head: Normocephalic and atraumatic.   Nose: Nose normal.   Mouth/Throat: Oropharynx is clear and moist. No oropharyngeal exudate.   Eyes: Pupils are equal, round, and reactive to light. Conjunctivae and EOM are normal. Right eye exhibits no discharge. Left eye exhibits no discharge. No scleral icterus.   Neck: Normal range of motion. Neck supple. No JVD present. No tracheal deviation present. No thyromegaly present.   Cardiovascular: Normal rate, regular rhythm, normal heart sounds and intact distal pulses. Exam reveals no gallop and no friction rub.   No murmur heard.  Pulmonary/Chest: Effort normal. No stridor. No respiratory distress. She has no wheezes. She has rales in the right middle field, the right lower field, the left middle field and the left lower field. She exhibits no tenderness.   Abdominal: Soft. Bowel sounds are normal. She exhibits no distension and no mass. There is no tenderness. There is no rebound and no guarding.   Musculoskeletal: Normal range of motion. She exhibits no edema or tenderness.   Lymphadenopathy:     She has no cervical adenopathy.   Neurological: She is alert and oriented to person, place, and time.   Skin: Skin is warm and dry. No rash noted. She is not diaphoretic. No erythema. No pallor.     Labs:  Physical performance battery trend 9/10/2019 2/14/2020 3/9/2020 " 5/20/2020   Balance tests 0 4 0 4   Gait speed - 4 - 4   Chair tests - 1 - 1   Total score 0 9 0 9         Pulmonary Function Tests 5/20/2020 3/9/2020 2/14/2020 9/10/2019 4/23/2019 10/16/2018 4/19/2018   FVC 2.15 1.96 2.29 2.43 2.41 2.59 2.52   FEV1 1.79 1.7 1.99 2.1 2.04 2.11 2.11   TLC (liters) - - - - - 3.76 3.31   DLCO (ml/mmHg sec) - - 4.4 6.9 5.6 5.2 4.1   FVC% 61 - 57 68 70 64 -   FEV1% 66 - 62 76 75 67 -   FEF 25-75 - - - - 2.97 2.46 2.87   FEF 25-75% - - - - 115 95 -   TLC% - - - - - 62 -   RV - - - - - 1.17 0.79   RV% - - - - - 49 -   DLCO% - - 17 27 28 21 -       6MW 5/20/2020 9/10/2019 4/23/2019 1/10/2018 10/3/2017 7/3/2017 3/30/2017   6MWT Status completed with stops completed with stops completed without stopping completed without stopping completed without stopping completed without stopping not completed   Patient Reported Dyspnea;Lightheadedness Dyspnea;Lightheadedness Dyspnea;Leg pain Dyspnea Dyspnea Dyspnea Dyspnea   Was O2 used? Yes Yes Yes Yes Yes - No   Delivery Method Cannula;Pull Tank;Continuous Flow Cannula;Pull Tank;Continuous Flow Cannula;Pull Tank;Continuous Flow Cannula;Pull Tank;Continuous Flow Cannula;Pull Tank;Continuous Flow - -   6MW Distance walked (feet)  940 875 - 400   Distance walked (meters) 110.34 243.84 304.8 286.51 266.7 - 121.92   Did patient stop? Yes Yes No No No No Yes   Oxygen Saturation 98 88 96 96 97 96 93   Supplemental Oxygen 4 L/M Room Air 3 L/M 3 L/M 3 L/M Room Air Room Air   Heart Rate 72 83 86 86 86 79 93   Blood Pressure 108/58 112/59 118/57 112/56 120/64 106/55 133/78   Kushal Dyspnea Rating  very, very light (just noticeable) moderate very light light light very light moderate   Oxygen Saturation 80 - 80 88 83 80 78   Supplemental Oxygen 4 L/M - 3 L/M 3 L/M 3 L/M Room Air Room Air   Heart Rate 88 - 90 92 102 93 111   Blood Pressure 112/56 - 119/54 118/57 128/66 114/64 139/65   Kushal Dyspnea Rating  very heavy - somewhat heavy moderate moderate  somewhat heavy somewhat heavy   Recovery Time (seconds) 172 - 96 97 116 142 151   Oxygen Saturation 97 - 95 97 94 94 92   Supplemental Oxygen 4 L/M - 3 L/M 3 L/M 3 L/M Room Air Room Air   Heart Rate 65 - 91 91 91 76 95       Assessment:-  1. Pulmonary fibrosis    2. Scleroderma involving lung    3. Chronic respiratory failure with hypoxia    4. Pulmonary hypertension    5. Lung transplant candidate      Plan:   1. She will continue mycophenolate and nintedanib for her scleroderma associated pulmonary fibrosis. FVC still declining but slightly higher than her last clinic visit.     2. Continue oxygen supplementation and exercise as tolerated. She will need to attend pulmonary rehabilitation.    3. Continue therapy for pulmonary hypertension with ambrisnetan and riociguat as prescribed by Dr. Russell.    4. Continue lung transplant workup. Will discuss in selection when workup is complete.      Rodrick Garcia MD  Pulmonary/Critical Care and Lung Transplantation  [facility]

## 2020-05-21 NOTE — TELEPHONE ENCOUNTER
MD Dori Mora, RN             Needs pulmonary rehab      Orders received from Dr. Garcia for pulmonary rehab.  See clinic note dated 5/20/20.  Contacted Ms. Cha regarding Dr. Garcia's orders for pulmonary rehab.  She stated that she was enrolled in pulmonary rehab twice a week last year at [facility], but she stopped going to care for her  after his stroke.  Informed her that we will contact [facility] to see if they have resumed the pulmonary rehab program since the COVID-19 pandemic.  Informed her that orders will be sent to [facility] if they are currently enrolling patients in pulmonary rehab.  Informed her that she will be kept informed of the status.  She verbalized her understanding of all discussed.

## 2020-05-21 NOTE — TELEPHONE ENCOUNTER
FL[facility]O notified of orders for pulmonary rehab & patient preference to be seen at [facility] ([phone]  F: [phone]). FLACO placed call To [facility] & was advised that they are willing to accept patients and will be starting up pulm rehab services again 6/1/2020. FLACO faxed order and clinical data to [facility]. Telephone call & voice mail left for patient to advise of above. FLACO remains available.

## 2020-05-25 NOTE — ANESTHESIA POSTPROCEDURE EVALUATION
Anesthesia Post Evaluation    Patient: Fatimah Cha    Procedure(s) Performed: Procedure(s) (LRB):  ESOPHAGOGASTRODUODENOSCOPY (EGD) (N/A)    Final Anesthesia Type: general    Patient location during evaluation: PACU  Patient participation: Yes- Able to Participate  Level of consciousness: awake and alert  Post-procedure vital signs: reviewed and stable  Pain management: adequate  Airway patency: patent    PONV status at discharge: No PONV  Anesthetic complications: no      Cardiovascular status: blood pressure returned to baseline  Respiratory status: unassisted  Hydration status: euvolemic  Follow-up not needed.                                              No case tracking events are documented in the log.      Pain/Juan C Score: No data recorded

## 2020-05-26 DIAGNOSIS — J84.10 PULMONARY FIBROSIS: ICD-10-CM

## 2020-05-26 DIAGNOSIS — M34.9 SCLERODERMA INVOLVING LUNG: ICD-10-CM

## 2020-05-26 RX ORDER — NINTEDANIB 150 MG/1
CAPSULE ORAL
Qty: 60 CAPSULE | Refills: 2 | Status: SHIPPED | OUTPATIENT
Start: 2020-05-26 | End: 2020-10-06

## 2020-06-01 ENCOUNTER — LAB VISIT (OUTPATIENT)
Dept: INTERNAL MEDICINE | Facility: CLINIC | Age: 65
End: 2020-06-01
Payer: MEDICARE

## 2020-06-01 DIAGNOSIS — R13.10 DYSPHAGIA, UNSPECIFIED TYPE: ICD-10-CM

## 2020-06-01 LAB — SARS-COV-2 RNA RESP QL NAA+PROBE: NOT DETECTED

## 2020-06-01 PROCEDURE — U0003 INFECTIOUS AGENT DETECTION BY NUCLEIC ACID (DNA OR RNA); SEVERE ACUTE RESPIRATORY SYNDROME CORONAVIRUS 2 (SARS-COV-2) (CORONAVIRUS DISEASE [COVID-19]), AMPLIFIED PROBE TECHNIQUE, MAKING USE OF HIGH THROUGHPUT TECHNOLOGIES AS DESCRIBED BY CMS-2020-01-R: HCPCS

## 2020-06-02 ENCOUNTER — HOSPITAL ENCOUNTER (OUTPATIENT)
Facility: HOSPITAL | Age: 65
Discharge: HOME | End: 2020-06-02
Attending: INTERNAL MEDICINE | Admitting: INTERNAL MEDICINE
Payer: MEDICARE

## 2020-06-02 VITALS
SYSTOLIC BLOOD PRESSURE: 110 MMHG | HEIGHT: 71 IN | BODY MASS INDEX: 23.52 KG/M2 | DIASTOLIC BLOOD PRESSURE: 63 MMHG | TEMPERATURE: 98 F | OXYGEN SATURATION: 100 % | WEIGHT: 168 LBS | HEART RATE: 68 BPM | RESPIRATION RATE: 16 BRPM

## 2020-06-02 DIAGNOSIS — R13.10 DYSPHAGIA: ICD-10-CM

## 2020-06-02 PROCEDURE — 25000003 PHARM REV CODE 250: Mod: NTX | Performed by: INTERNAL MEDICINE

## 2020-06-02 RX ORDER — LIDOCAINE HYDROCHLORIDE 20 MG/ML
JELLY TOPICAL ONCE
Status: COMPLETED | OUTPATIENT
Start: 2020-06-02 | End: 2020-06-02

## 2020-06-02 RX ADMIN — LIDOCAINE HYDROCHLORIDE 10 ML: 20 JELLY TOPICAL at 10:06

## 2020-06-02 NOTE — OR NURSING
10:55 Esophageal Manometry Esophageal Manometry - Naris Laterality: Left  Toleration: poor  Comment: Unable to pass catheter to right naris due to hx of nasal fracture. Resistance and discomfort. Left naris patent. Catheter passed with ease but patient unable to tolerate catheter placement secondary to sensitive gag reflex. Catheter passed to mid esophagus easily. Expected, normal gag reflex during placement. Verbal instructions,coaching and comfort measures provided. Patient unable to tolerate and requested aborting study. Catheter removed,, procedure aborted. Additional support provided post catheter removal. Patient chose not to attempt again. No pain on discharge. To home with sister on 3l nasal canula.

## 2020-06-02 NOTE — DISCHARGE INSTRUCTIONS
Esophageal Manometry     A catheter measures pressure along the esophagus.     Esophageal manometry is a test to measure the strength and function of the esophagus (the food pipe). Results can help identify causes of heartburn, swallowing problems, or chest pain. The test can also help plan surgery and determine the success of previous surgery.  Preparing for the test  Be sure to talk to your healthcare provider about any medicines you take. Some medicines can affect the test results. Also ask any questions you have about the risks of the test. These include irritation to the nose and throat. Be sure not to smoke, eat, or drink for up to 12 hours before the test.  During the test  Manometry takes about an hour. Usually you lie down during the test. Your nose and throat are numbed. Then a soft, thin tube is placed through the nose and down the esophagus. At first you may notice a gagging feeling. You will be asked to swallow several times. Holes along the tube measure the pressure while you swallow. Measurements are printed out as tracings, much like a heart test tracing. After the test, another catheter may be left in the esophagus for up to 24 hours to measure acid (pH) levels.  After esophageal manometry  Youll probably discuss the results of the test with your healthcare provider at another appointment. This is because time is needed to review the tracings. You may have a mild sore throat for a short time. As soon as the numbness in your throat is gone, you can return to eating and your normal activities.  Date Last Reviewed: 6/1/2016  © 1885-4389 [employer]. [address] [place] [address], [place] [address]. All rights reserved. This information is not intended as a substitute for professional medical care. Always follow your healthcare professional's instructions.

## 2020-06-05 ENCOUNTER — TELEPHONE (OUTPATIENT)
Dept: GASTROENTEROLOGY | Facility: HOSPITAL | Age: 65
End: 2020-06-05

## 2020-06-05 PROCEDURE — 99499 NO LOS: ICD-10-PCS | Mod: NTX,,, | Performed by: INTERNAL MEDICINE

## 2020-06-05 PROCEDURE — 99499 UNLISTED E&M SERVICE: CPT | Mod: NTX,,, | Performed by: INTERNAL MEDICINE

## 2020-06-05 NOTE — TELEPHONE ENCOUNTER
----- Message from Rohit Snow RN sent at 6/4/2020  5:55 AM CDT -----  FYBENJY,    Mrs Cha was not able to tolerate esophageal manometry on 06/02/2020. Hypersensitive gag reflex. Procedure aborted per her request. Documented in Epic record.  Thanks,  Bart

## 2020-06-05 NOTE — TELEPHONE ENCOUNTER
KAYLEE.  Patient was unable to tolerate esophageal manometry and pH testing.  At the RN that performed the procedure does not think that patient would tolerated even if we were to place it under sedation.  Follow-up with me as previously requested

## 2020-06-05 NOTE — [employer] PATIENT INSTRUCTIONS
Discharge Summary/Instructions for after Manometry (esophageal)  Patient Name: Fatimah Cha  Patient MRN: [id number]  Patient YOB: 1955 Friday, June 5, 2020  Radha Santiago MD  1.  Do Not eat or drink anything for 1 hour.  Try sips of water first.  If   tolerated, resume your regular diet or one recommended by your physician.  2.  Do not drive, operate machinery, make critical decisions, or do   activities that require coordination or balance for 24 hours.  3.  You may experience a sore nose and throat for 24 to 48 hours.  You may   use throat lozenges or gargle with warm salt water to relieve the   discomfort.  4.  Do not use any medication containing aspirin for 10 days.  5.  Go directly to the emergency room if you notice any of the following:   Chills and/or fever over 101   Persistent Nose Bleeding   Persistent vomiting or vomiting with blood/nasal regurgitation   Severe abdominal pain, other than gas cramps   Severe chest pain   Black, tarry stools     Your doctor recommends these additional instructions:  None  If you have any questions or problems, please call your physician.  EMERGENCY PHONE NUMBER: [phone]  LAB RESULTS: [phone]  Radha Santiago MD  6/5/2020 4:49:31 PM  This report has been verified and signed electronically.  [employer]

## 2020-06-08 ENCOUNTER — LAB VISIT (OUTPATIENT)
Dept: SURGERY | Facility: CLINIC | Age: 65
End: 2020-06-08
Payer: MEDICARE

## 2020-06-08 ENCOUNTER — TELEPHONE (OUTPATIENT)
Dept: GASTROENTEROLOGY | Facility: HOSPITAL | Age: 65
End: 2020-06-08

## 2020-06-08 DIAGNOSIS — Z76.82 LUNG TRANSPLANT CANDIDATE: ICD-10-CM

## 2020-06-08 LAB — SARS-COV-2 RNA RESP QL NAA+PROBE: NOT DETECTED

## 2020-06-08 PROCEDURE — U0003 INFECTIOUS AGENT DETECTION BY NUCLEIC ACID (DNA OR RNA); SEVERE ACUTE RESPIRATORY SYNDROME CORONAVIRUS 2 (SARS-COV-2) (CORONAVIRUS DISEASE [COVID-19]), AMPLIFIED PROBE TECHNIQUE, MAKING USE OF HIGH THROUGHPUT TECHNOLOGIES AS DESCRIBED BY CMS-2020-01-R: HCPCS | Mod: NTX

## 2020-06-08 NOTE — TELEPHONE ENCOUNTER
MD Radha Mora MD   Caller: Unspecified (Today,  2:42 PM)             The dysphagia gives me more concern. I may discuss her with what we have and go from there. I'll let you know if we decide on the BRAVO.

## 2020-06-08 NOTE — TELEPHONE ENCOUNTER
----- Message from Rodrick Garcia MD sent at 6/8/2020  1:18 PM CDT -----  Raleigh Porter,  What do you recommend going forward since manometry could not be performed?  She is declining and we need to decide on listing.   Frederick

## 2020-06-08 NOTE — TELEPHONE ENCOUNTER
I do not have an alternative I can offer to assess dysphagia. She probably has scleroderma esophagus based on esophagogram    I could offer BRAVO to assess GERD.  She is likely to have gerd bc she has a lg hiatal hernia so I would assume she has it.    Let me know if you think PAT would help you?  Charlotte

## 2020-06-09 ENCOUNTER — HOSPITAL ENCOUNTER (OUTPATIENT)
Facility: HOSPITAL | Age: 65
Discharge: HOME | End: 2020-06-09
Attending: INTERNAL MEDICINE | Admitting: INTERNAL MEDICINE
Payer: MEDICARE

## 2020-06-09 VITALS
TEMPERATURE: 98 F | BODY MASS INDEX: 23.8 KG/M2 | WEIGHT: 170 LBS | HEIGHT: 71 IN | SYSTOLIC BLOOD PRESSURE: 129 MMHG | RESPIRATION RATE: 18 BRPM | HEART RATE: 90 BPM | OXYGEN SATURATION: 100 % | DIASTOLIC BLOOD PRESSURE: 59 MMHG

## 2020-06-09 DIAGNOSIS — I50.42 CHRONIC COMBINED SYSTOLIC AND DIASTOLIC HEART FAILURE: ICD-10-CM

## 2020-06-09 DIAGNOSIS — Z76.82 LUNG TRANSPLANT CANDIDATE: ICD-10-CM

## 2020-06-09 LAB
ABO + RH BLD: NORMAL
ANION GAP SERPL CALC-SCNC: 10 MMOL/L (ref 8–16)
APTT BLDCRRT: 27.8 SEC (ref 21–32)
BLD GP AB SCN CELLS X3 SERPL QL: NORMAL
BUN SERPL-MCNC: 10 MG/DL (ref 8–23)
CALCIUM SERPL-MCNC: 7.9 MG/DL (ref 8.7–10.5)
CHLORIDE SERPL-SCNC: 106 MMOL/L (ref 95–110)
CO2 SERPL-SCNC: 25 MMOL/L (ref 23–29)
CREAT SERPL-MCNC: 0.7 MG/DL (ref 0.5–1.4)
ERYTHROCYTE [DISTWIDTH] IN BLOOD BY AUTOMATED COUNT: 17.6 % (ref 11.5–14.5)
EST. GFR  (AFRICAN AMERICAN): >60 ML/MIN/1.73 M^2
EST. GFR  (NON AFRICAN AMERICAN): >60 ML/MIN/1.73 M^2
GLUCOSE SERPL-MCNC: 82 MG/DL (ref 70–110)
HCT VFR BLD AUTO: 37.4 % (ref 37–48.5)
HGB BLD-MCNC: 11.1 G/DL (ref 12–16)
INR PPP: 0.9 (ref 0.8–1.2)
MCH RBC QN AUTO: 25.8 PG (ref 27–31)
MCHC RBC AUTO-ENTMCNC: 29.7 G/DL (ref 32–36)
MCV RBC AUTO: 87 FL (ref 82–98)
PLATELET # BLD AUTO: 239 K/UL (ref 150–350)
PMV BLD AUTO: 9.9 FL (ref 9.2–12.9)
POTASSIUM SERPL-SCNC: 2.9 MMOL/L (ref 3.5–5.1)
PROTHROMBIN TIME: 9.8 SEC (ref 9–12.5)
RBC # BLD AUTO: 4.3 M/UL (ref 4–5.4)
SODIUM SERPL-SCNC: 141 MMOL/L (ref 136–145)
WBC # BLD AUTO: 5.44 K/UL (ref 3.9–12.7)

## 2020-06-09 PROCEDURE — 86901 BLOOD TYPING SEROLOGIC RH(D): CPT | Mod: TXP

## 2020-06-09 PROCEDURE — 93460 PR CATH PLACE/CORON ANGIO, IMG SUPER/INTERP,R&L HRT CATH, L HRT VENTRIC: ICD-10-PCS | Mod: 26,TXP,, | Performed by: INTERNAL MEDICINE

## 2020-06-09 PROCEDURE — 85610 PROTHROMBIN TIME: CPT | Mod: TXP

## 2020-06-09 PROCEDURE — C1769 GUIDE WIRE: HCPCS | Mod: TXP | Performed by: INTERNAL MEDICINE

## 2020-06-09 PROCEDURE — 93460 R&L HRT ART/VENTRICLE ANGIO: CPT | Mod: TXP | Performed by: INTERNAL MEDICINE

## 2020-06-09 PROCEDURE — 85730 THROMBOPLASTIN TIME PARTIAL: CPT | Mod: TXP

## 2020-06-09 PROCEDURE — 63600175 PHARM REV CODE 636 W HCPCS: Mod: TXP | Performed by: INTERNAL MEDICINE

## 2020-06-09 PROCEDURE — 25000003 PHARM REV CODE 250: Mod: TXP | Performed by: INTERNAL MEDICINE

## 2020-06-09 PROCEDURE — 25000003 PHARM REV CODE 250: Mod: TXP | Performed by: STUDENT IN AN ORGANIZED HEALTH CARE EDUCATION/TRAINING PROGRAM

## 2020-06-09 PROCEDURE — 25500020 PHARM REV CODE 255: Mod: TXP | Performed by: INTERNAL MEDICINE

## 2020-06-09 PROCEDURE — 99152 MOD SED SAME PHYS/QHP 5/>YRS: CPT | Mod: TXP | Performed by: INTERNAL MEDICINE

## 2020-06-09 PROCEDURE — 99152 MOD SED SAME PHYS/QHP 5/>YRS: CPT | Mod: TXP,,, | Performed by: INTERNAL MEDICINE

## 2020-06-09 PROCEDURE — C1894 INTRO/SHEATH, NON-LASER: HCPCS | Mod: TXP | Performed by: INTERNAL MEDICINE

## 2020-06-09 PROCEDURE — C1751 CATH, INF, PER/CENT/MIDLINE: HCPCS | Mod: TXP | Performed by: INTERNAL MEDICINE

## 2020-06-09 PROCEDURE — 99153 MOD SED SAME PHYS/QHP EA: CPT | Mod: TXP | Performed by: INTERNAL MEDICINE

## 2020-06-09 PROCEDURE — 93460 R&L HRT ART/VENTRICLE ANGIO: CPT | Mod: 26,TXP,, | Performed by: INTERNAL MEDICINE

## 2020-06-09 PROCEDURE — C1887 CATHETER, GUIDING: HCPCS | Mod: TXP | Performed by: INTERNAL MEDICINE

## 2020-06-09 PROCEDURE — 85027 COMPLETE CBC AUTOMATED: CPT | Mod: TXP

## 2020-06-09 PROCEDURE — 80048 BASIC METABOLIC PNL TOTAL CA: CPT | Mod: TXP

## 2020-06-09 PROCEDURE — 99152 PR MOD CONSCIOUS SEDATION, SAME PHYS, 5+ YRS, FIRST 15 MIN: ICD-10-PCS | Mod: TXP,,, | Performed by: INTERNAL MEDICINE

## 2020-06-09 RX ORDER — LIDOCAINE HYDROCHLORIDE 20 MG/ML
INJECTION, SOLUTION INFILTRATION; PERINEURAL
Status: DISCONTINUED | OUTPATIENT
Start: 2020-06-09 | End: 2020-06-09 | Stop reason: HOSPADM

## 2020-06-09 RX ORDER — POTASSIUM CHLORIDE 20 MEQ/1
40 TABLET, EXTENDED RELEASE ORAL ONCE
Status: COMPLETED | OUTPATIENT
Start: 2020-06-09 | End: 2020-06-09

## 2020-06-09 RX ORDER — POTASSIUM CHLORIDE 7.45 MG/ML
10 INJECTION INTRAVENOUS
Status: COMPLETED | OUTPATIENT
Start: 2020-06-09 | End: 2020-06-09

## 2020-06-09 RX ORDER — DIPHENHYDRAMINE HCL 50 MG
50 CAPSULE ORAL ONCE
Status: COMPLETED | OUTPATIENT
Start: 2020-06-09 | End: 2020-06-09

## 2020-06-09 RX ORDER — SODIUM CHLORIDE 9 MG/ML
INJECTION, SOLUTION INTRAVENOUS CONTINUOUS
Status: DISCONTINUED | OUTPATIENT
Start: 2020-06-09 | End: 2020-06-09 | Stop reason: HOSPADM

## 2020-06-09 RX ORDER — MIDAZOLAM HYDROCHLORIDE 1 MG/ML
INJECTION, SOLUTION INTRAMUSCULAR; INTRAVENOUS
Status: DISCONTINUED | OUTPATIENT
Start: 2020-06-09 | End: 2020-06-09 | Stop reason: HOSPADM

## 2020-06-09 RX ORDER — FENTANYL CITRATE 50 UG/ML
INJECTION, SOLUTION INTRAMUSCULAR; INTRAVENOUS
Status: DISCONTINUED | OUTPATIENT
Start: 2020-06-09 | End: 2020-06-09 | Stop reason: HOSPADM

## 2020-06-09 RX ORDER — HEPARIN SODIUM 1000 [USP'U]/ML
INJECTION, SOLUTION INTRAVENOUS; SUBCUTANEOUS
Status: DISCONTINUED | OUTPATIENT
Start: 2020-06-09 | End: 2020-06-09 | Stop reason: HOSPADM

## 2020-06-09 RX ORDER — DIPHENOXYLATE HYDROCHLORIDE AND ATROPINE SULFATE 2.5; .025 MG/1; MG/1
1 TABLET ORAL DAILY
COMMUNITY

## 2020-06-09 RX ADMIN — SODIUM CHLORIDE: 0.9 INJECTION, SOLUTION INTRAVENOUS at 09:06

## 2020-06-09 RX ADMIN — POTASSIUM CHLORIDE 10 MEQ: 7.46 INJECTION, SOLUTION INTRAVENOUS at 10:06

## 2020-06-09 RX ADMIN — CALCIUM GLUCONATE 1000 MG: 98 INJECTION, SOLUTION INTRAVENOUS at 12:06

## 2020-06-09 RX ADMIN — POTASSIUM CHLORIDE 40 MEQ: 1500 TABLET, EXTENDED RELEASE ORAL at 02:06

## 2020-06-09 RX ADMIN — POTASSIUM CHLORIDE 10 MEQ: 7.46 INJECTION, SOLUTION INTRAVENOUS at 12:06

## 2020-06-09 RX ADMIN — SODIUM CHLORIDE 1.95 ML/KG/HR: 0.9 INJECTION, SOLUTION INTRAVENOUS at 10:06

## 2020-06-09 RX ADMIN — DIPHENHYDRAMINE HYDROCHLORIDE 50 MG: 50 CAPSULE ORAL at 09:06

## 2020-06-09 NOTE — Clinical Note
The PA catheter is inserted into the superior vena cava. Hemodynamics performed. O2 saturation measured at 66%.

## 2020-06-09 NOTE — DISCHARGE SUMMARY
Discharge Summary  Interventional Cardiology      Admit Date: 6/9/2020    Discharge Date:  6/9/2020    Attending Physician: Irving Flores MD    Discharge Physician: Ladarius Burton MD    Principal Diagnoses: Lung Transplant Candidate  Indication for Admission: Lung Transplant candidate.     Discharged Condition: Good    Hospital Course:   Pt is a lung transplant candidate here for LHC and RHC. LHC with normal coronaries.  RHC: RA, 10/5 (5), RV 84/4 (8), PW 21/20 (16), PA 84/24 (29)  No complications noted.     Outpatient Plan:  F/U with Lung Transplant.    Diet: Cardiac    Activity: Ad constanza, wound care instructions provided    Disposition: Home    Discharge Medications:      Medication List      ASK your doctor about these medications    ADEMPAS 2.5 mg tablet  Generic drug:  riociguat     alendronate 70 MG tablet  Commonly known as:  FOSAMAX     aspirin 81 MG EC tablet  Commonly known as:  ECOTRIN     atorvastatin 10 MG tablet  Commonly known as:  LIPITOR     AUGMENTIN 875-125 mg per tablet  Generic drug:  amoxicillin-clavulanate 875-125mg     azelastine 137 mcg (0.1 %) nasal spray  Commonly known as:  ASTELIN     biotin 1,000 mcg Chew     busPIRone 10 MG tablet  Commonly known as:  BUSPAR     carvediloL 3.125 MG tablet  Commonly known as:  COREG  TAKE ONE TABLET BY MOUTH TWICE DAILY WITH MEALS     cevimeline 30 mg capsule  Commonly known as:  EVOXAC     DIFLUCAN 150 MG Tab  Generic drug:  fluconazole     diphenoxylate-atropine 2.5-0.025 mg 2.5-0.025 mg per tablet  Commonly known as:  LOMOTIL     escitalopram oxalate 10 MG tablet  Commonly known as:  LEXAPRO     fluticasone propionate 50 mcg/actuation nasal spray  Commonly known as:  FLONASE     FORTEO 20 mcg/dose - 600 mcg/2.4 mL Pnij  Generic drug:  teriparatide     furosemide 40 MG tablet  Commonly known as:  LASIX  Take 1 tablet (40 mg total) by mouth once daily.     gabapentin 400 MG capsule  Commonly known as:  NEURONTIN     guaiFENesin 600 mg 12 hr  tablet  Commonly known as:  MUCINEX     hydroxychloroquine 200 mg tablet  Commonly known as:  PLAQUENIL     INCRUSE ELLIPTA 62.5 mcg/actuation inhalation capsule  Generic drug:  umeclidinium     LETAIRIS 10 MG Tab  Generic drug:  ambrisentan     levocetirizine 5 MG tablet  Commonly known as:  XYZAL     * TIROSINT 137 mcg Cap  Generic drug:  levothyroxine     * levothyroxine 137 MCG Tab tablet  Commonly known as:  SYNTHROID     multivitamin capsule     mycophenolate 500 mg Tab  Commonly known as:  CELLCEPT     nystatin 100,000 unit/mL suspension  Commonly known as:  MYCOSTATIN     OFEV 150 mg Cap  Generic drug:  nintedanib  TAKE 1 CAPSULE BY MOUTH TWICE DAILY (EVERY 12 HOURS) WITH FOOD. CALL [phone] FOR REFILLS.     pantoprazole 40 MG tablet  Commonly known as:  PROTONIX  Take 1 tablet (40 mg total) by mouth 2 (two) times daily.     potassium chloride 10 MEQ Cpsr  Commonly known as:  MICRO-K  Take 2 capsules (20 mEq total) by mouth once daily.     risedronate 150 MG Tab  Commonly known as:  ACTONEL     * VENTOLIN HFA 90 mcg/actuation inhaler  Generic drug:  albuterol     * albuterol 2.5 mg /3 mL (0.083 %) nebulizer solution  Commonly known as:  PROVENTIL     VITAMIN D2 50,000 unit Cap  Generic drug:  ergocalciferol         * This list has 4 medication(s) that are the same as other medications prescribed for you. Read the directions carefully, and ask your doctor or other care provider to review them with you.              Follow Up:      Ladarius Burton MD  Cardiology Fellow  Pager: [phone]  6/9/2020 10:41 AM

## 2020-06-09 NOTE — PLAN OF CARE
Pt transferred from cath lab via stretcher.  Vss.  Post op orders and assessment initiated.  Pt aao, tolerating po.  Family called to bs.  Pt in no acute distress and verbalizes no complaints. Call bell within reach.  Will continue to monitor.

## 2020-06-09 NOTE — H&P
Cardiology Clinic Note  Reason for Visit: RHC/LHC Pre-Lung Tx evaluation  Referring MD: Jose     HPI:   63 y/o WF with CREST syndrome, ILD, pHTN, HFpEF, 2l home O2 here for RHC/LHC as part of Pre-Lung Tx evaluation. Nik angina. Has chronic MORAES with minimal activity and at rest. +2 pillow orthopnea and mild LE edema that is chronic. Never had coronary angiogram.      ROS:    Constitution: Negative for fever or chills. Negative for weight loss or gain.   HENT: Negative for sore throat or headaches. Negative for rhinorrhea.  Eyes: Negative for blurred or double vision.   Cardiovascular: See above  Pulmonary: + SOB. Negative for cough.   Gastrointestinal: Negative for abdominal pain. Negative for nausea/ vomiting. Negative for diarrhea.   : Negative for dysuria.   Neurological: Negative for focal weakness or sensory changes.  PMH:           Past Medical History:   Diagnosis Date    Anxiety      CHF (congestive heart failure)      Colitis      CREST syndrome      GERD (gastroesophageal reflux disease)      Hypercholesteremia      Hypertension      Interstitial lung disease      Osteopenia      Pulmonary fibrosis      Pulmonary hypertension      Raynaud disease      Respiratory distress      Sciatica      Scleroderma      Sjoegren syndrome              Past Surgical History:   Procedure Laterality Date    BREAST BIOPSY        CARDIAC CATHETERIZATION   2013    COLONOSCOPY        COLONOSCOPY N/A 11/15/2017     Procedure: COLONOSCOPY;  Surgeon: Radha Santiago MD;  Location: [facility] ([address] [place] [address]);  Service: Endoscopy;  Laterality: N/A;  2nd floor.  Pulm htn, ILD on o2.  Pls review w anesthesia.  Pt has cardio-pulm workup in progress at [facility] by Dr. Russell.                 reese monsalve RN- I reviewed Ms. Cha's history with Dr. Leopold (Anesthesia) and based on her medical history of:  Pulmonary Hyper    foot surgery x2        lumpectomy (benign)        LUNG BIOPSY   03/2013    RIGHT  HEART CATHETERIZATION Right 12/20/2018     Procedure: INSERTION, CATHETER, RIGHT HEART;  Surgeon: Renetta Russell MD;  [facility] CATH LAB;  Service: Cardiology;  Laterality: Right;    THYROIDECTOMY        TONSILLECTOMY        TUBAL LIGATION        UPPER GASTROINTESTINAL ENDOSCOPY          Allergies:           Review of patient's allergies indicates:   Allergen Reactions    Adhesive tape-silicones        Medications:             Current Outpatient Medications on File Prior to Visit   Medication Sig Dispense Refill    ADEMPAS 2.5 mg tablet Take 2.5 mg by mouth 3 (three) times daily.         albuterol (PROVENTIL) 2.5 mg /3 mL (0.083 %) nebulizer solution USE ONE VIAL in Nebulizer EVERY 6 HOURS AS NEEDED FOR SHORTNESS OF BREATH   6    ambrisentan (LETAIRIS) 10 MG Tab Take 10 mg by mouth once daily.        aspirin (ECOTRIN) 81 MG EC tablet Take 81 mg by mouth every evening.        atorvastatin (LIPITOR) 10 MG tablet Take 10 mg by mouth once daily.         azelastine (ASTELIN) 137 mcg (0.1 %) nasal spray 2 sprays by Nasal route 2 (two) times daily as needed.         biotin 1,000 mcg Chew Take 1 capsule by mouth once daily.         busPIRone (BUSPAR) 10 MG tablet Take 10 mg by mouth 3 (three) times daily as needed (for anxiety).         carvedilol (COREG) 3.125 MG tablet TAKE ONE TABLET BY MOUTH TWICE DAILY WITH MEALS 180 tablet 6    cevimeline (EVOXAC) 30 mg capsule Take 30 mg by mouth 3 (three) times daily.        escitalopram oxalate (LEXAPRO) 10 MG tablet Take 10 mg by mouth once daily.        fluticasone (FLONASE) 50 mcg/actuation nasal spray 1 spray by Each Nare route as needed.    3    furosemide (LASIX) 40 MG tablet Take 1 tablet (40 mg total) by mouth once daily. 30 tablet 12    gabapentin (NEURONTIN) 400 MG capsule Take 400 mg by mouth 3 (three) times daily as needed (for bulging discs in neck; has not taken in months).        guaiFENesin (MUCINEX) 600 mg 12 hr tablet Take 1,200 mg by  mouth as needed for Congestion.        INCRUSE ELLIPTA 62.5 mcg/actuation DsDv Take 1 puff by mouth once daily.    3    multivitamin capsule Take 1 capsule by mouth once daily.        mycophenolate (CELLCEPT) 500 mg Tab Take 1,500 mg by mouth 2 (two) times daily.         nintedanib (OFEV) 150 mg Cap Take 150 mg by mouth 2 (two) times daily. 60 capsule 3    pantoprazole (PROTONIX) 40 MG tablet Take 40 mg by mouth 2 (two) times daily.        potassium chloride (MICRO-K) 10 MEQ CpSR Take 2 capsules (20 mEq total) by mouth once daily. 60 capsule 11    risedronate (ACTONEL) 150 MG Tab Take 150 mg by mouth every 30 days.   6    VENTOLIN HFA 90 mcg/actuation inhaler 2 puffs every 4 (four) hours as needed.         VITAMIN D2 50,000 unit capsule Take 50,000 Units by mouth As instructed. Twice a month          No current facility-administered medications on file prior to visit.       Social History:      Social History            Tobacco Use    Smoking status: Former Smoker       Packs/day: 1.00       Years: 5.00       Pack years: 5.00       Types: Cigarettes       Last attempt to quit: 3/30/1979       Years since quittin.9    Smokeless tobacco: Never Used   Substance Use Topics    Alcohol use: No      Family History:            Family History   Problem Relation Age of Onset    Thyroid cancer Mother      Heart failure Father      Emphysema Sister      Thyroid disease Sister      Deep vein thrombosis Brother      Lung cancer Brother      Colon cancer Maternal Uncle           60s    Cancer Maternal Aunt      Pulmonary embolism Daughter      Breast cancer Neg Hx      Ovarian cancer Neg Hx      Celiac disease Neg Hx      Cirrhosis Neg Hx      Colon polyps Neg Hx      Crohn's disease Neg Hx      Cystic fibrosis Neg Hx      Esophageal cancer Neg Hx      Hemochromatosis Neg Hx      Inflammatory bowel disease Neg Hx      Irritable bowel syndrome Neg Hx      Liver cancer Neg Hx      Liver disease  "Neg Hx      Rectal cancer Neg Hx      Stomach cancer Neg Hx      Ulcerative colitis Neg Hx      Phoenix's disease Neg Hx      Lymphoma Neg Hx      Tuberculosis Neg Hx      Rheum arthritis Neg Hx      Scleroderma Neg Hx      Melanoma Neg Hx      Lupus Neg Hx      Multiple sclerosis Neg Hx      Psoriasis Neg Hx      Skin cancer Neg Hx        Physical Exam:   BP (!) 108/55 (BP Location: Left arm, Patient Position: Sitting, BP Method: Large (Automatic))   Pulse 86   Ht 5' 11" (1.803 m)   Wt 81.2 kg (179 lb 0.2 oz)   SpO2 (!) 91% Comment: pt on 2.5L of O2 NC  BMI 24.97 kg/m²       Constitutional: No acute distress, conversant  HEENT: Sclera anicteric, Pupils equal, round and reactive to light, extraocular motions intact, Oropharynx clear  Neck: No JVD, no carotid bruits  Cardiovascular: regular rate and rhythm, no murmur, rubs or gallops, normal S1/S2  Pulmonary: Clear to auscultation bilaterally  Abdominal: Abdomen soft, nontender, nondistended, positive bowel sounds  Extremities: No lower extremity edema,   Pulses: 2+ BL Radial, 2+ BL carotid, 2+ BL DP, 2+ BL PT, normal Allens Test on R  Skin: No ecchymosis, erythema, or ulcers  Psych: Alert and oriented x 3, appropriate affect  Neuro: CNII-XII intact, no focal deficits     Labs:            Lab Results   Component Value Date      09/10/2019     K 4.0 09/10/2019      09/10/2019     CO2 25 09/10/2019     BUN 16 09/10/2019     CREATININE 0.7 09/10/2019     ANIONGAP 8 09/10/2019            Lab Results   Component Value Date     AST 19 09/10/2019     ALT 12 09/10/2019     ALKPHOS 51 (L) 09/10/2019     BILITOT 0.5 09/10/2019     ALBUMIN 3.9 09/10/2019            Lab Results   Component Value Date     CALCIUM 9.0 09/10/2019     MG 2.1 09/10/2019     PHOS 3.6 11/26/2017            Lab Results   Component Value Date     BNP 84 09/10/2019     BNP 56 02/25/2019     BNP 79 12/20/2018     Lab Results   Component Value Date     WBC 4.70 09/10/2019     " HGB 11.1 (L) 09/10/2019     HCT 37.0 09/10/2019      09/10/2019     GRAN 3.4 09/10/2019     GRAN 71.2 09/10/2019            Lab Results   Component Value Date     INR 1.0 12/20/2018      No results found for: CHOL, HDL, LDLCALC, TRIG  No results found for: HGBA1C  No results found for: TSH, K6RCXIC         Assessment:      1. Pulmonary fibrosis    2. Pulmonary hypertension    3. Scleroderma involving lung    4. Lung transplant candidate    5. CREST (calcinosis, Raynaud's phenomenon, esophageal dysfunction, sclerodactyly, telangiectasia)    6. Chronic diastolic heart failure    7. Hyperlipidemia, unspecified hyperlipidemia type          Plan:      - Will proceed with RHC via R AC vein and LHC via R radial artery as part of Pre-Lung Tx evaluation     The risks (including death, heart attack, limb loss, paralysis, emergency surgery, bleeding, renal failure, and stroke), the potential benefits of the procedure, and the alternatives to the procedure, were discussed in detail and accepted by the patient. All questions were answered. Patient agrees to proceed.        Signed:  Ladarius Burton MD  Cardiology Fellow  Pager [phone]

## 2020-06-09 NOTE — PROGRESS NOTES
Vascband removed at 1335. No sign of active bleeding or hematoma. Gauze and tegaderm dressing applied and is CDI. Will continue to monitor.

## 2020-06-09 NOTE — PROCEDURES
"    Post Cath Note  Referring Physician: Irving Flores MD  Procedure: CATHETERIZATION, HEART, BOTH LEFT AND RIGHT (Bilateral)       Access: Right radial and right AC    LVEDP 5 mmHg, LVEF preserved    See full report for further details    Intervention:   LHC with normal coronaries.  RHC: RA, 10/5 (5), RV 84/4 (8), PW 21/20 (16), PA 84/24 (29)    Closure device: Radial band    Post Cath Exam:   /66 (BP Location: Left arm, Patient Position: Lying)   Pulse 82   Temp 98.1 °F (36.7 °C) (Oral)   Resp 18   Ht 5' 11" (1.803 m)   Wt 77.1 kg (170 lb)   SpO2 100%   Breastfeeding? No   BMI 23.71 kg/m²   No unusual pain, hematoma, thrill or bruit at vascular access site.  Distal pulse present without signs of ischemia.    Recommendations:   - Routine post-cath care  - IVF at 5cc/kg for 4 hrs  - Continue medical management, Risk factor reduction  - F/U with Lung Transplant.    Signed:  Ladarius Burton MD  Cardiology Fellow  Pager [phone]          "

## 2020-06-09 NOTE — Clinical Note
The site was marked. Prepped: groin, right radial and right brachial. Prepped with: ChloraPrep. The site was clipped. The patient was draped.

## 2020-06-09 NOTE — Clinical Note
The PA catheter is repositioned to the main pulmonary artery. Hemodynamics performed. O2 saturation measured at 68%.

## 2020-06-09 NOTE — NURSING
Discharge instructions and medlist given.  Patient and friend verbalized understanding.  Denies need for escort off unit.  Off unit via wheelchair with friend pushing and will drive her home.

## 2020-06-09 NOTE — Clinical Note
Prepped: groin, right radial and right brachial. Prepped with: ChloraPrep. The site was clipped. The patient was draped.

## 2020-06-12 ENCOUNTER — TELEPHONE (OUTPATIENT)
Dept: GASTROENTEROLOGY | Facility: CLINIC | Age: 65
End: 2020-06-12

## 2020-06-12 NOTE — TELEPHONE ENCOUNTER
----- Message from Colleen Elaine sent at 6/12/2020 11:15 AM CDT -----  Contact: pt   Pt is returning the nurses phone call to schedule a victual fu visit can you please call pt at [phone]. I tried to schedule the virtual visit and the appt isn't coming up to schedule.    ABBY

## 2020-06-22 ENCOUNTER — TELEPHONE (OUTPATIENT)
Dept: TRANSPLANT | Facility: CLINIC | Age: 65
End: 2020-06-22

## 2020-06-22 DIAGNOSIS — Z79.899 HIGH RISK MEDICATION USE: Primary | ICD-10-CM

## 2020-06-22 DIAGNOSIS — J84.10 PULMONARY FIBROSIS: ICD-10-CM

## 2020-06-22 NOTE — TELEPHONE ENCOUNTER
Contacted patient to inquire about pulmonary rehab.  She stated that she was contacted to restart pulmonary rehab, but was told she would need to wear a mask at all times.  She stated that she can't wear a mask to exercise, therefore, she told Agapito Vera that she could not attend rehab until restrictions are lifted.

## 2020-06-22 NOTE — TELEPHONE ENCOUNTER
Patient due for month 3 OFEV lab work (CMP) per Dr. Garcia's orders.  Contacted patient.  Informed her that she is due for month 3 [facility] lab work.  Inquired as to when she could go to the lab this week.  She requested to schedule the CMP on 6/24/20 at 11 am at [facility].  Informed her that labs will be scheduled.  She verbalized her understanding.    Inquired if the dental appointment had been scheduled.  She stated that she has an appointment on 7/8/20.

## 2020-06-23 ENCOUNTER — COMMITTEE REVIEW (OUTPATIENT)
Dept: TRANSPLANT | Facility: CLINIC | Age: 65
End: 2020-06-23

## 2020-06-23 ENCOUNTER — DOCUMENTATION ONLY (OUTPATIENT)
Dept: PHARMACY | Facility: HOSPITAL | Age: 65
End: 2020-06-23

## 2020-06-23 NOTE — COMMITTEE REVIEW
Native Organ Diagnosis:  Pulmonary Fibrosis; Scleroderma; Pulmonary Hypertension    Declined:  Ms. Fatimah Cha was presented to the selection committee for a diagnosis of pulmonary fibrosis, scleroderma, and pulmonary hypertension.  Reggie Kingston,  was present during the meeting.  All members present agreed that patient is not a candidate for lung transplant at our center due to esophageal dysfunction and poor functional status.     I was present during the entire meeting and agree with the statement above.

## 2020-06-23 NOTE — PROGRESS NOTES
PHARM.D. PRE-TRANSPLANT NOTE:    Ms. Cha is a 64 YOF being evaluated for lung transplant secondary to schleroderma associated pulmonary fibrosis.  NYHA Class III.  Karnofsky score 60%.  Not a diabetic.      Pulmonary HTN:  Ambrisentan, riociguat    This patient's medication therapy was evaluated as part of her pre-transplant evaluation.      This patient's medication profile was reviewed for considerations for DAA Hepatitis C therapy:    []  No current inducers of CYP 3A4 or PGP  []  No amiodarone on this patient's EMR profile in the last 24 months  []  No past or current atrial fibrillation on this patient's EMR profile       Current Outpatient Medications   Medication Sig Dispense Refill    ADEMPAS 2.5 mg tablet Take 2.5 mg by mouth 3 (three) times daily.       albuterol (PROVENTIL) 2.5 mg /3 mL (0.083 %) nebulizer solution USE ONE VIAL in Nebulizer EVERY 6 HOURS AS NEEDED FOR SHORTNESS OF BREATH  6    alendronate (FOSAMAX) 70 MG tablet Take 70 mg by mouth every 7 days.       ambrisentan (LETAIRIS) 10 MG Tab Take 10 mg by mouth once daily.      amoxicillin-clavulanate 875-125mg (AUGMENTIN) 875-125 mg per tablet 1 tablet      aspirin (ECOTRIN) 81 MG EC tablet Take 81 mg by mouth every evening.      atorvastatin (LIPITOR) 10 MG tablet Take 10 mg by mouth once daily.       azelastine (ASTELIN) 137 mcg (0.1 %) nasal spray 2 sprays by Nasal route 2 (two) times daily as needed.       biotin 1,000 mcg Chew Take 1 capsule by mouth once daily.       busPIRone (BUSPAR) 10 MG tablet Take 10 mg by mouth 3 (three) times daily as needed (for anxiety).       carvedilol (COREG) 3.125 MG tablet TAKE ONE TABLET BY MOUTH TWICE DAILY WITH MEALS 180 tablet 6    cevimeline (EVOXAC) 30 mg capsule Take 30 mg by mouth 3 (three) times daily.      diphenoxylate-atropine 2.5-0.025 mg (LOMOTIL) 2.5-0.025 mg per tablet Take 1 tablet by mouth once daily.      escitalopram oxalate (LEXAPRO) 10 MG tablet Take 10 mg by mouth  once daily.      fluconazole (DIFLUCAN) 150 MG Tab 1 tablet today, then repeat in a week      fluticasone (FLONASE) 50 mcg/actuation nasal spray 1 spray by Each Nare route as needed.   3    furosemide (LASIX) 40 MG tablet Take 1 tablet (40 mg total) by mouth once daily. 30 tablet 12    gabapentin (NEURONTIN) 400 MG capsule Take 400 mg by mouth 3 (three) times daily as needed (for bulging discs in neck; has not taken in months).      guaiFENesin (MUCINEX) 600 mg 12 hr tablet Take 1,200 mg by mouth as needed for Congestion.      hydroxychloroquine (PLAQUENIL) 200 mg tablet Take 200 mg by mouth 2 (two) times daily.      INCRUSE ELLIPTA 62.5 mcg/actuation DsDv Take 1 puff by mouth once daily.   3    levocetirizine (XYZAL) 5 MG tablet Take 5 mg by mouth daily as needed.      levothyroxine (SYNTHROID) 137 MCG Tab tablet       levothyroxine (TIROSINT) 137 mcg Cap Take 1 capsule by mouth once daily.      multivitamin capsule Take 1 capsule by mouth once daily.      mycophenolate (CELLCEPT) 500 mg Tab Take 1,500 mg by mouth 2 (two) times daily.       nystatin (MYCOSTATIN) 100,000 unit/mL suspension USE 5MLS SWISH AND SWALLOW FOUR TIMES A DAY MOUTH/THROAT 7 DAYS      OFEV 150 mg Cap TAKE 1 CAPSULE BY MOUTH TWICE DAILY (EVERY 12 HOURS) WITH FOOD. CALL [phone] FOR REFILLS. 60 capsule 2    pantoprazole (PROTONIX) 40 MG tablet Take 1 tablet (40 mg total) by mouth 2 (two) times daily. 180 tablet 3    potassium chloride (MICRO-K) 10 MEQ CpSR Take 2 capsules (20 mEq total) by mouth once daily. 60 capsule 11    risedronate (ACTONEL) 150 MG Tab Take 150 mg by mouth every 30 days.  6    teriparatide (FORTEO) 20 mcg/dose - 600 mcg/2.4 mL PnIj       VENTOLIN HFA 90 mcg/actuation inhaler 2 puffs every 4 (four) hours as needed.       VITAMIN D2 50,000 unit capsule Take 50,000 Units by mouth As instructed. Three times a month       No current facility-administered medications for this visit.        Currently,  patient is responsible for preparing / administering this patient's medications on a daily basis.  I am available for consultation and can be contacted, as needed by the other members of the Lung Transplant team.

## 2020-06-24 ENCOUNTER — TELEPHONE (OUTPATIENT)
Dept: TRANSPLANT | Facility: CLINIC | Age: 65
End: 2020-06-24

## 2020-06-24 ENCOUNTER — LAB VISIT (OUTPATIENT)
Dept: LAB | Facility: HOSPITAL | Age: 65
End: 2020-06-24
Attending: INTERNAL MEDICINE
Payer: MEDICARE

## 2020-06-24 DIAGNOSIS — Z79.899 HIGH RISK MEDICATION USE: ICD-10-CM

## 2020-06-24 DIAGNOSIS — J84.10 PULMONARY FIBROSIS: ICD-10-CM

## 2020-06-24 LAB
ALBUMIN SERPL BCP-MCNC: 3.7 G/DL (ref 3.5–5.2)
ALP SERPL-CCNC: 75 U/L (ref 55–135)
ALT SERPL W/O P-5'-P-CCNC: 11 U/L (ref 10–44)
ANION GAP SERPL CALC-SCNC: 7 MMOL/L (ref 8–16)
AST SERPL-CCNC: 20 U/L (ref 10–40)
BILIRUB SERPL-MCNC: 0.6 MG/DL (ref 0.1–1)
BUN SERPL-MCNC: 14 MG/DL (ref 8–23)
CALCIUM SERPL-MCNC: 8.6 MG/DL (ref 8.7–10.5)
CHLORIDE SERPL-SCNC: 107 MMOL/L (ref 95–110)
CO2 SERPL-SCNC: 27 MMOL/L (ref 23–29)
CREAT SERPL-MCNC: 0.7 MG/DL (ref 0.5–1.4)
EST. GFR  (AFRICAN AMERICAN): >60 ML/MIN/1.73 M^2
EST. GFR  (NON AFRICAN AMERICAN): >60 ML/MIN/1.73 M^2
GLUCOSE SERPL-MCNC: 90 MG/DL (ref 70–110)
POTASSIUM SERPL-SCNC: 4 MMOL/L (ref 3.5–5.1)
PROT SERPL-MCNC: 6.8 G/DL (ref 6–8.4)
SODIUM SERPL-SCNC: 141 MMOL/L (ref 136–145)

## 2020-06-24 PROCEDURE — 36415 COLL VENOUS BLD VENIPUNCTURE: CPT

## 2020-06-24 PROCEDURE — 80053 COMPREHEN METABOLIC PANEL: CPT

## 2020-06-25 ENCOUNTER — TELEPHONE (OUTPATIENT)
Dept: TRANSPLANT | Facility: CLINIC | Age: 65
End: 2020-06-25

## 2020-06-25 NOTE — TELEPHONE ENCOUNTER
Contacted patient and notified her of the outcome of the selection committee meeting.  Informed her that she is not a candidate for lung transplantation at our center due to her esophageal dysfunction and poor functional status.  Informed her that she can follow up with her referring physician.  She verbalized her understanding.  She inquired if she can continue to follow with Dr. Garcia because she wants to keep her care at [facility].  She stated that she no longer wants to follow with [facility].  Informed her that I would discuss with Dr. Garcia and would call her back with his answer.  She stated that she is willing to follow with another pulmonologist at [facility] if necessary and mentioned Dr. Briones.  Informed her that Dr. Garcia is out of the office and I would call her after I speak with him.  Also informed her that she will receive a letter in the mail with our decision and she can go to another transplant center for evaluation if she chooses.  She stated that she has family in [place].  Informed her that she would need a support system / caregivers if she goes out of state.  She verbalized her understanding of all discussed and denied having any further questions or concerns.

## 2020-06-29 ENCOUNTER — OFFICE VISIT (OUTPATIENT)
Dept: GASTROENTEROLOGY | Facility: CLINIC | Age: 65
End: 2020-06-29
Payer: MEDICARE

## 2020-06-29 DIAGNOSIS — R68.81 EARLY SATIETY: ICD-10-CM

## 2020-06-29 DIAGNOSIS — R10.9 ABDOMINAL PAIN, UNSPECIFIED ABDOMINAL LOCATION: ICD-10-CM

## 2020-06-29 DIAGNOSIS — K21.9 GASTROESOPHAGEAL REFLUX DISEASE, ESOPHAGITIS PRESENCE NOT SPECIFIED: Primary | ICD-10-CM

## 2020-06-29 DIAGNOSIS — R13.10 DYSPHAGIA, UNSPECIFIED TYPE: ICD-10-CM

## 2020-06-29 DIAGNOSIS — R14.0 BLOATING: ICD-10-CM

## 2020-06-29 DIAGNOSIS — R19.7 DIARRHEA, UNSPECIFIED TYPE: ICD-10-CM

## 2020-06-29 PROCEDURE — 99443 PR PHYSICIAN TELEPHONE EVALUATION 21-30 MIN: ICD-10-PCS | Mod: 95,,, | Performed by: INTERNAL MEDICINE

## 2020-06-29 PROCEDURE — 99443 PR PHYSICIAN TELEPHONE EVALUATION 21-30 MIN: CPT | Mod: 95,,, | Performed by: INTERNAL MEDICINE

## 2020-06-29 RX ORDER — ESOMEPRAZOLE MAGNESIUM 40 MG/1
40 CAPSULE, DELAYED RELEASE ORAL
Qty: 60 CAPSULE | Refills: 6 | Status: SHIPPED | OUTPATIENT
Start: 2020-06-29 | End: 2021-07-08

## 2020-06-29 NOTE — PATIENT INSTRUCTIONS
-Start nexium 40mg  Twice per day   -Try FDguard 1 tablet three times per day as needed for abdominal pain, nausea, satiety, vomiting.  You can purchase this over the counter. Look for coupons on line.   -Try iberogast 5 to 20 drops (1 mL), 3 times a day for abdominal pain, nausea, vomiting, bloating.  The duration of taking Iberogast depends on the severity of your symptoms and how long they last.  Try to take it for at least 3 weeks to see if it makes a difference  -check stool studies  -Follow up with PCP about diarrhea  -We need to obtain report of your smart pill from Dr. Carrillo  -Complete gastric emptying study   -Use imodium 2mg one to four times per day as needed for diarrhea.  Do not take more that 8mg per day.  This medication may cause drowsiness or dizziness, which may impair physical or mental abilities.  Please be careful about performing tasks which require mental alertness (eg, operating machinery or driving), especially as you start taking this medication.    Discontinue promptly if constipation, abdominal pain, abdominal distension, blood in stool, or ileus develop.

## 2020-06-29 NOTE — PROGRESS NOTES
The patient location is: home  The chief complaint leading to consultation is: dysphagia, pre-transplant eval, gerd, belching, bloating, diarrhea  Visit type: audio  Total time spent with patient: 35 min  Each patient to whom he or she provides medical services by telemedicine is:  (1) informed of the relationship between the physician and patient and the respective role of any other health care provider with respect to management of the patient; and (2) notified that he or she may decline to receive medical services by telemedicine and may withdraw from such care at any time.         [facility] Consultation Note    Reason for Consult:    No chief complaint on file.      PCP:   Simon Orozco       Referring MD:  Esmer Vitale Md  [address] [place] [address]    Pulm: Dr. Garcia/Dr. Ken/Dr. Jaya Santiago    Gi: Dr. Carrillo    HPI:  Fatimah Cha is a 64 y.o. female with history CREST, Sjogran, Scleroderma, ILD, pulmonary HTN, HTN, HLD, hashimotos thyroiditis, hypothyroidsm here for evaluation of the following GI problems:    GERD.  Better  Pyrosis. Few per month  Regurgitation. Every other week   H/o bothersome heartburn in mid chest.  Nocturnal symptoms. None  Sleeps with head of the bed elevated.   Avoids eating prior to bedtime.         MEDS:   protonix 40mg BID  No longer on carafate 1 g TID   tums and gas ex prn     Belching. Still very bothersome.     Dysphagia.    Solids: yes  Liquids: no   Daily.  Choking: rare.  Sensitive gag reflex.   Location: cervical esophagus on r side.    .  Dry mouth.   Biotin   Symptoms started after thyroid surgery several years ago.    Got progressively worse over years.           Nausea. None  Early satiety.  assocaited w bloating and belching   Vomiting.  None   MEDS:   No meds  Improve w phenergan in the past     Abdominal pain. Better  No medications  Bentyl PRN with good control.      Gas and bloating.  Recently worse     Distention. None    Avoids artificial sugars.   Low FODMOP - No improvement  Lactose - still drinks milk   Does not drink through straws. does not chew gum.  No improvement with lactose elimination in the past.   Symptoms get worse at night. Worse after meals  Previously w positive SIBO testing, treated w Flagyl, rifaximin.    Hiccups.  Resolved. Previously with frequent hiccups for years lasting minutes to hours.     Diarrhea. Started right after ofev was initiated.   Worse since EGD  Winkler 6-7  BM TID   -Imodium 2md BID  Started after started ofev   FI-no  Nocturnal - no     Weight loss. Weight stable at 170lb     Panic attacks. Anxiety.    Buspirone 10mg prn for panic attacks   Lexapro 10mg     Denies BRBPR, melena.      Rheumatology: CREST, Sjogran, Scleroderma, ILD, pulmonary HTN.  On cellcept 1,500 mg BID, letaris 10 mg daily, evoxac 30 mg TID, adempas 2.5 mg daily, ofev. Followed by pulmonology and rheumatology.    Lung transplant eval in progress    Hashimotos thyroiditis. Is s/p thyroidectomy.      Total visit time was 35 minutes, more than 50% of which was spent in face-to-face counseling with patient regarding symptoms, diagnostic results, prognosis, risks and benefits of treatment options, instructions for management, importance of compliance with chosen treatment options, risk factor reduction, stress reduction, coping strategies.        Previous Studies:   Manometry 6/5/2020: Procedure aborted due to poor patient tolerance.   EGD 5/19/2020: Normal examined duodenum (-) Multiple gastric polyps (Fundic gland polyps). Erythematous mucosa in the gastric body and antrum (-). 3 cm hiatal hernia.  Nl p/d esophagus (-).  Esophagogram 3/10/2020: Patulous and mild dilatation of the thoracic esophagus with significantly reduced peristalsis, consistent with patient's history of scleroderma.  US 3/9/2020: . Mild hepatomegaly with no evidence of focal hepatic lesions.  EGD 11/15/17: - Normal esophagus. Z-line  irregular (-). 6 cm hiatal hernia.  G erythema (-). Multiple gastric polyps (FGP). Normal examined duodenum (-).   Colon 11/15/17: GPTTI. Two 2 to 5 mm polyps at the recto-sigmoid colon (-). Nl colon (-). Non-bleeding internal hemorrhoids. Nl ileum.  MBS 10/19/17: oropharyngeal swallow function WNL  Ct chst 8/30/17: progression of fibrosis.   EGD 6/16/14: HH. Short segment of salmon fingers(IM). Esophagitis. Fund gland polyps (FGP). Nl stomach (-). Duodenitis (-).   Colon 6/6/2014: GPTC. 3 tiny polyps in asc colon (TA). Int hemorrhoids.   CBC nl   Smart pill 4/21/2016: nl GE, nl SBT, delayed colonic transit       ROS:  Review of Systems   Constitutional: Negative for chills, fever and weight loss.   Eyes: Negative for pain and redness.   Respiratory: Positive for cough and shortness of breath.    Cardiovascular: Negative for chest pain.   Gastrointestinal: Positive for heartburn. Negative for abdominal pain, nausea and vomiting.   Genitourinary: Negative for dysuria and hematuria.   Musculoskeletal: Negative for back pain.   Skin: Negative for rash.   Psychiatric/Behavioral: Negative for depression. The patient is not nervous/anxious.       Medical History:   Past Medical History:   Diagnosis Date    Anxiety     CHF (congestive heart failure)     Colitis     CREST syndrome     GERD (gastroesophageal reflux disease)     Hypercholesteremia     Hypertension     Interstitial lung disease     Osteopenia     Pulmonary fibrosis     Pulmonary hypertension     Raynaud disease     Respiratory distress     Sciatica     Scleroderma     Sjoegren syndrome     Thyroid disease         Surgical History:   Past Surgical History:   Procedure Laterality Date    BREAST BIOPSY      CARDIAC CATHETERIZATION  2013    CATHETERIZATION OF BOTH LEFT AND RIGHT HEART Bilateral 6/9/2020    Procedure: CATHETERIZATION, HEART, BOTH LEFT AND RIGHT;  Surgeon: Irving Flores MD;  Location: [facility] CATH LAB;  Service: Cardiology;   Laterality: Bilateral;    COLONOSCOPY      COLONOSCOPY N/A 11/15/2017    Procedure: COLONOSCOPY;  Surgeon: Radha Santiago MD;  Location: [facility] (2ND FLR);  Service: Endoscopy;  Laterality: N/A;  2nd floor.  Pulm htn, ILD on o2.  Pls review w anesthesia.  Pt has cardio-pulm workup in progress at [facility] by Dr. Russell.                 reese monsalve RN- I reviewed Ms. Cha's history with Dr. Leopold (Anesthesia) and based on her medical history of:  Pulmonary Hyper    CORONARY ANGIOGRAPHY N/A 6/9/2020    Procedure: ANGIOGRAM, CORONARY ARTERY;  Surgeon: Irving Flores MD;  Location: [facility] CATH LAB;  Service: Cardiology;  Laterality: N/A;    ESOPHAGEAL MANOMETRY WITH MEASUREMENT OF IMPEDANCE N/A 6/2/2020    Procedure: MANOMETRY-ESOPHAGEAL-WITH IMPEDANCE;  Surgeon: Radha Santiago MD;  Location: [facility] (4TH FLR);  Service: Endoscopy;  Laterality: N/A;  Hold Narcotics x 1 days if able   Hold TCA x 1 days if able  ADHESIVE Allergy  per Dr. Santiago, to be scheduled in 1-2 months from 4/17/20-see telephone encounter 4/14/20-BB  instructions emailed to patient  COVID test scheduled for 6/1/20 at [facility]    ESOPHAGOGASTRODUODENOSCOPY N/A 5/19/2020    Procedure: ESOPHAGOGASTRODUODENOSCOPY (EGD);  Surgeon: Kemar Maldonado MD;  Location: [facility] (2ND FLR);  Service: Endoscopy;  Laterality: N/A;  Pulmonary HTN (PAP>50 or on meds). Urgent reschedule - lung transplant eval pt on 2-4 L oxygen  Lammi preferred/ok w other GI MD  standard prep  ADHESIVE Allergy  per Dr. Santiago, to be scheduled in 1-2 months from 4/17/20-see telephone encounter 4/14/20    foot surgery x2      lumpectomy (benign)      LUNG BIOPSY  03/2013    RIGHT HEART CATHETERIZATION Right 12/20/2018    Procedure: INSERTION, CATHETER, RIGHT HEART;  Surgeon: Renetta Russell MD;  Location: [facility] CATH LAB;  Service: Cardiology;  Laterality: Right;    THYROIDECTOMY      TONSILLECTOMY      TUBAL LIGATION      UPPER GASTROINTESTINAL ENDOSCOPY           Family History:   Family History   Problem Relation Age of Onset    Thyroid cancer Mother     Heart failure Father     Emphysema Sister     Thyroid disease Sister     Deep vein thrombosis Brother     Lung cancer Brother     Colon cancer Maternal Uncle         60s    Cancer Maternal Uncle         colon cancer    Cancer Maternal Aunt     Pulmonary embolism Daughter     Breast cancer Neg Hx     Ovarian cancer Neg Hx     Celiac disease Neg Hx     Cirrhosis Neg Hx     Colon polyps Neg Hx     Crohn's disease Neg Hx     Cystic fibrosis Neg Hx     Esophageal cancer Neg Hx     Hemochromatosis Neg Hx     Inflammatory bowel disease Neg Hx     Irritable bowel syndrome Neg Hx     Liver cancer Neg Hx     Liver disease Neg Hx     Rectal cancer Neg Hx     Stomach cancer Neg Hx     Ulcerative colitis Neg Hx     Phoenix's disease Neg Hx     Lymphoma Neg Hx     Tuberculosis Neg Hx     Rheum arthritis Neg Hx     Scleroderma Neg Hx     Melanoma Neg Hx     Lupus Neg Hx     Multiple sclerosis Neg Hx     Psoriasis Neg Hx     Skin cancer Neg Hx         Social History:   Social History     Socioeconomic History    Marital status:      Spouse name: Not on file    Number of children: Not on file    Years of education: Not on file    Highest education level: Not on file   Occupational History    Not on file   Social Needs    Financial resource strain: Not on file    Food insecurity     Worry: Not on file     Inability: Not on file    Transportation needs     Medical: Not on file     Non-medical: Not on file   Tobacco Use    Smoking status: Former Smoker     Packs/day: 1.00     Years: 5.00     Pack years: 5.00     Types: Cigarettes     Quit date: 3/30/1979     Years since quittin.2    Smokeless tobacco: Never Used   Substance and Sexual Activity    Alcohol use: No    Drug use: No    Sexual activity: Not on file   Lifestyle    Physical activity     Days per week: Not on file      Minutes per session: Not on file    Stress: Not on file   Relationships    Social connections     Talks on phone: Not on file     Gets together: Not on file     Attends [employer] service: Not on file     Active member of club or organization: Not on file     Attends meetings of clubs or organizations: Not on file     Relationship status: Not on file   Other Topics Concern    Not on file   Social History Narrative    Not on file        Review of patient's allergies indicates:  No Known Allergies    Current Outpatient Medications   Medication Sig Dispense Refill    ADEMPAS 2.5 mg tablet Take 2.5 mg by mouth 3 (three) times daily.       albuterol (PROVENTIL) 2.5 mg /3 mL (0.083 %) nebulizer solution USE ONE VIAL in Nebulizer EVERY 6 HOURS AS NEEDED FOR SHORTNESS OF BREATH  6    alendronate (FOSAMAX) 70 MG tablet Take 70 mg by mouth every 7 days.       ambrisentan (LETAIRIS) 10 MG Tab Take 10 mg by mouth once daily.      amoxicillin-clavulanate 875-125mg (AUGMENTIN) 875-125 mg per tablet 1 tablet      aspirin (ECOTRIN) 81 MG EC tablet Take 81 mg by mouth every evening.      atorvastatin (LIPITOR) 10 MG tablet Take 10 mg by mouth once daily.       azelastine (ASTELIN) 137 mcg (0.1 %) nasal spray 2 sprays by Nasal route 2 (two) times daily as needed.       biotin 1,000 mcg Chew Take 1 capsule by mouth once daily.       busPIRone (BUSPAR) 10 MG tablet Take 10 mg by mouth 3 (three) times daily as needed (for anxiety).       carvedilol (COREG) 3.125 MG tablet TAKE ONE TABLET BY MOUTH TWICE DAILY WITH MEALS 180 tablet 6    cevimeline (EVOXAC) 30 mg capsule Take 30 mg by mouth 3 (three) times daily.      diphenoxylate-atropine 2.5-0.025 mg (LOMOTIL) 2.5-0.025 mg per tablet Take 1 tablet by mouth once daily.      escitalopram oxalate (LEXAPRO) 10 MG tablet Take 10 mg by mouth once daily.      fluconazole (DIFLUCAN) 150 MG Tab 1 tablet today, then repeat in a week      fluticasone (FLONASE) 50 mcg/actuation  nasal spray 1 spray by Each Nare route as needed.   3    furosemide (LASIX) 40 MG tablet Take 1 tablet (40 mg total) by mouth once daily. 30 tablet 12    gabapentin (NEURONTIN) 400 MG capsule Take 400 mg by mouth 3 (three) times daily as needed (for bulging discs in neck; has not taken in months).      guaiFENesin (MUCINEX) 600 mg 12 hr tablet Take 1,200 mg by mouth as needed for Congestion.      hydroxychloroquine (PLAQUENIL) 200 mg tablet Take 200 mg by mouth 2 (two) times daily.      INCRUSE ELLIPTA 62.5 mcg/actuation DsDv Take 1 puff by mouth once daily.   3    levocetirizine (XYZAL) 5 MG tablet Take 5 mg by mouth daily as needed.      levothyroxine (SYNTHROID) 137 MCG Tab tablet       levothyroxine (TIROSINT) 137 mcg Cap Take 1 capsule by mouth once daily.      multivitamin capsule Take 1 capsule by mouth once daily.      mycophenolate (CELLCEPT) 500 mg Tab Take 1,500 mg by mouth 2 (two) times daily.       nystatin (MYCOSTATIN) 100,000 unit/mL suspension USE 5MLS SWISH AND SWALLOW FOUR TIMES A DAY MOUTH/THROAT 7 DAYS      OFEV 150 mg Cap TAKE 1 CAPSULE BY MOUTH TWICE DAILY (EVERY 12 HOURS) WITH FOOD. CALL [phone] FOR REFILLS. 60 capsule 2    pantoprazole (PROTONIX) 40 MG tablet Take 1 tablet (40 mg total) by mouth 2 (two) times daily. 180 tablet 3    potassium chloride (MICRO-K) 10 MEQ CpSR Take 2 capsules (20 mEq total) by mouth once daily. 60 capsule 11    risedronate (ACTONEL) 150 MG Tab Take 150 mg by mouth every 30 days.  6    teriparatide (FORTEO) 20 mcg/dose - 600 mcg/2.4 mL PnIj       VENTOLIN HFA 90 mcg/actuation inhaler 2 puffs every 4 (four) hours as needed.       VITAMIN D2 50,000 unit capsule Take 50,000 Units by mouth As instructed. Three times a month       No current facility-administered medications for this visit.         Objective Findings:  Vital Signs:  There were no vitals taken for this visit.  There is no height or weight on file to calculate BMI.    Physical Exam:  telehealth    Labs:  Lab Results   Component Value Date    WBC 5.44 06/09/2020    HGB 11.1 (L) 06/09/2020    HCT 37.4 06/09/2020    MCV 87 06/09/2020     06/09/2020     No results found for: FERRITIN  Lab Results   Component Value Date     06/24/2020    K 4.0 06/24/2020     06/24/2020    CO2 27 06/24/2020    GLU 90 06/24/2020    BUN 14 06/24/2020    CREATININE 0.7 06/24/2020    CALCIUM 8.6 (L) 06/24/2020    PROT 6.8 06/24/2020    ALBUMIN 3.7 06/24/2020    BILITOT 0.6 06/24/2020    ALKPHOS 75 06/24/2020    AST 20 06/24/2020    ALT 11 06/24/2020     Lab Results   Component Value Date    TSH 10.507 (H) 03/09/2020     No results found for: SEDRATE  No results found for: CRP  Lab Results   Component Value Date    HGBA1C 5.1 03/09/2020           Assessment and Plan:  Fatimah Cha is a 64 y.o. female with with history CREST, Sjogran, Scleroderma, ILD on 02, pulmonary HTN, HTN, HLD, hashimotos thyroiditis, hypothyroidsm here for evaluation of the following GI problems:    GERD. Regurgitation. Belching. 6 cm hiatal hernia.   No improvement with prevacid and prilosec  No longer Some improvement with carafate 1 g TID   Some improvement w protonix 40mg BID  Unable to complete mano/ph impedance due to discomfort   -Start nexium 40mg BID  -r/o gastroparesis  -Discussed pathophysiology, presentation and treatment of GERD including surgical options  -Discussed that dysphagia makes surgical intervention more complicated.    -Discussed GERD lifestyle precautions       Little's esophagus.  Not seen on most recent EGD     Intractable belching  -Unable to compete manometry to r/o supragastric belching  -Diaphragmatic breathing     Dry mouth.    Biotin prn  - does not help much     Dysphagia. Occasional choking . Seen by ENT in the past.  No EoE.  Nl MBS. Recent esophagogram w dysmotility.  EGD negative   Unable to complete manometry         Nausea.  Vomiting.  Improved.   Unable to tolerate reglan due to  difficulty sleeping.     -Phenergan prn     Unable to get report of smart pill by Dr. Carrillo at [facility]   -Check GES    Abdominal pain related to BMs. Improved. Still w abd discomfort w bloating.  IBS   -iberogast    Gas and bloating.    History of SIBO +, treated w flagyl, flagyl and neomycin  Avoids artificial sugars    No improvement with lactose elimination  No improvement w low FODMOP diet  No improvement with Rifaximin    Hiccups. Resolved.     Diarrhea. Started after ofev was initiated.   -Imodium prn   -check stool studies    Colitis.  Admitted to the hospital w self limited bloody diarrhea 10/2016.  CT showed colitis. Had similar episode 5 years prior. Negative stools studies.   No diarrhea or constipation for the past few months.  Colonoscopy negative.     3 tiny TA in 2013.  No polyps in 2017.    -Repeat in 2027     Hepatomegaly.  Seen by hepatology. Likely related to hepatic congestion, secondary to pulmonary hypertension.    Fibroscan <F3/4 fibrosis. No further evaluation required.     Anxiety.  Panic attacks  -Lexapro and buspar    CREST, Sjogran, Scleroderma, ILD, pulmonary HTN.    -Followed by pulmonology and rheumatology.  -Has apt w Dr. Farias   [place] [facility] [place] evaluation.   [place] [facility]   [place] advised to consider other center     Hashimotos thyroiditis. Hypothyroid    -On synthroid    Smart pill added in addendum    Follow up in about 5 months (around 11/29/2020).    1. Gastroesophageal reflux disease, esophagitis presence not specified    2. Diarrhea, unspecified type    3. Dysphagia, unspecified type    4. Early satiety    5. Bloating    6. Abdominal pain, unspecified abdominal location             Thank you so much for allowing me to participate in the care of Fatimah Santiago MD            Answers for HPI/ROS submitted by the patient on 4/14/2020   trouble swallowing: No  Joint pain? : No    Answers for HPI/ROS submitted  by the patient on 6/29/2020   trouble swallowing: No  Joint pain? : Yes

## 2020-06-30 ENCOUNTER — TELEPHONE (OUTPATIENT)
Dept: TRANSPLANT | Facility: CLINIC | Age: 65
End: 2020-06-30

## 2020-06-30 DIAGNOSIS — I27.20 PULMONARY HYPERTENSION: ICD-10-CM

## 2020-06-30 DIAGNOSIS — M34.9 SCLERODERMA INVOLVING LUNG: ICD-10-CM

## 2020-06-30 DIAGNOSIS — J84.10 PULMONARY FIBROSIS: Primary | ICD-10-CM

## 2020-06-30 NOTE — TELEPHONE ENCOUNTER
Discussed with Dr. Viramontes.  Instructed to schedule an appointment 3 months from her last appointment.    ----- Message from Rodrick Garcia MD sent at 6/29/2020  9:59 AM CDT -----    I would have her see Arely.    ----- Message -----  From: Dori Velazquez RN  Sent: 6/25/2020   3:17 PM CDT  To: Rodrick Garcia MD    Patient was declined for transplant at the meeting this week.  She wanted to know if you would continue to follow her since you prescribed the OFEV.  I told her that patients usually return to their referring pulmonologist.  She has not seen Dr. Ken in 8 months and they canceled her upcoming appointment.  She wants to have all her care at [facility] and not [facility] anyway.  She asked about following up with Dr. Briones here.  Just let me know what you recommend.    Thanks,  sl

## 2020-06-30 NOTE — TELEPHONE ENCOUNTER
----- Message from Arely Viramontes, DO sent at 6/30/2020  6:13 PM CDT -----  Full PFTs please.  Thanks    ----- Message -----  From: Dori Velazquez RN  Sent: 6/30/2020   4:23 PM CDT  To: Arely Viramontes, DO    What testing would you like her to have when she returns to see you in 3 months?    Dori

## 2020-07-02 ENCOUNTER — TELEPHONE (OUTPATIENT)
Dept: GASTROENTEROLOGY | Facility: CLINIC | Age: 65
End: 2020-07-02

## 2020-07-07 ENCOUNTER — HOSPITAL ENCOUNTER (OUTPATIENT)
Dept: RADIOLOGY | Facility: HOSPITAL | Age: 65
Discharge: HOME | End: 2020-07-07
Attending: INTERNAL MEDICINE
Payer: MEDICARE

## 2020-07-07 DIAGNOSIS — K21.9 GASTROESOPHAGEAL REFLUX DISEASE, ESOPHAGITIS PRESENCE NOT SPECIFIED: ICD-10-CM

## 2020-07-07 PROCEDURE — 78264 NM GASTRIC EMPTYING: ICD-10-PCS | Mod: 26,NTX,, | Performed by: RADIOLOGY

## 2020-07-07 PROCEDURE — 78264 GASTRIC EMPTYING IMG STUDY: CPT | Mod: TC,NTX

## 2020-07-07 PROCEDURE — 78264 GASTRIC EMPTYING IMG STUDY: CPT | Mod: 26,NTX,, | Performed by: RADIOLOGY

## 2020-07-07 PROCEDURE — A9541 TC99M SULFUR COLLOID: HCPCS | Mod: TXP

## 2020-07-31 DIAGNOSIS — Z01.812 PRE-PROCEDURE LAB EXAM: Primary | ICD-10-CM

## 2020-08-22 ENCOUNTER — CLINICAL SUPPORT (OUTPATIENT)
Dept: URGENT CARE | Facility: CLINIC | Age: 65
End: 2020-08-22
Payer: MEDICARE

## 2020-08-22 DIAGNOSIS — Z01.812 PRE-PROCEDURE LAB EXAM: ICD-10-CM

## 2020-08-22 PROCEDURE — U0003 INFECTIOUS AGENT DETECTION BY NUCLEIC ACID (DNA OR RNA); SEVERE ACUTE RESPIRATORY SYNDROME CORONAVIRUS 2 (SARS-COV-2) (CORONAVIRUS DISEASE [COVID-19]), AMPLIFIED PROBE TECHNIQUE, MAKING USE OF HIGH THROUGHPUT TECHNOLOGIES AS DESCRIBED BY CMS-2020-01-R: HCPCS | Mod: NTX

## 2020-08-23 LAB — SARS-COV-2 RNA RESP QL NAA+PROBE: NOT DETECTED

## 2020-08-24 ENCOUNTER — TELEPHONE (OUTPATIENT)
Dept: TRANSPLANT | Facility: CLINIC | Age: 65
End: 2020-08-24
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
## 2020-08-25 ENCOUNTER — TELEPHONE (OUTPATIENT)
Dept: TRANSPLANT | Facility: CLINIC | Age: 65
End: 2020-08-25

## 2020-08-25 ENCOUNTER — HOSPITAL ENCOUNTER (OUTPATIENT)
Dept: PULMONOLOGY | Facility: CLINIC | Age: 65
Discharge: HOME | End: 2020-08-25
Payer: MEDICARE

## 2020-08-25 ENCOUNTER — OFFICE VISIT (OUTPATIENT)
Dept: TRANSPLANT | Facility: CLINIC | Age: 65
End: 2020-08-25
Payer: MEDICARE

## 2020-08-25 VITALS
SYSTOLIC BLOOD PRESSURE: 119 MMHG | TEMPERATURE: 98 F | DIASTOLIC BLOOD PRESSURE: 59 MMHG | SYSTOLIC BLOOD PRESSURE: 108 MMHG | HEIGHT: 71 IN | OXYGEN SATURATION: 96 % | WEIGHT: 162 LBS | HEART RATE: 72 BPM | WEIGHT: 169.75 LBS | HEART RATE: 92 BPM | BODY MASS INDEX: 23.99 KG/M2 | HEIGHT: 69 IN | DIASTOLIC BLOOD PRESSURE: 60 MMHG | BODY MASS INDEX: 23.77 KG/M2 | RESPIRATION RATE: 20 BRPM

## 2020-08-25 VITALS — BODY MASS INDEX: 24.03 KG/M2 | HEIGHT: 69 IN | WEIGHT: 162.25 LBS

## 2020-08-25 DIAGNOSIS — I27.20 PULMONARY HYPERTENSION: Primary | ICD-10-CM

## 2020-08-25 DIAGNOSIS — I50.32 CHRONIC DIASTOLIC HEART FAILURE: ICD-10-CM

## 2020-08-25 DIAGNOSIS — I27.20 PULMONARY HYPERTENSION: ICD-10-CM

## 2020-08-25 DIAGNOSIS — M34.9 SCLERODERMA INVOLVING LUNG: ICD-10-CM

## 2020-08-25 DIAGNOSIS — J84.10 PULMONARY FIBROSIS: ICD-10-CM

## 2020-08-25 DIAGNOSIS — I27.9 CHRONIC PULMONARY HEART DISEASE: ICD-10-CM

## 2020-08-25 DIAGNOSIS — J84.9 INTERSTITIAL LUNG DISEASE: ICD-10-CM

## 2020-08-25 DIAGNOSIS — M34.1 CREST (CALCINOSIS, RAYNAUD'S PHENOMENON, ESOPHAGEAL DYSFUNCTION, SCLERODACTYLY, TELANGIECTASIA): ICD-10-CM

## 2020-08-25 DIAGNOSIS — J96.11 CHRONIC RESPIRATORY FAILURE WITH HYPOXIA: ICD-10-CM

## 2020-08-25 DIAGNOSIS — M34.9 SCLERODERMA WITH PULMONARY INVOLVEMENT: Primary | ICD-10-CM

## 2020-08-25 DIAGNOSIS — E78.2 MIXED HYPERLIPIDEMIA: ICD-10-CM

## 2020-08-25 DIAGNOSIS — J84.10 PULMONARY FIBROSIS: Primary | ICD-10-CM

## 2020-08-25 PROCEDURE — 94618 PULMONARY STRESS TESTING: ICD-10-PCS | Mod: 26,S$PBB,NTX, | Performed by: INTERNAL MEDICINE

## 2020-08-25 PROCEDURE — 94727 GAS DIL/WSHOT DETER LNG VOL: CPT | Mod: 26,S$PBB,NTX, | Performed by: INTERNAL MEDICINE

## 2020-08-25 PROCEDURE — 99999 PR PBB SHADOW E&M-EST. PATIENT-LVL V: ICD-10-PCS | Mod: PBBFAC,TXP,, | Performed by: INTERNAL MEDICINE

## 2020-08-25 PROCEDURE — 94729 DIFFUSING CAPACITY: CPT | Mod: 26,S$PBB,NTX, | Performed by: INTERNAL MEDICINE

## 2020-08-25 PROCEDURE — 94010 BREATHING CAPACITY TEST: ICD-10-PCS | Mod: 26,S$PBB,NTX, | Performed by: INTERNAL MEDICINE

## 2020-08-25 PROCEDURE — 99999 PR PBB SHADOW E&M-EST. PATIENT-LVL III: CPT | Mod: PBBFAC,TXP,, | Performed by: INTERNAL MEDICINE

## 2020-08-25 PROCEDURE — 99215 OFFICE O/P EST HI 40 MIN: CPT | Mod: PBBFAC,27,TXP | Performed by: INTERNAL MEDICINE

## 2020-08-25 PROCEDURE — 99999 PR PBB SHADOW E&M-EST. PATIENT-LVL V: CPT | Mod: PBBFAC,TXP,, | Performed by: INTERNAL MEDICINE

## 2020-08-25 PROCEDURE — 99999 PR PBB SHADOW E&M-EST. PATIENT-LVL III: ICD-10-PCS | Mod: PBBFAC,TXP,, | Performed by: INTERNAL MEDICINE

## 2020-08-25 PROCEDURE — 99214 PR OFFICE/OUTPT VISIT, EST, LEVL IV, 30-39 MIN: ICD-10-PCS | Mod: S$PBB,NTX,, | Performed by: INTERNAL MEDICINE

## 2020-08-25 PROCEDURE — 94729 PR C02/MEMBANE DIFFUSE CAPACITY: ICD-10-PCS | Mod: 26,S$PBB,NTX, | Performed by: INTERNAL MEDICINE

## 2020-08-25 PROCEDURE — 94618 PULMONARY STRESS TESTING: CPT | Mod: PBBFAC,NTX | Performed by: INTERNAL MEDICINE

## 2020-08-25 PROCEDURE — 94010 BREATHING CAPACITY TEST: CPT | Mod: 26,S$PBB,NTX, | Performed by: INTERNAL MEDICINE

## 2020-08-25 PROCEDURE — 94729 DIFFUSING CAPACITY: CPT | Mod: PBBFAC,NTX | Performed by: INTERNAL MEDICINE

## 2020-08-25 PROCEDURE — 99214 OFFICE O/P EST MOD 30 MIN: CPT | Mod: S$PBB,NTX,, | Performed by: INTERNAL MEDICINE

## 2020-08-25 PROCEDURE — 94618 PULMONARY STRESS TESTING: CPT | Mod: 26,S$PBB,NTX, | Performed by: INTERNAL MEDICINE

## 2020-08-25 PROCEDURE — 94727 GAS DIL/WSHOT DETER LNG VOL: CPT | Mod: PBBFAC,NTX | Performed by: INTERNAL MEDICINE

## 2020-08-25 PROCEDURE — 94727 PR PULM FUNCTION TEST BY GAS: ICD-10-PCS | Mod: 26,S$PBB,NTX, | Performed by: INTERNAL MEDICINE

## 2020-08-25 PROCEDURE — 99213 OFFICE O/P EST LOW 20 MIN: CPT | Mod: PBBFAC,TXP | Performed by: INTERNAL MEDICINE

## 2020-08-25 PROCEDURE — 94010 BREATHING CAPACITY TEST: CPT | Mod: PBBFAC,NTX | Performed by: INTERNAL MEDICINE

## 2020-08-25 RX ORDER — POTASSIUM CHLORIDE 750 MG/1
20 CAPSULE, EXTENDED RELEASE ORAL DAILY
Qty: 60 CAPSULE | Refills: 11 | Status: SHIPPED | OUTPATIENT
Start: 2020-08-25

## 2020-08-25 RX ORDER — CARVEDILOL 3.12 MG/1
3.12 TABLET ORAL 2 TIMES DAILY WITH MEALS
Qty: 180 TABLET | Refills: 6 | Status: SHIPPED | OUTPATIENT
Start: 2020-08-25

## 2020-08-25 RX ORDER — ATORVASTATIN CALCIUM 10 MG/1
10 TABLET, FILM COATED ORAL DAILY
Qty: 90 TABLET | Refills: 3 | Status: SHIPPED | OUTPATIENT
Start: 2020-08-25

## 2020-08-25 RX ORDER — FUROSEMIDE 40 MG/1
40 TABLET ORAL DAILY
Qty: 30 TABLET | Refills: 12 | Status: SHIPPED | OUTPATIENT
Start: 2020-08-25 | End: 2020-11-13

## 2020-08-25 NOTE — TELEPHONE ENCOUNTER
Spoke w/pt in clinic regarding initiation of Tyvaso. Pt already familiar w/role of SP, and uses [facility]. Discussed that process for starting Tyvaso will be similar (nurse visits, possible need for patient assistance, etc). Reviewed possible SEs w/patient and provided  guide on Tyvaso, and referred to Tyvaso website. All questions/concerns addressed to patient satisfaction. She has my direct office number for follow up. Will submit referral to [facility].

## 2020-08-25 NOTE — PROGRESS NOTES
Subjective:    Patient ID:  Fatimah Cha is a 65 y.o. 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 She underwent a VATS mediated lung biopsy (2013) at an OSH. (the results of this lung biopsy are unclear). She was then referred to Dr. Ken at [facility] for management of her ILD. There she has been followed with relative stability on azathioprine and prednisone.  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 Lately, she has started feeling a little better but still feels she is not back to her pre-flu baseline.   Previously she was on 3L Nc at home nocturnally and with severe exertion, now she feels she has to use her oxygen more. (still not 24/7).   She also has pulmonary HTN that she is on ambrisentan for along with adempas.     Pt was seen by Dr Viramontes today who told her LUT was not an option here because of her esophagus, was recommended to get second opinion in [place]-  Has had no change in her breathing- has good and bad days (about 5-6 good days and 1 where she wheezes and is more short winded) Has a little swelling in her right leg and hands- had to stop OFEV as she had severe diarrhea and was swelling and dizzy- has gone down some since coming off.     With nexium doing better from a GI standpoint    watches her Na and fluid intake- lasix is 40mg daily, some days has taking it twice which made her go to the BR like crazy she says and the swelling did go down-        243m, (10/17  266m  m ( 282  m in   July  )                                              O2 sat 93  -> 86 %        6L                                                   HR   82->  105                                                                BP                                                        Kushal    2 -> 4    R/[facility] June 2020  1. Coronary dominance: Right  2. The left main coronary artery (LMCA) is normal. It gives origin to the left anterior descending (LAD) and left circumflex (LCx) arteries. There is no ramus intermedius. There is IVAN 3 flow.  3. The LAD is normal. It gives origin to two large diagonal branch(es). There is IVAN 3 flow.  4. The LCx is noraml. It gives origin to one large obtuse marginal branch(es). There is IVAN 3 flow.  5. The RCA is normal. It gives origin to the posterior descending artery and the posterolateral branch. There is IVAN 3 flow.     6. LVEDP 8  7. AR 10/5 (5)  8. RV 84/4 (8)  9. PA 84/24 (49)  10. PCWP 21/20/16  11. CO 5.8/ CI 2.9      TTE today ( I viewed images today)  + pericardial effusion  RV 4.5 (uchanged from prior,)TAPSE actually better at 2.5   PASP 54  +MR, TR  D shaped septum in systole    TTE [facility] 8/27/19  1. TrIvial to mild mitral regurgitation.  2. Normal left ventricular systolic and diastolic functions with LVEF >55%.  3. Normal right ventricular systolic function.  4. No resting pulmonary hypertension. right atrial pressure is estimated to be normal (0-5 mm Hg).  Insufficient TR jet to eval PAP      [facility] 12/20/18  · RA: 5/ 2/ 3 RV: 50/ 1/ 6 PA: 47/ 17/ 30 PWP: 14/ 13/ 15 . Cardiac output was 6.5 by Naveed. Cardiac index is 3.23 L/min/m2    No 6mw today  Other: 6MWT: 10/16/2018  Pt walked 900 feet. Lowest O2 sat was 87% with highest . She required 3L NC to maintain O2 sat > 90%. Moderate dyspnea.     TTE 1/18  The right ventricle is normal in size measuring 3.5 cm at the base in the apical right ventricle-focused view. Global right ventricular systolic function appears normal. Tricuspid  "annular plane systolic excursion (TAPSE) is 2.3 cm.   Tissue Doppler-derived tricuspid annular peak systolic velocity (S prime) is 9.5 cm/s.         PFTs  1 /7 /20    FVC  2.29   L   57 % pred  FEV1  1.94 L  62   % pred  FEV1/FVC   85  %  DLCO        17   % pred   Repeat PFts 10/18 relatively stable although the DLCO is cont to fall (now 21% pred)    PFTs   9 / 19 /17    FVC  2.55   L    64 % pred  FEV1  2.08 L  68   % pred  FEV1/FVC   83  %  TLC 3.35  L  56 %pred  DLCO        19    % pred     Ct chest with contrast 8/30/17  Fibrosis in periphery of lungs, progressed since prior     Echo 8/30/17  Normal RV size and RV systolic fxn, PASp 72  TAPSE 2.35  Normal RA size, LA size  Normal LV EF 60-65%  Grade 1 DD    Lung bx 2013 with interstitial fibrosis, endstaged honeycomb    RHC 5/17  AOPRES: 106/70 (82)  AOSAT: 100  FICKCI: 2.96  FICKCO: 6.19  PAPRES: 52/16 (30)  PASAT: 74  PWPRES: 13/12 (10)  RAPRES: 8/7 (6)  RVPRES: 49/2, 6    Condition 2:  PWSAT_POST: 93    Review of Systems   Constitution: Negative for chills, fever, malaise/fatigue and weight gain.   HENT: Negative.    Eyes: Negative.    Cardiovascular: Positive for dyspnea on exertion. Negative for chest pain, leg swelling, near-syncope, orthopnea, palpitations, paroxysmal nocturnal dyspnea and syncope.   Respiratory: Negative for cough and shortness of breath.    Endocrine: Negative.    Skin: Negative.    Musculoskeletal: Negative.    Gastrointestinal: Negative for bloating, abdominal pain and change in bowel habit.   Neurological: Negative for dizziness and light-headedness.   Psychiatric/Behavioral: Negative for depression.        Objective: /60 (BP Location: Right arm, Patient Position: Sitting, BP Method: Medium (Automatic))   Pulse 72   Ht 5' 11" (1.803 m)   Wt 77 kg (169 lb 12.1 oz)   BMI 23.68 kg/m²       Physical Exam   Constitutional: She is oriented to person, place, and time. She appears well-developed and well-nourished.   HENT: "   Head: Normocephalic and atraumatic.   Eyes: Right eye exhibits no discharge. Left eye exhibits no discharge.   Neck: Neck supple. No JVD present. No thyromegaly present.   Cardiovascular: Normal rate and regular rhythm. Exam reveals no gallop and no friction rub.   No murmur heard.       Pulmonary/Chest: Effort normal and breath sounds normal. No respiratory distress. She has no wheezes. She has no rales.   Abdominal: Soft. Bowel sounds are normal. She exhibits no distension. There is no abdominal tenderness.   Musculoskeletal: Normal range of motion.         General: Edema present. No tenderness.      Comments: Mild nonpitting edema to above ankles today, compression stockings on   Neurological: She is alert and oriented to person, place, and time. No cranial nerve deficit. Coordination normal.   Skin: Skin is warm and dry. No rash noted.   Psychiatric: She has a normal mood and affect. Judgment and thought content normal.     Lab Results   Component Value Date     (H) 03/09/2020     08/25/2020    K 3.9 08/25/2020    MG 1.9 03/09/2020     08/25/2020    CO2 24 08/25/2020    BUN 16 08/25/2020    CREATININE 0.7 08/25/2020    GLU 76 08/25/2020    HGBA1C 5.1 03/09/2020    AST 23 08/25/2020    ALT 12 08/25/2020    ALBUMIN 3.8 08/25/2020    PROT 6.9 08/25/2020    BILITOT 0.6 08/25/2020    CHOL 120 03/09/2020    HDL 43 03/09/2020    LDLCALC 58.2 (L) 03/09/2020    TRIG 94 03/09/2020       Magnesium   Date Value Ref Range Status   03/09/2020 1.9 1.6 - 2.6 mg/dL Final       Lab Results   Component Value Date    WBC 5.55 08/25/2020    HGB 11.0 (L) 08/25/2020    HCT 38.1 08/25/2020    MCV 93 08/25/2020     08/25/2020       Lab Results   Component Value Date    INR 0.9 06/09/2020    INR 0.9 03/09/2020    INR 1.0 12/20/2018       BNP   Date Value Ref Range Status   03/09/2020 178 (H) 0 - 99 pg/mL Final     Comment:     Values of less than 100 pg/ml are consistent with non-CHF populations.   09/10/2019  84 0 - 99 pg/mL Final     Comment:     Values of less than 100 pg/ml are consistent with non-CHF populations.   02/25/2019 56 0 - 99 pg/mL Final     Comment:     Values of less than 100 pg/ml are consistent with non-CHF populations.       No results found for: LDH            Assessment:       1. Pulmonary hypertension- mild on RHC in May 2017, with normal RV size and fxn and low PAP on echo in Aug-  Appears dry today with normal BNP  FC II->III   WHO group 1 (ILD assoc with scleroderma)  On adempas and letairis- not a candidate here for LUT   2. CREST (calcinosis, Raynaud's phenomenon, esophageal dysfunction, sclerodactyly, telangiectasia)    3. Chronic respiratory failure with hypoxia    4. Orthostatic hypotension    5. Pulmonary fibrosis    6. Raynaud's disease without gangrene    7. Sjogren's syndrome with lung involvement    8. Chronic diastolic heart failure    9. Hypervolemia, unspecified hypervolemia type    10. Lung transplant candidate    11. Abnormal stress echocardiogram         Plan:   Cont letairis and  adempas- given that hemodynamics much worse on RHC will apply to add tyvaso, jalil given that LUT not an option    Added BNP onto this am's labs    Cont supplemental O2- will need this indefinitely for chronic hypoxic resp failure    cont coreg (was started after PET stress in 2017 with mild endothelial dysfunction) but I do think it has helped with her PVCs    Keep salt intake to under 2000 mg sodium, fluids to under 2 L (64 oz)    Check weights every morning after getting out of bed and urinating. If weight goes up 3# overnight or 5# in one week she should take an extra lasix and call us if the fluid doesn't come off.    F/u 3-4 mo, echo walk and labs

## 2020-08-25 NOTE — LETTER
August 25, 2020        Luis Ken  [address]  [facility] [address]  Phone: [phone]  Fax: [phone]             [facility] - Transplant 1st Fl  [address]  [facility] [address]  Phone: [phone]   Patient: Fatimah Cha   MR Number: [id number]   YOB: 1955   Date of Visit: 8/25/2020       Dear Dr. Luis Ken    Thank you for referring Fatimah Cha to me for evaluation. Attached you will find relevant portions of my assessment and plan of care.    If you have questions, please do not hesitate to call me. I look forward to following Fatimah Cha along with you.    Sincerely,    Arely Viramontes, DO    Enclosure    If you would like to receive this communication electronically, please contact [email or web address] or [phone] to request [employer] Link access.    [employer] Link is a tool which provides read-only access to select patient information with whom you have a relationship. Its easy to use and provides real time access to review your patients record including encounter summaries, notes, results, and demographic information.    If you feel you have received this communication in error or would no longer like to receive these types of communications, please e-mail [email or web address]

## 2020-08-25 NOTE — PROCEDURES
Fatimah Cha is a 65 y.o.  female patient, who presents for a 6 minute walk test ordered by MD Drew.  The diagnosis is Pulmonary Hypertension, Qualify for Oxygen.  The patient's BMI is 24kg/m2. Predicted distance (lower limit of normal) is 343.46 meters.    Test Results:    The test was not completed.  The patient stopped 4 times for a total of 192 seconds.  The total time walked was 168 seconds.  During walking, the patient reported:  Dyspnea. The patient used no assistive devices during testing.     08/25/2020---------Distance: 68.28 meters (224 feet)    Oxygen Titration:   O2 Sat % Supplemental Oxygen Heart Rate Blood Pressure Kushal Scale   Pre-exercise  (Resting) 98 % 6 L/M 78 bpm 97/52 3   During Exercise 87 % 6 L/M 111 bpm 121/55 7-8   Post-exercise   96 % 6 L/M  87 bpm 112/58       Recovery Time: 224 seconds    Oxygen Qualification:     O2 Sat % Supplemental Oxygen Heart Rate Blood Pressure Kushal Scale   Pre-exercise  (Resting) 86 % Room Air  89 bpm  101/58  2    During Exercise                  Post-exercise                      Recovery Time:      Performing nurse/tech: Quintin RILEY    PREVIOUS STUDY:   The patient had a previous study.    05/20/2020---------Distance: 110.34 meters (362 feet)       O2 Sat % Supplemental Oxygen Heart Rate Blood Pressure Kushal Scale   Pre-exercise  (Resting) 98 % 4 L/M 72 bpm 108/58 mmHg 0.5   During Exercise 80 % 4 L/M 88 bpm 112/56 mmHg 7-8   Post-exercise  (Recovery) 97 % 4 L/M  65 bpm           CLINICAL INTERPRETATION:  Six minute walk distance is 68.28 meters (224 feet) with very heavy dyspnea.  During exercise, there was significant desaturation while breathing supplemental oxygen.  Blood pressure remained stable and Heart rate increased significantly with walking.  This may represent a tachycardic response to exercise.  The patient did not report non-pulmonary symptoms during exercise.  Severe exercise impairment is likely due to desaturation and  subjective symptoms.  The patient did not complete the study, walking 192 seconds of the 360 second test.  The patient may benefit from using supplemental oxygen.  Since the previous study in May 2020., exercise capacity may be somewhat worse.  Based upon age and body mass index, exercise capacity is less than predicted.   Oxygen saturation did improve while breathing supplemental oxygen.

## 2020-08-25 NOTE — TELEPHONE ENCOUNTER
----- Message from Arely Viramontes DO sent at 8/25/2020  1:00 PM CDT -----  She uses up to 6L with exertion and only has a 5L concentrator.  Can we arrange for a 10L home concentrator?  She also uses very long oxygen tubing around her house and would benefit from an oximizer if she can get that as well.  She currently uses [employer].  Thanks!!    Orders entered per Dr. Viramontes's instructions.  Notified  to arrange for 10 liter concentrator and oxymizer.

## 2020-08-25 NOTE — LETTER
August 25, 2020        Luis Ken  [address]  [facility] [address]  Phone: [phone]  Fax: [phone]     Renetta Russell  [address]  [facility] [address]  Phone: [phone]  Fax: [phone]             [id number]  [address]  [place] [address]  Phone: [phone]   Patient: Fatimah Cha   MR Number: [id number]   YOB: 1955   Date of Visit: 8/25/2020       Dear Dr. Luis Ken, Renetta Russell    Thank you for referring Fatimah Cha to me for evaluation. Attached you will find relevant portions of my assessment and plan of care.    If you have questions, please do not hesitate to call me. I look forward to following Fatimah Cha along with you.    Sincerely,    Renetta Russell MD    Enclosure    If you would like to receive this communication electronically, please contact [email or web address] or [phone] to request [employer] Link access.    [employer] Link is a tool which provides read-only access to select patient information with whom you have a relationship. Its easy to use and provides real time access to review your patients record including encounter summaries, notes, results, and demographic information.    If you feel you have received this communication in error or would no longer like to receive these types of communications, please e-mail [email or web address]

## 2020-08-25 NOTE — PROGRESS NOTES
LUNG TRANSPLANT PRE FOLLOW-UP    Referring Physician: Luis Ken    Reason for Visit:  Pre-lung transplant follow-up.         Date of Initial Evaluation:                                                                                              History of Present Illness: Fatimah Cha is a 65 y.o. female who is on 2L of oxygen. She is on no assisted ventilation.  Her New [place] Heart Association Class is III and a Karnofsky score of 60% - Requires occasional assistance but is able to care for needs. She is not diabetic. She presents today for routine SSc-ILD follow-up.  Since her last visit, she has been denied lung transplant listing at our center given her esophageal dysmotility and deconditioning.  She is uncertain as to whether she will pursue evaluation at another center.  She denies any changes in her respiratory symptoms.  States that she continues to take MMF and OFEV.  Briefly stopped OFEV a few weeks ago due to GI side effects, but has since started back and is tolerating well.  She has established with [facility] rheumatology and has her first appointment coming up.  She is scheduled to resume her rituxan infusions.  She states that she remains very active around the house and is able to preform all ADLs and chores but requires frequent breaks.  Does not have a 10L concentrator at home and is requesting one given her supplemental oxygen needs.      Review of Systems   Constitutional: Negative for chills, fever, malaise/fatigue and weight loss.   HENT: Negative for congestion, ear pain, hearing loss, sinus pain and sore throat.    Eyes: Negative for blurred vision, double vision and photophobia.   Respiratory: Positive for cough (dry; chronic) and shortness of breath (chronic). Negative for sputum production and wheezing.    Cardiovascular: Negative for chest pain, orthopnea and leg swelling.   Gastrointestinal: Negative for abdominal pain, constipation, diarrhea, heartburn, nausea and vomiting.  "  Genitourinary: Negative for flank pain, frequency and urgency.   Musculoskeletal: Negative for back pain, falls, joint pain and myalgias.   Skin: Negative for itching and rash.   Neurological: Negative for dizziness, focal weakness, seizures, loss of consciousness, weakness and headaches.   Endo/Heme/Allergies: Does not bruise/bleed easily.   Psychiatric/Behavioral: Negative for depression. The patient is not nervous/anxious.      Objective:   BP (!) 108/59   Pulse 92   Temp 98.1 °F (36.7 °C) (Oral)   Resp 20   Ht 5' 9" (1.753 m)   Wt 73.5 kg (162 lb)   SpO2 96% Comment: 3liters  BMI 23.92 kg/m²      Physical Exam  Constitutional:       General: She is not in acute distress.     Appearance: She is well-developed. She is not diaphoretic.   HENT:      Head: Normocephalic and atraumatic.      Nose: Nose normal.      Mouth/Throat:      Pharynx: No oropharyngeal exudate.   Eyes:      General: No scleral icterus.        Right eye: No discharge.         Left eye: No discharge.      Conjunctiva/sclera: Conjunctivae normal.      Pupils: Pupils are equal, round, and reactive to light.   Neck:      Musculoskeletal: Normal range of motion and neck supple.      Thyroid: No thyromegaly.      Vascular: No JVD.      Trachea: No tracheal deviation.   Cardiovascular:      Rate and Rhythm: Normal rate and regular rhythm.      Heart sounds: Normal heart sounds. No murmur. No friction rub. No gallop.    Pulmonary:      Effort: Pulmonary effort is normal. No respiratory distress.      Breath sounds: No stridor. Examination of the right-middle field reveals rales. Examination of the left-middle field reveals rales. Examination of the right-lower field reveals rales. Examination of the left-lower field reveals rales. Rales present. No wheezing.   Chest:      Chest wall: No tenderness.   Abdominal:      General: Bowel sounds are normal. There is no distension.      Palpations: Abdomen is soft. There is no mass.      Tenderness: " There is no abdominal tenderness. There is no guarding or rebound.   Musculoskeletal: Normal range of motion.         General: No tenderness.   Lymphadenopathy:      Cervical: No cervical adenopathy.   Skin:     General: Skin is warm and dry.      Coloration: Skin is not pale.      Findings: No erythema or rash.   Neurological:      Mental Status: She is alert and oriented to person, place, and time.       Labs:  Physical performance battery trend 9/10/2019 2/14/2020 3/9/2020 5/20/2020 8/25/2020   Balance tests 0 4 0 4 0   Gait speed - 4 - 4 -   Chair tests - 1 - 1 -   Total score 0 9 0 9 0         Pulmonary Function Tests 5/20/2020 3/9/2020 2/14/2020 9/10/2019 4/23/2019 10/16/2018 4/19/2018   FVC 2.15 1.96 2.29 2.43 2.41 2.59 2.52   FEV1 1.79 1.7 1.99 2.1 2.04 2.11 2.11   TLC (liters) - - - - - 3.76 3.31   DLCO (ml/mmHg sec) - - 4.4 6.9 5.6 5.2 4.1   FVC% 61 - 57 68 70 64 -   FEV1% 66 - 62 76 75 67 -   FEF 25-75 - - - - 2.97 2.46 2.87   FEF 25-75% - - - - 115 95 -   TLC% - - - - - 62 -   RV - - - - - 1.17 0.79   RV% - - - - - 49 -   DLCO% - - 17 27 28 21 -       6MW 8/25/2020 5/20/2020 9/10/2019 4/23/2019 1/10/2018 10/3/2017 7/3/2017   6MWT Status not completed completed with stops completed with stops completed without stopping completed without stopping completed without stopping completed without stopping   Patient Reported Dyspnea Dyspnea;Lightheadedness Dyspnea;Lightheadedness Dyspnea;Leg pain Dyspnea Dyspnea Dyspnea   Was O2 used? Yes Yes Yes Yes Yes Yes -   Delivery Method Cannula;Pull Tank;Continuous Flow Cannula;Pull Tank;Continuous Flow Cannula;Pull Tank;Continuous Flow Cannula;Pull Tank;Continuous Flow Cannula;Pull Tank;Continuous Flow Cannula;Pull Tank;Continuous Flow -   6MW Distance walked (feet) 224  940 875 -   Distance walked (meters) 68.28 110.34 243.84 304.8 286.51 266.7 -   Did patient stop? Yes Yes Yes No No No No   Oxygen Saturation 98 98 88 96 96 97 96   Supplemental Oxygen 6 L/M  4 L/M Room Air 3 L/M 3 L/M 3 L/M Room Air   Heart Rate 78 72 83 86 86 86 79   Blood Pressure 97/52 108/58 112/59 118/57 112/56 120/64 106/55   Kushal Dyspnea Rating  moderate very, very light (just noticeable) moderate very light light light very light   Oxygen Saturation 87 80 - 80 88 83 80   Supplemental Oxygen 6 L/M 4 L/M - 3 L/M 3 L/M 3 L/M Room Air   Heart Rate 111 88 - 90 92 102 93   Blood Pressure 121/55 112/56 - 119/54 118/57 128/66 114/64   Kushal Dyspnea Rating  very heavy very heavy - somewhat heavy moderate moderate somewhat heavy   Recovery Time (seconds) 224 172 - 96 97 116 142   Oxygen Saturation 96 97 - 95 97 94 94   Supplemental Oxygen 6 L/M 4 L/M - 3 L/M 3 L/M 3 L/M Room Air   Heart Rate 87 65 - 91 91 91 76       Assessment:-  1. Scleroderma with pulmonary involvement    2. Chronic respiratory failure with hypoxia    3. Pulmonary hypertension      Plan:   1. Has known SSc-ILD currently on MMF and OFEV.  FVC has remained stable.  She is scheduled to establish care with [facility] rheumatology and is due for rituxan infusions.      2. Continue oxygen supplementation and exercise as tolerated. Encouraged pulmonary rehab.  Will arrange for a 10L home concentrator and oximizer.      3. Continue therapy for pulmonary hypertension with ambrisnetan and riociguat as prescribed by Dr. Russell.    4. Has been denied lung transplant listing at our center due to esophageal dysmotility and deconditioning.  Explained that she can seek evaluation at another center if she wishes.  Explained that she will need to increase her strength and conditioning to be a candidate at any center regardless of her esophageal function.  She verbalized understanding.    5. Follow-up in 3 months for routine ILD follow-up.      Arely Viramontes D.O.  Pulmonary/Critical Care and Lung Transplantation  [facility] [place]

## 2020-08-25 NOTE — PATIENT INSTRUCTIONS
We will apply for you to start tyvaso along with adempas and letairis    You'll start 3 breaths four times a day and slowly  Increase to to 12 breaths four times a day    Keep salt intake to under 2000 mg sodium, fluids to under 2 L (64 oz)    Call us if you find yourself getting more short of breath, have more swelling or unexpected weight changes

## 2020-08-25 NOTE — TELEPHONE ENCOUNTER
FLACO faxed oxygen orders to [employer] (Ph: [phone]  Fax: [phone]) in [place], [place]. FLACO spoke to Kenneth, who confirmed orders were received. Kenneth confirmed pt can be provided with 10 L concentrator, however stated [facility] does not carry oxymizer that was requested. [facility] can provide pt with regular high flow nasal canula. FLACO relayed this information to pt's coordinator EDI Velazquez RN, who will let MD know. FLACO to remain available.     ----- Message from Arely Viramontes DO sent at 8/25/2020  1:00 PM CDT -----  She uses up to 6L with exertion and only has a 5L concentrator.  Can we arrange for a 10L home concentrator?  She also uses very long oxygen tubing around her house and would benefit from an oximizer if she can get that as well.  She currently uses [facility].  Thanks!!

## 2020-08-26 ENCOUNTER — TELEPHONE (OUTPATIENT)
Dept: TRANSPLANT | Facility: CLINIC | Age: 65
End: 2020-08-26

## 2020-08-26 NOTE — TELEPHONE ENCOUNTER
----- Message from Renetta Russell MD sent at 8/26/2020  3:01 PM CDT -----  No, just proceed with tyvaso, glad we are adding another tx  ----- Message -----  From: CAMI Cedillo, RN  Sent: 8/26/2020   2:48 PM CDT  To: Renetta Russlel MD    Anything to do w/BNP that was added? It was 304.    Thank you,  Jalyn

## 2020-08-27 DIAGNOSIS — I27.9 CHRONIC PULMONARY HEART DISEASE: ICD-10-CM

## 2020-08-27 DIAGNOSIS — Z79.899 POLYPHARMACY: Primary | ICD-10-CM

## 2020-08-27 DIAGNOSIS — R06.82 TACHYPNEA: ICD-10-CM

## 2020-09-21 ENCOUNTER — OFFICE VISIT (OUTPATIENT)
Dept: RHEUMATOLOGY | Facility: CLINIC | Age: 65
End: 2020-09-21
Payer: MEDICARE

## 2020-09-21 ENCOUNTER — CLINICAL SUPPORT (OUTPATIENT)
Dept: INFECTIOUS DISEASES | Facility: CLINIC | Age: 65
End: 2020-09-21
Payer: MEDICARE

## 2020-09-21 VITALS
HEIGHT: 71 IN | DIASTOLIC BLOOD PRESSURE: 67 MMHG | SYSTOLIC BLOOD PRESSURE: 107 MMHG | BODY MASS INDEX: 23.66 KG/M2 | HEART RATE: 84 BPM | WEIGHT: 169 LBS

## 2020-09-21 DIAGNOSIS — Z79.899 OTHER LONG TERM (CURRENT) DRUG THERAPY: ICD-10-CM

## 2020-09-21 DIAGNOSIS — I27.20 PULMONARY HYPERTENSION: ICD-10-CM

## 2020-09-21 DIAGNOSIS — Z71.85 VACCINE COUNSELING: ICD-10-CM

## 2020-09-21 DIAGNOSIS — Z23 NEED FOR PROPHYLACTIC VACCINATION AGAINST HEPATITIS A AND HEPATITIS B: ICD-10-CM

## 2020-09-21 DIAGNOSIS — I73.00 RAYNAUD'S DISEASE WITHOUT GANGRENE: ICD-10-CM

## 2020-09-21 DIAGNOSIS — M34.1 CREST (CALCINOSIS, RAYNAUD'S PHENOMENON, ESOPHAGEAL DYSFUNCTION, SCLERODACTYLY, TELANGIECTASIA): ICD-10-CM

## 2020-09-21 DIAGNOSIS — J84.10 PULMONARY FIBROSIS: ICD-10-CM

## 2020-09-21 DIAGNOSIS — M35.01 SJOGREN'S SYNDROME WITH KERATOCONJUNCTIVITIS SICCA: ICD-10-CM

## 2020-09-21 DIAGNOSIS — K21.9 GASTRO-ESOPHAGEAL REFLUX DISEASE WITHOUT ESOPHAGITIS: ICD-10-CM

## 2020-09-21 DIAGNOSIS — Z76.82 LUNG TRANSPLANT CANDIDATE: ICD-10-CM

## 2020-09-21 DIAGNOSIS — J84.9 INTERSTITIAL LUNG DISEASE: ICD-10-CM

## 2020-09-21 DIAGNOSIS — D84.9 IMMUNOCOMPROMISED: ICD-10-CM

## 2020-09-21 DIAGNOSIS — M34.1 CREST (CALCINOSIS, RAYNAUD'S PHENOMENON, ESOPHAGEAL DYSFUNCTION, SCLERODACTYLY, TELANGIECTASIA): Primary | ICD-10-CM

## 2020-09-21 PROBLEM — E03.9 HYPOTHYROIDISM: Status: RESOLVED | Noted: 2020-09-21 | Resolved: 2020-09-21

## 2020-09-21 PROBLEM — M50.30 DDD (DEGENERATIVE DISC DISEASE), CERVICAL: Status: ACTIVE | Noted: 2020-09-21

## 2020-09-21 PROBLEM — R50.9 FEVER: Status: RESOLVED | Noted: 2018-03-28 | Resolved: 2020-09-21

## 2020-09-21 PROBLEM — E03.9 HYPOTHYROIDISM: Status: ACTIVE | Noted: 2020-09-21

## 2020-09-21 PROCEDURE — 99215 OFFICE O/P EST HI 40 MIN: CPT | Mod: PBBFAC,TXP,25 | Performed by: INTERNAL MEDICINE

## 2020-09-21 PROCEDURE — 90471 IMMUNIZATION ADMIN: CPT | Mod: PBBFAC,NTX

## 2020-09-21 PROCEDURE — 99205 PR OFFICE/OUTPT VISIT, NEW, LEVL V, 60-74 MIN: ICD-10-PCS | Mod: S$PBB,NTX,, | Performed by: INTERNAL MEDICINE

## 2020-09-21 PROCEDURE — 99999 PR PBB SHADOW E&M-EST. PATIENT-LVL V: ICD-10-PCS | Mod: PBBFAC,TXP,, | Performed by: INTERNAL MEDICINE

## 2020-09-21 PROCEDURE — 99205 OFFICE O/P NEW HI 60 MIN: CPT | Mod: S$PBB,NTX,, | Performed by: INTERNAL MEDICINE

## 2020-09-21 PROCEDURE — 99999 PR PBB SHADOW E&M-EST. PATIENT-LVL V: CPT | Mod: PBBFAC,TXP,, | Performed by: INTERNAL MEDICINE

## 2020-09-21 RX ORDER — MYCOPHENOLATE MOFETIL 500 MG/1
1500 TABLET ORAL 2 TIMES DAILY
Qty: 180 TABLET | Refills: 2 | Status: SHIPPED | OUTPATIENT
Start: 2020-09-21 | End: 2020-11-23 | Stop reason: SDUPTHER

## 2020-09-21 ASSESSMENT — ROUTINE ASSESSMENT OF PATIENT INDEX DATA (RAPID3)
FATIGUE SCORE: 5
PSYCHOLOGICAL DISTRESS SCORE: 3.3
TOTAL RAPID3 SCORE: 3.17
PAIN SCORE: 0.5
MDHAQ FUNCTION SCORE: 0.9
PATIENT GLOBAL ASSESSMENT SCORE: 6
AM STIFFNESS SCORE: 0, NO

## 2020-09-21 NOTE — ASSESSMENT & PLAN NOTE
Due to scleroderma and on both MMF to slow progression of NSIP and OFEV to slow progression of IPF but is still O2 dependent and with worsening dyspnea and was told that she is not a candidate for lung transplant (here)

## 2020-09-21 NOTE — PROGRESS NOTES
Subjective:       Patient ID: Fatimah Jonesboscq is a 65 y.o. female.    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 Viramontes and Dr Karen Hughes)    RTX infusions; 1000 x 2, 2018, 2019, none in 2020, at [facility] (RTX: first Dec 2017, Feb 2018, last dose was in June 2018)  On  mg per Dr Vitale   On  mg but off x 2 months    Current inflam arthritis ; no morning stiffness; occasional swelling ; no problems with mobility    Took prednisone for 5 years for lungs; off x 2 years  Had DXA and told osteoporosis and on alendronate since May; was on residronate prior to that    Esophagus: intermittent swallowing problem; on med for TY; +Little's (Dr. Santiago)     Low thyroid; difficulty regulating with polypharmacy; Dr Teixeira    Hx thyroid surgery    Dr Vitale note 8/13/2009:  COURTNEY 1:1280 nucleolar pattern at some point  COURTNEY 1:640 nucleolar in 9/2015  SSA +, RF+ at 32  SSB, Scl-70,     SSc Criteria: sine SSc: 13   +RP 3  +abnormal cap scope: drop-out, some haemorrhage, small dysangiogenesis 2  DU 2  +puffy fingers 2  +telangiectases 2  +PH/ILD 2  +PF by biopsy: severe interstitial fibrosis, honeycombing end-stage     Review of Systems   Constitutional: Positive for fatigue. Negative for fever and unexpected weight change.   HENT: Positive for trouble swallowing. Negative for mouth sores.         Dry mouth   Eyes: Negative for redness.        Dry eyes   Respiratory: Negative for cough and shortness of  "breath.    Cardiovascular: Negative for chest pain.   Gastrointestinal: Positive for diarrhea. Negative for abdominal pain and constipation.   Genitourinary: Negative for dysuria and genital sores.   Skin: Negative for color change and rash.   Neurological: Negative for headaches.   Hematological: Negative for adenopathy. Does not bruise/bleed easily.   Psychiatric/Behavioral: Positive for sleep disturbance.         Objective:   /67   Pulse 84   Ht 5' 11" (1.803 m)   Wt 76.7 kg (169 lb)   BMI 23.57 kg/m²      Physical Exam   Constitutional: She is well-developed, well-nourished, and in no distress.   Eyes: Conjunctivae are normal.   Cardiovascular: Normal rate and regular rhythm.    Pulmonary/Chest: She has no wheezes. She has no rales.   tachypneic but no rales or wheeze   Lymphadenopathy:     She has no cervical adenopathy.   Skin: No rash noted.     telangiectasia fingers and face   Musculoskeletal:      Comments: No synovitis                     3/9/2020 COURTNEY 1:2560 nucleolar, RF 14; normal IgG, IgA, low IgM 28  Assessment:       1. CREST (calcinosis, Raynaud's phenomenon, esophageal dysfunction, sclerodactyly, telangiectasia)    2. Interstitial lung disease    3. Pulmonary fibrosis    4. Raynaud's disease without gangrene    5. Sjogren's syndrome with keratoconjunctivitis sicca    6. Pulmonary hypertension    7. Gastro-esophageal reflux disease without esophagitis    8. Other long term (current) drug therapy            Plan:       Problem List Items Addressed This Visit        Active Problems    CREST (calcinosis, Raynaud's phenomenon, esophageal dysfunction, sclerodactyly, telangiectasia) - Primary     Overall she has no active synovitis and has been off of HCQ x 2 months   Raynaud's is currently controlled pretty well on Riociguat/Adempas  Esophageal sx are stable on esomeprazole  Unfortunately pulm ILD(for which is on mycophenolate and OFEV)  and Pulm Art HTN cause symptomatic dyspnea and fatigue " that is worse and BNP has startted to rise as well    I am not sure what the role of RTX is at this time and in light of decreased IgM level I will discuss with Dr Vitale and care team          Relevant Medications    mycophenolate (CELLCEPT) 500 mg Tab    Other Relevant Orders    Anti-scleroderma antibody    RNA polymerase III Ab, IgG    PM-Scl Antibody by Immunodiffusion    Anti Sm/RNP Antibody    Th/To Antibody    MyoMarker Panel 3    Cyclic Citrullinated Peptide Antibody, IgG    Rheumatoid factor    Aldolase    CK    C-Reactive Protein    Sedimentation rate    Pulmonary fibrosis     Continues on OFEV         Raynaud disease     Symptomatically better on pulm arterial htn Rx  No digital ulcers or fissures         Relevant Orders    Cryoglobulin    Sjogren's syndrome with keratoconjunctivitis sicca     Very dry sx; not sure if Evoxac helped before insurance refused to pay for it.  I am reluctant to start pilocarpine in light of new increase in BNP and will wait for Dr Hughes to see her first         Relevant Orders    Sjogrens syndrome-A extractable nuclear antibody    Sjogrens syndrome-B extractable nuclear antibody    C4 complement    C3 complement    Interstitial lung disease     Due to scleroderma and on both MMF to slow progression of NSIP and OFEV to slow progression of IPF but is still O2 dependent and with worsening dyspnea and was told that she is not a candidate for lung transplant (here)         Relevant Medications    mycophenolate (CELLCEPT) 500 mg Tab    Pulmonary hypertension     On therapy for this with escalation in light of increased BNP and fatigue/shortness of breath         Gastro-esophageal reflux disease without esophagitis     Esophageal complication of scleroderma, reportedly complicated by Ilttle's     Will cont PPI and f/u with GI          Other long term (current) drug therapy     Tolerating MMF with recent normal lab         Relevant Orders    IgA    IgG    IgM

## 2020-09-21 NOTE — PROGRESS NOTES
Patient received 2nd Hepatitis A vaccine.  Patient tolerated well and left via wheelchair with family in [place].

## 2020-09-21 NOTE — ASSESSMENT & PLAN NOTE
Esophageal complication of scleroderma, reportedly complicated by Little's     Will cont PPI and f/u with GI

## 2020-09-21 NOTE — ASSESSMENT & PLAN NOTE
Very dry sx; not sure if Evoxac helped before insurance refused to pay for it.  I am reluctant to start pilocarpine in light of new increase in BNP and will wait for Dr Hughes to see her first

## 2020-09-21 NOTE — ASSESSMENT & PLAN NOTE
Overall she has no active synovitis and has been off of HCQ x 2 months   Raynaud's is currently controlled pretty well on Riociguat/Adempas  Esophageal sx are stable on esomeprazole  Unfortunately pulm ILD(for which is on mycophenolate and OFEV)  and Pulm Art HTN cause symptomatic dyspnea and fatigue that is worse and BNP has startted to rise as well    I am not sure what the role of RTX is at this time and in light of decreased IgM level I will discuss with Dr Vitale and care team

## 2020-09-24 RX ORDER — TREPROSTINIL 1.74 MG/2.9ML
INHALANT ORAL
COMMUNITY
Start: 2020-09-22 | End: 2021-08-05

## 2020-09-25 ENCOUNTER — PATIENT MESSAGE (OUTPATIENT)
Dept: TRANSPLANT | Facility: CLINIC | Age: 65
End: 2020-09-25

## 2020-10-16 ENCOUNTER — TELEPHONE (OUTPATIENT)
Dept: TRANSPLANT | Facility: CLINIC | Age: 65
End: 2020-10-16

## 2020-10-16 NOTE — TELEPHONE ENCOUNTER
"Patient called to report she was seen at [facility] because she is in "a pulmonary hypertension study." They started to do stress echo today, as part of study, but patient was unable to participate due to initial echo results (found in Care Everywhere). "They told me I had quite a bit of fluid around my heart and that I should be seen sooner, rather than later. They told me I wasn't in danger, but I shouldn't wait until November 13, when I have my appointment scheduled."     Patient reports she has not gained any weight, and only has swelling to right leg, which is not new. She states she has felt "better" since starting Tyvaso, and is up to 8 breaths, 4 times daily. She also says that at present, she does not feel any more short of breath than baseline. Nicki Hughes and Edgar will be notified. Patient understands to go to ER, should she feel SOB above baseline. Of note, patient also has echocardiogram scheduled w/Nov 13 appointments.       "

## 2020-10-20 ENCOUNTER — PATIENT MESSAGE (OUTPATIENT)
Dept: TRANSPLANT | Facility: CLINIC | Age: 65
End: 2020-10-20

## 2020-11-13 ENCOUNTER — OFFICE VISIT (OUTPATIENT)
Dept: TRANSPLANT | Facility: CLINIC | Age: 65
End: 2020-11-13
Payer: MEDICARE

## 2020-11-13 ENCOUNTER — HOSPITAL ENCOUNTER (OUTPATIENT)
Dept: CARDIOLOGY | Facility: HOSPITAL | Age: 65
Discharge: HOME | End: 2020-11-13
Attending: INTERNAL MEDICINE
Payer: MEDICARE

## 2020-11-13 ENCOUNTER — HOSPITAL ENCOUNTER (OUTPATIENT)
Dept: PULMONOLOGY | Facility: CLINIC | Age: 65
Discharge: HOME | End: 2020-11-13
Payer: MEDICARE

## 2020-11-13 VITALS
DIASTOLIC BLOOD PRESSURE: 70 MMHG | HEIGHT: 71 IN | WEIGHT: 169 LBS | HEART RATE: 72 BPM | BODY MASS INDEX: 23.66 KG/M2 | SYSTOLIC BLOOD PRESSURE: 128 MMHG

## 2020-11-13 VITALS
BODY MASS INDEX: 24.26 KG/M2 | DIASTOLIC BLOOD PRESSURE: 55 MMHG | SYSTOLIC BLOOD PRESSURE: 112 MMHG | HEIGHT: 71 IN | WEIGHT: 173.31 LBS | OXYGEN SATURATION: 98 % | HEART RATE: 82 BPM

## 2020-11-13 VITALS — BODY MASS INDEX: 25.03 KG/M2 | HEIGHT: 69 IN | WEIGHT: 169 LBS

## 2020-11-13 DIAGNOSIS — I27.20 PULMONARY HYPERTENSION: Primary | ICD-10-CM

## 2020-11-13 DIAGNOSIS — J96.11 CHRONIC RESPIRATORY FAILURE WITH HYPOXIA: ICD-10-CM

## 2020-11-13 DIAGNOSIS — E78.5 HYPERLIPIDEMIA, UNSPECIFIED HYPERLIPIDEMIA TYPE: ICD-10-CM

## 2020-11-13 DIAGNOSIS — I27.9 CHRONIC PULMONARY HEART DISEASE: ICD-10-CM

## 2020-11-13 DIAGNOSIS — M34.1 CREST (CALCINOSIS, RAYNAUD'S PHENOMENON, ESOPHAGEAL DYSFUNCTION, SCLERODACTYLY, TELANGIECTASIA): ICD-10-CM

## 2020-11-13 DIAGNOSIS — J84.10 PULMONARY FIBROSIS: ICD-10-CM

## 2020-11-13 DIAGNOSIS — J84.9 INTERSTITIAL LUNG DISEASE: ICD-10-CM

## 2020-11-13 LAB
ASCENDING AORTA: 3.27 CM
AV INDEX (PROSTH): 0.7
AV MEAN GRADIENT: 4 MMHG
AV PEAK GRADIENT: 8 MMHG
AV VALVE AREA: 3.05 CM2
AV VELOCITY RATIO: 0.7
BSA FOR ECHO PROCEDURE: 1.96 M2
CV ECHO LV RWT: 0.24 CM
DOP CALC AO PEAK VEL: 1.43 M/S
DOP CALC AO VTI: 29.08 CM
DOP CALC LVOT AREA: 4.4 CM2
DOP CALC LVOT DIAMETER: 2.36 CM
DOP CALC LVOT PEAK VEL: 1 M/S
DOP CALC LVOT STROKE VOLUME: 88.71 CM3
DOP CALCLVOT PEAK VEL VTI: 20.29 CM
E WAVE DECELERATION TIME: 184.69 MSEC
E/A RATIO: 0.55
E/E' RATIO: 4.7 M/S
ECHO LV POSTERIOR WALL: 0.59 CM (ref 0.6–1.1)
FRACTIONAL SHORTENING: 35 % (ref 28–44)
INTERVENTRICULAR SEPTUM: 0.68 CM (ref 0.6–1.1)
IVRT: 142.72 MSEC
LA MAJOR: 6.18 CM
LA MINOR: 6.26 CM
LA WIDTH: 3.81 CM
LEFT ATRIUM SIZE: 3.82 CM
LEFT ATRIUM VOLUME INDEX: 39.2 ML/M2
LEFT ATRIUM VOLUME: 76.94 CM3
LEFT INTERNAL DIMENSION IN SYSTOLE: 3.15 CM (ref 2.1–4)
LEFT VENTRICLE DIASTOLIC VOLUME INDEX: 56.05 ML/M2
LEFT VENTRICLE DIASTOLIC VOLUME: 110.03 ML
LEFT VENTRICLE MASS INDEX: 49 G/M2
LEFT VENTRICLE SYSTOLIC VOLUME INDEX: 20 ML/M2
LEFT VENTRICLE SYSTOLIC VOLUME: 39.36 ML
LEFT VENTRICULAR INTERNAL DIMENSION IN DIASTOLE: 4.85 CM (ref 3.5–6)
LEFT VENTRICULAR MASS: 96.39 G
LV LATERAL E/E' RATIO: 4.27 M/S
LV SEPTAL E/E' RATIO: 5.22 M/S
MV PEAK A VEL: 0.86 M/S
MV PEAK E VEL: 0.47 M/S
MV STENOSIS PRESSURE HALF TIME: 53.56 MS
MV VALVE AREA P 1/2 METHOD: 4.11 CM2
PISA TR MAX VEL: 4.57 M/S
PULM VEIN S/D RATIO: 1.33
PV PEAK D VEL: 0.46 M/S
PV PEAK S VEL: 0.61 M/S
RA MAJOR: 5.78 CM
RA PRESSURE: 3 MMHG
RA WIDTH: 3.93 CM
RIGHT VENTRICULAR END-DIASTOLIC DIMENSION: 4.41 CM
RV TISSUE DOPPLER FREE WALL SYSTOLIC VELOCITY 1 (APICAL 4 CHAMBER VIEW): 12.48 CM/S
SINUS: 3.16 CM
STJ: 2.77 CM
TDI LATERAL: 0.11 M/S
TDI SEPTAL: 0.09 M/S
TDI: 0.1 M/S
TR MAX PG: 84 MMHG
TRICUSPID ANNULAR PLANE SYSTOLIC EXCURSION: 1.98 CM
TV REST PULMONARY ARTERY PRESSURE: 87 MMHG

## 2020-11-13 PROCEDURE — 93306 ECHO (CUPID ONLY): ICD-10-PCS | Mod: 26,NTX,, | Performed by: INTERNAL MEDICINE

## 2020-11-13 PROCEDURE — 99999 PR PBB SHADOW E&M-EST. PATIENT-LVL V: CPT | Mod: PBBFAC,,, | Performed by: INTERNAL MEDICINE

## 2020-11-13 PROCEDURE — 99215 OFFICE O/P EST HI 40 MIN: CPT | Mod: PBBFAC,25 | Performed by: INTERNAL MEDICINE

## 2020-11-13 PROCEDURE — 94618 PULMONARY STRESS TESTING: CPT | Mod: PBBFAC,NTX | Performed by: INTERNAL MEDICINE

## 2020-11-13 PROCEDURE — 99214 OFFICE O/P EST MOD 30 MIN: CPT | Mod: S$PBB,,, | Performed by: INTERNAL MEDICINE

## 2020-11-13 PROCEDURE — 93306 TTE W/DOPPLER COMPLETE: CPT | Mod: TXP

## 2020-11-13 PROCEDURE — 99999 PR PBB SHADOW E&M-EST. PATIENT-LVL V: ICD-10-PCS | Mod: PBBFAC,,, | Performed by: INTERNAL MEDICINE

## 2020-11-13 PROCEDURE — 93306 TTE W/DOPPLER COMPLETE: CPT | Mod: 26,NTX,, | Performed by: INTERNAL MEDICINE

## 2020-11-13 PROCEDURE — 99214 PR OFFICE/OUTPT VISIT, EST, LEVL IV, 30-39 MIN: ICD-10-PCS | Mod: S$PBB,,, | Performed by: INTERNAL MEDICINE

## 2020-11-13 PROCEDURE — 94618 PULMONARY STRESS TESTING: ICD-10-PCS | Mod: 26,S$PBB,NTX, | Performed by: INTERNAL MEDICINE

## 2020-11-13 PROCEDURE — 94618 PULMONARY STRESS TESTING: CPT | Mod: 26,S$PBB,NTX, | Performed by: INTERNAL MEDICINE

## 2020-11-13 RX ORDER — TORSEMIDE 20 MG/1
20 TABLET ORAL DAILY
Qty: 30 TABLET | Refills: 11 | Status: SHIPPED | OUTPATIENT
Start: 2020-11-13 | End: 2021-11-13

## 2020-11-13 NOTE — LETTER
December 9, 2020        Luis Ken  [address]  [facility] [address]  Phone: [phone]  Fax: [phone]     Renetta Russell  [address]  [facility] [address]  [facility] [address]  Phone: [phone]  Fax: [phone]             [facility]  [address]  [facility] [address]  Phone: [phone]   Patient: Fatimah Cha   MR Number: [id number]   YOB: 1955   Date of Visit: 11/13/2020       Dear Dr. Luis Ken, Renetta Russell    Thank you for referring Fatimah Cha to me for evaluation. Attached you will find relevant portions of my assessment and plan of care.    If you have questions, please do not hesitate to call me. I look forward to following Fatimah Cha along with you.    Sincerely,    Karen Hughes MD    Enclosure    If you would like to receive this communication electronically, please contact [email or web address] or [phone] to request [employer] Link access.    [employer] Link is a tool which provides read-only access to select patient information with whom you have a relationship. Its easy to use and provides real time access to review your patients record including encounter summaries, notes, results, and demographic information.    If you feel you have received this communication in error or would no longer like to receive these types of communications, please e-mail [email or web address]
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
## 2020-11-16 ENCOUNTER — TELEPHONE (OUTPATIENT)
Dept: TRANSPLANT | Facility: CLINIC | Age: 65
End: 2020-11-16

## 2020-11-16 NOTE — PROCEDURES
Fatimah Cha is a 65 y.o.  female patient, who presents for a 6 minute walk test ordered by MD Gini.  The diagnosis is Qualify for Oxygen, Pulmonary Hypertension.  The patient's BMI is 24.9 kg/m2.  Predicted distance (lower limit of normal) is 337.84 meters.      Test Results:    The test was not completed.   The patient stopped 2 times for a total of 258 seconds.  The total time walked was 102 seconds.  During walking, the patient reported:  Dyspnea. The patient used no assistive devices  during testing.     11/13/2020---------Distance: 60.96 meters (200 feet)     O2 Sat % Supplemental Oxygen Heart Rate Blood Pressure Kushal Scale   Pre-exercise  (Resting) 96 % Room Air 76 bpm 109/60 mmHg 1   During Exercise 86 % Room Air 112 bpm 131/63 mmHg 7-8   Post-exercise  (Recovery) 96 % Room Air  86 bpm 128/58 mmHg       Recovery Time: 148 seconds    Performing nurse/tech: Kristal RILEY      PREVIOUS STUDY:   The patient had a previous study.     08/25/2020---------Distance: 68.28 meters (224 feet)     Oxygen Titration:    O2 Sat % Supplemental Oxygen Heart Rate Blood Pressure Kushal Scale   Pre-exercise  (Resting) 98 % 6 L/M 78 bpm 97/52 3   During Exercise 87 % 6 L/M 111 bpm 121/55 7-8   Post-exercise    96 % 6 L/M  87 bpm 112/58       Recovery Time: 224 seconds      CLINICAL INTERPRETATION:  Six minute walk distance is 60.96 meters (200 feet) with very heavy dyspnea.  During exercise, there was significant desaturation while breathing room air.  Blood pressure remained stable and Heart rate increased significantly with walking.  This may represent a tachycardic response to exercise.  The patient did not report non-pulmonary symptoms during exercise.  Severe exercise impairment is likely due to desaturation and cardiovascular causes.  The patient did not complete the study, walking 102 seconds of the 360 second test.  The patient may benefit from using supplemental oxygen during exertion.  Since the previous  study in August 2020, exercise capacity is unchanged.  Based upon age and body mass index, exercise capacity is less than predicted.

## 2020-11-16 NOTE — TELEPHONE ENCOUNTER
Spoke with patient about transfer to pulmonary clinic, as Dr. Viramontes will no longer see patients for ALD/ILD. She verbalized understanding and requests to see Dr. Briones (established with her in 2017). If Dr. Briones is not available, she is requesting follow up on 11/23 or 12/4, coordinating with her other appointments. All questions answered at this time.

## 2020-11-17 ENCOUNTER — TELEPHONE (OUTPATIENT)
Dept: PULMONOLOGY | Facility: CLINIC | Age: 65
End: 2020-11-17

## 2020-11-17 DIAGNOSIS — R06.02 SHORTNESS OF BREATH: ICD-10-CM

## 2020-11-17 DIAGNOSIS — R06.02 SOB (SHORTNESS OF BREATH): Primary | ICD-10-CM

## 2020-11-19 ENCOUNTER — LAB VISIT (OUTPATIENT)
Dept: LAB | Facility: HOSPITAL | Age: 65
End: 2020-11-19
Attending: STUDENT IN AN ORGANIZED HEALTH CARE EDUCATION/TRAINING PROGRAM
Payer: MEDICARE

## 2020-11-19 DIAGNOSIS — I27.20 PULMONARY HYPERTENSION: ICD-10-CM

## 2020-11-19 LAB
ANION GAP SERPL CALC-SCNC: 11 MMOL/L (ref 8–16)
BUN SERPL-MCNC: 19 MG/DL (ref 8–23)
CALCIUM SERPL-MCNC: 9.3 MG/DL (ref 8.7–10.5)
CHLORIDE SERPL-SCNC: 105 MMOL/L (ref 95–110)
CO2 SERPL-SCNC: 26 MMOL/L (ref 23–29)
CREAT SERPL-MCNC: 0.8 MG/DL (ref 0.5–1.4)
EST. GFR  (AFRICAN AMERICAN): >60 ML/MIN/1.73 M^2
EST. GFR  (NON AFRICAN AMERICAN): >60 ML/MIN/1.73 M^2
GLUCOSE SERPL-MCNC: 83 MG/DL (ref 70–110)
POTASSIUM SERPL-SCNC: 4.1 MMOL/L (ref 3.5–5.1)
SODIUM SERPL-SCNC: 142 MMOL/L (ref 136–145)

## 2020-11-19 PROCEDURE — 36415 COLL VENOUS BLD VENIPUNCTURE: CPT

## 2020-11-19 PROCEDURE — 80048 BASIC METABOLIC PNL TOTAL CA: CPT

## 2020-11-20 ENCOUNTER — LAB VISIT (OUTPATIENT)
Dept: URGENT CARE | Facility: CLINIC | Age: 65
End: 2020-11-20
Payer: MEDICARE

## 2020-11-20 DIAGNOSIS — R06.02 SHORTNESS OF BREATH: ICD-10-CM

## 2020-11-20 DIAGNOSIS — Z03.818 ENCOUNTER FOR OBSERVATION FOR SUSPECTED EXPOSURE TO OTHER BIOLOGICAL AGENTS RULED OUT: ICD-10-CM

## 2020-11-20 PROCEDURE — U0003 INFECTIOUS AGENT DETECTION BY NUCLEIC ACID (DNA OR RNA); SEVERE ACUTE RESPIRATORY SYNDROME CORONAVIRUS 2 (SARS-COV-2) (CORONAVIRUS DISEASE [COVID-19]), AMPLIFIED PROBE TECHNIQUE, MAKING USE OF HIGH THROUGHPUT TECHNOLOGIES AS DESCRIBED BY CMS-2020-01-R: HCPCS

## 2020-11-20 PROCEDURE — 99000 PR SPECIMEN HANDLING,DR OFF->LAB: ICD-10-PCS | Mod: S$GLB,,, | Performed by: EMERGENCY MEDICINE

## 2020-11-20 PROCEDURE — 99000 SPECIMEN HANDLING OFFICE-LAB: CPT | Mod: S$GLB,,, | Performed by: EMERGENCY MEDICINE

## 2020-11-20 NOTE — PROGRESS NOTES
CDC TESTING AND QUARANTINE GUIDELINES FOR EXPOSURE   A CLOSE EXPOSURE IS DEFINED AS ANYONE WHO HAD A MASKED OR AN UNMASKED EXPOSURE TO A KNOWN  19 POSITIVE PERSON, AT LESS THAN 6 FT FOR MORE THAN 15 MINUTES. IF YOUR EXPOSURE MEETS THIS DEFINITION, THEN YOU ARE REQUIRED TO QUARANTINE FOR 14 DAYS PER THE CDC. THEY RECOMMEND THAT A TEST CAN BE PERFORMED IF YOU ASYMPTOMATIC.(SOMEONE WHO DOES NOT HAVE ANY SYMPTOMS), AND A TEST SHOULD BE DONE IF YOU DEVELOP SYMPTOMS AFTER AN EXPOSURE AS DESCRIBED ABOVE.    IF YOU MEET THE DEFINITION OF A CLOSE EXPOSURE, IT DOES NOT MATTER WHETHER OR NOT YOU ARE ASYMPTOMATIC OR SYMPTOMATIC- A NEGATIVE TEST DOES NOT GET YOU OUT OF 14 DAYS OF QUARANTINE!    PLEASE NOTE THAT IF YOU ARE ASYMPTOMATIC AND WAIT MORE THAN 4 DAYS TO TEST AFTER AN EXPOSURE, YOU RISK LENGTHENING YOUR QUARANTINE.THIS IS BECAUSE IF YOU TEST POSITIVE AS AN ASYMPTOMATIC, YOUR ISOLATION IS 10 DAYS FROM THE DATE OF THE POSITIVE TEST, NOT THE DATE OF EXPOSURE. SO FOR EXAMPLE, IF YOU TEST POSITIVE AS AN ASYMPTOMATIC ON DAY 7 FROM EXPOSURE, YOU HAVE NOW EXTENDED YOUR 14 DAY QUARANTINE TO A  17 DAY ISOLAION    IF YOUR EXPOSURE DOES NOT MEET THE ABOVE DEFINITION, YOU MAY RETURN TO YOUR NORMAL ACTIVITIES INCLUDING SOCIAL DISTANCING, WEARING MASKS, AND FREQUENT HANDWASHING

## 2020-11-21 LAB — SARS-COV-2 RNA RESP QL NAA+PROBE: NOT DETECTED

## 2020-11-22 NOTE — PROGRESS NOTES
Pulmonary & Critical Care Medicine   Clinic Note    HPI: New to me. Followed prior at [facility] and also in ILD/Transplant clinic at our center. Per prior transplant documentation: 65 y.o. female who is on 2L of oxygen. She is on no assisted ventilation.  Her New York Heart Association Class is III and a Karnofsky score of 60% - Requires occasional assistance but is able to care for needs. She is not diabetic. She presents today for routine SSc-ILD follow-up.  Since her last visit, she has been denied lung transplant listing at our center given her esophageal dysmotility and deconditioning.  She is uncertain as to whether she will pursue evaluation at another center.  She denies any changes in her respiratory symptoms.  States that she continues to take MMF and OFEV.  Briefly stopped OFEV a few weeks ago due to GI side effects, but has since started back and is tolerating well.  [place] [facility] [place] has her first appointment coming up.  She is scheduled to resume her rituxan infusions.  She states that she remains very active around the house and is able to preform all ADLs and chores but requires frequent breaks.  Does not have a 10L concentrator at home and is requesting one given her supplemental oxygen needs.      Interval History:     She is doing well. 2.5L NC at rest. Plays with it when she is exerting herself and sometimes upward of 4.5L with exertions. Has not been hospitalized in >3 years. Weight stable. Cough/congestion controlled with her nebulized bronchodilators.   Still on cellcept 1.5g BID + OFEV.. OFEV 150 qAM 2/2 diarrhea at BID dose despite lomotil.   Vaccinations UTD.   No falls/syncope. No additional complaints.     Seen by Rheumatology (Dr. Farias) this AM. Holding on additional RTX and maintain with current regimen for now.   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.  Retired last year just prior to COVID. No occupational exposures.   , lives on [place] with . 3 children. 1 miscarriage but no additional pregnancy related complications.   Exposure to Animals/Pets: 1 cat no issues.   Travel History: None   History of exposures to TB: None. Prior testing all negative,    Family History of Lung Cancer: None   Childhood history of Lung Disease: None   Immunosuppression- MMF as above   Anti fibrotic- OFEV    Chest trauma/Surgery- None.   [place] status- [place] [facility] [place] + esophageal dysmotility. Exploring [facility] as an option, but nothing yet decided.       Past Medical History:   Diagnosis Date    Anxiety     CHF (congestive heart failure)     Colitis     CREST syndrome     GERD (gastroesophageal reflux disease)     Hypercholesteremia     Hypertension     Interstitial lung disease     Osteopenia     Pulmonary fibrosis     Pulmonary hypertension     Raynaud disease     Respiratory distress     Sciatica     Scleroderma     Sjoegren syndrome     Thyroid disease      Past Surgical History:   Procedure Laterality Date    BREAST BIOPSY      CARDIAC CATHETERIZATION  2013    CATHETERIZATION OF BOTH LEFT AND RIGHT HEART Bilateral 6/9/2020    Procedure: CATHETERIZATION, HEART, BOTH LEFT AND RIGHT;  Surgeon: Irving Flores MD;  Location: [facility] CATH LAB;  Service: Cardiology;  Laterality: Bilateral;    COLONOSCOPY      COLONOSCOPY N/A 11/15/2017    Procedure: COLONOSCOPY;  Surgeon: Radha Santiago MD;  Location: [facility] ENDO ([address] [place] [address]);  Service: Endoscopy;  Laterality: N/A;  2nd floor.  Pulm htn, ILD on o2.  Pls review w anesthesia.  Pt has cardio-pulm workup in progress at [facility] by Dr. Russell.                 reese monsalve RN- I reviewed Ms. Cha's history with Dr. Leopold (Anesthesia) and based on her medical history of:  Pulmonary Hyper    CORONARY ANGIOGRAPHY N/A 6/9/2020    Procedure: ANGIOGRAM, CORONARY ARTERY;  Surgeon: Irving Flores MD;   Location: [facility] CATH LAB;  Service: Cardiology;  Laterality: N/A;    ESOPHAGEAL MANOMETRY WITH MEASUREMENT OF IMPEDANCE N/A 6/2/2020    Procedure: MANOMETRY-ESOPHAGEAL-WITH IMPEDANCE;  Surgeon: Radha Santiago MD;  Location: [facility] (4TH FLR);  Service: Endoscopy;  Laterality: N/A;  Hold Narcotics x 1 days if able   Hold TCA x 1 days if able  ADHESIVE Allergy  per Dr. Santiago, to be scheduled in 1-2 months from 4/17/20-see telephone encounter 4/14/20-BB  instructions emailed to patient  COVID test scheduled for 6/1/20 at [facility]    ESOPHAGOGASTRODUODENOSCOPY N/A 5/19/2020    Procedure: ESOPHAGOGASTRODUODENOSCOPY (EGD);  Surgeon: Kemar Maldonado MD;  Location: [facility] (2ND FLR);  Service: Endoscopy;  Laterality: N/A;  Pulmonary HTN (PAP>50 or on meds). Urgent reschedule - lung transplant eval pt on 2-4 L oxygen  Lammi preferred/ok w other GI MD  standard prep  ADHESIVE Allergy  per Dr. Santiago, to be scheduled in 1-2 months from 4/17/20-see telephone encounter 4/14/20    foot surgery x2      lumpectomy (benign)      LUNG BIOPSY  03/2013    RIGHT HEART CATHETERIZATION Right 12/20/2018    Procedure: INSERTION, CATHETER, RIGHT HEART;  Surgeon: Renetta Russell MD;  Location: [facility] CATH LAB;  Service: Cardiology;  Laterality: Right;    THYROIDECTOMY      TONSILLECTOMY      TUBAL LIGATION      UPPER GASTROINTESTINAL ENDOSCOPY       Family/Social History: Reviewed and updated.     Drug Allergies:   Review of patient's allergies indicates:   Allergen Reactions    Adhesive tape-silicones          Review of Systems:   Constitutional: Negative for chills, fever, malaise/fatigue and weight loss.   HENT: Negative for congestion, ear pain, hearing loss, sinus pain and sore throat.    Eyes: Negative for blurred vision, double vision and photophobia.   Respiratory: Positive for cough (dry; chronic) and shortness of breath (chronic). Negative for sputum production and wheezing.    Cardiovascular: Negative for chest  "pain, orthopnea and leg swelling.   Gastrointestinal: Negative for abdominal pain, constipation, diarrhea, heartburn, nausea and vomiting.   Genitourinary: Negative for flank pain, frequency and urgency.   Musculoskeletal: Negative for back pain, falls, joint pain and myalgias.   Skin: Negative for itching and rash.   Neurological: Negative for dizziness, focal weakness, seizures, loss of consciousness, weakness and headaches.   Endo/Heme/Allergies: Does not bruise/bleed easily.   Psychiatric/Behavioral: Negative for depression. The patient is not nervous/anxious.    A comprehensive 12-point review of systems was performed, and is negative except for those items mentioned above in the HPI section of this note.     Vital Signs:    /67 (BP Location: Left arm, Patient Position: Sitting)   Pulse 80   Ht 5' 9" (1.753 m)   Wt 78 kg (172 lb)   SpO2 (!) 94% Comment: 2.5  BMI 25.40 kg/m²        Physical Exam:   GEN- NAD AAOx3 Well Built, Well Appearing In wheelchair.   HEENT- ATNC, PERRLA, EOMI, OP-Cl. No JVD, LAD or bruit noted. Trachea Midline.   CV- RRR No M/R/G  RESP- scattered crackles throughout. No wheezing.   GI- S/NT/ND. Positive BS X 4. No HSM Noted  BACK- Spine midline. No step off, crepitus or deformity noted. No midline TTP.   Ext- MAEW, No deformity. No edema or rashes noted. +sclerodactly + telangiectasis No digital ulcers.   Neuro- Strength 5/5 symmetric. CN 2-12 intact. Normal gait. Normal sensation.      Personal Review and Summary of Prior Diagnostics    Laboratory Studies:        SARS-CoV2 (COVID-19) Qualitative PCR Not Detected Not Detected      Sodium 136 - 145 mmol/L 142    Potassium 3.5 - 5.1 mmol/L 4.1    Chloride 95 - 110 mmol/L 105    CO2 23 - 29 mmol/L 26    Glucose 70 - 110 mg/dL 83    BUN 8 - 23 mg/dL 19    Creatinine 0.5 - 1.4 mg/dL 0.8    Calcium 8.7 - 10.5 mg/dL 9.3    Anion Gap 8 - 16 mmol/L 11    eGFR if African American >60 mL/min/1.73 m^2 >60    eGFR if non  >60 " mL/min/1.73 m^2 >60           BNP 0 - 99 pg/mL 117High       WBC 3.90 - 12.70 K/uL 5.93    RBC 4.00 - 5.40 M/uL 4.08    Hemoglobin 12.0 - 16.0 g/dL 11.2Low     Hematocrit 37.0 - 48.5 % 38.0    MCV 82 - 98 fL 93    MCH 27.0 - 31.0 pg 27.5    MCHC 32.0 - 36.0 g/dL 29.5Low     RDW 11.5 - 14.5 % 16.4High     Platelets 150 - 350 K/uL 231    MPV 9.2 - 12.9 fL 8.8Low     Immature Granulocytes 0.0 - 0.5 % 0.3    Gran # (ANC) 1.8 - 7.7 K/uL 4.2    Immature Grans (Abs) 0.00 - 0.04 K/uL 0.02    Comment: Mild elevation in immature granulocytes is non specific and   can be seen in a variety of conditions including stress response,   acute inflammation, trauma and pregnancy. Correlation with other   laboratory and clinical findings is essential.    Lymph # 1.0 - 4.8 K/uL 0.7Low     Mono # 0.3 - 1.0 K/uL 0.5    Eos # 0.0 - 0.5 K/uL 0.4    Baso # 0.00 - 0.20 K/uL 0.08    nRBC 0 /100 WBC 0    Gran % 38.0 - 73.0 % 71.6    Lymph % 18.0 - 48.0 % 11.0Low     Mono % 4.0 - 15.0 % 8.9    Eosinophil % 0.0 - 8.0 % 6.9    Basophil % 0.0 - 1.9 % 1.3    Differential Method  Automated          Microbiology Data: None     Summary of Chest Imaging Personally Reviewed:     NM Gastric Emptying- Normal gastric emptying time.    CT Chest 2/2020- 1. Chronic and severe interstitial lung disease with progression since 2017 likely representing NSIP.  2. Mild cardiomegaly and a moderate volume pericardial fluid.  Chronic dilatation main pulmonary artery correlating with history of pulmonary hypertension.  3. Small right pleural effusion located in major fissure.  4. Dilated esophagus correlating with history of scleroderma.    Esophagram-    Patulous and mild dilatation of the thoracic esophagus with significantly reduced peristalsis, consistent with patient's history of scleroderma.    2D Echo:     · The left ventricle is normal in size with normal systolic function. The estimated ejection fraction is 60%.  · Moderate right ventricular enlargement with  hypertrophy and mildly to moderately reduced right ventricular systolic function.  · Indeterminate diastolic function.  · Mild biatrial enlargement.  · Small pericardial effusion.  · The estimated PA systolic pressure is 87 mmHg.  · Normal central venous pressure (3 mmHg).    Rt/Lt Cath 6/2020  1. Coronary dominance: Right  2. The left main coronary artery (LMCA) is normal. It gives origin to the left anterior descending (LAD) and left circumflex (LCx) arteries. There is no ramus intermedius. There is IVAN 3 flow.  3. The LAD is normal. It gives origin to two large diagonal branch(es). There is IVAN 3 flow.  4. The LCx is noraml. It gives origin to one large obtuse marginal branch(es). There is IVAN 3 flow.  5. The RCA is normal. It gives origin to the posterior descending artery and the posterolateral branch. There is IVAN 3 flow.     6. LVEDP 8  7. AR 10/5 (5)  8. RV 84/4 (8)  9. PA 84/24 (49)  10. PCWP 21/20/16  11. CO 5.8/ CI 2.9    PFT's:     Pulmonary Function Tests 5/20/2020 3/9/2020 2/14/2020 9/10/2019 4/23/2019 10/16/2018 4/19/2018   FVC 2.15 1.96 2.29 2.43 2.41 2.59 2.52   FEV1 1.79 1.7 1.99 2.1 2.04 2.11 2.11   TLC (liters) - - - - - 3.76 3.31   DLCO (ml/mmHg sec) - - 4.4 6.9 5.6 5.2 4.1   FVC% 61 - 57 68 70 64 -   FEV1% 66 - 62 76 75 67 -   FEF 25-75 - - - - 2.97 2.46 2.87   FEF 25-75% - - - - 115 95 -   TLC% - - - - - 62 -   RV - - - - - 1.17 0.79   RV% - - - - - 49 -   DLCO% - - 17 27 28 21 - 11/2020   FEV1/FVC- 87  FEV1- 1.84L (68%)   FVC- 2.11L (60%)   TLC- 49   DLCO- 20       11/13/2020---------Distance: 60.96 meters (200 feet)       O2 Sat % Supplemental Oxygen Heart Rate Blood Pressure Kushal Scale   Pre-exercise  (Resting) 96 % Room Air 76 bpm 109/60 mmHg 1   During Exercise 86 % Room Air 112 bpm 131/63 mmHg 7-8   Post-exercise  (Recovery) 96 % Room Air  86 bpm 128/58 mmHg       Recovery Time: 148 seconds     Performing nurse/tech: Kristal RILEY        PREVIOUS STUDY:   The patient had a previous  study.     08/25/2020---------Distance: 68.28 meters (224 feet)     Oxygen Titration:    O2 Sat % Supplemental Oxygen Heart Rate Blood Pressure Kushal Scale   Pre-exercise  (Resting) 98 % 6 L/M 78 bpm 97/52 3   During Exercise 87 % 6 L/M 111 bpm 121/55 7-8   Post-exercise    96 % 6 L/M  87 bpm 112/58        Recovery Time: 224 seconds       EGD- - Normal examined duodenum. Biopsied.                         - Multiple gastric polyps. Resected and retrieved.                         - Erythematous mucosa in the gastric body and                         antrum. Biopsied.                         - 3 cm hiatal hernia. Biopsied.       Assessment:       No diagnosis found.    Outpatient Encounter Medications as of 11/23/2020   Medication Sig Dispense Refill    ADEMPAS 2.5 mg tablet Take 2.5 mg by mouth 3 (three) times daily.       albuterol (PROVENTIL) 2.5 mg /3 mL (0.083 %) nebulizer solution USE ONE VIAL in Nebulizer EVERY 6 HOURS AS NEEDED FOR SHORTNESS OF BREATH  6    alendronate (FOSAMAX) 70 MG tablet Take 70 mg by mouth every 7 days.       ambrisentan (LETAIRIS) 10 MG Tab Take 10 mg by mouth once daily.      aspirin (ECOTRIN) 81 MG EC tablet Take 81 mg by mouth every evening.      atorvastatin (LIPITOR) 10 MG tablet Take 1 tablet (10 mg total) by mouth once daily. 90 tablet 3    azelastine (ASTELIN) 137 mcg (0.1 %) nasal spray 2 sprays by Nasal route 2 (two) times daily as needed.       biotin 1,000 mcg Chew Take 1 capsule by mouth once daily.       carvediloL (COREG) 3.125 MG tablet Take 1 tablet (3.125 mg total) by mouth 2 (two) times daily with meals. 180 tablet 6    diphenoxylate-atropine 2.5-0.025 mg (LOMOTIL) 2.5-0.025 mg per tablet Take 1 tablet by mouth once daily.      escitalopram oxalate (LEXAPRO) 10 MG tablet Take 10 mg by mouth once daily.      esomeprazole (NEXIUM) 40 MG capsule Take 1 capsule (40 mg total) by mouth 2 (two) times daily before meals. (Patient taking differently: Take 40 mg by  mouth once daily. ) 60 capsule 6    guaiFENesin (MUCINEX) 600 mg 12 hr tablet Take 1,200 mg by mouth as needed for Congestion.      levocetirizine (XYZAL) 5 MG tablet Take 5 mg by mouth daily as needed.      levothyroxine (TIROSINT) 137 mcg Cap Take 1 capsule by mouth once daily.      multivitamin capsule Take 1 capsule by mouth once daily.      mycophenolate (CELLCEPT) 500 mg Tab Take 3 tablets (1,500 mg total) by mouth 2 (two) times daily. 180 tablet 2    nintedanib (OFEV) 150 mg Cap Take 1 capsule by mouth 2 (two) times daily with meals. 60 capsule 11    nystatin (MYCOSTATIN) 100,000 unit/mL suspension USE 5MLS SWISH AND SWALLOW FOUR TIMES A DAY MOUTH/THROAT 7 DAYS      potassium chloride (MICRO-K) 10 MEQ CpSR Take 2 capsules (20 mEq total) by mouth once daily. 60 capsule 11    risedronate (ACTONEL) 150 MG Tab Take 150 mg by mouth once a week.   6    torsemide (DEMADEX) 20 MG Tab Take 1 tablet (20 mg total) by mouth once daily. 30 tablet 11    treprostiniL-neb accessories (TYVASO REFILL KIT) 1.74 mg/2.9 mL (0.6 mg/mL) Nebu Inhale into the lungs. Start with 3 inhalations, 4 times daily. Increase by 3 inhalations, 4 times daily, once weekly, for goal of 12-15 inhalations, as directed by physician.      VENTOLIN HFA 90 mcg/actuation inhaler 2 puffs every 4 (four) hours as needed.       VITAMIN D2 50,000 unit capsule Take 50,000 Units by mouth every 7 days. Three times a month       No facility-administered encounter medications on file as of 11/23/2020.      No orders of the defined types were placed in this encounter.      Plan:       1. 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 On continuous O2. Not transplant candidate at our center.     -- Continue MMF 1500 BID   -- Continue OFEV- currently 150 daily and attempting to increase. Will re-evaluate at next f/u but if remains intolerant will attempt 100 BID dose.   -- Supplemental O2 continuously. Has concentrator.   -- Bronchodilators for  bronchiectasis mgmt/clearance given symptomatic improvement.   -- PPI   -- Vaccinations UTD maintain.   -- Increase exercise. Will discuss pulmonary rehab referral on follow up.   -- Follow PFTs closely as rheum holding on RTX.. This is reasonable given stability, but need to watch closely for worsening.. Discussed with her.   -- Transplant w/u at [facility] if desired. Looks like she is considering [facility] as a nephew is working at this facility     2. Pulmonary HTN- WHO 1 2/2 SSc. Clinically euvolemic today. Last PAP 80's! Currently Adempas. Tyvaso just initiated. Maintain pulmonary vasodilator + diuretics per HF service.     3. Scleroderma- Per Rheum.. Has established care with Dr. Farias as above.     RTC 3-4 months for clinical re-evaluation. PFts at 6 months unless change clinically       Navid Cameron M.D.   [facility]

## 2020-11-23 ENCOUNTER — OFFICE VISIT (OUTPATIENT)
Dept: RHEUMATOLOGY | Facility: CLINIC | Age: 65
End: 2020-11-23
Payer: MEDICARE

## 2020-11-23 ENCOUNTER — HOSPITAL ENCOUNTER (OUTPATIENT)
Dept: PULMONOLOGY | Facility: CLINIC | Age: 65
Discharge: HOME | End: 2020-11-23
Payer: MEDICARE

## 2020-11-23 ENCOUNTER — OFFICE VISIT (OUTPATIENT)
Dept: PULMONOLOGY | Facility: CLINIC | Age: 65
End: 2020-11-23
Payer: MEDICARE

## 2020-11-23 VITALS
DIASTOLIC BLOOD PRESSURE: 68 MMHG | HEIGHT: 71 IN | WEIGHT: 171 LBS | SYSTOLIC BLOOD PRESSURE: 116 MMHG | HEART RATE: 97 BPM | BODY MASS INDEX: 23.94 KG/M2

## 2020-11-23 VITALS
BODY MASS INDEX: 25.48 KG/M2 | DIASTOLIC BLOOD PRESSURE: 67 MMHG | SYSTOLIC BLOOD PRESSURE: 118 MMHG | WEIGHT: 172 LBS | OXYGEN SATURATION: 94 % | HEIGHT: 69 IN | HEART RATE: 80 BPM

## 2020-11-23 DIAGNOSIS — R06.02 SOB (SHORTNESS OF BREATH): ICD-10-CM

## 2020-11-23 DIAGNOSIS — Z79.899 OTHER LONG TERM (CURRENT) DRUG THERAPY: ICD-10-CM

## 2020-11-23 DIAGNOSIS — J84.89 INTERSTITIAL LUNG DISEASE DUE TO CONNECTIVE TISSUE DISEASE: Primary | ICD-10-CM

## 2020-11-23 DIAGNOSIS — I27.20 PULMONARY HYPERTENSION: ICD-10-CM

## 2020-11-23 DIAGNOSIS — M34.1 CREST (CALCINOSIS, RAYNAUD'S PHENOMENON, ESOPHAGEAL DYSFUNCTION, SCLERODACTYLY, TELANGIECTASIA): ICD-10-CM

## 2020-11-23 DIAGNOSIS — J84.10 PULMONARY FIBROSIS: ICD-10-CM

## 2020-11-23 DIAGNOSIS — J84.9 INTERSTITIAL LUNG DISEASE: ICD-10-CM

## 2020-11-23 DIAGNOSIS — I27.9 CHRONIC PULMONARY HEART DISEASE: ICD-10-CM

## 2020-11-23 DIAGNOSIS — M35.02 SJOGREN'S SYNDROME WITH LUNG INVOLVEMENT: ICD-10-CM

## 2020-11-23 DIAGNOSIS — M35.9 INTERSTITIAL LUNG DISEASE DUE TO CONNECTIVE TISSUE DISEASE: Primary | ICD-10-CM

## 2020-11-23 DIAGNOSIS — I73.00 RAYNAUD'S DISEASE WITHOUT GANGRENE: ICD-10-CM

## 2020-11-23 DIAGNOSIS — M34.9 SCLERODERMA INVOLVING LUNG: ICD-10-CM

## 2020-11-23 DIAGNOSIS — M35.01 SJOGREN'S SYNDROME WITH KERATOCONJUNCTIVITIS SICCA: Primary | ICD-10-CM

## 2020-11-23 PROCEDURE — 99214 PR OFFICE/OUTPT VISIT, EST, LEVL IV, 30-39 MIN: ICD-10-PCS | Mod: S$PBB,,, | Performed by: INTERNAL MEDICINE

## 2020-11-23 PROCEDURE — 99214 OFFICE O/P EST MOD 30 MIN: CPT | Mod: S$PBB,,, | Performed by: INTERNAL MEDICINE

## 2020-11-23 PROCEDURE — 99999 PR PBB SHADOW E&M-EST. PATIENT-LVL IV: CPT | Mod: PBBFAC,,, | Performed by: INTERNAL MEDICINE

## 2020-11-23 PROCEDURE — 94010 BREATHING CAPACITY TEST: CPT | Mod: 26,S$PBB,, | Performed by: INTERNAL MEDICINE

## 2020-11-23 PROCEDURE — 94010 BREATHING CAPACITY TEST: CPT | Mod: PBBFAC | Performed by: INTERNAL MEDICINE

## 2020-11-23 PROCEDURE — 99214 OFFICE O/P EST MOD 30 MIN: CPT | Mod: 25,S$PBB,, | Performed by: EMERGENCY MEDICINE

## 2020-11-23 PROCEDURE — 94727 GAS DIL/WSHOT DETER LNG VOL: CPT | Mod: 26,S$PBB,, | Performed by: INTERNAL MEDICINE

## 2020-11-23 PROCEDURE — 99999 PR PBB SHADOW E&M-EST. PATIENT-LVL IV: ICD-10-PCS | Mod: PBBFAC,,, | Performed by: INTERNAL MEDICINE

## 2020-11-23 PROCEDURE — 94010 BREATHING CAPACITY TEST: ICD-10-PCS | Mod: 26,S$PBB,, | Performed by: INTERNAL MEDICINE

## 2020-11-23 PROCEDURE — 94729 DIFFUSING CAPACITY: CPT | Mod: PBBFAC | Performed by: INTERNAL MEDICINE

## 2020-11-23 PROCEDURE — 94729 DIFFUSING CAPACITY: CPT | Mod: 26,S$PBB,, | Performed by: INTERNAL MEDICINE

## 2020-11-23 PROCEDURE — 99999 PR PBB SHADOW E&M-EST. PATIENT-LVL III: CPT | Mod: PBBFAC,,, | Performed by: EMERGENCY MEDICINE

## 2020-11-23 PROCEDURE — 94729 PR C02/MEMBANE DIFFUSE CAPACITY: ICD-10-PCS | Mod: 26,S$PBB,, | Performed by: INTERNAL MEDICINE

## 2020-11-23 PROCEDURE — 99999 PR PBB SHADOW E&M-EST. PATIENT-LVL III: ICD-10-PCS | Mod: PBBFAC,,, | Performed by: EMERGENCY MEDICINE

## 2020-11-23 PROCEDURE — 99214 PR OFFICE/OUTPT VISIT, EST, LEVL IV, 30-39 MIN: ICD-10-PCS | Mod: 25,S$PBB,, | Performed by: EMERGENCY MEDICINE

## 2020-11-23 PROCEDURE — 94727 GAS DIL/WSHOT DETER LNG VOL: CPT | Mod: PBBFAC | Performed by: INTERNAL MEDICINE

## 2020-11-23 PROCEDURE — 94727 PR PULM FUNCTION TEST BY GAS: ICD-10-PCS | Mod: 26,S$PBB,, | Performed by: INTERNAL MEDICINE

## 2020-11-23 PROCEDURE — 99213 OFFICE O/P EST LOW 20 MIN: CPT | Mod: PBBFAC,25 | Performed by: EMERGENCY MEDICINE

## 2020-11-23 PROCEDURE — 99214 OFFICE O/P EST MOD 30 MIN: CPT | Mod: PBBFAC,27,25 | Performed by: INTERNAL MEDICINE

## 2020-11-23 RX ORDER — MYCOPHENOLATE MOFETIL 500 MG/1
1500 TABLET ORAL 2 TIMES DAILY
Qty: 180 TABLET | Refills: 2 | Status: SHIPPED | OUTPATIENT
Start: 2020-11-23 | End: 2021-02-23 | Stop reason: SDUPTHER

## 2020-11-23 RX ORDER — ALBUTEROL SULFATE 0.83 MG/ML
SOLUTION RESPIRATORY (INHALATION)
Qty: 3 BOX | Refills: 6 | Status: SHIPPED | OUTPATIENT
Start: 2020-11-23

## 2020-11-23 ASSESSMENT — ROUTINE ASSESSMENT OF PATIENT INDEX DATA (RAPID3)
PAIN SCORE: 0
PSYCHOLOGICAL DISTRESS SCORE: 0
FATIGUE SCORE: 1
TOTAL RAPID3 SCORE: 2.33
MDHAQ FUNCTION SCORE: 0.9
PATIENT GLOBAL ASSESSMENT SCORE: 4
AM STIFFNESS SCORE: 0, NO

## 2020-11-23 NOTE — ASSESSMENT & PLAN NOTE
She reports no worsening of sx and in fact improvement of shortness of breath since starting additional pulm HTN meds

## 2020-11-23 NOTE — PROGRESS NOTES
Rapid3 Question Responses and Scores 11/18/2020   MDHAQ Score 0.9   Psychologic Score 0   Pain Score 0   When you awakened in the morning OVER THE LAST WEEK, did you feel stiff? No   Fatigue Score 1   Global Health Score 4   RAPID3 Score 2.33

## 2020-11-23 NOTE — ASSESSMENT & PLAN NOTE
Patient presents to clinic for disease management of CREST syndrome and associated co morbidities. Patient reports compliance with Cellcept and denies any associated symptoms besides diarrhea which was 2/2 OFEV. Patient denies any recent infections and risk vs benefits were discussed with patient about continuing Cellcept.     Plan  - Continue Cellcept 500 mg 3 tablets BID with plan for follow up in 3 months with CBC, CMP, ESR/CRP

## 2020-11-23 NOTE — PROGRESS NOTES
Subjective     Chief Complaint:    History of Present Illness:  Ms. Fatimah Halecjose elias is a 65 y.o. female with raynaud's, CREST, hashimoto's thyroiditis, PAH, ILD on 1-2 L NC and being seen today for disease management.    Patient reports improvement of SOB/MORAES with the initiation of Tyvaso. Patient reports typically tiring out when completing ADLs but has had noticeable improvement with walking and activity. Patient reports using 3-4 L when completing activities or working. Patient reports diarrhea has been persistent since starting OFEV and reports titrating dose down to once nightly which has decrease bouts to 2x daily and is now intermittent. Patient reports dysphagia is still an issue with occasional choking episodes but is improved with daily alterations in eating habits. Last EGD manometry was not able to be completed and patient has persistent GERD. Patient reports no arthritic symptoms as patient was discontinued from plaquenil. Patient denies any new telangiectasias, fissures or ulcers. Patient reports sicca symptoms are stable and manageable. Patient reports compliance with Cellcept and no new associated symptoms.     Patient was seen by Lung transplant and was determined to not be a candidate based on esophageal dysfunction but should seek 2nd opinion.      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 Viramontes and Dr Karen Hughes)     RTX infusions; 1000 x 2, 2018, 2019, none in 2020, [place] (RTX: first Dec 2017, Feb 2018, last  dose was in June 2018)  On  mg per Dr Vitale   On  mg but off x 2 months     Current inflam arthritis ; no morning stiffness; occasional swelling ; no problems with mobility     Took prednisone for 5 years for lungs; off x 2 years  Had DXA and told osteoporosis and on alendronate since May; was on residronate prior to that     Esophagus: intermittent swallowing problem; on med for TY; +Little's (Dr. Santiago)      Low thyroid; difficulty regulating with polypharmacy; Dr Teixeira     Hx thyroid surgery     Dr Vitale note 8/13/2009:  COURTNEY 1:1280 nucleolar pattern at some point  COURTNEY 1:640 nucleolar in 9/2015  SSA +, RF+ at 32  SSB, Scl-70,     SSc Criteria: sine SSc: 13   +RP 3  +abnormal cap scope: drop-out, some haemorrhage, small dysangiogenesis 2  DU 2  +puffy fingers 2  +telangiectases 2  +PH/ILD 2  +PF by biopsy: severe interstitial fibrosis, honeycombing end-stage       Review of Systems   Constitutional: Negative for chills, fever and weight loss.   HENT: Negative for sinus pain and sore throat.    Eyes: Negative for redness.   Respiratory: Positive for cough and shortness of breath. Negative for hemoptysis and wheezing.    Cardiovascular: Positive for leg swelling (improving R>L). Negative for chest pain, palpitations and PND.   Gastrointestinal: Positive for diarrhea and heartburn. Negative for abdominal pain, constipation, nausea and vomiting.   Genitourinary: Negative for dysuria.   Musculoskeletal: Negative for myalgias.   Skin: Negative for rash.   Neurological: Negative for dizziness, weakness and headaches.   Endo/Heme/Allergies: Does not bruise/bleed easily.       PAST HISTORY:     Past Medical History:   Diagnosis Date    Anxiety     CHF (congestive heart failure)     Colitis     CREST syndrome     GERD (gastroesophageal reflux disease)     Hypercholesteremia     Hypertension     Interstitial lung disease     Osteopenia     Pulmonary fibrosis     Pulmonary hypertension      Raynaud disease     Respiratory distress     Sciatica     Scleroderma     Sjoegren syndrome     Thyroid disease        Past Surgical History:   Procedure Laterality Date    BREAST BIOPSY      CARDIAC CATHETERIZATION  2013    CATHETERIZATION OF BOTH LEFT AND RIGHT HEART Bilateral 6/9/2020    Procedure: CATHETERIZATION, HEART, BOTH LEFT AND RIGHT;  Surgeon: Irving Flores MD;  Location: [facility] CATH LAB;  Service: Cardiology;  Laterality: Bilateral;    COLONOSCOPY      COLONOSCOPY N/A 11/15/2017    Procedure: COLONOSCOPY;  Surgeon: Radha Santiago MD;  Location: [facility] ENDO (2ND FLR);  Service: Endoscopy;  Laterality: N/A;  2nd floor.  Pulm htn, ILD on o2.  Pls review w anesthesia.  Pt has cardio-pulm workup in progress at [facility] by Dr. Russell.                 reese monsalve RN- I reviewed Ms. Cha's history with Dr. Leopold (Anesthesia) and based on her medical history of:  Pulmonary Hyper    CORONARY ANGIOGRAPHY N/A 6/9/2020    Procedure: ANGIOGRAM, CORONARY ARTERY;  Surgeon: Irving Flores MD;  Location: [facility] CATH LAB;  Service: Cardiology;  Laterality: N/A;    ESOPHAGEAL MANOMETRY WITH MEASUREMENT OF IMPEDANCE N/A 6/2/2020    Procedure: MANOMETRY-ESOPHAGEAL-WITH IMPEDANCE;  Surgeon: Radha Santiago MD;  Location: [facility] (4TH FLR);  Service: Endoscopy;  Laterality: N/A;  Hold Narcotics x 1 days if able   Hold TCA x 1 days if able  ADHESIVE Allergy  per Dr. Santiago, to be scheduled in 1-2 months from 4/17/20-see telephone encounter 4/14/20-BB  instructions emailed to patient  COVID test scheduled for 6/1/20 at [facility]    ESOPHAGOGASTRODUODENOSCOPY N/A 5/19/2020    Procedure: ESOPHAGOGASTRODUODENOSCOPY (EGD);  Surgeon: Kemar Maldonado MD;  Location: [facility] ENDO (2ND FLR);  Service: Endoscopy;  Laterality: N/A;  Pulmonary HTN (PAP>50 or on meds). Urgent reschedule - lung transplant eval pt on 2-4 L oxygen  Lammi preferred/ok w other GI MD  standard prep  ADHESIVE Allergy  per Dr. Santiago, to be  scheduled in 1-2 months from 4/17/20-see telephone encounter 4/14/20    foot surgery x2      lumpectomy (benign)      LUNG BIOPSY  03/2013    RIGHT HEART CATHETERIZATION Right 12/20/2018    Procedure: INSERTION, CATHETER, RIGHT HEART;  Surgeon: Renetta Russell MD;  Location: [facility] CATH LAB;  Service: Cardiology;  Laterality: Right;    THYROIDECTOMY      TONSILLECTOMY      TUBAL LIGATION      UPPER GASTROINTESTINAL ENDOSCOPY         Family History   Problem Relation Age of Onset    Thyroid cancer Mother     Heart failure Father     Emphysema Sister     Thyroid disease Sister     Deep vein thrombosis Brother     Lung cancer Brother     Colon cancer Maternal Uncle         60s    Cancer Maternal Uncle         colon cancer    Cancer Maternal Aunt     Pulmonary embolism Daughter     Breast cancer Neg Hx     Ovarian cancer Neg Hx     Celiac disease Neg Hx     Cirrhosis Neg Hx     Colon polyps Neg Hx     Crohn's disease Neg Hx     Cystic fibrosis Neg Hx     Esophageal cancer Neg Hx     Hemochromatosis Neg Hx     Inflammatory bowel disease Neg Hx     Irritable bowel syndrome Neg Hx     Liver cancer Neg Hx     Liver disease Neg Hx     Rectal cancer Neg Hx     Stomach cancer Neg Hx     Ulcerative colitis Neg Hx     Phoenix's disease Neg Hx     Lymphoma Neg Hx     Tuberculosis Neg Hx     Rheum arthritis Neg Hx     Scleroderma Neg Hx     Melanoma Neg Hx     Lupus Neg Hx     Multiple sclerosis Neg Hx     Psoriasis Neg Hx     Skin cancer Neg Hx          MEDICATIONS & ALLERGIES:     Current Outpatient Medications on File Prior to Visit   Medication Sig    ADEMPAS 2.5 mg tablet Take 2.5 mg by mouth 3 (three) times daily.     ambrisentan (LETAIRIS) 10 MG Tab Take 10 mg by mouth once daily.    aspirin (ECOTRIN) 81 MG EC tablet Take 81 mg by mouth every evening.    atorvastatin (LIPITOR) 10 MG tablet Take 1 tablet (10 mg total) by mouth once daily.    azelastine (ASTELIN) 137 mcg (0.1 %)  nasal spray 2 sprays by Nasal route 2 (two) times daily as needed.     biotin 1,000 mcg Chew Take 1 capsule by mouth once daily.     carvediloL (COREG) 3.125 MG tablet Take 1 tablet (3.125 mg total) by mouth 2 (two) times daily with meals.    diphenoxylate-atropine 2.5-0.025 mg (LOMOTIL) 2.5-0.025 mg per tablet Take 1 tablet by mouth once daily.    escitalopram oxalate (LEXAPRO) 10 MG tablet Take 10 mg by mouth once daily.    guaiFENesin (MUCINEX) 600 mg 12 hr tablet Take 1,200 mg by mouth as needed for Congestion.    levocetirizine (XYZAL) 5 MG tablet Take 5 mg by mouth daily as needed.    levothyroxine (TIROSINT) 137 mcg Cap Take 1 capsule by mouth once daily.    multivitamin capsule Take 1 capsule by mouth once daily.    nintedanib (OFEV) 150 mg Cap Take 1 capsule by mouth 2 (two) times daily with meals.    nystatin (MYCOSTATIN) 100,000 unit/mL suspension USE 5MLS SWISH AND SWALLOW FOUR TIMES A DAY MOUTH/THROAT 7 DAYS    potassium chloride (MICRO-K) 10 MEQ CpSR Take 2 capsules (20 mEq total) by mouth once daily.    risedronate (ACTONEL) 150 MG Tab Take 150 mg by mouth once a week.     torsemide (DEMADEX) 20 MG Tab Take 1 tablet (20 mg total) by mouth once daily.    treprostiniL-neb accessories (TYVASO REFILL KIT) 1.74 mg/2.9 mL (0.6 mg/mL) Nebu Inhale into the lungs. Start with 3 inhalations, 4 times daily. Increase by 3 inhalations, 4 times daily, once weekly, for goal of 12-15 inhalations, as directed by physician.    VENTOLIN HFA 90 mcg/actuation inhaler 2 puffs every 4 (four) hours as needed.     VITAMIN D2 50,000 unit capsule Take 50,000 Units by mouth every 7 days. Three times a month    [DISCONTINUED] albuterol (PROVENTIL) 2.5 mg /3 mL (0.083 %) nebulizer solution USE ONE VIAL in Nebulizer EVERY 6 HOURS AS NEEDED FOR SHORTNESS OF BREATH    [DISCONTINUED] mycophenolate (CELLCEPT) 500 mg Tab Take 3 tablets (1,500 mg total) by mouth 2 (two) times daily.    alendronate (FOSAMAX) 70 MG tablet  "Take 70 mg by mouth every 7 days.     esomeprazole (NEXIUM) 40 MG capsule Take 1 capsule (40 mg total) by mouth 2 (two) times daily before meals. (Patient taking differently: Take 40 mg by mouth once daily. )     No current facility-administered medications on file prior to visit.        Review of patient's allergies indicates:   Allergen Reactions    Adhesive tape-silicones        OBJECTIVE:     Vital Signs:  Vitals:    11/23/20 1028   BP: 116/68   Pulse: 97   Weight: 77.6 kg (171 lb)   Height: 5' 11" (1.803 m)       Body mass index is 23.85 kg/m².     Physical Exam   Constitutional:  Non-toxic appearance. She does not appear ill.   HENT:   Mouth/Throat: Mucous membranes are dry.   Eyes: Pupils are equal, round, and reactive to light.   Neck: Normal range of motion. No muscular tenderness present.   Cardiovascular: Normal rate, regular rhythm, normal heart sounds and normal pulses.   No murmur heard.  Pulmonary/Chest: Effort normal. No respiratory distress. She has no wheezes. She has rales.   Abdominal: Normal appearance.   Musculoskeletal: Normal range of motion.         General: No swelling.      Right lower leg: Edema present.      Left lower leg: No edema.   Neurological: She is alert.   Skin: Capillary refill takes less than 2 seconds. No lesion and no rash noted.   telangectasia on palmar surface and nose        Laboratory  Lab Results   Component Value Date    WBC 5.93 11/13/2020    HGB 11.2 (L) 11/13/2020    HCT 38.0 11/13/2020    MCV 93 11/13/2020     11/13/2020     @[id number](GLU,NA,K,Cl,CO2,BUN,Creatinine,Calcium,MG)@  Lab Results   Component Value Date    INR 0.9 06/09/2020    INR 0.9 03/09/2020    INR 1.0 12/20/2018     Lab Results   Component Value Date    HGBA1C 5.1 03/09/2020     No results for input(s): POCTGLUCOSE in the last 72 hours.    Diagnostic Results:  Labs: Reviewed  X-Ray: Reviewed  Echo: Reviewed  Upper GI: Reviewed    ASSESSMENT & PLAN:   Ms. Fatimah Halecjose elias is a 65 y.o. " female    CREST (calcinosis, Raynaud's phenomenon, esophageal dysfunction, sclerodactyly, telangiectasia)  Patient presents to clinic for disease management of CREST syndrome and associated co morbidities. Patient reports compliance with Cellcept and denies any associated symptoms besides diarrhea which was 2/2 OFEV. Patient denies any recent infections and risk vs benefits were discussed with patient about continuing Cellcept.     Plan  - Continue Cellcept 500 mg 3 tablets BID with plan for follow up in 3 months with CBC, CMP, ESR/CRP    Interstitial lung disease  She reports no worsening of sx and in fact improvement of shortness of breath since starting additional pulm HTN meds     Pulmonary fibrosis  Continues on OFEV and is tolerating the GI side effects (diarrhea)    Raynaud disease   no ulcers    Sjogren's syndrome with keratoconjunctivitis sicca  Dry but managing well    Pulmonary hypertension  On combination therapy for this and advised to consider other transplant centers    Other long term (current) drug therapy  No interval complications of MMF so will continue for now  discussed immunizations           Mark Angel D.O.  PGY-1 Internal Medicine  [facility]  [address]  [place] [id number]     Answers for HPI/ROS submitted by the patient on 11/18/2020   mouth sores: No  trouble swallowing: Yes  unexpected weight change: No  genital sore: No

## 2020-12-04 ENCOUNTER — OFFICE VISIT (OUTPATIENT)
Dept: GASTROENTEROLOGY | Facility: CLINIC | Age: 65
End: 2020-12-04
Payer: MEDICARE

## 2020-12-04 ENCOUNTER — LAB VISIT (OUTPATIENT)
Dept: LAB | Facility: HOSPITAL | Age: 65
End: 2020-12-04
Attending: INTERNAL MEDICINE
Payer: MEDICARE

## 2020-12-04 VITALS
HEART RATE: 90 BPM | BODY MASS INDEX: 24.85 KG/M2 | WEIGHT: 177.5 LBS | SYSTOLIC BLOOD PRESSURE: 103 MMHG | HEIGHT: 71 IN | DIASTOLIC BLOOD PRESSURE: 60 MMHG

## 2020-12-04 DIAGNOSIS — K21.9 GASTROESOPHAGEAL REFLUX DISEASE, UNSPECIFIED WHETHER ESOPHAGITIS PRESENT: ICD-10-CM

## 2020-12-04 DIAGNOSIS — D64.9 ANEMIA, UNSPECIFIED TYPE: ICD-10-CM

## 2020-12-04 DIAGNOSIS — R14.0 BLOATING: ICD-10-CM

## 2020-12-04 DIAGNOSIS — R13.10 DYSPHAGIA, UNSPECIFIED TYPE: ICD-10-CM

## 2020-12-04 DIAGNOSIS — R11.2 NAUSEA AND VOMITING, INTRACTABILITY OF VOMITING NOT SPECIFIED, UNSPECIFIED VOMITING TYPE: ICD-10-CM

## 2020-12-04 DIAGNOSIS — M34.9 SCLERODERMA: ICD-10-CM

## 2020-12-04 DIAGNOSIS — R10.9 ABDOMINAL PAIN, UNSPECIFIED ABDOMINAL LOCATION: ICD-10-CM

## 2020-12-04 DIAGNOSIS — R19.7 DIARRHEA, UNSPECIFIED TYPE: Primary | ICD-10-CM

## 2020-12-04 LAB
FERRITIN SERPL-MCNC: 54 NG/ML (ref 20–300)
IRON SERPL-MCNC: 59 UG/DL (ref 30–160)
SATURATED IRON: 16 % (ref 20–50)
TOTAL IRON BINDING CAPACITY: 360 UG/DL (ref 250–450)
TRANSFERRIN SERPL-MCNC: 243 MG/DL (ref 200–375)

## 2020-12-04 PROCEDURE — 36415 COLL VENOUS BLD VENIPUNCTURE: CPT

## 2020-12-04 PROCEDURE — 99999 PR PBB SHADOW E&M-EST. PATIENT-LVL V: CPT | Mod: PBBFAC,,, | Performed by: INTERNAL MEDICINE

## 2020-12-04 PROCEDURE — 99215 OFFICE O/P EST HI 40 MIN: CPT | Mod: PBBFAC | Performed by: INTERNAL MEDICINE

## 2020-12-04 PROCEDURE — 99214 PR OFFICE/OUTPT VISIT, EST, LEVL IV, 30-39 MIN: ICD-10-PCS | Mod: S$PBB,,, | Performed by: INTERNAL MEDICINE

## 2020-12-04 PROCEDURE — 83540 ASSAY OF IRON: CPT

## 2020-12-04 PROCEDURE — 99214 OFFICE O/P EST MOD 30 MIN: CPT | Mod: S$PBB,,, | Performed by: INTERNAL MEDICINE

## 2020-12-04 PROCEDURE — 99999 PR PBB SHADOW E&M-EST. PATIENT-LVL V: ICD-10-PCS | Mod: PBBFAC,,, | Performed by: INTERNAL MEDICINE

## 2020-12-04 PROCEDURE — 82728 ASSAY OF FERRITIN: CPT

## 2020-12-04 RX ORDER — LOPERAMIDE HYDROCHLORIDE 2 MG/1
2 CAPSULE ORAL 4 TIMES DAILY PRN
Qty: 120 CAPSULE | Refills: 6 | Status: SHIPPED | OUTPATIENT
Start: 2020-12-04 | End: 2021-01-03

## 2020-12-04 NOTE — PROGRESS NOTES
The patient location is: home  The chief complaint leading to consultation is: dysphagia, pre-transplant eval, gerd, belching, bloating, diarrhea  Visit type: audio  Total time spent with patient: 35 min  Each patient to whom he or she provides medical services by telemedicine is:  (1) informed of the relationship between the physician and patient and the respective role of any other health care provider with respect to management of the patient; and (2) notified that he or she may decline to receive medical services by telemedicine and may withdraw from such care at any time.         [facility] Consultation Note    Reason for Consult:    Chief Complaint   Patient presents with    Dysphagia    Emesis    Diarrhea       PCP:   Simon Orozco       Referring MD:  Esmer Vitale Md  [address] [place] [address]    Pulm: Dr. Navid Cameron/Dr. Jaya Santiago   GI: Dr. Carrillo    HPI:  Fatimah Cha is a 65 y.o. female with history CREST, Sjogran, Scleroderma, ILD, pulmonary HTN, HTN, HLD, hashimotos thyroiditis, hypothyroidsm here for evaluation of the following GI problems:    GERD.  Better  Pyrosis. Few per month  Regurgitation. Few pwr month     MEDS:   On nexium 40mg daily   No longer on carafate 1 g TID   No longer using tums    Belching. Resolved    Dysphagia. Occasional    Solids: yes  Liquids: no   Daily.  Location: cervical esophagus on r side.      Dry mouth.   Biotin          Nausea. None  Early satiety. Occasionally   Vomiting.  None   MEDS:   No meds  Improved w phenergan in the past   Better w nexium     Abdominal pain. Resolved  No medications  Bentyl PRN with good control.      Gas and bloating.  Occsiaonal   Avoids artificial sugars.   Lactose - still drinks milk. No improvement with lactose elimination in the past.   Previously w positive SIBO testing, treated w Flagyl, rifaximin.  Better w nexium     Hiccups.  Occasional     Diarrhea. Started right  after ofev was initiated.   Worse since EGD  [place] 6-7  BM Intermittent episodes - 5 per day today, sometimes more      FI-no  Nocturnal - no    MEDS:  -Imodium 2md BID prn  Started after started ofev.  Hold ovef for a month and symptoms resolve.  Restarts ofev and symptoms return.  This is approved by pulm      Weight loss. Weight stable at 170lb     Panic attacks. Anxiety.    Buspirone 10mg prn for panic attacks   Lexapro 10mg     Denies BRBPR, melena.      Rheumatology: CREST, Sjogran, Scleroderma, ILD, pulmonary HTN.    On cellcept 1,500 mg BID, letaris 10 mg daily, evoxac 30 mg TID,   adempas 2.5 mg daily, ofev. Gastrointestinal: Abdominal pain (15% to 18%), decreased appetite (9% to 11%), diarrhea (62% to 76%), nausea (24% to 32%), vomiting (12% to 25%)    [place]. Gastrointestinal: Dyspepsia (13% to 19%; Zhen 2013), nausea (14%), diarrhea (12%), vomiting (10%), gastritis (2% to 6%; Angellai 2013), constipation (5%), gastroesophageal reflux disease (5%), abdominal distention, dysphagia       Followed by pulmonology and rheumatology.    Lung transplant eval in progress    Hashimotos thyroiditis. Is s/p thyroidectomy.      Total visit time was 35 minutes, more than 50% of which was spent in face-to-face counseling with patient regarding symptoms, diagnostic results, prognosis, risks and benefits of treatment options, instructions for management, importance of compliance with chosen treatment options, risk factor reduction, stress reduction, coping strategies.        Previous Studies:   GES 6/29/2020: Normal gastric emptying time.  Manometry 6/5/2020: Procedure aborted due to poor patient tolerance.   EGD 5/19/2020: Normal examined duodenum (-) Multiple gastric polyps (Fundic gland polyps). Erythematous mucosa in the gastric body and antrum (-). 3 cm hiatal hernia.  Nl p/d esophagus (-).  Esophagogram 3/10/2020: Patulous and mild dilatation of the thoracic esophagus with significantly reduced peristalsis,  consistent with patient's history of scleroderma.  US 3/9/2020: . Mild hepatomegaly with no evidence of focal hepatic lesions.  EGD 11/15/17: - Normal esophagus. Z-line irregular (-). 6 cm hiatal hernia.  G erythema (-). Multiple gastric polyps (FGP). Normal examined duodenum (-).   Colon 11/15/17: GPTTI. Two 2 to 5 mm polyps at the recto-sigmoid colon (-). Nl colon (-). Non-bleeding internal hemorrhoids. Nl ileum.  MBS 10/19/17: oropharyngeal swallow function WNL  Ct chst 8/30/17: progression of fibrosis.   EGD 6/16/14: HH. Short segment of salmon fingers(IM). Esophagitis. Fund gland polyps (FGP). Nl stomach (-). Duodenitis (-).   Colon 6/6/2014: GPTC. 3 tiny polyps in asc colon (TA). Int hemorrhoids.   CBC nl   Smart pill 4/21/2016: nl GE, nl SBT, delayed colonic transit       ROS:  Review of Systems   Constitutional: Negative for chills, fever and weight loss.   Eyes: Negative for pain and redness.   Respiratory: Positive for cough and shortness of breath.    Cardiovascular: Negative for chest pain.   Gastrointestinal: Positive for heartburn. Negative for abdominal pain, nausea and vomiting.   Genitourinary: Negative for dysuria and hematuria.   Musculoskeletal: Negative for back pain.   Skin: Negative for rash.   Psychiatric/Behavioral: Negative for depression. The patient is not nervous/anxious.       Medical History:   Past Medical History:   Diagnosis Date    Anxiety     CHF (congestive heart failure)     Colitis     CREST syndrome     GERD (gastroesophageal reflux disease)     Hypercholesteremia     Hypertension     Interstitial lung disease     Osteopenia     Pulmonary fibrosis     Pulmonary hypertension     Raynaud disease     Respiratory distress     Sciatica     Scleroderma     Sjoegren syndrome     Thyroid disease         Surgical History:   Past Surgical History:   Procedure Laterality Date    BREAST BIOPSY      CARDIAC CATHETERIZATION  2013    CATHETERIZATION OF BOTH LEFT AND RIGHT  HEART Bilateral 6/9/2020    Procedure: CATHETERIZATION, HEART, BOTH LEFT AND RIGHT;  Surgeon: Irving Flores MD;  Location: [facility] CATH LAB;  Service: Cardiology;  Laterality: Bilateral;    COLONOSCOPY      COLONOSCOPY N/A 11/15/2017    Procedure: COLONOSCOPY;  Surgeon: Radha Santiago MD;  Location: [facility] ENDO (2ND FLR);  Service: Endoscopy;  Laterality: N/A;  2nd floor.  Pulm htn, ILD on o2.  Pls review w anesthesia.  Pt has cardio-pulm workup in progress at [facility] by Dr. Russell.                 reese monsalve RN- I reviewed Ms. Cha's history with Dr. Leopold (Anesthesia) and based on her medical history of:  Pulmonary Hyper    CORONARY ANGIOGRAPHY N/A 6/9/2020    Procedure: ANGIOGRAM, CORONARY ARTERY;  Surgeon: Irving Flores MD;  Location: [facility] CATH LAB;  Service: Cardiology;  Laterality: N/A;    ESOPHAGEAL MANOMETRY WITH MEASUREMENT OF IMPEDANCE N/A 6/2/2020    Procedure: MANOMETRY-ESOPHAGEAL-WITH IMPEDANCE;  Surgeon: Radha Santiago MD;  Location: [facility] (4TH FLR);  Service: Endoscopy;  Laterality: N/A;  Hold Narcotics x 1 days if able   Hold TCA x 1 days if able  ADHESIVE Allergy  per Dr. Santiago, to be scheduled in 1-2 months from 4/17/20-see telephone encounter 4/14/20-BB  instructions emailed to patient  COVID test scheduled for 6/1/20 at [facility]    ESOPHAGOGASTRODUODENOSCOPY N/A 5/19/2020    Procedure: ESOPHAGOGASTRODUODENOSCOPY (EGD);  Surgeon: Kemar Maldonado MD;  Location: [facility] ENDO (2ND FLR);  Service: Endoscopy;  Laterality: N/A;  Pulmonary HTN (PAP>50 or on meds). Urgent reschedule - lung transplant eval pt on 2-4 L oxygen  Lammi preferred/ok w other GI MD  standard prep  ADHESIVE Allergy  per Dr. Santiago, to be scheduled in 1-2 months from 4/17/20-see telephone encounter 4/14/20    foot surgery x2      lumpectomy (benign)      LUNG BIOPSY  03/2013    RIGHT HEART CATHETERIZATION Right 12/20/2018    Procedure: INSERTION, CATHETER, RIGHT HEART;  Surgeon: Renetta Russell MD;   Location: [facility] LAB;  Service: Cardiology;  Laterality: Right;    THYROIDECTOMY      TONSILLECTOMY      TUBAL LIGATION      UPPER GASTROINTESTINAL ENDOSCOPY          Family History:   Family History   Problem Relation Age of Onset    Thyroid cancer Mother     Heart failure Father     Emphysema Sister     Thyroid disease Sister     Deep vein thrombosis Brother     Lung cancer Brother     Colon cancer Maternal Uncle         60s    Cancer Maternal Uncle         colon cancer    Cancer Maternal Aunt     Pulmonary embolism Daughter     Breast cancer Neg Hx     Ovarian cancer Neg Hx     Celiac disease Neg Hx     Cirrhosis Neg Hx     Colon polyps Neg Hx     Crohn's disease Neg Hx     Cystic fibrosis Neg Hx     Esophageal cancer Neg Hx     Hemochromatosis Neg Hx     Inflammatory bowel disease Neg Hx     Irritable bowel syndrome Neg Hx     Liver cancer Neg Hx     Liver disease Neg Hx     Rectal cancer Neg Hx     Stomach cancer Neg Hx     Ulcerative colitis Neg Hx     Phoenix's disease Neg Hx     Lymphoma Neg Hx     Tuberculosis Neg Hx     Rheum arthritis Neg Hx     Scleroderma Neg Hx     Melanoma Neg Hx     Lupus Neg Hx     Multiple sclerosis Neg Hx     Psoriasis Neg Hx     Skin cancer Neg Hx         Social History:   Social History     Socioeconomic History    Marital status:      Spouse name: Not on file    Number of children: Not on file    Years of education: Not on file    Highest education level: Not on file   Occupational History    Not on file   Social Needs    Financial resource strain: Not on file    Food insecurity     Worry: Not on file     Inability: Not on file    Transportation needs     Medical: Not on file     Non-medical: Not on file   Tobacco Use    Smoking status: Former Smoker     Packs/day: 1.00     Years: 5.00     Pack years: 5.00     Types: Cigarettes     Quit date: 3/30/1979     Years since quittin.7    Smokeless tobacco: Never Used    Substance and Sexual Activity    Alcohol use: No    Drug use: No    Sexual activity: Not on file   Lifestyle    Physical activity     Days per week: Not on file     Minutes per session: Not on file    Stress: Not on file   Relationships    Social connections     Talks on phone: Not on file     Gets together: Not on file     Attends [employer] service: Not on file     Active member of club or organization: Not on file     Attends meetings of clubs or organizations: Not on file     Relationship status: Not on file   Other Topics Concern    Not on file   Social History Narrative    Not on file        Review of patient's allergies indicates:  No Known Allergies    Current Outpatient Medications   Medication Sig Dispense Refill    ADEMPAS 2.5 mg tablet Take 2.5 mg by mouth 3 (three) times daily.       albuterol (PROVENTIL) 2.5 mg /3 mL (0.083 %) nebulizer solution USE ONE VIAL in Nebulizer EVERY 6 HOURS AS NEEDED FOR SHORTNESS OF BREATH 3 Box 6    alendronate (FOSAMAX) 70 MG tablet Take 70 mg by mouth every 7 days.       ambrisentan (LETAIRIS) 10 MG Tab Take 10 mg by mouth once daily.      aspirin (ECOTRIN) 81 MG EC tablet Take 81 mg by mouth every evening.      atorvastatin (LIPITOR) 10 MG tablet Take 1 tablet (10 mg total) by mouth once daily. 90 tablet 3    azelastine (ASTELIN) 137 mcg (0.1 %) nasal spray 2 sprays by Nasal route 2 (two) times daily as needed.       biotin 1,000 mcg Chew Take 1 capsule by mouth once daily.       carvediloL (COREG) 3.125 MG tablet Take 1 tablet (3.125 mg total) by mouth 2 (two) times daily with meals. 180 tablet 6    diphenoxylate-atropine 2.5-0.025 mg (LOMOTIL) 2.5-0.025 mg per tablet Take 1 tablet by mouth once daily.      escitalopram oxalate (LEXAPRO) 10 MG tablet Take 10 mg by mouth once daily.      guaiFENesin (MUCINEX) 600 mg 12 hr tablet Take 1,200 mg by mouth as needed for Congestion.      levocetirizine (XYZAL) 5 MG tablet Take 5 mg by mouth daily as  "needed.      levothyroxine (TIROSINT) 137 mcg Cap Take 1 capsule by mouth once daily.      multivitamin capsule Take 1 capsule by mouth once daily.      mycophenolate (CELLCEPT) 500 mg Tab Take 3 tablets (1,500 mg total) by mouth 2 (two) times daily. 180 tablet 2    nintedanib (OFEV) 150 mg Cap Take 1 capsule by mouth 2 (two) times daily with meals. 60 capsule 11    potassium chloride (MICRO-K) 10 MEQ CpSR Take 2 capsules (20 mEq total) by mouth once daily. 60 capsule 11    torsemide (DEMADEX) 20 MG Tab Take 1 tablet (20 mg total) by mouth once daily. 30 tablet 11    treprostiniL-neb accessories (TYVASO REFILL KIT) 1.74 mg/2.9 mL (0.6 mg/mL) Nebu Inhale into the lungs. Start with 3 inhalations, 4 times daily. Increase by 3 inhalations, 4 times daily, once weekly, for goal of 12-15 inhalations, as directed by physician.      VENTOLIN HFA 90 mcg/actuation inhaler 2 puffs every 4 (four) hours as needed.       VITAMIN D2 50,000 unit capsule Take 50,000 Units by mouth every 7 days. Three times a month      esomeprazole (NEXIUM) 40 MG capsule Take 1 capsule (40 mg total) by mouth 2 (two) times daily before meals. (Patient taking differently: Take 40 mg by mouth once daily. ) 60 capsule 6    loperamide (IMODIUM) 2 mg capsule Take 1 capsule (2 mg total) by mouth 4 (four) times daily as needed for Diarrhea. 120 capsule 6    nystatin (MYCOSTATIN) 100,000 unit/mL suspension USE 5MLS SWISH AND SWALLOW FOUR TIMES A DAY MOUTH/THROAT 7 DAYS      risedronate (ACTONEL) 150 MG Tab Take 150 mg by mouth once a week.   6     No current facility-administered medications for this visit.         Objective Findings:  Vital Signs:  /60   Pulse 90   Ht 5' 11" (1.803 m)   Wt 80.5 kg (177 lb 7.5 oz)   BMI 24.75 kg/m²   Body mass index is 24.75 kg/m².    Physical Exam:   General appearance: alert, cooperative, no distress, evidence of systemic sclerosis w  narrow oral aperture.   HENT: Normocephalic, atraumatic, neck " symmetrical, no nasal discharge  Eyes: conjunctivae/corneas clear, PERRL, EOM's intact  Lungs: clear to auscultation bilaterally, no dullness to percussion bilaterally  Heart: regular rate and rhythm without rub; no displacement of the PMI  Abdomen: soft, non-tender; bowel sounds normoactive; no organomegaly  Extremities: extremities symmetric; no clubbing, cyanosis, or edema,  raynauds,   sclerodactyly, osteolysis of digits, digital ulcers.  Integument: Skin color, texture, turgor normal; no rashes; hair distrubution normal,  telangectasia, tightness of skin.    Neurologic: Alert and oriented X 3, normal strength, normal coordination and gait  Psychiatric: no pressured speech; normal affect; no evidence of impaired cognition;      Labs:  Lab Results   Component Value Date    WBC 5.93 11/13/2020    HGB 11.2 (L) 11/13/2020    HCT 38.0 11/13/2020    MCV 93 11/13/2020     11/13/2020     No results found for: FERRITIN  Lab Results   Component Value Date     11/19/2020    K 4.1 11/19/2020     11/19/2020    CO2 26 11/19/2020    GLU 83 11/19/2020    BUN 19 11/19/2020    CREATININE 0.8 11/19/2020    CALCIUM 9.3 11/19/2020    PROT 7.3 11/13/2020    ALBUMIN 3.8 11/13/2020    BILITOT 0.6 11/13/2020    ALKPHOS 63 11/13/2020    AST 20 11/13/2020    ALT 11 11/13/2020     Lab Results   Component Value Date    TSH 10.507 (H) 03/09/2020     No results found for: SEDRATE  No results found for: CRP  Lab Results   Component Value Date    HGBA1C 5.1 03/09/2020           Assessment and Plan:  Fatimah Cha is a 65 y.o. female with with history CREST, Sjogran, Scleroderma, ILD on 02, pulmonary HTN, HTN, HLD, hashimotos thyroiditis, hypothyroidsm here for evaluation of the following GI problems:    GERD. Regurgitation. 6 cm hiatal hernia.   No improvement with prevacid and prilosec  No longer Some improvement with carafate 1 g TID   Some improvement w protonix 40mg BID  Unable to complete mano/ph impedance due to  discomfort   -Well controlled w nexium 40mg BID     Little's esophagus.  Not seen on most recent EGD     Intractable belching.  Resolved  -Unable to compete manometry to r/o supragastric belching    Dry mouth.    Biotin prn     Dysphagia. Occasional choking . Seen by ENT in the past.  No EoE.  Nl MBS. Recent esophagogram w dysmotility.  EGD negative   Unable to complete manometry         Nausea.  Vomiting.  Improved.   Unable to get report of smart pill by Dr. Carrillo at [facility]. Nl GES   Unable to tolerate reglan due to difficulty sleeping.     -Phenergan prn       Abdominal pain related to BMs. Improved. Still w abd discomfort w bloating.  IBS.  Improved     Gas and bloating.    History of SIBO +, treated w flagyl, flagyl and neomycin  Avoids artificial sugars    No improvement with lactose elimination  No improvement w low FODMOP diet  No improvement with Rifaximin    Hiccups. Resolved.    Diarrhea related to ofev use, subsides when holds offev.  No Fi or nocturnal diarrhea.  Stools studies negative in July 2020.  EGD w duod bx negative in 2017/ colon w colinic bx negative in 2017 (prior to onset of diarrhea). Nl albumin  -Will hold off on repeat EGD/colon bc pt is very high risk for procedures adb symptoms resolve when ovef is held.   -Imodium prn     Colitis.  Admitted to the hospital w self limited bloody diarrhea 10/2016.  CT showed colitis. Had similar episode 5 years prior. Negative stools studies.   Colonoscopy negative.     3 tiny TA in 2013.  No polyps in 2017.    -Repeat in 2027     Anemia.  Hg 11 decreased over the past few years.  Ho CHRIS requiring IV iron in the past   -Check iron studies  -Will check EGD/colonoscopy if CHRIS, FI or nocturnal diarrhea     Vit D Deficiency   50K U weekly     Hepatomegaly.  Seen by hepatology. Likely related to hepatic congestion, secondary to pulmonary hypertension.    Fibroscan <F3/4 fibrosis. No further evaluation required.     Anxiety.  Panic attacks  -Lexapro and  buspar    CREST, Sjogran, Scleroderma, ILD, pulmonary HTN.    -Followed by pulmonology and rheumatology.  -Followed by Dr. Farisa and pulm/transplant     Pre-lung transplant evaluation.   Not a candidate for tx at [facility].     Hashimotos thyroiditis. Hypothyroid    -On synthroid    Follow up in about 5 months (around 5/4/2021).    1. Diarrhea, unspecified type    2. Anemia, unspecified type    3. Gastroesophageal reflux disease, unspecified whether esophagitis present    4. Dysphagia, unspecified type    5. Scleroderma    6. Nausea and vomiting, intractability of vomiting not specified, unspecified vomiting type    7. Bloating    8. Abdominal pain, unspecified abdominal location      Orders Placed This Encounter    Ferritin    Iron and TIBC    loperamide (IMODIUM) 2 mg capsule         Thank you so much for allowing me to participate in the care of Fatimah Santiago MD

## 2021-01-22 ENCOUNTER — TELEPHONE (OUTPATIENT)
Dept: PULMONOLOGY | Facility: CLINIC | Age: 66
End: 2021-01-22

## 2021-01-22 ENCOUNTER — PATIENT MESSAGE (OUTPATIENT)
Dept: PULMONOLOGY | Facility: CLINIC | Age: 66
End: 2021-01-22

## 2021-01-28 ENCOUNTER — TELEPHONE (OUTPATIENT)
Dept: TRANSPLANT | Facility: CLINIC | Age: 66
End: 2021-01-28

## 2021-02-23 ENCOUNTER — PATIENT MESSAGE (OUTPATIENT)
Dept: RHEUMATOLOGY | Facility: CLINIC | Age: 66
End: 2021-02-23

## 2021-02-23 ENCOUNTER — LAB VISIT (OUTPATIENT)
Dept: LAB | Facility: HOSPITAL | Age: 66
End: 2021-02-23
Attending: INTERNAL MEDICINE
Payer: MEDICARE

## 2021-02-23 ENCOUNTER — OFFICE VISIT (OUTPATIENT)
Dept: PULMONOLOGY | Facility: CLINIC | Age: 66
End: 2021-02-23
Payer: MEDICARE

## 2021-02-23 ENCOUNTER — OFFICE VISIT (OUTPATIENT)
Dept: RHEUMATOLOGY | Facility: CLINIC | Age: 66
End: 2021-02-23
Payer: MEDICARE

## 2021-02-23 VITALS
SYSTOLIC BLOOD PRESSURE: 102 MMHG | HEIGHT: 71 IN | OXYGEN SATURATION: 95 % | BODY MASS INDEX: 24.72 KG/M2 | HEART RATE: 79 BPM | DIASTOLIC BLOOD PRESSURE: 51 MMHG | WEIGHT: 176.56 LBS

## 2021-02-23 VITALS
BODY MASS INDEX: 25.18 KG/M2 | SYSTOLIC BLOOD PRESSURE: 81 MMHG | HEIGHT: 71 IN | WEIGHT: 179.88 LBS | HEART RATE: 67 BPM | DIASTOLIC BLOOD PRESSURE: 51 MMHG

## 2021-02-23 DIAGNOSIS — M34.1 CREST (CALCINOSIS, RAYNAUD'S PHENOMENON, ESOPHAGEAL DYSFUNCTION, SCLERODACTYLY, TELANGIECTASIA): ICD-10-CM

## 2021-02-23 DIAGNOSIS — Z79.899 HIGH RISK MEDICATION USE: ICD-10-CM

## 2021-02-23 DIAGNOSIS — I27.9 CHRONIC PULMONARY HEART DISEASE: ICD-10-CM

## 2021-02-23 DIAGNOSIS — J84.89 INTERSTITIAL LUNG DISEASE DUE TO CONNECTIVE TISSUE DISEASE: ICD-10-CM

## 2021-02-23 DIAGNOSIS — I27.20 PULMONARY HYPERTENSION: ICD-10-CM

## 2021-02-23 DIAGNOSIS — J84.10 PULMONARY FIBROSIS: ICD-10-CM

## 2021-02-23 DIAGNOSIS — M35.9 INTERSTITIAL LUNG DISEASE DUE TO CONNECTIVE TISSUE DISEASE: ICD-10-CM

## 2021-02-23 DIAGNOSIS — M35.02 SJOGREN'S SYNDROME WITH LUNG INVOLVEMENT: ICD-10-CM

## 2021-02-23 DIAGNOSIS — M34.9 SCLERODERMA INVOLVING LUNG: ICD-10-CM

## 2021-02-23 DIAGNOSIS — R06.02 SOB (SHORTNESS OF BREATH): ICD-10-CM

## 2021-02-23 DIAGNOSIS — J84.9 ILD (INTERSTITIAL LUNG DISEASE): Primary | ICD-10-CM

## 2021-02-23 DIAGNOSIS — I73.00 RAYNAUD'S DISEASE WITHOUT GANGRENE: ICD-10-CM

## 2021-02-23 DIAGNOSIS — M34.1 CREST (CALCINOSIS, RAYNAUD'S PHENOMENON, ESOPHAGEAL DYSFUNCTION, SCLERODACTYLY, TELANGIECTASIA): Primary | ICD-10-CM

## 2021-02-23 DIAGNOSIS — J84.9 INTERSTITIAL LUNG DISEASE: ICD-10-CM

## 2021-02-23 PROBLEM — E06.3 AUTOIMMUNE THYROIDITIS: Status: ACTIVE | Noted: 2020-11-30

## 2021-02-23 PROBLEM — M81.0 AGE-RELATED OSTEOPOROSIS WITHOUT CURRENT PATHOLOGICAL FRACTURE: Status: ACTIVE | Noted: 2020-11-30

## 2021-02-23 LAB
BASOPHILS # BLD AUTO: 0.09 K/UL (ref 0–0.2)
BASOPHILS NFR BLD: 1.4 % (ref 0–1.9)
BNP SERPL-MCNC: 134 PG/ML (ref 0–99)
DIFFERENTIAL METHOD: ABNORMAL
EOSINOPHIL # BLD AUTO: 0.4 K/UL (ref 0–0.5)
EOSINOPHIL NFR BLD: 6.5 % (ref 0–8)
ERYTHROCYTE [DISTWIDTH] IN BLOOD BY AUTOMATED COUNT: 14.8 % (ref 11.5–14.5)
ERYTHROCYTE [SEDIMENTATION RATE] IN BLOOD BY WESTERGREN METHOD: 15 MM/HR (ref 0–36)
HCT VFR BLD AUTO: 36.4 % (ref 37–48.5)
HGB BLD-MCNC: 10.9 G/DL (ref 12–16)
IMM GRANULOCYTES # BLD AUTO: 0.04 K/UL (ref 0–0.04)
IMM GRANULOCYTES NFR BLD AUTO: 0.6 % (ref 0–0.5)
LYMPHOCYTES # BLD AUTO: 0.7 K/UL (ref 1–4.8)
LYMPHOCYTES NFR BLD: 11.1 % (ref 18–48)
MCH RBC QN AUTO: 27.4 PG (ref 27–31)
MCHC RBC AUTO-ENTMCNC: 29.9 G/DL (ref 32–36)
MCV RBC AUTO: 92 FL (ref 82–98)
MONOCYTES # BLD AUTO: 0.5 K/UL (ref 0.3–1)
MONOCYTES NFR BLD: 7.6 % (ref 4–15)
NEUTROPHILS # BLD AUTO: 4.6 K/UL (ref 1.8–7.7)
NEUTROPHILS NFR BLD: 72.8 % (ref 38–73)
NRBC BLD-RTO: 0 /100 WBC
PLATELET # BLD AUTO: 249 K/UL (ref 150–350)
PMV BLD AUTO: 8.6 FL (ref 9.2–12.9)
RBC # BLD AUTO: 3.98 M/UL (ref 4–5.4)
WBC # BLD AUTO: 6.28 K/UL (ref 3.9–12.7)

## 2021-02-23 PROCEDURE — 86140 C-REACTIVE PROTEIN: CPT

## 2021-02-23 PROCEDURE — 99214 PR OFFICE/OUTPT VISIT, EST, LEVL IV, 30-39 MIN: ICD-10-PCS | Mod: S$PBB,,, | Performed by: INTERNAL MEDICINE

## 2021-02-23 PROCEDURE — 99215 OFFICE O/P EST HI 40 MIN: CPT | Mod: PBBFAC | Performed by: EMERGENCY MEDICINE

## 2021-02-23 PROCEDURE — 99214 OFFICE O/P EST MOD 30 MIN: CPT | Mod: S$PBB,,, | Performed by: INTERNAL MEDICINE

## 2021-02-23 PROCEDURE — 36415 COLL VENOUS BLD VENIPUNCTURE: CPT

## 2021-02-23 PROCEDURE — 83880 ASSAY OF NATRIURETIC PEPTIDE: CPT

## 2021-02-23 PROCEDURE — 80053 COMPREHEN METABOLIC PANEL: CPT

## 2021-02-23 PROCEDURE — 99999 PR PBB SHADOW E&M-EST. PATIENT-LVL IV: CPT | Mod: PBBFAC,,, | Performed by: INTERNAL MEDICINE

## 2021-02-23 PROCEDURE — 99214 OFFICE O/P EST MOD 30 MIN: CPT | Mod: S$PBB,,, | Performed by: EMERGENCY MEDICINE

## 2021-02-23 PROCEDURE — 85652 RBC SED RATE AUTOMATED: CPT

## 2021-02-23 PROCEDURE — 99999 PR PBB SHADOW E&M-EST. PATIENT-LVL V: ICD-10-PCS | Mod: PBBFAC,,, | Performed by: EMERGENCY MEDICINE

## 2021-02-23 PROCEDURE — 82550 ASSAY OF CK (CPK): CPT

## 2021-02-23 PROCEDURE — 99214 PR OFFICE/OUTPT VISIT, EST, LEVL IV, 30-39 MIN: ICD-10-PCS | Mod: S$PBB,,, | Performed by: EMERGENCY MEDICINE

## 2021-02-23 PROCEDURE — 83735 ASSAY OF MAGNESIUM: CPT

## 2021-02-23 PROCEDURE — 99214 OFFICE O/P EST MOD 30 MIN: CPT | Mod: PBBFAC,27 | Performed by: INTERNAL MEDICINE

## 2021-02-23 PROCEDURE — 85025 COMPLETE CBC W/AUTO DIFF WBC: CPT

## 2021-02-23 PROCEDURE — 99999 PR PBB SHADOW E&M-EST. PATIENT-LVL IV: ICD-10-PCS | Mod: PBBFAC,,, | Performed by: INTERNAL MEDICINE

## 2021-02-23 PROCEDURE — 99999 PR PBB SHADOW E&M-EST. PATIENT-LVL V: CPT | Mod: PBBFAC,,, | Performed by: EMERGENCY MEDICINE

## 2021-02-23 RX ORDER — MYCOPHENOLATE MOFETIL 500 MG/1
1500 TABLET ORAL 2 TIMES DAILY
Qty: 180 TABLET | Refills: 2 | Status: SHIPPED | OUTPATIENT
Start: 2021-02-23 | End: 2021-04-27

## 2021-02-24 LAB
ALBUMIN SERPL BCP-MCNC: 4 G/DL (ref 3.5–5.2)
ALP SERPL-CCNC: 62 U/L (ref 55–135)
ALT SERPL W/O P-5'-P-CCNC: 9 U/L (ref 10–44)
ANION GAP SERPL CALC-SCNC: 9 MMOL/L (ref 8–16)
AST SERPL-CCNC: 23 U/L (ref 10–40)
BILIRUB SERPL-MCNC: 0.5 MG/DL (ref 0.1–1)
BUN SERPL-MCNC: 14 MG/DL (ref 8–23)
CALCIUM SERPL-MCNC: 9 MG/DL (ref 8.7–10.5)
CHLORIDE SERPL-SCNC: 106 MMOL/L (ref 95–110)
CK SERPL-CCNC: 77 U/L (ref 20–180)
CO2 SERPL-SCNC: 26 MMOL/L (ref 23–29)
CREAT SERPL-MCNC: 0.7 MG/DL (ref 0.5–1.4)
CRP SERPL-MCNC: 4.8 MG/L (ref 0–8.2)
EST. GFR  (AFRICAN AMERICAN): >60 ML/MIN/1.73 M^2
EST. GFR  (NON AFRICAN AMERICAN): >60 ML/MIN/1.73 M^2
GLUCOSE SERPL-MCNC: 87 MG/DL (ref 70–110)
MAGNESIUM SERPL-MCNC: 2.1 MG/DL (ref 1.6–2.6)
POTASSIUM SERPL-SCNC: 4.2 MMOL/L (ref 3.5–5.1)
PROT SERPL-MCNC: 6.8 G/DL (ref 6–8.4)
SODIUM SERPL-SCNC: 141 MMOL/L (ref 136–145)

## 2021-03-18 ENCOUNTER — TELEPHONE (OUTPATIENT)
Dept: PULMONOLOGY | Facility: CLINIC | Age: 66
End: 2021-03-18

## 2021-03-19 ENCOUNTER — HOSPITAL ENCOUNTER (OUTPATIENT)
Dept: PULMONOLOGY | Facility: CLINIC | Age: 66
Discharge: HOME | End: 2021-03-19
Payer: MEDICARE

## 2021-03-19 ENCOUNTER — OFFICE VISIT (OUTPATIENT)
Dept: TRANSPLANT | Facility: CLINIC | Age: 66
End: 2021-03-19
Payer: MEDICARE

## 2021-03-19 VITALS
BODY MASS INDEX: 24.17 KG/M2 | WEIGHT: 169 LBS | WEIGHT: 172.63 LBS | DIASTOLIC BLOOD PRESSURE: 58 MMHG | SYSTOLIC BLOOD PRESSURE: 102 MMHG | HEIGHT: 71 IN | OXYGEN SATURATION: 99 % | BODY MASS INDEX: 25.03 KG/M2 | HEIGHT: 69 IN | HEART RATE: 74 BPM

## 2021-03-19 DIAGNOSIS — I27.9 CHRONIC PULMONARY HEART DISEASE: ICD-10-CM

## 2021-03-19 DIAGNOSIS — J96.11 CHRONIC RESPIRATORY FAILURE WITH HYPOXIA: ICD-10-CM

## 2021-03-19 DIAGNOSIS — M34.1 CREST (CALCINOSIS, RAYNAUD'S PHENOMENON, ESOPHAGEAL DYSFUNCTION, SCLERODACTYLY, TELANGIECTASIA): ICD-10-CM

## 2021-03-19 DIAGNOSIS — E78.5 HYPERLIPIDEMIA, UNSPECIFIED HYPERLIPIDEMIA TYPE: ICD-10-CM

## 2021-03-19 DIAGNOSIS — I73.00 RAYNAUD'S DISEASE WITHOUT GANGRENE: ICD-10-CM

## 2021-03-19 DIAGNOSIS — M35.01 SJOGREN'S SYNDROME WITH KERATOCONJUNCTIVITIS SICCA: ICD-10-CM

## 2021-03-19 DIAGNOSIS — I27.20 PULMONARY HYPERTENSION: Primary | ICD-10-CM

## 2021-03-19 PROCEDURE — 99213 PR OFFICE/OUTPT VISIT, EST, LEVL III, 20-29 MIN: ICD-10-PCS | Mod: S$PBB,,, | Performed by: INTERNAL MEDICINE

## 2021-03-19 PROCEDURE — 94618 PULMONARY STRESS TESTING: CPT | Mod: PBBFAC | Performed by: INTERNAL MEDICINE

## 2021-03-19 PROCEDURE — 94618 PULMONARY STRESS TESTING: CPT | Mod: 26,S$PBB,, | Performed by: INTERNAL MEDICINE

## 2021-03-19 PROCEDURE — 99999 PR PBB SHADOW E&M-EST. PATIENT-LVL IV: ICD-10-PCS | Mod: PBBFAC,,, | Performed by: INTERNAL MEDICINE

## 2021-03-19 PROCEDURE — 99213 OFFICE O/P EST LOW 20 MIN: CPT | Mod: S$PBB,,, | Performed by: INTERNAL MEDICINE

## 2021-03-19 PROCEDURE — 94618 PULMONARY STRESS TESTING: ICD-10-PCS | Mod: 26,S$PBB,, | Performed by: INTERNAL MEDICINE

## 2021-03-19 PROCEDURE — 99999 PR PBB SHADOW E&M-EST. PATIENT-LVL IV: CPT | Mod: PBBFAC,,, | Performed by: INTERNAL MEDICINE

## 2021-03-19 PROCEDURE — 99214 OFFICE O/P EST MOD 30 MIN: CPT | Mod: PBBFAC | Performed by: INTERNAL MEDICINE

## 2021-04-13 ENCOUNTER — TELEPHONE (OUTPATIENT)
Dept: GASTROENTEROLOGY | Facility: CLINIC | Age: 66
End: 2021-04-13

## 2021-05-13 ENCOUNTER — EPISODE CHANGES (OUTPATIENT)
Dept: TRANSPLANT | Facility: CLINIC | Age: 66
End: 2021-05-13

## 2021-05-17 ENCOUNTER — TELEPHONE (OUTPATIENT)
Dept: RHEUMATOLOGY | Facility: CLINIC | Age: 66
End: 2021-05-17

## 2021-07-20 DIAGNOSIS — J84.9 INTERSTITIAL LUNG DISEASE: ICD-10-CM

## 2021-07-20 DIAGNOSIS — M34.1 CREST (CALCINOSIS, RAYNAUD'S PHENOMENON, ESOPHAGEAL DYSFUNCTION, SCLERODACTYLY, TELANGIECTASIA): ICD-10-CM

## 2021-07-20 RX ORDER — MYCOPHENOLATE MOFETIL 500 MG/1
1500 TABLET ORAL 2 TIMES DAILY
Qty: 540 TABLET | Refills: 0 | OUTPATIENT
Start: 2021-07-20

## 2021-10-18 ENCOUNTER — PATIENT MESSAGE (OUTPATIENT)
Dept: TRANSPLANT | Facility: CLINIC | Age: 66
End: 2021-10-18
Payer: MEDICARE

## 2022-03-15 ENCOUNTER — TELEPHONE (OUTPATIENT)
Dept: GASTROENTEROLOGY | Facility: CLINIC | Age: 67
End: 2022-03-15
Payer: MEDICARE

## 2022-03-15 NOTE — TELEPHONE ENCOUNTER
----- Message from Sandi Aggarwal sent at 3/15/2022 12:13 PM CDT -----  Regarding: appt  Contact: pt @ [phone]  Pt calling to speak with someone regarding scheduling an appt with Dr. Santiago in July. Please call.

## 2022-03-15 NOTE — TELEPHONE ENCOUNTER
Called and spoke with pt,   Pt explained had a double lung transplant with HH repair in [place].  Will be returning home ~ June/July.  Explained we do not have July schedule yet.   Asked pt to call the clinic at end of May, early June to schedule for July.  Pt agreed.

## 2022-07-12 DIAGNOSIS — I10 HYPERTENSION, UNSPECIFIED TYPE: Primary | ICD-10-CM

## 2022-07-12 NOTE — PROGRESS NOTES
"Mrs. Cha called to says that she was moving back to the area. She has been in [place] for 14 months, following a double lung transplant. She said she was in the hospital for 2 months; her "heart stopped," she had a seizure, and a stroke. She is better, able to return home to [facility], and has been participating in pul rehab. Has noted, however that her blood pressure is very high. Runs in the 170/90s when she exercises. She has been put on hydralazine and metoprolol, but doesn't think it is helping. She would like to establish care with a cardiologist here.  "

## 2022-10-03 NOTE — PROGRESS NOTES
LUNG TRANSPLANT PRE FOLLOW-UP    Referring Physician: Luis Ken    Reason for Visit:  Pre-lung transplant follow-up.         Date of Initial Evaluation:                                                                                              History of Present Illness: Fatimah Cha is a 62 y.o. female who is on 2L of oxygen. She is on no assisted ventilation.  Her [place] Heart Association Class is III and a Karnofsky score of 70% - Cares for self: Unable to carry on normal activity or active work. She is not diabetic.  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  She was diagnosed with Sjogren's in 2013.  Had abnormal imaging at that time and had a VATS bx at [facility] that showed UIP.  She was originally started on Imuran and steroids.  She transferred care to [facility] and is followed Dr. Ken (pulm) and Dr. Vitale (rheum).  Since she was last seen in transplant clinic in 7/2017, she has been taken off Imuran and started on Cellcept 1500mg BID.  She was also started on Rituxan infusions (12/19/2017; 1/2/2018).  She remains on Prednisone and becomes more symptomatic when weaned down.  Since starting the Rituxan, she states that she has had improvement in her dyspnea.  Notes that she does not desaturate as much with exertion and does not have to up titrate her oxygen as much.  She was given abx after her first infusion for respiratory symptoms and was hospitalized for volume overload after her second.  Otherwise, no other hospitalizations or sick contacts.  Has been seen by GI and had a MBSS and EGD--which were unremarkable.  Biopsies were taken and are pending.  GERD is well controlled with BID PPI.      Review of Systems   Constitutional: Negative for chills, diaphoresis, fever, malaise/fatigue and weight loss.   HENT: Negative for congestion, hearing loss, nosebleeds, sinus pain and sore throat.    Eyes: Negative for blurred vision, double vision, photophobia, pain, discharge and redness.   Respiratory:  "Positive for shortness of breath. Negative for cough, hemoptysis, sputum production, wheezing and stridor.    Cardiovascular: Positive for leg swelling. Negative for chest pain, palpitations, orthopnea, claudication and PND.   Gastrointestinal: Negative for abdominal pain, blood in stool, constipation, diarrhea, heartburn, melena, nausea and vomiting.   Genitourinary: Negative for dysuria, flank pain, frequency, hematuria and urgency.   Musculoskeletal: Negative for back pain, falls, joint pain, myalgias and neck pain.   Skin: Negative for itching and rash.   Neurological: Negative for dizziness, tingling, tremors, sensory change, speech change, focal weakness, seizures, weakness and headaches.   Endo/Heme/Allergies: Negative for environmental allergies and polydipsia. Does not bruise/bleed easily.   Psychiatric/Behavioral: Negative for depression, hallucinations, memory loss, substance abuse and suicidal ideas. The patient is not nervous/anxious and does not have insomnia.         Objective:   /63 (BP Location: Left arm, Patient Position: Sitting, BP Method: Medium (Automatic))   Pulse 83   Temp 97.9 °F (36.6 °C) (Oral)   Resp 20   Ht 5' 9" (1.753 m)   Wt 80.3 kg (177 lb)   SpO2 97% Comment: 2L NC  BMI 26.14 kg/m²     Physical Exam   Constitutional: She is oriented to person, place, and time and well-developed, well-nourished, and in no distress. No distress.   HENT:   Head: Normocephalic and atraumatic.   Nose: Nose normal.   Mouth/Throat: Oropharynx is clear and moist. No oropharyngeal exudate.   Eyes: Conjunctivae and EOM are normal. Pupils are equal, round, and reactive to light. No scleral icterus.   Neck: Normal range of motion. Neck supple. No JVD present. No tracheal deviation present. No thyromegaly present.   Cardiovascular: Normal rate, regular rhythm and normal heart sounds.  Exam reveals no gallop and no friction rub.    No murmur heard.  Pulmonary/Chest: Effort normal. No stridor. No " respiratory distress. She has no wheezes. She has rales in the right lower field and the left lower field. She exhibits no tenderness.   Abdominal: Soft. She exhibits no distension. There is no rebound.   Musculoskeletal: Normal range of motion. She exhibits edema. She exhibits no tenderness or deformity.   Trace right lower extremity edema with varicose veins   Neurological: She is alert and oriented to person, place, and time. Gait normal. Coordination normal.   Skin: Skin is warm and dry. No rash noted. She is not diaphoretic. No erythema. No pallor.   Psychiatric: Mood and affect normal.     Labs:  No visits with results within 7 Day(s) from this visit.   Latest known visit with results is:   Admission on 01/03/2018, Discharged on 01/03/2018   Component Date Value    WBC 01/03/2018 6.42     RBC 01/03/2018 4.20     Hemoglobin 01/03/2018 11.6*    Hematocrit 01/03/2018 37.1     MCV 01/03/2018 88     MCH 01/03/2018 27.6     MCHC 01/03/2018 31.3*    RDW 01/03/2018 14.7*    Platelets 01/03/2018 201     MPV 01/03/2018 9.3     Immature Granulocytes 01/03/2018 1.1*    Gran # 01/03/2018 4.6     Immature Grans (Abs) 01/03/2018 0.07*    Lymph # 01/03/2018 0.8*    Mono # 01/03/2018 0.7     Eos # 01/03/2018 0.2     Baso # 01/03/2018 0.06     nRBC 01/03/2018 0     Gran% 01/03/2018 71.2     Lymph% 01/03/2018 11.8*    Mono% 01/03/2018 11.4     Eosinophil% 01/03/2018 3.6     Basophil% 01/03/2018 0.9     Differential Method 01/03/2018 Automated     Sodium 01/03/2018 142     Potassium 01/03/2018 4.2     Chloride 01/03/2018 110     CO2 01/03/2018 23     Glucose 01/03/2018 86     BUN, Bld 01/03/2018 14     Creatinine 01/03/2018 0.7     Calcium 01/03/2018 8.5*    Total Protein 01/03/2018 7.1     Albumin 01/03/2018 3.4*    Total Bilirubin 01/03/2018 0.4     Alkaline Phosphatase 01/03/2018 49*    AST 01/03/2018 39     ALT 01/03/2018 17     Anion Gap 01/03/2018 9     eGFR if   01/03/2018 >60.0     eGFR if non African Amer* 01/03/2018 >60.0     Troponin I 01/03/2018 0.008     BNP 01/03/2018 42        Pulmonary Function Tests 1/10/2018 7/3/2017 6/13/2017   FVC 2.38 2.6 2.6   FEV1 1.94 2.2 2.23   TLC (liters) - 3.3 3.3   DLCO (ml/mmHg sec) 6.7 6.4 6.2   FVC% 68 74 65   FEV1% 71 80 72   FEF 25-75 2.21 2.99 2.94   FEF 25-75% 84 113 112   TLC% - 56 55   RV - 0.78 0.68   RV% - 34 29   DLCO% 34 32 25     Other:   6MWT: Walked 940 feet without stops.  Desaturated to 88% on 3L nasal cannula.  Stopped for dyspnea.  Kushal score post exercise 3.      Assessment:-  1. Lung transplant candidate    2. Interstitial lung disease    3. Sjogren's syndrome with lung involvement    4. Chronic respiratory failure with hypoxia    5. Pulmonary hypertension    6. Gastroesophageal reflux disease, esophagitis presence not specified      Plan:   1. Followed for ILD from Sjogren's syndrome.  Sees Dr. Ken and Dr. Vitale at [facility].  Currently on Mycophenolate 1,500mg BID (changed from Imuran in 7/2017) and Prednisone.  Was started on Rituxan in 12/2017 and has received 2 doses with improvement in her symptoms.  FVC has decreased 9% from previous visit.  Biopsy results reviewed from [facility].  Vague description of interstitial fibrosis.  Noted increased lymphocytes and no fibroblastic foci or temporal heterogeneity.  CT scan more consistent with NSIP.  Continue current medications and follow-up with Dr. Ken and Dr. Vitale.    2.  Continue 2 L supplemental oxygen for her chronic hypoxemic respiratory failure.      3. Is scheduled for echocardiogram to assess for PH.  She had a RHC in 5/2017 that showed a mPAP of 30.  Continue Letairis as prescribed.  Agree with her stopping Sildenafil.      4. GERD is well controlled with BID PPI.  MBSS without aspiration.  GI following and EGD unremarkable.  Follows with Dr. Santiago.     5. As far as lung transplant candidacy, she has concerning findings at this time which  include a 9% decrease in her FVC over a 6 month period.  She also has exertional hypoxemia down to 88% on 6MWT.  Subjectively, she feels better after her first 2 infusions of Rituxan.  Will defer work-up for transplant at this time but will follow closely with repeat PFTs in 3 months.  Can reevaluate earlier if she has clinical decline before her next visit.      6. Follow up in 3 months or earlier if needed.    Arely Viramontes DO  PCCM Fellow    Attending Note:    I have seen and evaluated the patient with the fellow. Their note reflects the content of our discussion and my plan of care.      Rodrick Garcia MD  Pulmonary/Critical Care Medicine     Acitretin Counseling:  I discussed with the patient the risks of acitretin including but not limited to hair loss, dry lips/skin/eyes, liver damage, hyperlipidemia, depression/suicidal ideation, photosensitivity.  Serious rare side effects can include but are not limited to pancreatitis, pseudotumor cerebri, bony changes, clot formation/stroke/heart attack.  Patient understands that alcohol is contraindicated since it can result in liver toxicity and significantly prolong the elimination of the drug by many years.
